# Patient Record
Sex: FEMALE | Race: WHITE | NOT HISPANIC OR LATINO | Employment: UNEMPLOYED | URBAN - METROPOLITAN AREA
[De-identification: names, ages, dates, MRNs, and addresses within clinical notes are randomized per-mention and may not be internally consistent; named-entity substitution may affect disease eponyms.]

---

## 2022-04-15 ENCOUNTER — PRE-ADMISSION TESTING (OUTPATIENT)
Dept: PREADMISSION TESTING | Facility: HOSPITAL | Age: 62
End: 2022-04-15

## 2022-04-15 VITALS — HEIGHT: 65 IN | BODY MASS INDEX: 37.17 KG/M2 | WEIGHT: 223.11 LBS

## 2022-04-15 LAB
DEPRECATED RDW RBC AUTO: 47.7 FL (ref 37–54)
ERYTHROCYTE [DISTWIDTH] IN BLOOD BY AUTOMATED COUNT: 14.1 % (ref 12.3–15.4)
HBA1C MFR BLD: 5.2 % (ref 4.8–5.6)
HCT VFR BLD AUTO: 43.8 % (ref 34–46.6)
HGB BLD-MCNC: 14.4 G/DL (ref 12–15.9)
MCH RBC QN AUTO: 30.2 PG (ref 26.6–33)
MCHC RBC AUTO-ENTMCNC: 32.9 G/DL (ref 31.5–35.7)
MCV RBC AUTO: 91.8 FL (ref 79–97)
PLATELET # BLD AUTO: 287 10*3/MM3 (ref 140–450)
PMV BLD AUTO: 10 FL (ref 6–12)
QT INTERVAL: 370 MS
QTC INTERVAL: 432 MS
RBC # BLD AUTO: 4.77 10*6/MM3 (ref 3.77–5.28)
SARS-COV-2 RNA PNL SPEC NAA+PROBE: NOT DETECTED
WBC NRBC COR # BLD: 8.28 10*3/MM3 (ref 3.4–10.8)

## 2022-04-15 PROCEDURE — 36415 COLL VENOUS BLD VENIPUNCTURE: CPT

## 2022-04-15 PROCEDURE — 83036 HEMOGLOBIN GLYCOSYLATED A1C: CPT

## 2022-04-15 PROCEDURE — 93010 ELECTROCARDIOGRAM REPORT: CPT | Performed by: INTERNAL MEDICINE

## 2022-04-15 PROCEDURE — U0004 COV-19 TEST NON-CDC HGH THRU: HCPCS

## 2022-04-15 PROCEDURE — 93005 ELECTROCARDIOGRAM TRACING: CPT

## 2022-04-15 PROCEDURE — C9803 HOPD COVID-19 SPEC COLLECT: HCPCS

## 2022-04-15 PROCEDURE — 85027 COMPLETE CBC AUTOMATED: CPT

## 2022-04-15 RX ORDER — TIZANIDINE 4 MG/1
4 TABLET ORAL AS NEEDED
Status: ON HOLD | COMMUNITY
Start: 2022-03-21 | End: 2022-10-20

## 2022-04-15 RX ORDER — GABAPENTIN 300 MG/1
300 CAPSULE ORAL 3 TIMES DAILY PRN
Status: ON HOLD | COMMUNITY
Start: 2022-03-21 | End: 2022-10-20

## 2022-04-15 RX ORDER — RIVAROXABAN 20 MG/1
20 TABLET, FILM COATED ORAL DAILY
Status: ON HOLD | COMMUNITY
Start: 2022-03-21 | End: 2022-10-17

## 2022-04-15 RX ORDER — AMLODIPINE BESYLATE 10 MG/1
10 TABLET ORAL DAILY
Status: ON HOLD | COMMUNITY
Start: 2022-03-21 | End: 2022-12-05

## 2022-04-15 RX ORDER — PRIMIDONE 50 MG/1
150 TABLET ORAL NIGHTLY
COMMUNITY
Start: 2022-03-21 | End: 2022-10-22 | Stop reason: HOSPADM

## 2022-04-15 RX ORDER — ATORVASTATIN CALCIUM 40 MG/1
40 TABLET, FILM COATED ORAL DAILY
COMMUNITY
Start: 2022-03-21

## 2022-04-15 RX ORDER — MELATONIN
3000 2 TIMES DAILY
COMMUNITY
Start: 2022-03-21

## 2022-04-15 RX ORDER — CYANOCOBALAMIN 1000 UG/ML
1000 INJECTION, SOLUTION INTRAMUSCULAR; SUBCUTANEOUS
Status: ON HOLD | COMMUNITY
Start: 2022-03-21 | End: 2022-10-20

## 2022-04-15 RX ORDER — LIOTHYRONINE SODIUM 5 UG/1
5 TABLET ORAL DAILY
COMMUNITY
Start: 2022-03-21

## 2022-04-15 RX ORDER — ESTRADIOL 1 MG/1
1 TABLET ORAL DAILY
Status: ON HOLD | COMMUNITY
End: 2022-10-17

## 2022-04-15 RX ORDER — LEVOTHYROXINE SODIUM 112 UG/1
224 CAPSULE ORAL DAILY
Status: ON HOLD | COMMUNITY
Start: 2022-03-31 | End: 2022-10-20

## 2022-04-15 NOTE — PAT
Patient viewed general [facility] education video as instructed in their preoperative information received from their surgeon.  Patient stated the general [facility] education video was viewed in its entirety and survey completed.  Copies of [facility] general education handouts (Incentive Spirometry, Meds to Beds Program, Patient Belongings, Pre-op skin preparation instructions, Blood Glucose testing, Visitor policy, Surgery FAQ, Code H) distributed to patient if not printed. Education related to the PAT pass and skin preparation for surgery (if applicable) completed in PAT as a reinforcement to PAT education video. Patient instructed to return PAT pass provided today as well as completed skin preparation sheet (if applicable) on the day of procedure.     Additionally if patient had not viewed video yet but intended to view it at home or in our waiting area, then referred them to the handout with QR code/link provided during PAT visit.  Instructed patient to complete survey after viewing the video in its entirety.  Encouraged patient/family to read [facility] general education handouts thoroughly and notify PAT staff with any questions or concerns. Patient verbalized understanding of all information and priority content.    Patient to apply Chlorhexadine wipes  to surgical area (as instructed) the night before procedure and the AM of procedure. Wipes provided.    Covid test done in PAT, pt reports she tested positive for Covid in February 2022 in [place] but could not find result.  Asymptomatic in PAT today.     Pt reports she is stopping Xarelto 48 hours prior to procedure.  Pt reports last dose will be on 4/15/22.

## 2022-04-17 ENCOUNTER — ANESTHESIA EVENT (OUTPATIENT)
Dept: PERIOP | Facility: HOSPITAL | Age: 62
End: 2022-04-17

## 2022-04-17 RX ORDER — SODIUM CHLORIDE 0.9 % (FLUSH) 0.9 %
10 SYRINGE (ML) INJECTION EVERY 12 HOURS SCHEDULED
Status: CANCELLED | OUTPATIENT
Start: 2022-04-17

## 2022-04-17 RX ORDER — FAMOTIDINE 10 MG/ML
20 INJECTION, SOLUTION INTRAVENOUS ONCE
Status: CANCELLED | OUTPATIENT
Start: 2022-04-17 | End: 2022-04-17

## 2022-04-18 ENCOUNTER — ANESTHESIA (OUTPATIENT)
Dept: PERIOP | Facility: HOSPITAL | Age: 62
End: 2022-04-18

## 2022-04-18 ENCOUNTER — APPOINTMENT (OUTPATIENT)
Dept: GENERAL RADIOLOGY | Facility: HOSPITAL | Age: 62
End: 2022-04-18

## 2022-04-18 ENCOUNTER — HOSPITAL ENCOUNTER (OUTPATIENT)
Facility: HOSPITAL | Age: 62
Discharge: HOME | End: 2022-04-18
Attending: SURGERY | Admitting: SURGERY

## 2022-04-18 VITALS
BODY MASS INDEX: 37.1 KG/M2 | OXYGEN SATURATION: 94 % | RESPIRATION RATE: 16 BRPM | SYSTOLIC BLOOD PRESSURE: 120 MMHG | DIASTOLIC BLOOD PRESSURE: 87 MMHG | TEMPERATURE: 97.8 F | WEIGHT: 222.66 LBS | HEIGHT: 65 IN | HEART RATE: 72 BPM

## 2022-04-18 PROBLEM — I72.4 POPLITEAL ARTERY ANEURYSM: Status: ACTIVE | Noted: 2022-04-18

## 2022-04-18 PROBLEM — I72.4 POPLITEAL ANEURYSM: Status: ACTIVE | Noted: 2022-04-18

## 2022-04-18 PROCEDURE — 25010000002 PHENYLEPHRINE 10 MG/ML SOLUTION: Performed by: NURSE ANESTHETIST, CERTIFIED REGISTERED

## 2022-04-18 PROCEDURE — 25010000002 HEPARIN (PORCINE) PER 1000 UNITS: Performed by: NURSE ANESTHETIST, CERTIFIED REGISTERED

## 2022-04-18 PROCEDURE — C1894 INTRO/SHEATH, NON-LASER: HCPCS | Performed by: SURGERY

## 2022-04-18 PROCEDURE — 25010000002 HYDROMORPHONE 1 MG/ML SOLUTION

## 2022-04-18 PROCEDURE — C1874 STENT, COATED/COV W/DEL SYS: HCPCS | Performed by: SURGERY

## 2022-04-18 PROCEDURE — 25010000002 PROPOFOL 10 MG/ML EMULSION: Performed by: NURSE ANESTHETIST, CERTIFIED REGISTERED

## 2022-04-18 PROCEDURE — C1760 CLOSURE DEV, VASC: HCPCS | Performed by: SURGERY

## 2022-04-18 PROCEDURE — 25010000002 CEFAZOLIN IN DEXTROSE 2-4 GM/100ML-% SOLUTION: Performed by: SURGERY

## 2022-04-18 PROCEDURE — C1725 CATH, TRANSLUMIN NON-LASER: HCPCS | Performed by: SURGERY

## 2022-04-18 PROCEDURE — 0 IODIXANOL PER 1 ML: Performed by: SURGERY

## 2022-04-18 PROCEDURE — 25010000002 HEPARIN (PORCINE) PER 1000 UNITS: Performed by: SURGERY

## 2022-04-18 PROCEDURE — 0 LIDOCAINE 1 % SOLUTION: Performed by: SURGERY

## 2022-04-18 PROCEDURE — C1753 CATH, INTRAVAS ULTRASOUND: HCPCS | Performed by: SURGERY

## 2022-04-18 PROCEDURE — 25010000002 MIDAZOLAM PER 1 MG: Performed by: ANESTHESIOLOGY

## 2022-04-18 PROCEDURE — C1769 GUIDE WIRE: HCPCS | Performed by: SURGERY

## 2022-04-18 PROCEDURE — 25010000002 PROTAMINE SULFATE PER 10 MG: Performed by: NURSE ANESTHETIST, CERTIFIED REGISTERED

## 2022-04-18 PROCEDURE — 25010000002 ONDANSETRON PER 1 MG: Performed by: NURSE ANESTHETIST, CERTIFIED REGISTERED

## 2022-04-18 PROCEDURE — 25010000002 DEXAMETHASONE PER 1 MG: Performed by: NURSE ANESTHETIST, CERTIFIED REGISTERED

## 2022-04-18 DEVICE — STENTGR ENDOPROSTH VIABAHN RO HEP 7F 8MM 10X120CM: Type: IMPLANTABLE DEVICE | Site: ARTERIAL | Status: FUNCTIONAL

## 2022-04-18 RX ORDER — HYDROMORPHONE HYDROCHLORIDE 1 MG/ML
0.5 INJECTION, SOLUTION INTRAMUSCULAR; INTRAVENOUS; SUBCUTANEOUS
Status: DISCONTINUED | OUTPATIENT
Start: 2022-04-18 | End: 2022-04-18 | Stop reason: HOSPADM

## 2022-04-18 RX ORDER — IODIXANOL 320 MG/ML
INJECTION, SOLUTION INTRAVASCULAR AS NEEDED
Status: DISCONTINUED | OUTPATIENT
Start: 2022-04-18 | End: 2022-04-18 | Stop reason: HOSPADM

## 2022-04-18 RX ORDER — CEFAZOLIN SODIUM 2 G/100ML
2 INJECTION, SOLUTION INTRAVENOUS ONCE
Status: COMPLETED | OUTPATIENT
Start: 2022-04-18 | End: 2022-04-18

## 2022-04-18 RX ORDER — PROTAMINE SULFATE 10 MG/ML
INJECTION, SOLUTION INTRAVENOUS AS NEEDED
Status: DISCONTINUED | OUTPATIENT
Start: 2022-04-18 | End: 2022-04-18 | Stop reason: SURG

## 2022-04-18 RX ORDER — LIDOCAINE HYDROCHLORIDE 10 MG/ML
INJECTION, SOLUTION INFILTRATION; PERINEURAL AS NEEDED
Status: DISCONTINUED | OUTPATIENT
Start: 2022-04-18 | End: 2022-04-18 | Stop reason: HOSPADM

## 2022-04-18 RX ORDER — LIOTHYRONINE SODIUM 5 UG/1
5 TABLET ORAL DAILY
Status: CANCELLED | OUTPATIENT
Start: 2022-04-18

## 2022-04-18 RX ORDER — MIDAZOLAM HYDROCHLORIDE 1 MG/ML
1 INJECTION INTRAMUSCULAR; INTRAVENOUS
Status: DISCONTINUED | OUTPATIENT
Start: 2022-04-18 | End: 2022-04-18 | Stop reason: HOSPADM

## 2022-04-18 RX ORDER — AMLODIPINE BESYLATE 10 MG/1
10 TABLET ORAL DAILY
Status: CANCELLED | OUTPATIENT
Start: 2022-04-18

## 2022-04-18 RX ORDER — HYDROCODONE BITARTRATE AND ACETAMINOPHEN 5; 325 MG/1; MG/1
1 TABLET ORAL EVERY 4 HOURS PRN
Status: DISCONTINUED | OUTPATIENT
Start: 2022-04-18 | End: 2022-04-18 | Stop reason: HOSPADM

## 2022-04-18 RX ORDER — PRIMIDONE 50 MG/1
50 TABLET ORAL DAILY
Status: CANCELLED | OUTPATIENT
Start: 2022-04-18

## 2022-04-18 RX ORDER — ATORVASTATIN CALCIUM 40 MG/1
40 TABLET, FILM COATED ORAL DAILY
Status: CANCELLED | OUTPATIENT
Start: 2022-04-18

## 2022-04-18 RX ORDER — SODIUM CHLORIDE, SODIUM LACTATE, POTASSIUM CHLORIDE, CALCIUM CHLORIDE 600; 310; 30; 20 MG/100ML; MG/100ML; MG/100ML; MG/100ML
9 INJECTION, SOLUTION INTRAVENOUS CONTINUOUS
Status: DISCONTINUED | OUTPATIENT
Start: 2022-04-18 | End: 2022-04-18 | Stop reason: HOSPADM

## 2022-04-18 RX ORDER — LIDOCAINE HYDROCHLORIDE 10 MG/ML
0.5 INJECTION, SOLUTION EPIDURAL; INFILTRATION; INTRACAUDAL; PERINEURAL ONCE AS NEEDED
Status: COMPLETED | OUTPATIENT
Start: 2022-04-18 | End: 2022-04-18

## 2022-04-18 RX ORDER — ONDANSETRON 2 MG/ML
4 INJECTION INTRAMUSCULAR; INTRAVENOUS ONCE AS NEEDED
Status: DISCONTINUED | OUTPATIENT
Start: 2022-04-18 | End: 2022-04-18 | Stop reason: HOSPADM

## 2022-04-18 RX ORDER — NALOXONE HCL 0.4 MG/ML
0.4 VIAL (ML) INJECTION
Status: DISCONTINUED | OUTPATIENT
Start: 2022-04-18 | End: 2022-04-18 | Stop reason: HOSPADM

## 2022-04-18 RX ORDER — PHENYLEPHRINE HYDROCHLORIDE 10 MG/ML
INJECTION INTRAVENOUS AS NEEDED
Status: DISCONTINUED | OUTPATIENT
Start: 2022-04-18 | End: 2022-04-18 | Stop reason: SURG

## 2022-04-18 RX ORDER — GABAPENTIN 300 MG/1
300 CAPSULE ORAL NIGHTLY
Status: CANCELLED | OUTPATIENT
Start: 2022-04-18

## 2022-04-18 RX ORDER — PROPOFOL 10 MG/ML
VIAL (ML) INTRAVENOUS AS NEEDED
Status: DISCONTINUED | OUTPATIENT
Start: 2022-04-18 | End: 2022-04-18 | Stop reason: SURG

## 2022-04-18 RX ORDER — DEXAMETHASONE SODIUM PHOSPHATE 4 MG/ML
INJECTION, SOLUTION INTRA-ARTICULAR; INTRALESIONAL; INTRAMUSCULAR; INTRAVENOUS; SOFT TISSUE AS NEEDED
Status: DISCONTINUED | OUTPATIENT
Start: 2022-04-18 | End: 2022-04-18 | Stop reason: SURG

## 2022-04-18 RX ORDER — ESTRADIOL 0.5 MG/1
1 TABLET ORAL DAILY
Status: CANCELLED | OUTPATIENT
Start: 2022-04-18

## 2022-04-18 RX ORDER — SODIUM CHLORIDE 0.9 % (FLUSH) 0.9 %
10 SYRINGE (ML) INJECTION AS NEEDED
Status: DISCONTINUED | OUTPATIENT
Start: 2022-04-18 | End: 2022-04-18 | Stop reason: HOSPADM

## 2022-04-18 RX ORDER — CYANOCOBALAMIN 1000 UG/ML
1000 INJECTION, SOLUTION INTRAMUSCULAR; SUBCUTANEOUS
Status: CANCELLED | OUTPATIENT
Start: 2022-04-18

## 2022-04-18 RX ORDER — FAMOTIDINE 20 MG/1
20 TABLET, FILM COATED ORAL ONCE
Status: COMPLETED | OUTPATIENT
Start: 2022-04-18 | End: 2022-04-18

## 2022-04-18 RX ORDER — HEPARIN SODIUM 1000 [USP'U]/ML
INJECTION, SOLUTION INTRAVENOUS; SUBCUTANEOUS AS NEEDED
Status: DISCONTINUED | OUTPATIENT
Start: 2022-04-18 | End: 2022-04-18 | Stop reason: SURG

## 2022-04-18 RX ORDER — ONDANSETRON 2 MG/ML
INJECTION INTRAMUSCULAR; INTRAVENOUS AS NEEDED
Status: DISCONTINUED | OUTPATIENT
Start: 2022-04-18 | End: 2022-04-18 | Stop reason: SURG

## 2022-04-18 RX ORDER — SODIUM CHLORIDE 9 MG/ML
100 INJECTION, SOLUTION INTRAVENOUS CONTINUOUS
Status: DISCONTINUED | OUTPATIENT
Start: 2022-04-18 | End: 2022-04-18 | Stop reason: HOSPADM

## 2022-04-18 RX ORDER — BUPIVACAINE HYDROCHLORIDE 5 MG/ML
INJECTION, SOLUTION EPIDURAL; INTRACAUDAL AS NEEDED
Status: DISCONTINUED | OUTPATIENT
Start: 2022-04-18 | End: 2022-04-18 | Stop reason: HOSPADM

## 2022-04-18 RX ORDER — MELATONIN
1000 DAILY
Status: CANCELLED | OUTPATIENT
Start: 2022-04-18

## 2022-04-18 RX ORDER — FENTANYL CITRATE 50 UG/ML
50 INJECTION, SOLUTION INTRAMUSCULAR; INTRAVENOUS
Status: DISCONTINUED | OUTPATIENT
Start: 2022-04-18 | End: 2022-04-18 | Stop reason: HOSPADM

## 2022-04-18 RX ADMIN — PHENYLEPHRINE HYDROCHLORIDE 200 MCG: 10 INJECTION INTRAVENOUS at 10:25

## 2022-04-18 RX ADMIN — PROPOFOL 50 MG: 10 INJECTION, EMULSION INTRAVENOUS at 09:59

## 2022-04-18 RX ADMIN — SODIUM CHLORIDE, POTASSIUM CHLORIDE, SODIUM LACTATE AND CALCIUM CHLORIDE 9 ML/HR: 600; 310; 30; 20 INJECTION, SOLUTION INTRAVENOUS at 08:45

## 2022-04-18 RX ADMIN — HYDROMORPHONE HYDROCHLORIDE 0.5 MG: 1 INJECTION, SOLUTION INTRAMUSCULAR; INTRAVENOUS; SUBCUTANEOUS at 11:00

## 2022-04-18 RX ADMIN — FAMOTIDINE 20 MG: 20 TABLET ORAL at 08:46

## 2022-04-18 RX ADMIN — ONDANSETRON 4 MG: 2 INJECTION INTRAMUSCULAR; INTRAVENOUS at 09:59

## 2022-04-18 RX ADMIN — PHENYLEPHRINE HYDROCHLORIDE 100 MCG: 10 INJECTION INTRAVENOUS at 10:10

## 2022-04-18 RX ADMIN — DEXAMETHASONE SODIUM PHOSPHATE 4 MG: 4 INJECTION, SOLUTION INTRA-ARTICULAR; INTRALESIONAL; INTRAMUSCULAR; INTRAVENOUS; SOFT TISSUE at 09:59

## 2022-04-18 RX ADMIN — LIDOCAINE HYDROCHLORIDE 0.5 ML: 10 INJECTION, SOLUTION EPIDURAL; INFILTRATION; INTRACAUDAL; PERINEURAL at 08:45

## 2022-04-18 RX ADMIN — MIDAZOLAM HYDROCHLORIDE 2 MG: 1 INJECTION, SOLUTION INTRAMUSCULAR; INTRAVENOUS at 09:05

## 2022-04-18 RX ADMIN — PHENYLEPHRINE HYDROCHLORIDE 100 MCG: 10 INJECTION INTRAVENOUS at 10:19

## 2022-04-18 RX ADMIN — PROPOFOL 100 MCG/KG/MIN: 10 INJECTION, EMULSION INTRAVENOUS at 09:59

## 2022-04-18 RX ADMIN — PROTAMINE SULFATE 20 MG: 10 INJECTION, SOLUTION INTRAVENOUS at 10:42

## 2022-04-18 RX ADMIN — HEPARIN SODIUM 8000 UNITS: 1000 INJECTION, SOLUTION INTRAVENOUS; SUBCUTANEOUS at 10:14

## 2022-04-18 RX ADMIN — CEFAZOLIN SODIUM 2 G: 2 INJECTION, SOLUTION INTRAVENOUS at 09:59

## 2022-04-18 NOTE — CASE MANAGEMENT/SOCIAL WORK
Discharge Planning Assessment  [facility]     Patient Name: Madelaine Chavez  MRN: [id number]  Today's Date: 4/18/2022    Admit Date: 4/18/2022     Discharge Needs Assessment    No documentation.                Discharge Plan     Row Name 04/18/22 1452       Plan    Plan home    Provided Post Acute Provider List? N/A    Provided Post Acute Provider Quality & Resource List? N/A    Patient/Family in Agreement with Plan yes    Plan Comments CM spoke with pt and granddaughter Maximino and bedside. Pt resides in [facility] and is independent of adls. Pt denies use of DME and is not current with home health or outpatient medical services. Pt denies having a living will or legal POA. Pt has received her covid vaccinations. PCP is DUANE Torres in [facility]. Pt has [employer] [place] medicaid and denies concerns or disruption in coverage. Pt has prescription drug coverage and denies issues obtaining or affording current medications. Pt plans to return home and will have assistance from his son Juan and daughter in law who reside with her. Pt will have private transportation home.    Final Discharge Disposition Code 01 - home or self-care              Continued Care and Services - Admitted Since 4/18/2022    Coordination has not been started for this encounter.       Expected Discharge Date and Time     Expected Discharge Date Expected Discharge Time    Apr 18, 2022          Demographic Summary    No documentation.                Functional Status    No documentation.                Psychosocial    No documentation.                Abuse/Neglect    No documentation.                Legal    No documentation.                Substance Abuse    No documentation.                Patient Forms    No documentation.                   Natasha Castro RN

## 2022-04-18 NOTE — H&P
Pre-Op H&P  Madelaine Chavez  [id number]  1960      Chief complaint: RLE pain      Subjective:  Patient is a 62 y.o.female presents for scheduled surgery by Dr. Linn. She anticipates a RIGHT LOWER EXTREMITY ANGIOGRAM IVUS, POSSIBLE POPLITEAL ARTERY STENT today. 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 She had the left popliteal artery repaired about 3 weeks ago; which she recovered well.      Review of Systems:  Constitutional-- No fever, chills or sweats. No fatigue.  CV-- No chest pain, palpitation or syncope. +HTN, HLD  Resp-- No SOB, cough, hemoptysis  Skin--No rashes or lesions      Allergies: No Known Allergies      Home Meds:  Medications Prior to Admission   Medication Sig Dispense Refill Last Dose   • amLODIPine (NORVASC) 10 MG tablet Take 10 mg by mouth Daily.   4/18/2022 at Unknown time   • atorvastatin (LIPITOR) 40 MG tablet Take 40 mg by mouth Daily.   4/17/2022 at Unknown time   • cholecalciferol (VITAMIN D3) 25 MCG (1000 UT) tablet Take 1,000 Units by mouth Daily.   4/17/2022 at Unknown time   • cyanocobalamin 1000 MCG/ML injection Inject 1,000 mcg under the skin into the appropriate area as directed Every 28 (Twenty-Eight) Days.   Past Month at Unknown time   • estradiol (ESTRACE) 1 MG tablet Take 1 mg by mouth Daily.   4/17/2022 at Unknown time   • gabapentin (NEURONTIN) 300 MG capsule Take 300 mg by mouth As Needed.   Past Month at Unknown time   • liothyronine (CYTOMEL) 5 MCG tablet Take 5 mcg by mouth Daily.   4/17/2022 at Unknown time   • primidone (MYSOLINE) 50 MG tablet Take 50 mg by mouth Daily.   4/18/2022 at Unknown time   • Tirosint 112 MCG capsule Take 112 mcg by mouth Daily.   4/17/2022 at Unknown time   • tiZANidine (ZANAFLEX) 4 MG tablet Take 4 mg by mouth As Needed.   Past Month at Unknown time   • Xarelto 20 MG tablet Take 20 mg by mouth Daily.   04/15/2021         PMH:   Past Medical History:   Diagnosis Date   • Cancer  "(HCC)     Vulvar dysplasia   • Elevated cholesterol    • Full dentures    • GERD (gastroesophageal reflux disease)    • History of transfusion     auto transfusion   • Wears glasses      PSH:    Past Surgical History:   Procedure Laterality Date   • ABDOMINAL SURGERY      Portion of bowel removed as a child and lap band surgery as an adult.   • BACK SURGERY     •  SECTION     • COLONOSCOPY     • HERNIA REPAIR      ventral hernia repair with mesh   • HYSTERECTOMY      total   • JOINT REPLACEMENT Bilateral    • LAPAROSCOPIC CHOLECYSTECTOMY     • THYROIDECTOMY, PARTIAL     • VULVA SURGERY       Simple Partial Vulvectomy       Immunization History:  Influenza:   Pneumococcal: UTD  Tetanus: UTD  Covid x2:     Social History:   Tobacco:   Social History     Tobacco Use   Smoking Status Former Smoker   • Quit date: 2021   • Years since quittin.3   Smokeless Tobacco Never Used      Alcohol:     Social History     Substance and Sexual Activity   Alcohol Use Not Currently         Physical Exam:/89 (BP Location: Right arm, Patient Position: Lying)   Pulse 84   Temp 98 °F (36.7 °C) (Temporal)   Resp 18   Ht 165.1 cm (65\")   Wt 101 kg (222 lb 10.6 oz)   SpO2 98%   BMI 37.05 kg/m²       General Appearance:    Alert, cooperative, no distress, appears stated age   Head:    Normocephalic, without obvious abnormality, atraumatic   Lungs:     Clear to auscultation bilaterally, respirations unlabored    Heart:   Regular rate and rhythm, S1 and S2 normal    Abdomen:    Soft without tenderness   Extremities:   Extremities normal, atraumatic, no cyanosis or edema   Skin:   Skin color, texture, turgor normal, no rashes or lesions   Neurologic:   Grossly intact     Results Review:     LABS:  Lab Results   Component Value Date    WBC 8.28 04/15/2022    HGB 14.4 04/15/2022    HCT 43.8 04/15/2022    MCV 91.8 04/15/2022     04/15/2022       RADIOLOGY:  Imaging Results (Last 72 Hours)     ** No " results found for the last 72 hours. **          I reviewed the patient's new clinical results.    Cancer Staging (if applicable)  Cancer Patient: __ yes __no __unknown; If yes, clinical stage T:__ N:__M:__, stage group or __N/A      Impression: Popliteal artery aneurysm       Plan: RIGHT LOWER EXTREMITY ANGIOGRAM IVUS, POSSIBLE POPLITEAL ARTERY STENT      Rachel Howard, APRN   4/18/2022   08:54 EDT

## 2022-04-18 NOTE — OP NOTE
AORTAGRAM WITH OR WITHOUT RUNOFFS POSSIBLE STENT  Procedure Report    Patient Name:  Madelaine Chavez  YOB: 1960    Date of Surgery:  4/18/2022     Indications: This is a 62-year-old female who presented with left sided blue toe syndrome and pain.  Patient underwent work-up and was found to have small bilateral popliteal artery aneurysms with mural thrombus.  She underwent successful endovascular treatment of her left-sided popliteal artery aneurysm and now presents for stenting of her right popliteal artery.    Pre-op Diagnosis:   Asymptomatic right popliteal artery aneurysm with mural thrombus       Post-Op Diagnosis Codes:  Asymptomatic right popliteal artery aneurysm with mural thrombus    Procedure/CPT® Codes:      Procedure(s):  1.  Left common femoral artery percutaneous ultrasound-guided access  2.  Right lower extremity angiogram  3.  Intravascular ultrasound of right superficial femoral and popliteal arteries  4.  Right popliteal artery stenting (8x10 cm viabahn)    Staff:  Surgeon(s):  Jeremy Linn MD    Circulator: Haase, Sherri L, RN; Mary Jo Mejia RN  Radiology Technologist: Harris Santos  Scrub Person: Yaritza Stevens  Nursing Assistant: Fela Christensen PCT           Anesthesia: Monitored Anesthesia Care    Estimated Blood Loss: none    Implants:    Implant Name Type Inv. Item Serial No.  Lot No. LRB No. Used Action   STENTGR ENDOPROSTH VIABAHN RO HEP 7F [id number] - [id number] Implant STENTGR ENDOPROSTH VIABAHN RO HEP 7F 8MM [id number]  WL [employer] RAFAT[employer]  Right 1 Implanted       Specimen:          none        Findings: successful coverage of mural thrombus of the popliteal artery    Complications: none    Description of Procedure: Patient was brought to the operating, and laid on the table in the supine position.  Patient's bilateral groins were prepped and draped in standard surgical fashion.  Timeout was performed.  Left common femoral artery was identified  with ultrasound guidance.  Skin and subcutaneous tissues were infiltrated with local anesthetic.  The vessel was accessed utilizing micropuncture technique.  7 Angolan sheath was placed.  [place] was administered.  Flush catheter was advanced into the infrarenal aorta.  Aortogram was not performed as it was recently done and showed no significant aortic pathology.  The flush catheter was advanced across the aortic bifurcation into the right external iliac artery.  Wire was advanced into the superficial femoral artery on the right.  Short 7 Angolan sheath was exchanged for 7 Angolan by 45 cm sheath.  A glide advantage wire was advanced into the distal popliteal artery and exchanged for a 0.018 system.  An angiogram was performed and this time.  This did not show significant aneurysmal degeneration.  There was proximal and mid popliteal artery anatomy was obscured due to presence of knee hardware.  At this time an intravascular ultrasound catheter was advanced through the superficial femoral and popliteal arteries.  This allowed us to identify and measure the sizes of the vessels and also noted significant amount of thrombus lining is slightly dilated popliteal artery.  The diameter of the vessel measured no greater than 9 mm.  However there was significant amount of mural thrombus lining the vessel.  The vessel was smaller in proximal and distal aspect and measured about 7 mm.  At this time given the presence of mural thrombus and slight dilation of the vessel we decided to proceed and covered this area with a Viabahn stent graft.  The 8 x 10 Viabahn stent graft was advanced and deployed.  It was then angioplastied with a 7 mm balloon.  Completion angiogram showed excellent flow through the treated segment without extravasation of contrast, dissection or stenosis.  At this time procedure was terminated.  Heparin effect was reversed with protamine.  The sheath was removed and access site was closed with a Perclose closure  device.  Sterile dressing was applied.  Patient tolerated the procedure well.  She was taken to recovery stable condition.      Jeremy Linn MD     Date: 4/18/2022  Time: 10:46 EDT

## 2022-04-18 NOTE — ANESTHESIA PREPROCEDURE EVALUATION
Anesthesia Evaluation     Patient summary reviewed and Nursing notes reviewed   NPO Solid Status: > 8 hours  NPO Liquid Status: > 2 hours           Airway   Mallampati: I  TM distance: >3 FB  Neck ROM: full  No difficulty expected  Dental    (+) upper dentures and lower dentures    Pulmonary     breath sounds clear to auscultation  Cardiovascular     ECG reviewed  Rhythm: regular  Rate: normal    (+) hyperlipidemia,       Neuro/Psych  GI/Hepatic/Renal/Endo    (+) obesity,  GERD,      Musculoskeletal     Abdominal    Substance History      OB/GYN          Other      history of cancer                  Anesthesia Plan    ASA 3     MAC     intravenous induction     Anesthetic plan, all risks, benefits, and alternatives have been provided, discussed and informed consent has been obtained with: patient.    Plan discussed with CRNA.        CODE STATUS:

## 2022-04-18 NOTE — ANESTHESIA POSTPROCEDURE EVALUATION
Patient: Madelaine Chavez    Procedure Summary     Date: 04/18/22 Room / Location: [facility] OR 02 / [facility]    Anesthesia Start: 0955 Anesthesia Stop: 1056    Procedure: RIGHT LOWER EXTREMITY ANGIOGRAM IVUS, right POPLITEAL ARTERY STENT (N/A Abdomen) Diagnosis:     Surgeons: Jeremy Linn MD Provider: Toan Kessler MD    Anesthesia Type: MAC ASA Status: 3          Anesthesia Type: MAC    Vitals  Vitals Value Taken Time   BP     Temp     Pulse     Resp     SpO2 95 % 04/18/22 1056           Post Anesthesia Care and Evaluation    Patient location during evaluation: PACU  Patient participation: complete - patient participated  Level of consciousness: awake and alert  Pain management: adequate  Airway patency: patent  Anesthetic complications: No anesthetic complications  PONV Status: none  Cardiovascular status: hemodynamically stable and acceptable  Respiratory status: nonlabored ventilation, acceptable and nasal cannula  Hydration status: acceptable

## 2022-10-12 ENCOUNTER — PRE-ADMISSION TESTING (OUTPATIENT)
Dept: PREADMISSION TESTING | Facility: HOSPITAL | Age: 62
End: 2022-10-12

## 2022-10-12 VITALS — WEIGHT: 217.37 LBS | BODY MASS INDEX: 36.22 KG/M2 | HEIGHT: 65 IN

## 2022-10-12 LAB
ANION GAP SERPL CALCULATED.3IONS-SCNC: 11 MMOL/L (ref 5–15)
APTT PPP: 34.8 SECONDS (ref 22–39)
BUN SERPL-MCNC: 10 MG/DL (ref 8–23)
BUN/CREAT SERPL: 14.3 (ref 7–25)
CALCIUM SPEC-SCNC: 9.3 MG/DL (ref 8.6–10.5)
CHLORIDE SERPL-SCNC: 105 MMOL/L (ref 98–107)
CO2 SERPL-SCNC: 24 MMOL/L (ref 22–29)
CREAT SERPL-MCNC: 0.7 MG/DL (ref 0.57–1)
DEPRECATED RDW RBC AUTO: 44.4 FL (ref 37–54)
EGFRCR SERPLBLD CKD-EPI 2021: 99.2 ML/MIN/1.73
ERYTHROCYTE [DISTWIDTH] IN BLOOD BY AUTOMATED COUNT: 13.9 % (ref 12.3–15.4)
GLUCOSE SERPL-MCNC: 102 MG/DL (ref 65–99)
HBA1C MFR BLD: 5.2 % (ref 4.8–5.6)
HCT VFR BLD AUTO: 39.5 % (ref 34–46.6)
HGB BLD-MCNC: 12.7 G/DL (ref 12–15.9)
INR PPP: 1.18 (ref 0.84–1.13)
MCH RBC QN AUTO: 28 PG (ref 26.6–33)
MCHC RBC AUTO-ENTMCNC: 32.2 G/DL (ref 31.5–35.7)
MCV RBC AUTO: 87 FL (ref 79–97)
PLATELET # BLD AUTO: 351 10*3/MM3 (ref 140–450)
PMV BLD AUTO: 9.6 FL (ref 6–12)
POTASSIUM SERPL-SCNC: 3.9 MMOL/L (ref 3.5–5.2)
PROTHROMBIN TIME: 15 SECONDS (ref 11.4–14.4)
QT INTERVAL: 328 MS
QTC INTERVAL: 420 MS
RBC # BLD AUTO: 4.54 10*6/MM3 (ref 3.77–5.28)
SODIUM SERPL-SCNC: 140 MMOL/L (ref 136–145)
WBC NRBC COR # BLD: 5.73 10*3/MM3 (ref 3.4–10.8)

## 2022-10-12 PROCEDURE — 85027 COMPLETE CBC AUTOMATED: CPT

## 2022-10-12 PROCEDURE — 93005 ELECTROCARDIOGRAM TRACING: CPT

## 2022-10-12 PROCEDURE — 80048 BASIC METABOLIC PNL TOTAL CA: CPT

## 2022-10-12 PROCEDURE — 85610 PROTHROMBIN TIME: CPT

## 2022-10-12 PROCEDURE — 36415 COLL VENOUS BLD VENIPUNCTURE: CPT

## 2022-10-12 PROCEDURE — 85730 THROMBOPLASTIN TIME PARTIAL: CPT

## 2022-10-12 PROCEDURE — 83036 HEMOGLOBIN GLYCOSYLATED A1C: CPT

## 2022-10-12 PROCEDURE — 93010 ELECTROCARDIOGRAM REPORT: CPT | Performed by: INTERNAL MEDICINE

## 2022-10-12 RX ORDER — ALLOPURINOL 100 MG/1
100 TABLET ORAL DAILY
COMMUNITY

## 2022-10-12 NOTE — PAT
Patient viewed general PAT education video as instructed in their preoperative information received from their surgeon.  Patient stated the general PAT education video was viewed in its entirety and survey completed.  Copies of [facility] general education handouts (Incentive Spirometry, Meds to Beds Program, Patient Belongings, Pre-op skin preparation instructions, Blood Glucose testing, Visitor policy, Surgery FAQ, Code H) distributed to patient if not printed. Education related to the PAT pass and skin preparation for surgery (if applicable) completed in PAT as a reinforcement to PAT education video. Patient instructed to return PAT pass provided today as well as completed skin preparation sheet (if applicable) on the day of procedure.     Additionally if patient had not viewed video yet but intended to view it at home or in our waiting area, then referred them to the handout with QR code/link provided during PAT visit.  Instructed patient to complete survey after viewing the video in its entirety.  Encouraged patient/family to read [facility] general education handouts thoroughly and notify PAT staff with any questions or concerns. Patient verbalized understanding of all information and priority content.    An arrival time for procedure was not provided during PAT visit. If patient had any questions or concerns about their arrival time, they were instructed to contact their surgeon/physician.  Additionally, if the patient referred to an arrival time that was acquired from their my chart account, patient was encouraged to verify that time with their surgeon/physician. Arrival times are NOT provided in Pre Admission Testing Department.    Patient to apply Chlorhexadine wipes  to surgical area (as instructed) the night before procedure and the AM of procedure. Wipes provided.    Patient denies any current skin issues.     PATIENT TO HOLD ELIQUIS 48 HOURS PRIOR TO SURGERY. WRITTEN INSTRUCTIONS IN CHART FROM DR HALL. PATIENT  VERBALIZES UNDERSTANDING

## 2022-10-16 ENCOUNTER — ANESTHESIA EVENT (OUTPATIENT)
Dept: PERIOP | Facility: HOSPITAL | Age: 62
End: 2022-10-16

## 2022-10-16 RX ORDER — FAMOTIDINE 10 MG/ML
20 INJECTION, SOLUTION INTRAVENOUS ONCE
Status: CANCELLED | OUTPATIENT
Start: 2022-10-16 | End: 2022-10-16

## 2022-10-17 ENCOUNTER — APPOINTMENT (OUTPATIENT)
Dept: GENERAL RADIOLOGY | Facility: HOSPITAL | Age: 62
End: 2022-10-17

## 2022-10-17 ENCOUNTER — ANESTHESIA (OUTPATIENT)
Dept: PERIOP | Facility: HOSPITAL | Age: 62
End: 2022-10-17

## 2022-10-17 ENCOUNTER — HOSPITAL ENCOUNTER (INPATIENT)
Facility: HOSPITAL | Age: 62
LOS: 5 days | Discharge: HOME | End: 2022-10-22
Attending: SURGERY | Admitting: SURGERY

## 2022-10-17 DIAGNOSIS — I70.229 CRITICAL LIMB ISCHEMIA WITH HISTORY OF REVASCULARIZATION OF SAME EXTREMITY: Primary | ICD-10-CM

## 2022-10-17 DIAGNOSIS — Z98.890 CRITICAL LIMB ISCHEMIA WITH HISTORY OF REVASCULARIZATION OF SAME EXTREMITY: Primary | ICD-10-CM

## 2022-10-17 PROBLEM — M10.9 GOUT: Chronic | Status: ACTIVE | Noted: 2022-10-17

## 2022-10-17 PROBLEM — I10 HTN (HYPERTENSION): Status: ACTIVE | Noted: 2022-10-17

## 2022-10-17 PROBLEM — E66.9 CLASS 2 OBESITY IN ADULT: Status: ACTIVE | Noted: 2022-10-17

## 2022-10-17 PROBLEM — I71.40 AAA (ABDOMINAL AORTIC ANEURYSM): Status: ACTIVE | Noted: 2022-10-17

## 2022-10-17 PROBLEM — Z79.01 CHRONIC ANTICOAGULATION: Status: ACTIVE | Noted: 2022-10-17

## 2022-10-17 PROBLEM — K21.9 GERD (GASTROESOPHAGEAL REFLUX DISEASE): Chronic | Status: ACTIVE | Noted: 2022-10-17

## 2022-10-17 PROBLEM — M10.9 GOUT: Status: ACTIVE | Noted: 2022-10-17

## 2022-10-17 PROBLEM — Z87.891 FORMER SMOKER: Status: ACTIVE | Noted: 2022-10-17

## 2022-10-17 PROBLEM — I72.4 POPLITEAL ARTERY ANEURYSM: Chronic | Status: ACTIVE | Noted: 2022-04-18

## 2022-10-17 PROBLEM — E66.9 CLASS 2 OBESITY IN ADULT: Chronic | Status: ACTIVE | Noted: 2022-10-17

## 2022-10-17 PROBLEM — E89.0 POST-OPERATIVE HYPOTHYROIDISM: Status: ACTIVE | Noted: 2022-10-17

## 2022-10-17 PROBLEM — Z79.01 CHRONIC ANTICOAGULATION: Chronic | Status: ACTIVE | Noted: 2022-10-17

## 2022-10-17 PROBLEM — E78.5 DYSLIPIDEMIA: Chronic | Status: ACTIVE | Noted: 2022-10-17

## 2022-10-17 PROBLEM — Z87.891 FORMER SMOKER: Chronic | Status: ACTIVE | Noted: 2022-10-17

## 2022-10-17 PROBLEM — K21.9 GERD (GASTROESOPHAGEAL REFLUX DISEASE): Status: ACTIVE | Noted: 2022-10-17

## 2022-10-17 PROBLEM — E78.5 DYSLIPIDEMIA: Status: ACTIVE | Noted: 2022-10-17

## 2022-10-17 PROBLEM — I10 HTN (HYPERTENSION): Chronic | Status: ACTIVE | Noted: 2022-10-17

## 2022-10-17 PROBLEM — E89.0 POST-OPERATIVE HYPOTHYROIDISM: Chronic | Status: ACTIVE | Noted: 2022-10-17

## 2022-10-17 PROBLEM — I71.40 AAA (ABDOMINAL AORTIC ANEURYSM): Chronic | Status: ACTIVE | Noted: 2022-10-17

## 2022-10-17 LAB
BASOPHILS # BLD AUTO: 0.02 10*3/MM3 (ref 0–0.2)
BASOPHILS NFR BLD AUTO: 0.3 % (ref 0–1.5)
DEPRECATED RDW RBC AUTO: 43.5 FL (ref 37–54)
EOSINOPHIL # BLD AUTO: 0.02 10*3/MM3 (ref 0–0.4)
EOSINOPHIL NFR BLD AUTO: 0.3 % (ref 0.3–6.2)
ERYTHROCYTE [DISTWIDTH] IN BLOOD BY AUTOMATED COUNT: 14.1 % (ref 12.3–15.4)
HCT VFR BLD AUTO: 34.6 % (ref 34–46.6)
HGB BLD-MCNC: 11.3 G/DL (ref 12–15.9)
IMM GRANULOCYTES # BLD AUTO: 0.05 10*3/MM3 (ref 0–0.05)
IMM GRANULOCYTES NFR BLD AUTO: 0.7 % (ref 0–0.5)
LYMPHOCYTES # BLD AUTO: 0.84 10*3/MM3 (ref 0.7–3.1)
LYMPHOCYTES NFR BLD AUTO: 11.4 % (ref 19.6–45.3)
MCH RBC QN AUTO: 27.7 PG (ref 26.6–33)
MCHC RBC AUTO-ENTMCNC: 32.7 G/DL (ref 31.5–35.7)
MCV RBC AUTO: 84.8 FL (ref 79–97)
MONOCYTES # BLD AUTO: 0.16 10*3/MM3 (ref 0.1–0.9)
MONOCYTES NFR BLD AUTO: 2.2 % (ref 5–12)
NEUTROPHILS NFR BLD AUTO: 6.31 10*3/MM3 (ref 1.7–7)
NEUTROPHILS NFR BLD AUTO: 85.1 % (ref 42.7–76)
NRBC BLD AUTO-RTO: 0 /100 WBC (ref 0–0.2)
PLATELET # BLD AUTO: 379 10*3/MM3 (ref 140–450)
PMV BLD AUTO: 9.4 FL (ref 6–12)
RBC # BLD AUTO: 4.08 10*6/MM3 (ref 3.77–5.28)
WBC NRBC COR # BLD: 7.4 10*3/MM3 (ref 3.4–10.8)

## 2022-10-17 PROCEDURE — C1768 GRAFT, VASCULAR: HCPCS | Performed by: SURGERY

## 2022-10-17 PROCEDURE — 25010000002 CEFAZOLIN IN DEXTROSE 2-4 GM/100ML-% SOLUTION: Performed by: SURGERY

## 2022-10-17 PROCEDURE — 25010000002 FENTANYL CITRATE (PF) 50 MCG/ML SOLUTION: Performed by: NURSE ANESTHETIST, CERTIFIED REGISTERED

## 2022-10-17 PROCEDURE — 99233 SBSQ HOSP IP/OBS HIGH 50: CPT | Performed by: INTERNAL MEDICINE

## 2022-10-17 PROCEDURE — 25010000002 PROTAMINE SULFATE PER 10 MG: Performed by: NURSE ANESTHETIST, CERTIFIED REGISTERED

## 2022-10-17 PROCEDURE — 25010000002 DEXAMETHASONE PER 1 MG: Performed by: NURSE ANESTHETIST, CERTIFIED REGISTERED

## 2022-10-17 PROCEDURE — 25010000002 PHENYLEPHRINE 10 MG/ML SOLUTION 1 ML VIAL: Performed by: NURSE ANESTHETIST, CERTIFIED REGISTERED

## 2022-10-17 PROCEDURE — 25010000002 HEPARIN (PORCINE) PER 1000 UNITS: Performed by: SURGERY

## 2022-10-17 PROCEDURE — 25010000002 PROPOFOL 10 MG/ML EMULSION: Performed by: NURSE ANESTHETIST, CERTIFIED REGISTERED

## 2022-10-17 PROCEDURE — 04CN0ZZ EXTIRPATION OF MATTER FROM LEFT POPLITEAL ARTERY, OPEN APPROACH: ICD-10-PCS | Performed by: SURGERY

## 2022-10-17 PROCEDURE — 25010000002 HYDROMORPHONE 1 MG/ML SOLUTION

## 2022-10-17 PROCEDURE — C1894 INTRO/SHEATH, NON-LASER: HCPCS | Performed by: SURGERY

## 2022-10-17 PROCEDURE — 85025 COMPLETE CBC W/AUTO DIFF WBC: CPT | Performed by: SURGERY

## 2022-10-17 PROCEDURE — B41G1ZZ FLUOROSCOPY OF LEFT LOWER EXTREMITY ARTERIES USING LOW OSMOLAR CONTRAST: ICD-10-PCS | Performed by: SURGERY

## 2022-10-17 PROCEDURE — 041N0JL BYPASS LEFT POPLITEAL ARTERY TO POPLITEAL ARTERY WITH SYNTHETIC SUBSTITUTE, OPEN APPROACH: ICD-10-PCS | Performed by: SURGERY

## 2022-10-17 PROCEDURE — 0 LIDOCAINE 1 % SOLUTION: Performed by: NURSE ANESTHETIST, CERTIFIED REGISTERED

## 2022-10-17 PROCEDURE — C1757 CATH, THROMBECTOMY/EMBOLECT: HCPCS | Performed by: SURGERY

## 2022-10-17 PROCEDURE — 25010000002 IOPAMIDOL 61 % SOLUTION: Performed by: SURGERY

## 2022-10-17 PROCEDURE — 25010000002 HEPARIN (PORCINE) PER 1000 UNITS: Performed by: NURSE ANESTHETIST, CERTIFIED REGISTERED

## 2022-10-17 PROCEDURE — 04CL3ZZ EXTIRPATION OF MATTER FROM LEFT FEMORAL ARTERY, PERCUTANEOUS APPROACH: ICD-10-PCS | Performed by: SURGERY

## 2022-10-17 PROCEDURE — 25010000002 FENTANYL CITRATE (PF) 50 MCG/ML SOLUTION

## 2022-10-17 PROCEDURE — 25010000002 HYDROMORPHONE PER 4 MG: Performed by: SURGERY

## 2022-10-17 PROCEDURE — 25010000002 ONDANSETRON PER 1 MG: Performed by: NURSE ANESTHETIST, CERTIFIED REGISTERED

## 2022-10-17 DEVICE — PROPATEN VASCULAR GRAFT TW RR 6MMX50CM 40CM RINGS HEPARIN
Type: IMPLANTABLE DEVICE | Site: ARTERY ILIAC | Status: FUNCTIONAL
Brand: GORE PROPATEN VASCULAR GRAFT

## 2022-10-17 RX ORDER — AMOXICILLIN 250 MG
2 CAPSULE ORAL 2 TIMES DAILY PRN
Status: DISCONTINUED | OUTPATIENT
Start: 2022-10-17 | End: 2022-10-20

## 2022-10-17 RX ORDER — ASPIRIN 81 MG/1
81 TABLET, CHEWABLE ORAL DAILY
Status: DISCONTINUED | OUTPATIENT
Start: 2022-10-17 | End: 2022-10-22 | Stop reason: HOSPADM

## 2022-10-17 RX ORDER — OXYCODONE AND ACETAMINOPHEN 10; 325 MG/1; MG/1
1 TABLET ORAL EVERY 4 HOURS PRN
Status: DISCONTINUED | OUTPATIENT
Start: 2022-10-17 | End: 2022-10-22 | Stop reason: HOSPADM

## 2022-10-17 RX ORDER — SODIUM CHLORIDE 0.9 % (FLUSH) 0.9 %
10 SYRINGE (ML) INJECTION AS NEEDED
Status: DISCONTINUED | OUTPATIENT
Start: 2022-10-17 | End: 2022-10-17 | Stop reason: HOSPADM

## 2022-10-17 RX ORDER — SODIUM CHLORIDE 0.9 % (FLUSH) 0.9 %
3 SYRINGE (ML) INJECTION EVERY 12 HOURS SCHEDULED
Status: DISCONTINUED | OUTPATIENT
Start: 2022-10-17 | End: 2022-10-17 | Stop reason: HOSPADM

## 2022-10-17 RX ORDER — HYDROMORPHONE HYDROCHLORIDE 1 MG/ML
0.5 INJECTION, SOLUTION INTRAMUSCULAR; INTRAVENOUS; SUBCUTANEOUS
Status: DISCONTINUED | OUTPATIENT
Start: 2022-10-17 | End: 2022-10-20

## 2022-10-17 RX ORDER — SODIUM CHLORIDE 0.9 % (FLUSH) 0.9 %
3-10 SYRINGE (ML) INJECTION AS NEEDED
Status: DISCONTINUED | OUTPATIENT
Start: 2022-10-17 | End: 2022-10-17 | Stop reason: HOSPADM

## 2022-10-17 RX ORDER — LIDOCAINE HYDROCHLORIDE 10 MG/ML
0.5 INJECTION, SOLUTION EPIDURAL; INFILTRATION; INTRACAUDAL; PERINEURAL ONCE AS NEEDED
Status: COMPLETED | OUTPATIENT
Start: 2022-10-17 | End: 2022-10-17

## 2022-10-17 RX ORDER — FENTANYL CITRATE 50 UG/ML
INJECTION, SOLUTION INTRAMUSCULAR; INTRAVENOUS AS NEEDED
Status: DISCONTINUED | OUTPATIENT
Start: 2022-10-17 | End: 2022-10-17 | Stop reason: SURG

## 2022-10-17 RX ORDER — FENTANYL CITRATE 50 UG/ML
50 INJECTION, SOLUTION INTRAMUSCULAR; INTRAVENOUS
Status: DISCONTINUED | OUTPATIENT
Start: 2022-10-17 | End: 2022-10-17 | Stop reason: HOSPADM

## 2022-10-17 RX ORDER — SODIUM CHLORIDE 0.9 % (FLUSH) 0.9 %
10 SYRINGE (ML) INJECTION EVERY 12 HOURS SCHEDULED
Status: DISCONTINUED | OUTPATIENT
Start: 2022-10-17 | End: 2022-10-17 | Stop reason: HOSPADM

## 2022-10-17 RX ORDER — DROPERIDOL 2.5 MG/ML
0.62 INJECTION, SOLUTION INTRAMUSCULAR; INTRAVENOUS
Status: DISCONTINUED | OUTPATIENT
Start: 2022-10-17 | End: 2022-10-17 | Stop reason: HOSPADM

## 2022-10-17 RX ORDER — NALOXONE HCL 0.4 MG/ML
0.4 VIAL (ML) INJECTION
Status: DISCONTINUED | OUTPATIENT
Start: 2022-10-17 | End: 2022-10-20

## 2022-10-17 RX ORDER — BUPIVACAINE HCL/0.9 % NACL/PF 0.125 %
PLASTIC BAG, INJECTION (ML) EPIDURAL AS NEEDED
Status: DISCONTINUED | OUTPATIENT
Start: 2022-10-17 | End: 2022-10-17 | Stop reason: SURG

## 2022-10-17 RX ORDER — PROMETHAZINE HYDROCHLORIDE 25 MG/1
25 TABLET ORAL ONCE AS NEEDED
Status: DISCONTINUED | OUTPATIENT
Start: 2022-10-17 | End: 2022-10-17 | Stop reason: HOSPADM

## 2022-10-17 RX ORDER — AMLODIPINE BESYLATE 10 MG/1
10 TABLET ORAL DAILY
Status: DISCONTINUED | OUTPATIENT
Start: 2022-10-18 | End: 2022-10-22 | Stop reason: HOSPADM

## 2022-10-17 RX ORDER — PROMETHAZINE HYDROCHLORIDE 25 MG/1
25 SUPPOSITORY RECTAL ONCE AS NEEDED
Status: DISCONTINUED | OUTPATIENT
Start: 2022-10-17 | End: 2022-10-17 | Stop reason: HOSPADM

## 2022-10-17 RX ORDER — FAMOTIDINE 20 MG/1
20 TABLET, FILM COATED ORAL ONCE
Status: COMPLETED | OUTPATIENT
Start: 2022-10-17 | End: 2022-10-17

## 2022-10-17 RX ORDER — DEXAMETHASONE SODIUM PHOSPHATE 10 MG/ML
INJECTION INTRAMUSCULAR; INTRAVENOUS AS NEEDED
Status: DISCONTINUED | OUTPATIENT
Start: 2022-10-17 | End: 2022-10-17 | Stop reason: SURG

## 2022-10-17 RX ORDER — MEPERIDINE HYDROCHLORIDE 25 MG/ML
12.5 INJECTION INTRAMUSCULAR; INTRAVENOUS; SUBCUTANEOUS
Status: DISCONTINUED | OUTPATIENT
Start: 2022-10-17 | End: 2022-10-17 | Stop reason: HOSPADM

## 2022-10-17 RX ORDER — MIDAZOLAM HYDROCHLORIDE 1 MG/ML
1 INJECTION INTRAMUSCULAR; INTRAVENOUS
Status: DISCONTINUED | OUTPATIENT
Start: 2022-10-17 | End: 2022-10-17 | Stop reason: HOSPADM

## 2022-10-17 RX ORDER — ENOXAPARIN SODIUM 100 MG/ML
40 INJECTION SUBCUTANEOUS DAILY
Status: DISCONTINUED | OUTPATIENT
Start: 2022-10-18 | End: 2022-10-18 | Stop reason: SDUPTHER

## 2022-10-17 RX ORDER — CEFAZOLIN SODIUM 2 G/100ML
2 INJECTION, SOLUTION INTRAVENOUS EVERY 8 HOURS
Status: COMPLETED | OUTPATIENT
Start: 2022-10-18 | End: 2022-10-18

## 2022-10-17 RX ORDER — BUPIVACAINE HYDROCHLORIDE AND EPINEPHRINE 5; 5 MG/ML; UG/ML
INJECTION, SOLUTION PERINEURAL AS NEEDED
Status: DISCONTINUED | OUTPATIENT
Start: 2022-10-17 | End: 2022-10-17 | Stop reason: HOSPADM

## 2022-10-17 RX ORDER — LIDOCAINE HYDROCHLORIDE 10 MG/ML
INJECTION, SOLUTION INFILTRATION; PERINEURAL AS NEEDED
Status: DISCONTINUED | OUTPATIENT
Start: 2022-10-17 | End: 2022-10-17 | Stop reason: SURG

## 2022-10-17 RX ORDER — ONDANSETRON 2 MG/ML
4 INJECTION INTRAMUSCULAR; INTRAVENOUS ONCE AS NEEDED
Status: DISCONTINUED | OUTPATIENT
Start: 2022-10-17 | End: 2022-10-17 | Stop reason: HOSPADM

## 2022-10-17 RX ORDER — HYDROMORPHONE HYDROCHLORIDE 1 MG/ML
0.5 INJECTION, SOLUTION INTRAMUSCULAR; INTRAVENOUS; SUBCUTANEOUS
Status: DISCONTINUED | OUTPATIENT
Start: 2022-10-17 | End: 2022-10-17 | Stop reason: HOSPADM

## 2022-10-17 RX ORDER — SODIUM CHLORIDE 9 MG/ML
50 INJECTION, SOLUTION INTRAVENOUS CONTINUOUS
Status: DISCONTINUED | OUTPATIENT
Start: 2022-10-17 | End: 2022-10-22 | Stop reason: HOSPADM

## 2022-10-17 RX ORDER — SODIUM CHLORIDE, SODIUM LACTATE, POTASSIUM CHLORIDE, CALCIUM CHLORIDE 600; 310; 30; 20 MG/100ML; MG/100ML; MG/100ML; MG/100ML
9 INJECTION, SOLUTION INTRAVENOUS CONTINUOUS
Status: DISCONTINUED | OUTPATIENT
Start: 2022-10-17 | End: 2022-10-17

## 2022-10-17 RX ORDER — PRIMIDONE 50 MG/1
50 TABLET ORAL DAILY
Status: DISCONTINUED | OUTPATIENT
Start: 2022-10-18 | End: 2022-10-18

## 2022-10-17 RX ORDER — ATORVASTATIN CALCIUM 40 MG/1
40 TABLET, FILM COATED ORAL NIGHTLY
Status: DISCONTINUED | OUTPATIENT
Start: 2022-10-17 | End: 2022-10-22 | Stop reason: HOSPADM

## 2022-10-17 RX ORDER — CEFAZOLIN SODIUM 2 G/100ML
2 INJECTION, SOLUTION INTRAVENOUS ONCE
Status: COMPLETED | OUTPATIENT
Start: 2022-10-17 | End: 2022-10-17

## 2022-10-17 RX ORDER — SODIUM CHLORIDE, SODIUM LACTATE, POTASSIUM CHLORIDE, CALCIUM CHLORIDE 600; 310; 30; 20 MG/100ML; MG/100ML; MG/100ML; MG/100ML
INJECTION, SOLUTION INTRAVENOUS CONTINUOUS PRN
Status: DISCONTINUED | OUTPATIENT
Start: 2022-10-17 | End: 2022-10-17 | Stop reason: SURG

## 2022-10-17 RX ORDER — HEPARIN SODIUM 1000 [USP'U]/ML
INJECTION, SOLUTION INTRAVENOUS; SUBCUTANEOUS AS NEEDED
Status: DISCONTINUED | OUTPATIENT
Start: 2022-10-17 | End: 2022-10-17 | Stop reason: SURG

## 2022-10-17 RX ORDER — FAMOTIDINE 20 MG/1
20 TABLET, FILM COATED ORAL
Status: DISCONTINUED | OUTPATIENT
Start: 2022-10-18 | End: 2022-10-22 | Stop reason: HOSPADM

## 2022-10-17 RX ORDER — ONDANSETRON 2 MG/ML
INJECTION INTRAMUSCULAR; INTRAVENOUS AS NEEDED
Status: DISCONTINUED | OUTPATIENT
Start: 2022-10-17 | End: 2022-10-17 | Stop reason: SURG

## 2022-10-17 RX ORDER — NALOXONE HCL 0.4 MG/ML
0.4 VIAL (ML) INJECTION AS NEEDED
Status: DISCONTINUED | OUTPATIENT
Start: 2022-10-17 | End: 2022-10-17 | Stop reason: HOSPADM

## 2022-10-17 RX ORDER — ONDANSETRON 2 MG/ML
4 INJECTION INTRAMUSCULAR; INTRAVENOUS EVERY 6 HOURS PRN
Status: DISCONTINUED | OUTPATIENT
Start: 2022-10-17 | End: 2022-10-22 | Stop reason: HOSPADM

## 2022-10-17 RX ORDER — EPHEDRINE SULFATE 50 MG/ML
INJECTION, SOLUTION INTRAVENOUS AS NEEDED
Status: DISCONTINUED | OUTPATIENT
Start: 2022-10-17 | End: 2022-10-17 | Stop reason: SURG

## 2022-10-17 RX ORDER — PROPOFOL 10 MG/ML
VIAL (ML) INTRAVENOUS AS NEEDED
Status: DISCONTINUED | OUTPATIENT
Start: 2022-10-17 | End: 2022-10-17 | Stop reason: SURG

## 2022-10-17 RX ORDER — ONDANSETRON 4 MG/1
4 TABLET, FILM COATED ORAL EVERY 6 HOURS PRN
Status: DISCONTINUED | OUTPATIENT
Start: 2022-10-17 | End: 2022-10-22 | Stop reason: HOSPADM

## 2022-10-17 RX ORDER — DROPERIDOL 2.5 MG/ML
0.62 INJECTION, SOLUTION INTRAMUSCULAR; INTRAVENOUS ONCE AS NEEDED
Status: DISCONTINUED | OUTPATIENT
Start: 2022-10-17 | End: 2022-10-17 | Stop reason: HOSPADM

## 2022-10-17 RX ORDER — IPRATROPIUM BROMIDE AND ALBUTEROL SULFATE 2.5; .5 MG/3ML; MG/3ML
3 SOLUTION RESPIRATORY (INHALATION) ONCE AS NEEDED
Status: DISCONTINUED | OUTPATIENT
Start: 2022-10-17 | End: 2022-10-17 | Stop reason: HOSPADM

## 2022-10-17 RX ORDER — PROTAMINE SULFATE 10 MG/ML
INJECTION, SOLUTION INTRAVENOUS AS NEEDED
Status: DISCONTINUED | OUTPATIENT
Start: 2022-10-17 | End: 2022-10-17 | Stop reason: SURG

## 2022-10-17 RX ORDER — GABAPENTIN 300 MG/1
300 CAPSULE ORAL 3 TIMES DAILY PRN
Status: DISCONTINUED | OUTPATIENT
Start: 2022-10-17 | End: 2022-10-22 | Stop reason: HOSPADM

## 2022-10-17 RX ORDER — HYDROCODONE BITARTRATE AND ACETAMINOPHEN 5; 325 MG/1; MG/1
1 TABLET ORAL ONCE AS NEEDED
Status: DISCONTINUED | OUTPATIENT
Start: 2022-10-17 | End: 2022-10-17 | Stop reason: HOSPADM

## 2022-10-17 RX ORDER — LABETALOL HYDROCHLORIDE 5 MG/ML
5 INJECTION, SOLUTION INTRAVENOUS
Status: DISCONTINUED | OUTPATIENT
Start: 2022-10-17 | End: 2022-10-17 | Stop reason: HOSPADM

## 2022-10-17 RX ORDER — ROCURONIUM BROMIDE 10 MG/ML
INJECTION, SOLUTION INTRAVENOUS AS NEEDED
Status: DISCONTINUED | OUTPATIENT
Start: 2022-10-17 | End: 2022-10-17 | Stop reason: SURG

## 2022-10-17 RX ORDER — HYDRALAZINE HYDROCHLORIDE 20 MG/ML
5 INJECTION INTRAMUSCULAR; INTRAVENOUS
Status: DISCONTINUED | OUTPATIENT
Start: 2022-10-17 | End: 2022-10-17 | Stop reason: HOSPADM

## 2022-10-17 RX ORDER — FENTANYL CITRATE 50 UG/ML
INJECTION, SOLUTION INTRAMUSCULAR; INTRAVENOUS
Status: COMPLETED
Start: 2022-10-17 | End: 2022-10-17

## 2022-10-17 RX ADMIN — Medication 100 MCG: at 12:25

## 2022-10-17 RX ADMIN — PROPOFOL 25 MCG/KG/MIN: 10 INJECTION, EMULSION INTRAVENOUS at 11:34

## 2022-10-17 RX ADMIN — EPHEDRINE SULFATE 5 MG: 50 INJECTION INTRAVENOUS at 13:06

## 2022-10-17 RX ADMIN — FAMOTIDINE 20 MG: 20 TABLET ORAL at 09:35

## 2022-10-17 RX ADMIN — SODIUM CHLORIDE, POTASSIUM CHLORIDE, SODIUM LACTATE AND CALCIUM CHLORIDE: 600; 310; 30; 20 INJECTION, SOLUTION INTRAVENOUS at 11:24

## 2022-10-17 RX ADMIN — PROPOFOL 180 MG: 10 INJECTION, EMULSION INTRAVENOUS at 11:27

## 2022-10-17 RX ADMIN — Medication 50 MCG: at 11:44

## 2022-10-17 RX ADMIN — Medication 100 MCG: at 12:52

## 2022-10-17 RX ADMIN — FENTANYL CITRATE 50 MCG: 50 INJECTION, SOLUTION INTRAMUSCULAR; INTRAVENOUS at 16:44

## 2022-10-17 RX ADMIN — EPHEDRINE SULFATE 5 MG: 50 INJECTION INTRAVENOUS at 10:37

## 2022-10-17 RX ADMIN — HYDROMORPHONE HYDROCHLORIDE 0.5 MG: 1 INJECTION, SOLUTION INTRAMUSCULAR; INTRAVENOUS; SUBCUTANEOUS at 19:10

## 2022-10-17 RX ADMIN — HYDROMORPHONE HYDROCHLORIDE 0.5 MG: 1 INJECTION, SOLUTION INTRAMUSCULAR; INTRAVENOUS; SUBCUTANEOUS at 16:05

## 2022-10-17 RX ADMIN — PHENYLEPHRINE HYDROCHLORIDE 0.1 MCG/MIN: 10 INJECTION INTRAVENOUS at 13:13

## 2022-10-17 RX ADMIN — Medication 100 MCG: at 11:48

## 2022-10-17 RX ADMIN — Medication 50 MCG: at 11:33

## 2022-10-17 RX ADMIN — EPHEDRINE SULFATE 5 MG: 50 INJECTION INTRAVENOUS at 11:58

## 2022-10-17 RX ADMIN — Medication 100 MCG: at 10:37

## 2022-10-17 RX ADMIN — FENTANYL CITRATE 50 MCG: 50 INJECTION, SOLUTION INTRAMUSCULAR; INTRAVENOUS at 13:13

## 2022-10-17 RX ADMIN — HEPARIN SODIUM 8000 UNITS: 1000 INJECTION, SOLUTION INTRAVENOUS; SUBCUTANEOUS at 13:13

## 2022-10-17 RX ADMIN — Medication 100 MCG: at 11:58

## 2022-10-17 RX ADMIN — CEFAZOLIN SODIUM 2 G: 2 INJECTION, SOLUTION INTRAVENOUS at 23:48

## 2022-10-17 RX ADMIN — FENTANYL CITRATE 100 MCG: 50 INJECTION, SOLUTION INTRAMUSCULAR; INTRAVENOUS at 11:27

## 2022-10-17 RX ADMIN — ROCURONIUM BROMIDE 10 MG: 10 INJECTION, SOLUTION INTRAVENOUS at 14:40

## 2022-10-17 RX ADMIN — ROCURONIUM BROMIDE 10 MG: 10 INJECTION, SOLUTION INTRAVENOUS at 13:56

## 2022-10-17 RX ADMIN — ROCURONIUM BROMIDE 20 MG: 10 INJECTION, SOLUTION INTRAVENOUS at 12:12

## 2022-10-17 RX ADMIN — ROCURONIUM BROMIDE 10 MG: 10 INJECTION, SOLUTION INTRAVENOUS at 13:06

## 2022-10-17 RX ADMIN — SODIUM CHLORIDE 100 ML/HR: 9 INJECTION, SOLUTION INTRAVENOUS at 18:27

## 2022-10-17 RX ADMIN — CEFAZOLIN SODIUM 3 G: 2 INJECTION, SOLUTION INTRAVENOUS at 15:29

## 2022-10-17 RX ADMIN — SODIUM CHLORIDE, POTASSIUM CHLORIDE, SODIUM LACTATE AND CALCIUM CHLORIDE 9 ML/HR: 600; 310; 30; 20 INJECTION, SOLUTION INTRAVENOUS at 10:00

## 2022-10-17 RX ADMIN — LIDOCAINE HYDROCHLORIDE 50 MG: 10 INJECTION, SOLUTION INFILTRATION; PERINEURAL at 11:27

## 2022-10-17 RX ADMIN — EPHEDRINE SULFATE 5 MG: 50 INJECTION INTRAVENOUS at 11:52

## 2022-10-17 RX ADMIN — Medication 100 MCG: at 13:06

## 2022-10-17 RX ADMIN — CEFAZOLIN SODIUM 2 G: 2 INJECTION, SOLUTION INTRAVENOUS at 11:34

## 2022-10-17 RX ADMIN — HEPARIN SODIUM 2000 UNITS: 1000 INJECTION, SOLUTION INTRAVENOUS; SUBCUTANEOUS at 13:56

## 2022-10-17 RX ADMIN — ROCURONIUM BROMIDE 10 MG: 10 INJECTION, SOLUTION INTRAVENOUS at 12:47

## 2022-10-17 RX ADMIN — ATORVASTATIN CALCIUM 40 MG: 40 TABLET, FILM COATED ORAL at 20:06

## 2022-10-17 RX ADMIN — Medication 100 MCG: at 12:40

## 2022-10-17 RX ADMIN — SUGAMMADEX 200 MG: 100 INJECTION, SOLUTION INTRAVENOUS at 15:36

## 2022-10-17 RX ADMIN — PROTAMINE SULFATE 20 MG: 10 INJECTION, SOLUTION INTRAVENOUS at 15:16

## 2022-10-17 RX ADMIN — Medication 100 MCG: at 11:53

## 2022-10-17 RX ADMIN — Medication 100 MCG: at 12:58

## 2022-10-17 RX ADMIN — FENTANYL CITRATE 50 MCG: 50 INJECTION, SOLUTION INTRAMUSCULAR; INTRAVENOUS at 14:24

## 2022-10-17 RX ADMIN — ROCURONIUM BROMIDE 10 MG: 10 INJECTION, SOLUTION INTRAVENOUS at 14:59

## 2022-10-17 RX ADMIN — HYDROMORPHONE HYDROCHLORIDE 0.5 MG: 1 INJECTION, SOLUTION INTRAMUSCULAR; INTRAVENOUS; SUBCUTANEOUS at 23:33

## 2022-10-17 RX ADMIN — ROCURONIUM BROMIDE 10 MG: 10 INJECTION, SOLUTION INTRAVENOUS at 13:31

## 2022-10-17 RX ADMIN — SODIUM CHLORIDE, POTASSIUM CHLORIDE, SODIUM LACTATE AND CALCIUM CHLORIDE: 600; 310; 30; 20 INJECTION, SOLUTION INTRAVENOUS at 13:54

## 2022-10-17 RX ADMIN — FENTANYL CITRATE 50 MCG: 50 INJECTION, SOLUTION INTRAMUSCULAR; INTRAVENOUS at 17:26

## 2022-10-17 RX ADMIN — Medication 100 MCG: at 12:47

## 2022-10-17 RX ADMIN — ONDANSETRON 4 MG: 2 INJECTION INTRAMUSCULAR; INTRAVENOUS at 15:18

## 2022-10-17 RX ADMIN — LIDOCAINE HYDROCHLORIDE 0.5 ML: 10 INJECTION, SOLUTION EPIDURAL; INFILTRATION; INTRACAUDAL; PERINEURAL at 09:35

## 2022-10-17 RX ADMIN — ASPIRIN 81 MG 81 MG: 81 TABLET ORAL at 18:30

## 2022-10-17 RX ADMIN — ROCURONIUM BROMIDE 50 MG: 10 INJECTION, SOLUTION INTRAVENOUS at 11:27

## 2022-10-17 RX ADMIN — DEXAMETHASONE SODIUM PHOSPHATE 8 MG: 10 INJECTION INTRAMUSCULAR; INTRAVENOUS at 11:33

## 2022-10-17 NOTE — ANESTHESIA POSTPROCEDURE EVALUATION
Patient: Madelaine Chavez    Procedure Summary     Date: 10/17/22 Room / Location: [facility] OR 02 / [facility]    Anesthesia Start: 1124 Anesthesia Stop: 1550    Procedure: LEFT ABOVE KNEE TO BELOW KNEE  POPLITEAL ARTERY BYPASS, COMPLETION OF ANGIOGRAM (Left: Thigh) Diagnosis:     Surgeons: Jeremy Linn MD Provider: Abdi Blanca MD    Anesthesia Type: general ASA Status: 3          Anesthesia Type: general    Vitals  , /70, Sat 93, RR 12, Temp 97          Post Anesthesia Care and Evaluation    Patient location during evaluation: PACU  Patient participation: complete - patient participated  Level of consciousness: awake and alert  Pain score: 0  Pain management: adequate    Airway patency: patent  Anesthetic complications: No anesthetic complications  PONV Status: none  Cardiovascular status: hemodynamically stable and acceptable  Respiratory status: nonlabored ventilation, acceptable and nasal cannula  Hydration status: acceptable

## 2022-10-17 NOTE — ANESTHESIA PREPROCEDURE EVALUATION
Anesthesia Evaluation                  Airway   Mallampati: I  TM distance: >3 FB  Neck ROM: full  No difficulty expected  Dental      Pulmonary    Cardiovascular     ECG reviewed    (+) hypertension, PVD,       Neuro/Psych  GI/Hepatic/Renal/Endo    (+)  GERD,  thyroid problem hypothyroidism    Musculoskeletal     Abdominal    Substance History      OB/GYN          Other   arthritis,    history of cancer                    Anesthesia Plan    ASA 3     general     intravenous induction     Anesthetic plan, risks, benefits, and alternatives have been provided, discussed and informed consent has been obtained with: patient.    Plan discussed with CRNA.        CODE STATUS:

## 2022-10-18 PROBLEM — Z72.0 CURRENT EVERY DAY NICOTINE VAPING: Status: ACTIVE | Noted: 2022-10-18

## 2022-10-18 LAB
ANION GAP SERPL CALCULATED.3IONS-SCNC: 8 MMOL/L (ref 5–15)
BUN SERPL-MCNC: 8 MG/DL (ref 8–23)
BUN/CREAT SERPL: 12.1 (ref 7–25)
CALCIUM SPEC-SCNC: 8.4 MG/DL (ref 8.6–10.5)
CHLORIDE SERPL-SCNC: 107 MMOL/L (ref 98–107)
CO2 SERPL-SCNC: 24 MMOL/L (ref 22–29)
CREAT SERPL-MCNC: 0.66 MG/DL (ref 0.57–1)
DEPRECATED RDW RBC AUTO: 45.4 FL (ref 37–54)
EGFRCR SERPLBLD CKD-EPI 2021: 100.6 ML/MIN/1.73
ERYTHROCYTE [DISTWIDTH] IN BLOOD BY AUTOMATED COUNT: 14.3 % (ref 12.3–15.4)
GLUCOSE SERPL-MCNC: 121 MG/DL (ref 65–99)
HCT VFR BLD AUTO: 32.3 % (ref 34–46.6)
HGB BLD-MCNC: 10.4 G/DL (ref 12–15.9)
MCH RBC QN AUTO: 27.9 PG (ref 26.6–33)
MCHC RBC AUTO-ENTMCNC: 32.2 G/DL (ref 31.5–35.7)
MCV RBC AUTO: 86.6 FL (ref 79–97)
PLATELET # BLD AUTO: 371 10*3/MM3 (ref 140–450)
PMV BLD AUTO: 9.5 FL (ref 6–12)
POTASSIUM SERPL-SCNC: 4.3 MMOL/L (ref 3.5–5.2)
RBC # BLD AUTO: 3.73 10*6/MM3 (ref 3.77–5.28)
SODIUM SERPL-SCNC: 139 MMOL/L (ref 136–145)
WBC NRBC COR # BLD: 8.82 10*3/MM3 (ref 3.4–10.8)

## 2022-10-18 PROCEDURE — 25010000002 ENOXAPARIN PER 10 MG: Performed by: SURGERY

## 2022-10-18 PROCEDURE — 80048 BASIC METABOLIC PNL TOTAL CA: CPT | Performed by: SURGERY

## 2022-10-18 PROCEDURE — 97162 PT EVAL MOD COMPLEX 30 MIN: CPT

## 2022-10-18 PROCEDURE — 25010000002 HYDROMORPHONE PER 4 MG: Performed by: SURGERY

## 2022-10-18 PROCEDURE — 25010000002 CEFAZOLIN IN DEXTROSE 2-4 GM/100ML-% SOLUTION: Performed by: SURGERY

## 2022-10-18 PROCEDURE — 85027 COMPLETE CBC AUTOMATED: CPT | Performed by: SURGERY

## 2022-10-18 PROCEDURE — 99232 SBSQ HOSP IP/OBS MODERATE 35: CPT | Performed by: NURSE PRACTITIONER

## 2022-10-18 RX ORDER — ACETAMINOPHEN 325 MG/1
650 TABLET ORAL EVERY 6 HOURS PRN
Status: DISCONTINUED | OUTPATIENT
Start: 2022-10-18 | End: 2022-10-22 | Stop reason: HOSPADM

## 2022-10-18 RX ORDER — PROPRANOLOL HYDROCHLORIDE 20 MG/1
20 TABLET ORAL EVERY 12 HOURS
Status: DISCONTINUED | OUTPATIENT
Start: 2022-10-18 | End: 2022-10-19

## 2022-10-18 RX ADMIN — AMLODIPINE BESYLATE 10 MG: 10 TABLET ORAL at 08:40

## 2022-10-18 RX ADMIN — APIXABAN 5 MG: 5 TABLET, FILM COATED ORAL at 14:38

## 2022-10-18 RX ADMIN — ATORVASTATIN CALCIUM 40 MG: 40 TABLET, FILM COATED ORAL at 21:27

## 2022-10-18 RX ADMIN — OXYCODONE HYDROCHLORIDE AND ACETAMINOPHEN 1 TABLET: 10; 325 TABLET ORAL at 08:40

## 2022-10-18 RX ADMIN — OXYCODONE HYDROCHLORIDE AND ACETAMINOPHEN 1 TABLET: 10; 325 TABLET ORAL at 12:46

## 2022-10-18 RX ADMIN — SODIUM CHLORIDE 100 ML/HR: 9 INJECTION, SOLUTION INTRAVENOUS at 03:26

## 2022-10-18 RX ADMIN — SODIUM CHLORIDE 100 ML/HR: 9 INJECTION, SOLUTION INTRAVENOUS at 23:26

## 2022-10-18 RX ADMIN — ENOXAPARIN SODIUM 40 MG: 40 INJECTION SUBCUTANEOUS at 08:40

## 2022-10-18 RX ADMIN — FAMOTIDINE 20 MG: 20 TABLET ORAL at 17:03

## 2022-10-18 RX ADMIN — CEFAZOLIN SODIUM 2 G: 2 INJECTION, SOLUTION INTRAVENOUS at 07:24

## 2022-10-18 RX ADMIN — HYDROMORPHONE HYDROCHLORIDE 0.5 MG: 1 INJECTION, SOLUTION INTRAMUSCULAR; INTRAVENOUS; SUBCUTANEOUS at 03:16

## 2022-10-18 RX ADMIN — APIXABAN 5 MG: 5 TABLET, FILM COATED ORAL at 22:49

## 2022-10-18 RX ADMIN — PROPRANOLOL HYDROCHLORIDE 20 MG: 20 TABLET ORAL at 17:56

## 2022-10-18 RX ADMIN — HYDROMORPHONE HYDROCHLORIDE 0.5 MG: 1 INJECTION, SOLUTION INTRAMUSCULAR; INTRAVENOUS; SUBCUTANEOUS at 07:24

## 2022-10-18 RX ADMIN — OXYCODONE HYDROCHLORIDE AND ACETAMINOPHEN 1 TABLET: 10; 325 TABLET ORAL at 00:36

## 2022-10-18 RX ADMIN — HYDROMORPHONE HYDROCHLORIDE 0.5 MG: 1 INJECTION, SOLUTION INTRAMUSCULAR; INTRAVENOUS; SUBCUTANEOUS at 21:28

## 2022-10-18 RX ADMIN — ASPIRIN 81 MG 81 MG: 81 TABLET ORAL at 08:40

## 2022-10-18 RX ADMIN — HYDROMORPHONE HYDROCHLORIDE 0.5 MG: 1 INJECTION, SOLUTION INTRAMUSCULAR; INTRAVENOUS; SUBCUTANEOUS at 11:57

## 2022-10-18 RX ADMIN — HYDROMORPHONE HYDROCHLORIDE 0.5 MG: 1 INJECTION, SOLUTION INTRAMUSCULAR; INTRAVENOUS; SUBCUTANEOUS at 16:15

## 2022-10-18 RX ADMIN — ACETAMINOPHEN 650 MG: 325 TABLET, FILM COATED ORAL at 22:49

## 2022-10-18 RX ADMIN — PRIMIDONE 50 MG: 50 TABLET ORAL at 08:45

## 2022-10-18 RX ADMIN — FAMOTIDINE 20 MG: 20 TABLET ORAL at 07:23

## 2022-10-18 RX ADMIN — OXYCODONE HYDROCHLORIDE AND ACETAMINOPHEN 1 TABLET: 10; 325 TABLET ORAL at 17:03

## 2022-10-19 PROBLEM — E03.9 HYPOTHYROIDISM: Status: ACTIVE | Noted: 2022-10-19

## 2022-10-19 LAB
ANION GAP SERPL CALCULATED.3IONS-SCNC: 9 MMOL/L (ref 5–15)
BUN SERPL-MCNC: 8 MG/DL (ref 8–23)
BUN/CREAT SERPL: 9.6 (ref 7–25)
CALCIUM SPEC-SCNC: 8.2 MG/DL (ref 8.6–10.5)
CHLORIDE SERPL-SCNC: 107 MMOL/L (ref 98–107)
CO2 SERPL-SCNC: 25 MMOL/L (ref 22–29)
CREAT SERPL-MCNC: 0.83 MG/DL (ref 0.57–1)
DEPRECATED RDW RBC AUTO: 46.9 FL (ref 37–54)
EGFRCR SERPLBLD CKD-EPI 2021: 80.8 ML/MIN/1.73
ERYTHROCYTE [DISTWIDTH] IN BLOOD BY AUTOMATED COUNT: 14.6 % (ref 12.3–15.4)
GLUCOSE SERPL-MCNC: 97 MG/DL (ref 65–99)
HCT VFR BLD AUTO: 31.5 % (ref 34–46.6)
HGB BLD-MCNC: 9.8 G/DL (ref 12–15.9)
MAGNESIUM SERPL-MCNC: 2 MG/DL (ref 1.6–2.4)
MCH RBC QN AUTO: 27.8 PG (ref 26.6–33)
MCHC RBC AUTO-ENTMCNC: 31.1 G/DL (ref 31.5–35.7)
MCV RBC AUTO: 89.2 FL (ref 79–97)
PHOSPHATE SERPL-MCNC: 3.6 MG/DL (ref 2.5–4.5)
PLATELET # BLD AUTO: 339 10*3/MM3 (ref 140–450)
PMV BLD AUTO: 9.4 FL (ref 6–12)
POTASSIUM SERPL-SCNC: 3.9 MMOL/L (ref 3.5–5.2)
RBC # BLD AUTO: 3.53 10*6/MM3 (ref 3.77–5.28)
SODIUM SERPL-SCNC: 141 MMOL/L (ref 136–145)
WBC NRBC COR # BLD: 7.66 10*3/MM3 (ref 3.4–10.8)

## 2022-10-19 PROCEDURE — 25010000002 HYDROMORPHONE PER 4 MG: Performed by: SURGERY

## 2022-10-19 PROCEDURE — 80048 BASIC METABOLIC PNL TOTAL CA: CPT | Performed by: NURSE PRACTITIONER

## 2022-10-19 PROCEDURE — 99232 SBSQ HOSP IP/OBS MODERATE 35: CPT | Performed by: NURSE PRACTITIONER

## 2022-10-19 PROCEDURE — 85027 COMPLETE CBC AUTOMATED: CPT | Performed by: NURSE PRACTITIONER

## 2022-10-19 PROCEDURE — 83735 ASSAY OF MAGNESIUM: CPT | Performed by: NURSE PRACTITIONER

## 2022-10-19 PROCEDURE — 84100 ASSAY OF PHOSPHORUS: CPT | Performed by: NURSE PRACTITIONER

## 2022-10-19 RX ORDER — ALLOPURINOL 100 MG/1
100 TABLET ORAL DAILY
Status: DISCONTINUED | OUTPATIENT
Start: 2022-10-19 | End: 2022-10-22 | Stop reason: HOSPADM

## 2022-10-19 RX ORDER — LEVOTHYROXINE SODIUM 112 UG/1
224 TABLET ORAL
Status: DISCONTINUED | OUTPATIENT
Start: 2022-10-19 | End: 2022-10-20

## 2022-10-19 RX ORDER — PROPRANOLOL HYDROCHLORIDE 10 MG/1
10 TABLET ORAL EVERY 12 HOURS
Status: DISCONTINUED | OUTPATIENT
Start: 2022-10-19 | End: 2022-10-22 | Stop reason: HOSPADM

## 2022-10-19 RX ORDER — LIOTHYRONINE SODIUM 5 UG/1
5 TABLET ORAL DAILY
Status: DISCONTINUED | OUTPATIENT
Start: 2022-10-19 | End: 2022-10-22 | Stop reason: HOSPADM

## 2022-10-19 RX ADMIN — SENNOSIDES AND DOCUSATE SODIUM 2 TABLET: 50; 8.6 TABLET ORAL at 06:08

## 2022-10-19 RX ADMIN — APIXABAN 5 MG: 5 TABLET, FILM COATED ORAL at 20:10

## 2022-10-19 RX ADMIN — ALLOPURINOL 100 MG: 100 TABLET ORAL at 14:06

## 2022-10-19 RX ADMIN — FAMOTIDINE 20 MG: 20 TABLET ORAL at 08:42

## 2022-10-19 RX ADMIN — ATORVASTATIN CALCIUM 40 MG: 40 TABLET, FILM COATED ORAL at 20:10

## 2022-10-19 RX ADMIN — GABAPENTIN 300 MG: 300 CAPSULE ORAL at 09:20

## 2022-10-19 RX ADMIN — SODIUM CHLORIDE 100 ML/HR: 9 INJECTION, SOLUTION INTRAVENOUS at 08:46

## 2022-10-19 RX ADMIN — LEVOTHYROXINE SODIUM 224 MCG: 0.11 TABLET ORAL at 14:07

## 2022-10-19 RX ADMIN — HYDROMORPHONE HYDROCHLORIDE 0.5 MG: 1 INJECTION, SOLUTION INTRAMUSCULAR; INTRAVENOUS; SUBCUTANEOUS at 18:14

## 2022-10-19 RX ADMIN — APIXABAN 5 MG: 5 TABLET, FILM COATED ORAL at 08:43

## 2022-10-19 RX ADMIN — GABAPENTIN 300 MG: 300 CAPSULE ORAL at 20:10

## 2022-10-19 RX ADMIN — FAMOTIDINE 20 MG: 20 TABLET ORAL at 18:14

## 2022-10-19 RX ADMIN — OXYCODONE HYDROCHLORIDE AND ACETAMINOPHEN 1 TABLET: 10; 325 TABLET ORAL at 08:42

## 2022-10-19 RX ADMIN — HYDROMORPHONE HYDROCHLORIDE 0.5 MG: 1 INJECTION, SOLUTION INTRAMUSCULAR; INTRAVENOUS; SUBCUTANEOUS at 10:09

## 2022-10-19 RX ADMIN — OXYCODONE HYDROCHLORIDE AND ACETAMINOPHEN 1 TABLET: 10; 325 TABLET ORAL at 14:06

## 2022-10-19 RX ADMIN — PROPRANOLOL HYDROCHLORIDE 10 MG: 10 TABLET ORAL at 18:14

## 2022-10-19 RX ADMIN — ASPIRIN 81 MG 81 MG: 81 TABLET ORAL at 08:42

## 2022-10-19 RX ADMIN — LIOTHYRONINE SODIUM 5 MCG: 5 TABLET ORAL at 14:07

## 2022-10-19 RX ADMIN — HYDROMORPHONE HYDROCHLORIDE 0.5 MG: 1 INJECTION, SOLUTION INTRAMUSCULAR; INTRAVENOUS; SUBCUTANEOUS at 20:10

## 2022-10-20 ENCOUNTER — APPOINTMENT (OUTPATIENT)
Dept: CARDIOLOGY | Facility: HOSPITAL | Age: 62
End: 2022-10-20

## 2022-10-20 ENCOUNTER — APPOINTMENT (OUTPATIENT)
Dept: CT IMAGING | Facility: HOSPITAL | Age: 62
End: 2022-10-20

## 2022-10-20 LAB
ANION GAP SERPL CALCULATED.3IONS-SCNC: 10 MMOL/L (ref 5–15)
BH CV LOWER VASCULAR LEFT COMMON FEMORAL AUGMENT: NORMAL
BH CV LOWER VASCULAR LEFT COMMON FEMORAL COMPRESS: NORMAL
BH CV LOWER VASCULAR LEFT COMMON FEMORAL PHASIC: NORMAL
BH CV LOWER VASCULAR LEFT COMMON FEMORAL SPONT: NORMAL
BH CV LOWER VASCULAR LEFT DISTAL FEMORAL AUGMENT: NORMAL
BH CV LOWER VASCULAR LEFT DISTAL FEMORAL COMPRESS: NORMAL
BH CV LOWER VASCULAR LEFT DISTAL FEMORAL PHASIC: NORMAL
BH CV LOWER VASCULAR LEFT DISTAL FEMORAL SPONT: NORMAL
BH CV LOWER VASCULAR LEFT GASTRONEMIUS COMPRESS: NORMAL
BH CV LOWER VASCULAR LEFT GREATER SAPH AK COMPRESS: NORMAL
BH CV LOWER VASCULAR LEFT GREATER SAPH BK COMPRESS: NORMAL
BH CV LOWER VASCULAR LEFT LESSER SAPH COMPRESS: NORMAL
BH CV LOWER VASCULAR LEFT MID FEMORAL AUGMENT: NORMAL
BH CV LOWER VASCULAR LEFT MID FEMORAL COMPRESS: NORMAL
BH CV LOWER VASCULAR LEFT MID FEMORAL PHASIC: NORMAL
BH CV LOWER VASCULAR LEFT MID FEMORAL SPONT: NORMAL
BH CV LOWER VASCULAR LEFT PERONEAL COMPRESS: NORMAL
BH CV LOWER VASCULAR LEFT POPLITEAL AUGMENT: NORMAL
BH CV LOWER VASCULAR LEFT POPLITEAL COMPRESS: NORMAL
BH CV LOWER VASCULAR LEFT POPLITEAL PHASIC: NORMAL
BH CV LOWER VASCULAR LEFT POPLITEAL SPONT: NORMAL
BH CV LOWER VASCULAR LEFT POSTERIOR TIBIAL COMPRESS: NORMAL
BH CV LOWER VASCULAR LEFT PROFUNDA FEMORAL AUGMENT: NORMAL
BH CV LOWER VASCULAR LEFT PROFUNDA FEMORAL COMPRESS: NORMAL
BH CV LOWER VASCULAR LEFT PROFUNDA FEMORAL PHASIC: NORMAL
BH CV LOWER VASCULAR LEFT PROFUNDA FEMORAL SPONT: NORMAL
BH CV LOWER VASCULAR LEFT PROXIMAL FEMORAL AUGMENT: NORMAL
BH CV LOWER VASCULAR LEFT PROXIMAL FEMORAL COMPRESS: NORMAL
BH CV LOWER VASCULAR LEFT PROXIMAL FEMORAL PHASIC: NORMAL
BH CV LOWER VASCULAR LEFT PROXIMAL FEMORAL SPONT: NORMAL
BH CV LOWER VASCULAR LEFT SAPHENOFEMORAL JUNCTION AUGMENT: NORMAL
BH CV LOWER VASCULAR LEFT SAPHENOFEMORAL JUNCTION COMPRESS: NORMAL
BH CV LOWER VASCULAR LEFT SAPHENOFEMORAL JUNCTION PHASIC: NORMAL
BH CV LOWER VASCULAR LEFT SAPHENOFEMORAL JUNCTION SPONT: NORMAL
BH CV LOWER VASCULAR LEFT SOLEAL COMPRESS: NORMAL
BH CV LOWER VASCULAR RIGHT COMMON FEMORAL AUGMENT: NORMAL
BH CV LOWER VASCULAR RIGHT COMMON FEMORAL COMPRESS: NORMAL
BH CV LOWER VASCULAR RIGHT COMMON FEMORAL PHASIC: NORMAL
BH CV LOWER VASCULAR RIGHT COMMON FEMORAL SPONT: NORMAL
BH CV LOWER VASCULAR RIGHT PROFUNDA FEMORAL AUGMENT: NORMAL
BH CV LOWER VASCULAR RIGHT PROFUNDA FEMORAL PHASIC: NORMAL
BH CV LOWER VASCULAR RIGHT PROFUNDA FEMORAL SPONT: NORMAL
BH CV LOWER VASCULAR RIGHT SAPHENOFEMORAL JUNCTION AUGMENT: NORMAL
BH CV LOWER VASCULAR RIGHT SAPHENOFEMORAL JUNCTION PHASIC: NORMAL
BH CV LOWER VASCULAR RIGHT SAPHENOFEMORAL JUNCTION SPONT: NORMAL
BUN SERPL-MCNC: 11 MG/DL (ref 8–23)
BUN/CREAT SERPL: 11 (ref 7–25)
CALCIUM SPEC-SCNC: 8 MG/DL (ref 8.6–10.5)
CHLORIDE SERPL-SCNC: 103 MMOL/L (ref 98–107)
CO2 SERPL-SCNC: 22 MMOL/L (ref 22–29)
CREAT SERPL-MCNC: 1 MG/DL (ref 0.57–1)
DEPRECATED RDW RBC AUTO: 46.5 FL (ref 37–54)
EGFRCR SERPLBLD CKD-EPI 2021: 64.6 ML/MIN/1.73
ERYTHROCYTE [DISTWIDTH] IN BLOOD BY AUTOMATED COUNT: 14.6 % (ref 12.3–15.4)
GLUCOSE BLDC GLUCOMTR-MCNC: 136 MG/DL (ref 70–130)
GLUCOSE SERPL-MCNC: 121 MG/DL (ref 65–99)
HCT VFR BLD AUTO: 31.5 % (ref 34–46.6)
HGB BLD-MCNC: 10 G/DL (ref 12–15.9)
MAGNESIUM SERPL-MCNC: 1.5 MG/DL (ref 1.6–2.4)
MAXIMAL PREDICTED HEART RATE: 160 BPM
MCH RBC QN AUTO: 27.8 PG (ref 26.6–33)
MCHC RBC AUTO-ENTMCNC: 31.7 G/DL (ref 31.5–35.7)
MCV RBC AUTO: 87.5 FL (ref 79–97)
PHOSPHATE SERPL-MCNC: 3.9 MG/DL (ref 2.5–4.5)
PLATELET # BLD AUTO: 349 10*3/MM3 (ref 140–450)
PMV BLD AUTO: 9.3 FL (ref 6–12)
POTASSIUM SERPL-SCNC: 4.1 MMOL/L (ref 3.5–5.2)
PROCALCITONIN SERPL-MCNC: 0.04 NG/ML (ref 0–0.25)
RBC # BLD AUTO: 3.6 10*6/MM3 (ref 3.77–5.28)
SODIUM SERPL-SCNC: 135 MMOL/L (ref 136–145)
SODIUM SERPL-SCNC: 135 MMOL/L (ref 136–145)
STRESS TARGET HR: 136 BPM
WBC NRBC COR # BLD: 8.45 10*3/MM3 (ref 3.4–10.8)

## 2022-10-20 PROCEDURE — 25010000002 HYDROMORPHONE PER 4 MG: Performed by: SURGERY

## 2022-10-20 PROCEDURE — 84145 PROCALCITONIN (PCT): CPT

## 2022-10-20 PROCEDURE — 84295 ASSAY OF SERUM SODIUM: CPT

## 2022-10-20 PROCEDURE — 99232 SBSQ HOSP IP/OBS MODERATE 35: CPT

## 2022-10-20 PROCEDURE — 97530 THERAPEUTIC ACTIVITIES: CPT

## 2022-10-20 PROCEDURE — 0 IOPAMIDOL PER 1 ML: Performed by: SURGERY

## 2022-10-20 PROCEDURE — 97165 OT EVAL LOW COMPLEX 30 MIN: CPT

## 2022-10-20 PROCEDURE — 73706 CT ANGIO LWR EXTR W/O&W/DYE: CPT

## 2022-10-20 PROCEDURE — 83735 ASSAY OF MAGNESIUM: CPT | Performed by: NURSE PRACTITIONER

## 2022-10-20 PROCEDURE — 84100 ASSAY OF PHOSPHORUS: CPT | Performed by: NURSE PRACTITIONER

## 2022-10-20 PROCEDURE — 80048 BASIC METABOLIC PNL TOTAL CA: CPT | Performed by: NURSE PRACTITIONER

## 2022-10-20 PROCEDURE — 85027 COMPLETE CBC AUTOMATED: CPT | Performed by: NURSE PRACTITIONER

## 2022-10-20 PROCEDURE — 93971 EXTREMITY STUDY: CPT | Performed by: INTERNAL MEDICINE

## 2022-10-20 PROCEDURE — 93971 EXTREMITY STUDY: CPT

## 2022-10-20 PROCEDURE — 97535 SELF CARE MNGMENT TRAINING: CPT

## 2022-10-20 PROCEDURE — 25010000002 MAGNESIUM SULFATE 2 GM/50ML SOLUTION

## 2022-10-20 PROCEDURE — 82962 GLUCOSE BLOOD TEST: CPT

## 2022-10-20 PROCEDURE — 63710000001 ONDANSETRON PER 8 MG: Performed by: SURGERY

## 2022-10-20 RX ORDER — PLECANATIDE 3 MG/1
3 TABLET ORAL DAILY
COMMUNITY

## 2022-10-20 RX ORDER — AMOXICILLIN 250 MG
2 CAPSULE ORAL 2 TIMES DAILY
Status: DISCONTINUED | OUTPATIENT
Start: 2022-10-20 | End: 2022-10-22 | Stop reason: HOSPADM

## 2022-10-20 RX ORDER — HYDROMORPHONE HYDROCHLORIDE 1 MG/ML
0.5 INJECTION, SOLUTION INTRAMUSCULAR; INTRAVENOUS; SUBCUTANEOUS
Status: DISCONTINUED | OUTPATIENT
Start: 2022-10-20 | End: 2022-10-22 | Stop reason: HOSPADM

## 2022-10-20 RX ORDER — LEVOTHYROXINE SODIUM 125 UG/1
250 CAPSULE ORAL DAILY
COMMUNITY

## 2022-10-20 RX ORDER — MAGNESIUM SULFATE HEPTAHYDRATE 40 MG/ML
4 INJECTION, SOLUTION INTRAVENOUS AS NEEDED
Status: DISCONTINUED | OUTPATIENT
Start: 2022-10-20 | End: 2022-10-22 | Stop reason: HOSPADM

## 2022-10-20 RX ORDER — OXYCODONE AND ACETAMINOPHEN 10; 325 MG/1; MG/1
1 TABLET ORAL EVERY 6 HOURS PRN
COMMUNITY

## 2022-10-20 RX ORDER — LEVOTHYROXINE SODIUM 125 UG/1
250 CAPSULE ORAL
Status: DISCONTINUED | OUTPATIENT
Start: 2022-10-21 | End: 2022-10-22 | Stop reason: HOSPADM

## 2022-10-20 RX ORDER — MAGNESIUM SULFATE HEPTAHYDRATE 40 MG/ML
2 INJECTION, SOLUTION INTRAVENOUS AS NEEDED
Status: DISCONTINUED | OUTPATIENT
Start: 2022-10-20 | End: 2022-10-22 | Stop reason: HOSPADM

## 2022-10-20 RX ORDER — ESTRADIOL 1 MG/1
1 TABLET ORAL DAILY
Status: ON HOLD | COMMUNITY
End: 2022-10-20

## 2022-10-20 RX ORDER — NALOXONE HCL 0.4 MG/ML
0.4 VIAL (ML) INJECTION
Status: DISCONTINUED | OUTPATIENT
Start: 2022-10-20 | End: 2022-10-22 | Stop reason: HOSPADM

## 2022-10-20 RX ORDER — BISACODYL 10 MG
10 SUPPOSITORY, RECTAL RECTAL DAILY PRN
Status: DISCONTINUED | OUTPATIENT
Start: 2022-10-20 | End: 2022-10-22 | Stop reason: HOSPADM

## 2022-10-20 RX ORDER — POLYETHYLENE GLYCOL 3350 17 G/17G
17 POWDER, FOR SOLUTION ORAL DAILY PRN
Status: DISCONTINUED | OUTPATIENT
Start: 2022-10-20 | End: 2022-10-22 | Stop reason: HOSPADM

## 2022-10-20 RX ORDER — BISACODYL 5 MG/1
5 TABLET, DELAYED RELEASE ORAL DAILY PRN
Status: DISCONTINUED | OUTPATIENT
Start: 2022-10-20 | End: 2022-10-22 | Stop reason: HOSPADM

## 2022-10-20 RX ADMIN — FAMOTIDINE 20 MG: 20 TABLET ORAL at 08:21

## 2022-10-20 RX ADMIN — APIXABAN 5 MG: 5 TABLET, FILM COATED ORAL at 20:00

## 2022-10-20 RX ADMIN — IOPAMIDOL 125 ML: 755 INJECTION, SOLUTION INTRAVENOUS at 02:52

## 2022-10-20 RX ADMIN — ONDANSETRON HYDROCHLORIDE 4 MG: 4 TABLET, FILM COATED ORAL at 17:31

## 2022-10-20 RX ADMIN — GABAPENTIN 300 MG: 300 CAPSULE ORAL at 23:23

## 2022-10-20 RX ADMIN — MAGNESIUM SULFATE HEPTAHYDRATE 2 G: 2 INJECTION, SOLUTION INTRAVENOUS at 12:47

## 2022-10-20 RX ADMIN — GABAPENTIN 300 MG: 300 CAPSULE ORAL at 08:28

## 2022-10-20 RX ADMIN — PROPRANOLOL HYDROCHLORIDE 10 MG: 10 TABLET ORAL at 17:31

## 2022-10-20 RX ADMIN — OXYCODONE HYDROCHLORIDE AND ACETAMINOPHEN 1 TABLET: 10; 325 TABLET ORAL at 17:31

## 2022-10-20 RX ADMIN — BISACODYL 5 MG: 5 TABLET, COATED ORAL at 16:02

## 2022-10-20 RX ADMIN — HYDROMORPHONE HYDROCHLORIDE 0.5 MG: 1 INJECTION, SOLUTION INTRAMUSCULAR; INTRAVENOUS; SUBCUTANEOUS at 19:52

## 2022-10-20 RX ADMIN — ATORVASTATIN CALCIUM 40 MG: 40 TABLET, FILM COATED ORAL at 20:00

## 2022-10-20 RX ADMIN — FAMOTIDINE 20 MG: 20 TABLET ORAL at 16:54

## 2022-10-20 RX ADMIN — APIXABAN 5 MG: 5 TABLET, FILM COATED ORAL at 08:21

## 2022-10-20 RX ADMIN — SENNOSIDES AND DOCUSATE SODIUM 2 TABLET: 50; 8.6 TABLET ORAL at 20:00

## 2022-10-20 RX ADMIN — HYDROMORPHONE HYDROCHLORIDE 0.5 MG: 1 INJECTION, SOLUTION INTRAMUSCULAR; INTRAVENOUS; SUBCUTANEOUS at 11:40

## 2022-10-20 RX ADMIN — PROPRANOLOL HYDROCHLORIDE 10 MG: 10 TABLET ORAL at 05:22

## 2022-10-20 RX ADMIN — HYDROMORPHONE HYDROCHLORIDE 0.5 MG: 1 INJECTION, SOLUTION INTRAMUSCULAR; INTRAVENOUS; SUBCUTANEOUS at 14:31

## 2022-10-20 RX ADMIN — HYDROMORPHONE HYDROCHLORIDE 0.5 MG: 1 INJECTION, SOLUTION INTRAMUSCULAR; INTRAVENOUS; SUBCUTANEOUS at 01:13

## 2022-10-20 RX ADMIN — OXYCODONE HYDROCHLORIDE AND ACETAMINOPHEN 1 TABLET: 10; 325 TABLET ORAL at 21:05

## 2022-10-20 RX ADMIN — HYDROMORPHONE HYDROCHLORIDE 0.5 MG: 1 INJECTION, SOLUTION INTRAMUSCULAR; INTRAVENOUS; SUBCUTANEOUS at 18:42

## 2022-10-20 RX ADMIN — ACETAMINOPHEN 650 MG: 325 TABLET, FILM COATED ORAL at 10:28

## 2022-10-20 RX ADMIN — HYDROMORPHONE HYDROCHLORIDE 0.5 MG: 1 INJECTION, SOLUTION INTRAMUSCULAR; INTRAVENOUS; SUBCUTANEOUS at 16:54

## 2022-10-20 RX ADMIN — ALLOPURINOL 100 MG: 100 TABLET ORAL at 08:21

## 2022-10-20 RX ADMIN — HYDROMORPHONE HYDROCHLORIDE 0.5 MG: 1 INJECTION, SOLUTION INTRAMUSCULAR; INTRAVENOUS; SUBCUTANEOUS at 21:05

## 2022-10-20 RX ADMIN — MAGNESIUM SULFATE HEPTAHYDRATE 2 G: 2 INJECTION, SOLUTION INTRAVENOUS at 04:03

## 2022-10-20 RX ADMIN — OXYCODONE HYDROCHLORIDE AND ACETAMINOPHEN 1 TABLET: 10; 325 TABLET ORAL at 09:32

## 2022-10-20 RX ADMIN — LIOTHYRONINE SODIUM 5 MCG: 5 TABLET ORAL at 08:28

## 2022-10-20 RX ADMIN — MAGNESIUM SULFATE HEPTAHYDRATE 2 G: 2 INJECTION, SOLUTION INTRAVENOUS at 06:47

## 2022-10-20 RX ADMIN — ASPIRIN 81 MG 81 MG: 81 TABLET ORAL at 08:21

## 2022-10-20 RX ADMIN — HYDROMORPHONE HYDROCHLORIDE 0.5 MG: 1 INJECTION, SOLUTION INTRAMUSCULAR; INTRAVENOUS; SUBCUTANEOUS at 23:23

## 2022-10-20 RX ADMIN — AMLODIPINE BESYLATE 10 MG: 10 TABLET ORAL at 08:21

## 2022-10-20 RX ADMIN — LEVOTHYROXINE SODIUM 224 MCG: 0.11 TABLET ORAL at 05:22

## 2022-10-21 LAB
ANION GAP SERPL CALCULATED.3IONS-SCNC: 9 MMOL/L (ref 5–15)
BUN SERPL-MCNC: 9 MG/DL (ref 8–23)
BUN/CREAT SERPL: 13.4 (ref 7–25)
CALCIUM SPEC-SCNC: 8.2 MG/DL (ref 8.6–10.5)
CHLORIDE SERPL-SCNC: 108 MMOL/L (ref 98–107)
CO2 SERPL-SCNC: 24 MMOL/L (ref 22–29)
CREAT SERPL-MCNC: 0.67 MG/DL (ref 0.57–1)
DEPRECATED RDW RBC AUTO: 46.9 FL (ref 37–54)
EGFRCR SERPLBLD CKD-EPI 2021: 100.2 ML/MIN/1.73
ERYTHROCYTE [DISTWIDTH] IN BLOOD BY AUTOMATED COUNT: 14.4 % (ref 12.3–15.4)
GLUCOSE SERPL-MCNC: 112 MG/DL (ref 65–99)
HCT VFR BLD AUTO: 31.7 % (ref 34–46.6)
HGB BLD-MCNC: 10 G/DL (ref 12–15.9)
MAGNESIUM SERPL-MCNC: 1.9 MG/DL (ref 1.6–2.4)
MCH RBC QN AUTO: 28.1 PG (ref 26.6–33)
MCHC RBC AUTO-ENTMCNC: 31.5 G/DL (ref 31.5–35.7)
MCV RBC AUTO: 89 FL (ref 79–97)
PHOSPHATE SERPL-MCNC: 4.6 MG/DL (ref 2.5–4.5)
PLATELET # BLD AUTO: 339 10*3/MM3 (ref 140–450)
PMV BLD AUTO: 9.4 FL (ref 6–12)
POTASSIUM SERPL-SCNC: 4.2 MMOL/L (ref 3.5–5.2)
RBC # BLD AUTO: 3.56 10*6/MM3 (ref 3.77–5.28)
SODIUM SERPL-SCNC: 141 MMOL/L (ref 136–145)
WBC NRBC COR # BLD: 6.09 10*3/MM3 (ref 3.4–10.8)

## 2022-10-21 PROCEDURE — 80048 BASIC METABOLIC PNL TOTAL CA: CPT

## 2022-10-21 PROCEDURE — 85027 COMPLETE CBC AUTOMATED: CPT

## 2022-10-21 PROCEDURE — 25010000002 ONDANSETRON PER 1 MG: Performed by: SURGERY

## 2022-10-21 PROCEDURE — 83735 ASSAY OF MAGNESIUM: CPT

## 2022-10-21 PROCEDURE — 25010000002 HYDROMORPHONE PER 4 MG: Performed by: SURGERY

## 2022-10-21 PROCEDURE — 84100 ASSAY OF PHOSPHORUS: CPT

## 2022-10-21 PROCEDURE — 99232 SBSQ HOSP IP/OBS MODERATE 35: CPT

## 2022-10-21 RX ADMIN — OXYCODONE HYDROCHLORIDE AND ACETAMINOPHEN 1 TABLET: 10; 325 TABLET ORAL at 18:29

## 2022-10-21 RX ADMIN — FAMOTIDINE 20 MG: 20 TABLET ORAL at 17:18

## 2022-10-21 RX ADMIN — FAMOTIDINE 20 MG: 20 TABLET ORAL at 06:01

## 2022-10-21 RX ADMIN — LIOTHYRONINE SODIUM 5 MCG: 5 TABLET ORAL at 11:09

## 2022-10-21 RX ADMIN — PROPRANOLOL HYDROCHLORIDE 10 MG: 10 TABLET ORAL at 17:18

## 2022-10-21 RX ADMIN — APIXABAN 5 MG: 5 TABLET, FILM COATED ORAL at 11:09

## 2022-10-21 RX ADMIN — APIXABAN 5 MG: 5 TABLET, FILM COATED ORAL at 20:02

## 2022-10-21 RX ADMIN — SENNOSIDES AND DOCUSATE SODIUM 2 TABLET: 50; 8.6 TABLET ORAL at 20:02

## 2022-10-21 RX ADMIN — SENNOSIDES AND DOCUSATE SODIUM 2 TABLET: 50; 8.6 TABLET ORAL at 11:08

## 2022-10-21 RX ADMIN — ALLOPURINOL 100 MG: 100 TABLET ORAL at 11:08

## 2022-10-21 RX ADMIN — OXYCODONE HYDROCHLORIDE AND ACETAMINOPHEN 1 TABLET: 10; 325 TABLET ORAL at 12:13

## 2022-10-21 RX ADMIN — ASPIRIN 81 MG 81 MG: 81 TABLET ORAL at 11:09

## 2022-10-21 RX ADMIN — LEVOTHYROXINE SODIUM 250 MCG: 125 CAPSULE ORAL at 05:21

## 2022-10-21 RX ADMIN — OXYCODONE HYDROCHLORIDE AND ACETAMINOPHEN 1 TABLET: 10; 325 TABLET ORAL at 05:18

## 2022-10-21 RX ADMIN — OXYCODONE HYDROCHLORIDE AND ACETAMINOPHEN 1 TABLET: 10; 325 TABLET ORAL at 01:44

## 2022-10-21 RX ADMIN — HYDROMORPHONE HYDROCHLORIDE 0.5 MG: 1 INJECTION, SOLUTION INTRAMUSCULAR; INTRAVENOUS; SUBCUTANEOUS at 20:02

## 2022-10-21 RX ADMIN — HYDROMORPHONE HYDROCHLORIDE 0.5 MG: 1 INJECTION, SOLUTION INTRAMUSCULAR; INTRAVENOUS; SUBCUTANEOUS at 17:15

## 2022-10-21 RX ADMIN — ONDANSETRON 4 MG: 2 INJECTION INTRAMUSCULAR; INTRAVENOUS at 09:05

## 2022-10-21 RX ADMIN — PROPRANOLOL HYDROCHLORIDE 10 MG: 10 TABLET ORAL at 05:18

## 2022-10-21 RX ADMIN — ATORVASTATIN CALCIUM 40 MG: 40 TABLET, FILM COATED ORAL at 20:02

## 2022-10-22 ENCOUNTER — READMISSION MANAGEMENT (OUTPATIENT)
Dept: CALL CENTER | Facility: HOSPITAL | Age: 62
End: 2022-10-22

## 2022-10-22 VITALS
BODY MASS INDEX: 34.06 KG/M2 | OXYGEN SATURATION: 93 % | SYSTOLIC BLOOD PRESSURE: 108 MMHG | DIASTOLIC BLOOD PRESSURE: 66 MMHG | HEART RATE: 100 BPM | RESPIRATION RATE: 20 BRPM | HEIGHT: 67 IN | TEMPERATURE: 98.9 F | WEIGHT: 217 LBS

## 2022-10-22 PROBLEM — Z98.890 CRITICAL LIMB ISCHEMIA WITH HISTORY OF REVASCULARIZATION OF SAME EXTREMITY: Status: RESOLVED | Noted: 2022-10-17 | Resolved: 2022-10-22

## 2022-10-22 PROBLEM — I72.4 POPLITEAL ARTERY ANEURYSM: Chronic | Status: RESOLVED | Noted: 2022-04-18 | Resolved: 2022-10-22

## 2022-10-22 PROBLEM — I70.229 CRITICAL LIMB ISCHEMIA WITH HISTORY OF REVASCULARIZATION OF SAME EXTREMITY: Status: RESOLVED | Noted: 2022-10-17 | Resolved: 2022-10-22

## 2022-10-22 LAB
ANION GAP SERPL CALCULATED.3IONS-SCNC: 8 MMOL/L (ref 5–15)
BUN SERPL-MCNC: 8 MG/DL (ref 8–23)
BUN/CREAT SERPL: 11.4 (ref 7–25)
CALCIUM SPEC-SCNC: 8.1 MG/DL (ref 8.6–10.5)
CHLORIDE SERPL-SCNC: 104 MMOL/L (ref 98–107)
CO2 SERPL-SCNC: 24 MMOL/L (ref 22–29)
CREAT SERPL-MCNC: 0.7 MG/DL (ref 0.57–1)
DEPRECATED RDW RBC AUTO: 45.8 FL (ref 37–54)
EGFRCR SERPLBLD CKD-EPI 2021: 99.2 ML/MIN/1.73
ERYTHROCYTE [DISTWIDTH] IN BLOOD BY AUTOMATED COUNT: 14.6 % (ref 12.3–15.4)
GLUCOSE SERPL-MCNC: 115 MG/DL (ref 65–99)
HCT VFR BLD AUTO: 30.3 % (ref 34–46.6)
HGB BLD-MCNC: 9.9 G/DL (ref 12–15.9)
MAGNESIUM SERPL-MCNC: 1.6 MG/DL (ref 1.6–2.4)
MCH RBC QN AUTO: 28 PG (ref 26.6–33)
MCHC RBC AUTO-ENTMCNC: 32.7 G/DL (ref 31.5–35.7)
MCV RBC AUTO: 85.8 FL (ref 79–97)
PHOSPHATE SERPL-MCNC: 4.1 MG/DL (ref 2.5–4.5)
PLATELET # BLD AUTO: 361 10*3/MM3 (ref 140–450)
PMV BLD AUTO: 9.6 FL (ref 6–12)
POTASSIUM SERPL-SCNC: 4.1 MMOL/L (ref 3.5–5.2)
RBC # BLD AUTO: 3.53 10*6/MM3 (ref 3.77–5.28)
SODIUM SERPL-SCNC: 136 MMOL/L (ref 136–145)
WBC NRBC COR # BLD: 6.75 10*3/MM3 (ref 3.4–10.8)

## 2022-10-22 PROCEDURE — 99232 SBSQ HOSP IP/OBS MODERATE 35: CPT

## 2022-10-22 PROCEDURE — 97530 THERAPEUTIC ACTIVITIES: CPT

## 2022-10-22 PROCEDURE — 83735 ASSAY OF MAGNESIUM: CPT

## 2022-10-22 PROCEDURE — 80048 BASIC METABOLIC PNL TOTAL CA: CPT

## 2022-10-22 PROCEDURE — 97116 GAIT TRAINING THERAPY: CPT

## 2022-10-22 PROCEDURE — 84100 ASSAY OF PHOSPHORUS: CPT

## 2022-10-22 PROCEDURE — 0 MAGNESIUM SULFATE 4 GM/100ML SOLUTION

## 2022-10-22 PROCEDURE — 85027 COMPLETE CBC AUTOMATED: CPT

## 2022-10-22 RX ORDER — PROPRANOLOL HYDROCHLORIDE 10 MG/1
10 TABLET ORAL EVERY 12 HOURS
Qty: 180 TABLET | Refills: 3 | Status: SHIPPED | OUTPATIENT
Start: 2022-10-22 | End: 2023-01-20

## 2022-10-22 RX ORDER — ASPIRIN 81 MG/1
81 TABLET, CHEWABLE ORAL DAILY
Qty: 90 TABLET | Refills: 3 | Status: SHIPPED | OUTPATIENT
Start: 2022-10-23 | End: 2023-01-21

## 2022-10-22 RX ORDER — OXYCODONE AND ACETAMINOPHEN 10; 325 MG/1; MG/1
1 TABLET ORAL EVERY 4 HOURS PRN
Qty: 18 TABLET | Refills: 0 | Status: SHIPPED | OUTPATIENT
Start: 2022-10-22 | End: 2022-10-25

## 2022-10-22 RX ADMIN — LIOTHYRONINE SODIUM 5 MCG: 5 TABLET ORAL at 09:11

## 2022-10-22 RX ADMIN — MAGNESIUM SULFATE HEPTAHYDRATE 4 G: 40 INJECTION, SOLUTION INTRAVENOUS at 03:15

## 2022-10-22 RX ADMIN — OXYCODONE HYDROCHLORIDE AND ACETAMINOPHEN 1 TABLET: 10; 325 TABLET ORAL at 00:29

## 2022-10-22 RX ADMIN — FAMOTIDINE 20 MG: 20 TABLET ORAL at 16:50

## 2022-10-22 RX ADMIN — APIXABAN 5 MG: 5 TABLET, FILM COATED ORAL at 09:12

## 2022-10-22 RX ADMIN — SENNOSIDES AND DOCUSATE SODIUM 2 TABLET: 50; 8.6 TABLET ORAL at 09:11

## 2022-10-22 RX ADMIN — ASPIRIN 81 MG 81 MG: 81 TABLET ORAL at 09:12

## 2022-10-22 RX ADMIN — AMLODIPINE BESYLATE 10 MG: 10 TABLET ORAL at 09:12

## 2022-10-22 RX ADMIN — ACETAMINOPHEN 650 MG: 325 TABLET, FILM COATED ORAL at 01:06

## 2022-10-22 RX ADMIN — LEVOTHYROXINE SODIUM 250 MCG: 125 CAPSULE ORAL at 06:31

## 2022-10-22 RX ADMIN — OXYCODONE HYDROCHLORIDE AND ACETAMINOPHEN 1 TABLET: 10; 325 TABLET ORAL at 09:11

## 2022-10-22 RX ADMIN — FAMOTIDINE 20 MG: 20 TABLET ORAL at 06:31

## 2022-10-22 RX ADMIN — ALLOPURINOL 100 MG: 100 TABLET ORAL at 09:11

## 2022-10-22 RX ADMIN — PROPRANOLOL HYDROCHLORIDE 10 MG: 10 TABLET ORAL at 06:31

## 2022-10-22 RX ADMIN — OXYCODONE HYDROCHLORIDE AND ACETAMINOPHEN 1 TABLET: 10; 325 TABLET ORAL at 16:50

## 2022-10-23 NOTE — OUTREACH NOTE
Prep Survey    Flowsheet Row Responses   [facility] facility patient discharged from? [place]   Is LACE score < 7 ? No   Emergency Room discharge w/ pulse ox? No   Eligibility Readm Mgmt   Discharge diagnosis Bilateral popliteal artery aneurysm s/p repair and stent placement    Does the patient have one of the following disease processes/diagnoses(primary or secondary)? General Surgery   Does the patient have Home health ordered? No   Is there a DME ordered? No   Prep survey completed? Yes          QUINN MICHAUD - Registered Nurse

## 2022-10-25 ENCOUNTER — READMISSION MANAGEMENT (OUTPATIENT)
Dept: CALL CENTER | Facility: HOSPITAL | Age: 62
End: 2022-10-25

## 2022-10-25 NOTE — OUTREACH NOTE
"General Surgery Week 1 Survey    Flowsheet Row Responses   [facility] patient discharged from? [place]   Does the patient have one of the following disease processes/diagnoses(primary or secondary)? General Surgery   Week 1 attempt successful? Yes   Call start time 1200   Call end time 1202   Discharge diagnosis Bilateral popliteal artery aneurysm s/p repair and stent placement    Meds reviewed with patient/caregiver? Yes   Is the patient having any side effects they believe may be caused by any medication additions or changes? No   Does the patient have all medications related to this admission filled (includes all antibiotics, pain medications, etc.) Yes   Is the patient taking all medications as directed (includes completed medication regime)? Yes   Does the patient have a follow up appointment scheduled with their surgeon? Yes   Has the patient kept scheduled appointments due by today? N/A   Comments 11/3/22 with surgeon   Has home health visited the patient within 72 hours of discharge? N/A   Psychosocial issues? No   Did the patient receive a copy of their discharge instructions? Yes   Nursing interventions Reviewed instructions with patient   What is the patient's perception of their health status since discharge? Improving  [\"Some swelling and pain\" ]   Nursing interventions Nurse provided patient education   Is the patient /caregiver able to teach back basic post-op care? Drive as instructed by MD in discharge instructions, Take showers only when approved by MD-sponge bathe until then, Lifting as instructed by MD in discharge instructions, Keep incision areas clean,dry and protected, Continue use of incentive spirometry at least 1 week post discharge   Is the patient/caregiver able to teach back signs and symptoms of incisional infection? Increased redness, swelling or pain at the incisonal site, Increased drainage or bleeding, Incisional warmth, Pus or odor from incision, Fever   Is the " patient/caregiver able to teach back steps to recovery at home? Set small, achievable goals for return to baseline health, Rest and rebuild strength, gradually increase activity, Eat a well-balance diet, Make a list of questions for surgeon's appointment   If the patient is a current smoker, are they able to teach back resources for cessation? Not a smoker   Is the patient/caregiver able to teach back the hierarchy of who to call/visit for symptoms/problems? PCP, Specialist, Home health nurse, Urgent Care, ED, 911 Yes   Week 1 call completed? Yes          AMARA BELLA - Registered Nurse

## 2022-12-02 ENCOUNTER — ANESTHESIA EVENT (OUTPATIENT)
Dept: PERIOP | Facility: HOSPITAL | Age: 62
End: 2022-12-02

## 2022-12-02 RX ORDER — SODIUM CHLORIDE 0.9 % (FLUSH) 0.9 %
10 SYRINGE (ML) INJECTION AS NEEDED
Status: CANCELLED | OUTPATIENT
Start: 2022-12-02

## 2022-12-02 RX ORDER — FAMOTIDINE 10 MG/ML
20 INJECTION, SOLUTION INTRAVENOUS ONCE
Status: CANCELLED | OUTPATIENT
Start: 2022-12-02 | End: 2022-12-02

## 2022-12-02 RX ORDER — SODIUM CHLORIDE 0.9 % (FLUSH) 0.9 %
10 SYRINGE (ML) INJECTION EVERY 12 HOURS SCHEDULED
Status: CANCELLED | OUTPATIENT
Start: 2022-12-02

## 2022-12-05 ENCOUNTER — APPOINTMENT (OUTPATIENT)
Dept: GENERAL RADIOLOGY | Facility: HOSPITAL | Age: 62
End: 2022-12-05

## 2022-12-05 ENCOUNTER — HOSPITAL ENCOUNTER (OUTPATIENT)
Facility: HOSPITAL | Age: 62
Discharge: HOME | End: 2022-12-05
Attending: SURGERY | Admitting: SURGERY

## 2022-12-05 ENCOUNTER — ANESTHESIA (OUTPATIENT)
Dept: PERIOP | Facility: HOSPITAL | Age: 62
End: 2022-12-05

## 2022-12-05 VITALS
OXYGEN SATURATION: 98 % | DIASTOLIC BLOOD PRESSURE: 69 MMHG | HEART RATE: 68 BPM | BODY MASS INDEX: 36.15 KG/M2 | RESPIRATION RATE: 18 BRPM | TEMPERATURE: 97.7 F | HEIGHT: 65 IN | WEIGHT: 217 LBS | SYSTOLIC BLOOD PRESSURE: 97 MMHG

## 2022-12-05 PROBLEM — I70.229 CRITICAL LIMB ISCHEMIA WITH HISTORY OF REVASCULARIZATION OF SAME EXTREMITY: Status: ACTIVE | Noted: 2022-12-05

## 2022-12-05 PROBLEM — Z98.890 CRITICAL LIMB ISCHEMIA WITH HISTORY OF REVASCULARIZATION OF SAME EXTREMITY: Status: ACTIVE | Noted: 2022-12-05

## 2022-12-05 PROCEDURE — C1894 INTRO/SHEATH, NON-LASER: HCPCS | Performed by: SURGERY

## 2022-12-05 PROCEDURE — 25010000002 ONDANSETRON PER 1 MG: Performed by: NURSE ANESTHETIST, CERTIFIED REGISTERED

## 2022-12-05 PROCEDURE — C1769 GUIDE WIRE: HCPCS | Performed by: SURGERY

## 2022-12-05 PROCEDURE — C1887 CATHETER, GUIDING: HCPCS | Performed by: SURGERY

## 2022-12-05 PROCEDURE — 0 LIDOCAINE 1 % SOLUTION: Performed by: SURGERY

## 2022-12-05 PROCEDURE — 25010000002 FENTANYL CITRATE (PF) 50 MCG/ML SOLUTION

## 2022-12-05 PROCEDURE — C1760 CLOSURE DEV, VASC: HCPCS | Performed by: SURGERY

## 2022-12-05 PROCEDURE — 25010000002 IOPAMIDOL 61 % SOLUTION: Performed by: SURGERY

## 2022-12-05 PROCEDURE — 25010000002 CEFAZOLIN IN DEXTROSE 2-4 GM/100ML-% SOLUTION: Performed by: SURGERY

## 2022-12-05 PROCEDURE — 25010000002 HEPARIN (PORCINE) PER 1000 UNITS: Performed by: SURGERY

## 2022-12-05 PROCEDURE — 25010000002 DEXAMETHASONE PER 1 MG: Performed by: NURSE ANESTHETIST, CERTIFIED REGISTERED

## 2022-12-05 PROCEDURE — 25010000002 HEPARIN (PORCINE) PER 1000 UNITS: Performed by: NURSE ANESTHETIST, CERTIFIED REGISTERED

## 2022-12-05 PROCEDURE — C1725 CATH, TRANSLUMIN NON-LASER: HCPCS | Performed by: SURGERY

## 2022-12-05 PROCEDURE — C1757 CATH, THROMBECTOMY/EMBOLECT: HCPCS | Performed by: SURGERY

## 2022-12-05 PROCEDURE — 75710 ARTERY X-RAYS ARM/LEG: CPT

## 2022-12-05 PROCEDURE — 25010000002 PROPOFOL 10 MG/ML EMULSION: Performed by: NURSE ANESTHETIST, CERTIFIED REGISTERED

## 2022-12-05 PROCEDURE — 25010000002 ALTEPLASE 2 MG RECONSTITUTED SOLUTION: Performed by: SURGERY

## 2022-12-05 RX ORDER — DROPERIDOL 2.5 MG/ML
0.62 INJECTION, SOLUTION INTRAMUSCULAR; INTRAVENOUS
Status: DISCONTINUED | OUTPATIENT
Start: 2022-12-05 | End: 2022-12-05 | Stop reason: HOSPADM

## 2022-12-05 RX ORDER — SODIUM CHLORIDE 0.9 % (FLUSH) 0.9 %
3-10 SYRINGE (ML) INJECTION AS NEEDED
Status: DISCONTINUED | OUTPATIENT
Start: 2022-12-05 | End: 2022-12-05 | Stop reason: HOSPADM

## 2022-12-05 RX ORDER — SODIUM CHLORIDE 9 MG/ML
40 INJECTION, SOLUTION INTRAVENOUS AS NEEDED
Status: DISCONTINUED | OUTPATIENT
Start: 2022-12-05 | End: 2022-12-05 | Stop reason: HOSPADM

## 2022-12-05 RX ORDER — PROMETHAZINE HYDROCHLORIDE 25 MG/1
25 SUPPOSITORY RECTAL ONCE AS NEEDED
Status: DISCONTINUED | OUTPATIENT
Start: 2022-12-05 | End: 2022-12-05 | Stop reason: HOSPADM

## 2022-12-05 RX ORDER — FAMOTIDINE 20 MG/1
20 TABLET, FILM COATED ORAL ONCE
Status: COMPLETED | OUTPATIENT
Start: 2022-12-05 | End: 2022-12-05

## 2022-12-05 RX ORDER — DEXAMETHASONE SODIUM PHOSPHATE 4 MG/ML
INJECTION, SOLUTION INTRA-ARTICULAR; INTRALESIONAL; INTRAMUSCULAR; INTRAVENOUS; SOFT TISSUE AS NEEDED
Status: DISCONTINUED | OUTPATIENT
Start: 2022-12-05 | End: 2022-12-05 | Stop reason: SURG

## 2022-12-05 RX ORDER — HYDROMORPHONE HYDROCHLORIDE 1 MG/ML
0.5 INJECTION, SOLUTION INTRAMUSCULAR; INTRAVENOUS; SUBCUTANEOUS
Status: DISCONTINUED | OUTPATIENT
Start: 2022-12-05 | End: 2022-12-05 | Stop reason: HOSPADM

## 2022-12-05 RX ORDER — IODIXANOL 320 MG/ML
INJECTION, SOLUTION INTRAVASCULAR AS NEEDED
Status: DISCONTINUED | OUTPATIENT
Start: 2022-12-05 | End: 2022-12-05 | Stop reason: HOSPADM

## 2022-12-05 RX ORDER — LIDOCAINE HYDROCHLORIDE 10 MG/ML
INJECTION, SOLUTION INFILTRATION; PERINEURAL AS NEEDED
Status: DISCONTINUED | OUTPATIENT
Start: 2022-12-05 | End: 2022-12-05 | Stop reason: HOSPADM

## 2022-12-05 RX ORDER — MIDAZOLAM HYDROCHLORIDE 1 MG/ML
1 INJECTION INTRAMUSCULAR; INTRAVENOUS
Status: DISCONTINUED | OUTPATIENT
Start: 2022-12-05 | End: 2022-12-05 | Stop reason: HOSPADM

## 2022-12-05 RX ORDER — HYDROCODONE BITARTRATE AND ACETAMINOPHEN 5; 325 MG/1; MG/1
TABLET ORAL
Status: COMPLETED
Start: 2022-12-05 | End: 2022-12-05

## 2022-12-05 RX ORDER — NALOXONE HCL 0.4 MG/ML
0.4 VIAL (ML) INJECTION AS NEEDED
Status: DISCONTINUED | OUTPATIENT
Start: 2022-12-05 | End: 2022-12-05 | Stop reason: HOSPADM

## 2022-12-05 RX ORDER — PROMETHAZINE HYDROCHLORIDE 25 MG/1
25 TABLET ORAL ONCE AS NEEDED
Status: DISCONTINUED | OUTPATIENT
Start: 2022-12-05 | End: 2022-12-05 | Stop reason: HOSPADM

## 2022-12-05 RX ORDER — DROPERIDOL 2.5 MG/ML
0.62 INJECTION, SOLUTION INTRAMUSCULAR; INTRAVENOUS ONCE AS NEEDED
Status: DISCONTINUED | OUTPATIENT
Start: 2022-12-05 | End: 2022-12-05 | Stop reason: HOSPADM

## 2022-12-05 RX ORDER — SODIUM CHLORIDE, SODIUM LACTATE, POTASSIUM CHLORIDE, CALCIUM CHLORIDE 600; 310; 30; 20 MG/100ML; MG/100ML; MG/100ML; MG/100ML
9 INJECTION, SOLUTION INTRAVENOUS CONTINUOUS
Status: DISCONTINUED | OUTPATIENT
Start: 2022-12-05 | End: 2022-12-05 | Stop reason: HOSPADM

## 2022-12-05 RX ORDER — ONDANSETRON 2 MG/ML
INJECTION INTRAMUSCULAR; INTRAVENOUS AS NEEDED
Status: DISCONTINUED | OUTPATIENT
Start: 2022-12-05 | End: 2022-12-05 | Stop reason: SURG

## 2022-12-05 RX ORDER — FENTANYL CITRATE 50 UG/ML
50 INJECTION, SOLUTION INTRAMUSCULAR; INTRAVENOUS
Status: DISCONTINUED | OUTPATIENT
Start: 2022-12-05 | End: 2022-12-05 | Stop reason: HOSPADM

## 2022-12-05 RX ORDER — NALOXONE HCL 0.4 MG/ML
0.4 VIAL (ML) INJECTION
Status: DISCONTINUED | OUTPATIENT
Start: 2022-12-05 | End: 2022-12-05 | Stop reason: HOSPADM

## 2022-12-05 RX ORDER — LIDOCAINE HYDROCHLORIDE 10 MG/ML
INJECTION, SOLUTION EPIDURAL; INFILTRATION; INTRACAUDAL; PERINEURAL AS NEEDED
Status: DISCONTINUED | OUTPATIENT
Start: 2022-12-05 | End: 2022-12-05 | Stop reason: SURG

## 2022-12-05 RX ORDER — FENTANYL CITRATE 50 UG/ML
INJECTION, SOLUTION INTRAMUSCULAR; INTRAVENOUS
Status: COMPLETED
Start: 2022-12-05 | End: 2022-12-05

## 2022-12-05 RX ORDER — HYDROCODONE BITARTRATE AND ACETAMINOPHEN 5; 325 MG/1; MG/1
1 TABLET ORAL ONCE AS NEEDED
Status: DISCONTINUED | OUTPATIENT
Start: 2022-12-05 | End: 2022-12-05 | Stop reason: HOSPADM

## 2022-12-05 RX ORDER — IPRATROPIUM BROMIDE AND ALBUTEROL SULFATE 2.5; .5 MG/3ML; MG/3ML
3 SOLUTION RESPIRATORY (INHALATION) ONCE AS NEEDED
Status: DISCONTINUED | OUTPATIENT
Start: 2022-12-05 | End: 2022-12-05 | Stop reason: HOSPADM

## 2022-12-05 RX ORDER — LIDOCAINE HYDROCHLORIDE 10 MG/ML
0.5 INJECTION, SOLUTION EPIDURAL; INFILTRATION; INTRACAUDAL; PERINEURAL ONCE AS NEEDED
Status: COMPLETED | OUTPATIENT
Start: 2022-12-05 | End: 2022-12-05

## 2022-12-05 RX ORDER — LABETALOL HYDROCHLORIDE 5 MG/ML
5 INJECTION, SOLUTION INTRAVENOUS
Status: DISCONTINUED | OUTPATIENT
Start: 2022-12-05 | End: 2022-12-05 | Stop reason: HOSPADM

## 2022-12-05 RX ORDER — CEFAZOLIN SODIUM 2 G/100ML
2 INJECTION, SOLUTION INTRAVENOUS ONCE
Status: COMPLETED | OUTPATIENT
Start: 2022-12-05 | End: 2022-12-05

## 2022-12-05 RX ORDER — PROPOFOL 10 MG/ML
VIAL (ML) INTRAVENOUS AS NEEDED
Status: DISCONTINUED | OUTPATIENT
Start: 2022-12-05 | End: 2022-12-05 | Stop reason: SURG

## 2022-12-05 RX ORDER — SODIUM CHLORIDE 9 MG/ML
100 INJECTION, SOLUTION INTRAVENOUS CONTINUOUS
Status: DISCONTINUED | OUTPATIENT
Start: 2022-12-05 | End: 2022-12-05 | Stop reason: HOSPADM

## 2022-12-05 RX ORDER — NITROGLYCERIN 20 MG/100ML
INJECTION INTRAVENOUS CONTINUOUS PRN
Status: COMPLETED | OUTPATIENT
Start: 2022-12-05 | End: 2022-12-05

## 2022-12-05 RX ORDER — HYDROCODONE BITARTRATE AND ACETAMINOPHEN 5; 325 MG/1; MG/1
1 TABLET ORAL EVERY 4 HOURS PRN
Status: DISCONTINUED | OUTPATIENT
Start: 2022-12-05 | End: 2022-12-05 | Stop reason: HOSPADM

## 2022-12-05 RX ORDER — SODIUM CHLORIDE 0.9 % (FLUSH) 0.9 %
3 SYRINGE (ML) INJECTION EVERY 12 HOURS SCHEDULED
Status: DISCONTINUED | OUTPATIENT
Start: 2022-12-05 | End: 2022-12-05 | Stop reason: HOSPADM

## 2022-12-05 RX ORDER — ONDANSETRON 2 MG/ML
4 INJECTION INTRAMUSCULAR; INTRAVENOUS ONCE AS NEEDED
Status: DISCONTINUED | OUTPATIENT
Start: 2022-12-05 | End: 2022-12-05 | Stop reason: HOSPADM

## 2022-12-05 RX ORDER — PHENYLEPHRINE HCL IN 0.9% NACL 1 MG/10 ML
SYRINGE (ML) INTRAVENOUS AS NEEDED
Status: DISCONTINUED | OUTPATIENT
Start: 2022-12-05 | End: 2022-12-05 | Stop reason: SURG

## 2022-12-05 RX ORDER — HEPARIN SODIUM 1000 [USP'U]/ML
INJECTION, SOLUTION INTRAVENOUS; SUBCUTANEOUS AS NEEDED
Status: DISCONTINUED | OUTPATIENT
Start: 2022-12-05 | End: 2022-12-05 | Stop reason: SURG

## 2022-12-05 RX ADMIN — LIDOCAINE HYDROCHLORIDE 0.2 ML: 10 INJECTION, SOLUTION EPIDURAL; INFILTRATION; INTRACAUDAL; PERINEURAL at 06:35

## 2022-12-05 RX ADMIN — HEPARIN SODIUM 1000 UNITS: 1000 INJECTION, SOLUTION INTRAVENOUS; SUBCUTANEOUS at 08:35

## 2022-12-05 RX ADMIN — DEXAMETHASONE SODIUM PHOSPHATE 4 MG: 4 INJECTION, SOLUTION INTRAMUSCULAR; INTRAVENOUS at 08:00

## 2022-12-05 RX ADMIN — FAMOTIDINE 20 MG: 20 TABLET ORAL at 07:12

## 2022-12-05 RX ADMIN — ONDANSETRON 4 MG: 2 INJECTION INTRAMUSCULAR; INTRAVENOUS at 08:00

## 2022-12-05 RX ADMIN — HYDROCODONE BITARTRATE AND ACETAMINOPHEN 1 TABLET: 5; 325 TABLET ORAL at 11:49

## 2022-12-05 RX ADMIN — LIDOCAINE HYDROCHLORIDE 50 MG: 10 INJECTION, SOLUTION EPIDURAL; INFILTRATION; INTRACAUDAL; PERINEURAL at 07:57

## 2022-12-05 RX ADMIN — SODIUM CHLORIDE, POTASSIUM CHLORIDE, SODIUM LACTATE AND CALCIUM CHLORIDE 9 ML/HR: 600; 310; 30; 20 INJECTION, SOLUTION INTRAVENOUS at 06:35

## 2022-12-05 RX ADMIN — CEFAZOLIN SODIUM 2 G: 2 INJECTION, SOLUTION INTRAVENOUS at 07:58

## 2022-12-05 RX ADMIN — Medication 100 MCG: at 08:05

## 2022-12-05 RX ADMIN — FENTANYL CITRATE 50 MCG: 50 INJECTION, SOLUTION INTRAMUSCULAR; INTRAVENOUS at 09:45

## 2022-12-05 RX ADMIN — APIXABAN 5 MG: 5 TABLET, FILM COATED ORAL at 11:48

## 2022-12-05 RX ADMIN — PROPOFOL 100 MCG/KG/MIN: 10 INJECTION, EMULSION INTRAVENOUS at 07:57

## 2022-12-05 RX ADMIN — PROPOFOL 50 MG: 10 INJECTION, EMULSION INTRAVENOUS at 08:45

## 2022-12-05 RX ADMIN — Medication 100 MCG: at 08:37

## 2022-12-05 RX ADMIN — HEPARIN SODIUM 3000 UNITS: 1000 INJECTION, SOLUTION INTRAVENOUS; SUBCUTANEOUS at 09:00

## 2022-12-05 RX ADMIN — PROPOFOL 50 MG: 10 INJECTION, EMULSION INTRAVENOUS at 07:57

## 2022-12-05 RX ADMIN — Medication 100 MCG: at 09:00

## 2022-12-05 RX ADMIN — HEPARIN SODIUM 8000 UNITS: 1000 INJECTION, SOLUTION INTRAVENOUS; SUBCUTANEOUS at 08:19

## 2022-12-05 NOTE — OP NOTE
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  Patient still has poor distal perfusion with concerns for tibial vessel disease and she is brought to the operating room for an angiogram with possible intervention.    Pre-op Diagnosis:   Left lower extremity critical limb ischemia, tissue loss       Post-Op Diagnosis Codes:  Left lower extremity critical limb ischemia, tissue loss    Procedure/CPT® Codes:    Procedure(s):  1.  Right common femoral artery percutaneous ultrasound-guided access  2.  Aortogram, left lower extremity angiogram  3.  Left anterior tibial percutaneous mechanical thrombectomy utilizing export catheter  4.  Direct injection of thrombolytic into the anterior tibial artery  5.  Angioplasty of left anterior tibial and dorsalis pedis arteries    Staff:  Surgeon(s):  Jeremy Linn MD    Circulator: Lisa Yu RN  Radiology Technologist: Harris Santos; Eriberto Back, RT  Scrub Person: Dilcia Dowling Megan     Anesthesia: Monitored Anesthesia Care    Estimated Blood Loss: minimal    Implants:    Nothing was implanted during the procedure    Specimen: None      Findings: Occlusion of the tibioperoneal trunk, distal anterior tibial artery, distal posterior tibial artery    Complications: None    Description of Procedure: Patient was brought to the operating room, and laid on the table in a supine position.  Patient's bilateral groins were prepped and draped in standard surgical fashion.  Timeout was performed.  Right common femoral artery was identified with ultrasound guidance.  Skin and subcutaneous tissues were infiltrated with local anesthetic.  The vessel was accessed utilizing  micropuncture technique and the 6 Nigerian sheath was placed.  Heparin was administered.  Flush catheter was advanced into the distal aorta.  Aortogram showed patent distal aorta with patent bilateral common, internal and external iliac arteries.  Flush catheter was advanced into the left external iliac artery and left lower extremity angiogram was performed.  This showed patent left common femoral and profunda femoris arteries.  The potential femoral artery was patent.  The popliteal artery and stent were occluded.  The bypass graft appeared to be patent with no proximal or distal stenoses.  The anterior tibial artery was widely patent proximally and had an occlusion in the distal segment around the level of the ankle.  There was pedal flow through this vessel.  The tibioperoneal trunk was now occluded.  This was patent on the postop angiography after the creation of the bypass graft.  There was reconstitution of the posterior tibial and peroneal arteries.  There was a segment of occlusion of the posterior tibial artery around the ankle.  At this stage the short 6 Nigerian sheath was exchanged for a 6 Nigerian by 65 cm sheath.  A glide advantage wire and a 0.014 trailblazer catheter were advanced all the way across the occluded anterior tibial artery with successful reentry.  An export catheter was then used to perform a percutaneous mechanical thrombectomy removing several pieces of dark red thrombus.  2 mg of tissue plasminogen activator and 400 mcg of nitroglycerin were directly administered into the anterior tibial artery.  The vessel was then angioplastied with 2.5 mm balloon with good angiographic result with pedal flow.  There was an area of vasospasm noted which we left alone.  I then attempted to cross the occluded mid peroneal trunk without success.  At this stage we decided to terminate the procedure.  Heparin effect was not reversed.  The 6 Nigerian sheath was removed and the access site was closed with  Perclose closure device with successful hemostasis sterile dressing was applied.  [facility] and [facility] to recovery in stable condition.  All counts were correct.      Jeremy Linn MD     Date: 12/5/2022  Time: 09:33 EST

## 2022-12-05 NOTE — ANESTHESIA PREPROCEDURE EVALUATION
Anesthesia Evaluation     Patient summary reviewed and Nursing notes reviewed   no history of anesthetic complications:  NPO Solid Status: > 8 hours  NPO Liquid Status: > 2 hours           Airway   Mallampati: II  TM distance: >3 FB  Neck ROM: full  No difficulty expected  Dental    (+) edentulous    Pulmonary - normal exam    breath sounds clear to auscultation  (+) a smoker (quit x 1 yr) Former,   Cardiovascular - normal exam    ECG reviewed  PT is on anticoagulation therapy  Rhythm: regular  Rate: normal    (+) hypertension, PVD (s/p left fem-pop; 3cm AAA),       Neuro/Psych- negative ROS  GI/Hepatic/Renal/Endo    (+) obesity,  GERD well controlled,  thyroid problem hypothyroidism    Musculoskeletal     Abdominal    Substance History      OB/GYN          Other   arthritis,                      Anesthesia Plan    ASA 3     general     intravenous induction     Anesthetic plan, risks, benefits, and alternatives have been provided, discussed and informed consent has been obtained with: patient.    Plan discussed with CRNA.        CODE STATUS:

## 2022-12-05 NOTE — ANESTHESIA POSTPROCEDURE EVALUATION
Patient: Madelaine Chavez    Procedure Summary     Date: 12/05/22 Room / Location:  [facility] OR 02 /  [facility]    Anesthesia Start: 0753 Anesthesia Stop: 0936    Procedure: LOWER EXTREMITY ANGIOGRAM, PERCUTANEOUS THROMBECTOMY AND ANGIOPLASTY OF THE LEFT ANTERIOR TIBIAL ARTERY (Left: Abdomen) Diagnosis:     Surgeons: Jeremy Linn MD Provider: Tong Colmenares MD    Anesthesia Type: general ASA Status: 3          Anesthesia Type: general    Vitals  Vitals Value Taken Time   /65 12/05/22 0932   Temp     Pulse 85 12/05/22 0934   Resp     SpO2 95 % 12/05/22 0935   Vitals shown include unvalidated device data.    t 97.6F  rr 16    Post Anesthesia Care and Evaluation    Patient location during evaluation: PACU  Patient participation: complete - patient participated  Level of consciousness: awake and alert  Pain management: adequate    Airway patency: patent  Anesthetic complications: No anesthetic complications  PONV Status: none  Cardiovascular status: hemodynamically stable and acceptable  Respiratory status: nonlabored ventilation, acceptable and nasal cannula  Hydration status: acceptable

## 2022-12-05 NOTE — INTERVAL H&P NOTE
[facility] Pre-op    Full history and physical note from office is attached.    VS: /84  HR 71  RR 16  T 97.2  sat 96%RA    Immunizations:  Influenza:  2022  Pneumococcal:  No  Tetanus:  Unknown  Covid x2: 2021      LAB Results:  Lab Results   Component Value Date    WBC 6.75 10/22/2022    HGB 9.9 (L) 10/22/2022    HCT 30.3 (L) 10/22/2022    MCV 85.8 10/22/2022     10/22/2022    NEUTROABS 6.31 10/17/2022    GLUCOSE 115 (H) 10/22/2022    BUN 8 10/22/2022    CREATININE 0.70 10/22/2022     10/22/2022    K 4.1 10/22/2022     10/22/2022    CO2 24.0 10/22/2022    MG 1.6 10/22/2022    PHOS 4.1 10/22/2022    CALCIUM 8.1 (L) 10/22/2022    PTT 34.8 10/12/2022    INR 1.18 (H) 10/12/2022       Cancer Staging (if applicable)  Cancer Patient: __ yes __no __unknown__N/A; If yes, clinical stage T:__ N:__M:__, stage group or __N/A      Impression: Critical limb ischemia LLE       Plan: LOWER EXTREMITY ANGIOGRAM WITH POSSIBLE INTERVENTION LEFT      DUANE Dupree   12/5/2022   06:53 EST

## 2023-02-09 ENCOUNTER — HOSPITAL ENCOUNTER (INPATIENT)
Age: 63
LOS: 22 days | Discharge: HOME | DRG: 329 | End: 2023-03-03
Attending: INTERNAL MEDICINE
Payer: MEDICAID

## 2023-02-09 ENCOUNTER — APPOINTMENT (OUTPATIENT)
Dept: CT IMAGING | Age: 63
DRG: 329 | End: 2023-02-09
Attending: INTERNAL MEDICINE
Payer: MEDICAID

## 2023-02-09 DIAGNOSIS — T14.8XXA HEMATOMA: Primary | ICD-10-CM

## 2023-02-09 DIAGNOSIS — K56.699 SIGMOID STRICTURE (HCC): ICD-10-CM

## 2023-02-09 DIAGNOSIS — N17.9 ACUTE RENAL FAILURE, UNSPECIFIED ACUTE RENAL FAILURE TYPE (HCC): ICD-10-CM

## 2023-02-09 PROBLEM — K56.609 COLONIC OBSTRUCTION (HCC): Status: ACTIVE | Noted: 2023-02-09

## 2023-02-09 PROBLEM — K56.609 BOWEL OBSTRUCTION (HCC): Status: ACTIVE | Noted: 2023-02-09

## 2023-02-09 LAB
A/G RATIO: 1.5 (ref 1.1–2.2)
ALBUMIN SERPL-MCNC: 3.3 G/DL (ref 3.4–5)
ALP BLD-CCNC: 240 U/L (ref 40–129)
ALT SERPL-CCNC: 77 U/L (ref 10–40)
ANION GAP SERPL CALCULATED.3IONS-SCNC: 10 MMOL/L (ref 3–16)
APTT: 33.1 SEC (ref 23–34.3)
AST SERPL-CCNC: 120 U/L (ref 15–37)
BASOPHILS ABSOLUTE: 0 K/UL (ref 0–0.2)
BASOPHILS RELATIVE PERCENT: 0.6 %
BILIRUB SERPL-MCNC: 0.4 MG/DL (ref 0–1)
BUN BLDV-MCNC: 17 MG/DL (ref 7–20)
CALCIUM SERPL-MCNC: 8.7 MG/DL (ref 8.3–10.6)
CHLORIDE BLD-SCNC: 104 MMOL/L (ref 99–110)
CO2: 25 MMOL/L (ref 21–32)
CREAT SERPL-MCNC: 0.8 MG/DL (ref 0.6–1.2)
EOSINOPHILS ABSOLUTE: 0.1 K/UL (ref 0–0.6)
EOSINOPHILS RELATIVE PERCENT: 2.7 %
GFR SERPL CREATININE-BSD FRML MDRD: >60 ML/MIN/{1.73_M2}
GLUCOSE BLD-MCNC: 134 MG/DL (ref 70–99)
HCT VFR BLD CALC: 35.8 % (ref 36–48)
HEMOGLOBIN: 11.2 G/DL (ref 12–16)
INR BLD: 1.27 (ref 0.87–1.14)
LACTIC ACID: 0.8 MMOL/L (ref 0.4–2)
LYMPHOCYTES ABSOLUTE: 1.3 K/UL (ref 1–5.1)
LYMPHOCYTES RELATIVE PERCENT: 24.4 %
MAGNESIUM: 2 MG/DL (ref 1.8–2.4)
MCH RBC QN AUTO: 24.9 PG (ref 26–34)
MCHC RBC AUTO-ENTMCNC: 31.1 G/DL (ref 31–36)
MCV RBC AUTO: 79.9 FL (ref 80–100)
MONOCYTES ABSOLUTE: 0.7 K/UL (ref 0–1.3)
MONOCYTES RELATIVE PERCENT: 13.6 %
NEUTROPHILS ABSOLUTE: 3.2 K/UL (ref 1.7–7.7)
NEUTROPHILS RELATIVE PERCENT: 58.7 %
PDW BLD-RTO: 17.6 % (ref 12.4–15.4)
PHOSPHORUS: 4.6 MG/DL (ref 2.5–4.9)
PLATELET # BLD: 339 K/UL (ref 135–450)
PMV BLD AUTO: 7.8 FL (ref 5–10.5)
POTASSIUM REFLEX MAGNESIUM: 4.3 MMOL/L (ref 3.5–5.1)
PROTHROMBIN TIME: 15.9 SEC (ref 11.7–14.5)
RBC # BLD: 4.48 M/UL (ref 4–5.2)
SODIUM BLD-SCNC: 139 MMOL/L (ref 136–145)
TOTAL PROTEIN: 5.5 G/DL (ref 6.4–8.2)
WBC # BLD: 5.4 K/UL (ref 4–11)

## 2023-02-09 PROCEDURE — APPNB60 APP NON BILLABLE TIME 46-60 MINS: Performed by: CLINICAL NURSE SPECIALIST

## 2023-02-09 PROCEDURE — 85730 THROMBOPLASTIN TIME PARTIAL: CPT

## 2023-02-09 PROCEDURE — 85025 COMPLETE CBC W/AUTO DIFF WBC: CPT

## 2023-02-09 PROCEDURE — 84100 ASSAY OF PHOSPHORUS: CPT

## 2023-02-09 PROCEDURE — 2580000003 HC RX 258: Performed by: INTERNAL MEDICINE

## 2023-02-09 PROCEDURE — 1200000000 HC SEMI PRIVATE

## 2023-02-09 PROCEDURE — 6370000000 HC RX 637 (ALT 250 FOR IP): Performed by: INTERNAL MEDICINE

## 2023-02-09 PROCEDURE — 6360000002 HC RX W HCPCS: Performed by: INTERNAL MEDICINE

## 2023-02-09 PROCEDURE — 99254 IP/OBS CNSLTJ NEW/EST MOD 60: CPT | Performed by: SURGERY

## 2023-02-09 PROCEDURE — 6360000004 HC RX CONTRAST MEDICATION: Performed by: CLINICAL NURSE SPECIALIST

## 2023-02-09 PROCEDURE — 83735 ASSAY OF MAGNESIUM: CPT

## 2023-02-09 PROCEDURE — 36415 COLL VENOUS BLD VENIPUNCTURE: CPT

## 2023-02-09 PROCEDURE — 74177 CT ABD & PELVIS W/CONTRAST: CPT

## 2023-02-09 PROCEDURE — 83605 ASSAY OF LACTIC ACID: CPT

## 2023-02-09 PROCEDURE — 85610 PROTHROMBIN TIME: CPT

## 2023-02-09 PROCEDURE — APPSS45 APP SPLIT SHARED TIME 31-45 MINUTES: Performed by: CLINICAL NURSE SPECIALIST

## 2023-02-09 PROCEDURE — 80053 COMPREHEN METABOLIC PANEL: CPT

## 2023-02-09 RX ORDER — CYCLOBENZAPRINE HCL 10 MG
10 TABLET ORAL 3 TIMES DAILY
Status: ON HOLD | COMMUNITY
End: 2023-03-03 | Stop reason: HOSPADM

## 2023-02-09 RX ORDER — PROPRANOLOL HYDROCHLORIDE 40 MG/1
40 TABLET ORAL 2 TIMES DAILY
Status: ON HOLD | COMMUNITY
End: 2023-03-03 | Stop reason: HOSPADM

## 2023-02-09 RX ORDER — ONDANSETRON 2 MG/ML
4 INJECTION INTRAMUSCULAR; INTRAVENOUS EVERY 6 HOURS PRN
Status: DISCONTINUED | OUTPATIENT
Start: 2023-02-09 | End: 2023-03-03

## 2023-02-09 RX ORDER — OXYCODONE AND ACETAMINOPHEN 10; 325 MG/1; MG/1
1 TABLET ORAL EVERY 6 HOURS PRN
COMMUNITY

## 2023-02-09 RX ORDER — KETOROLAC TROMETHAMINE 30 MG/ML
30 INJECTION, SOLUTION INTRAMUSCULAR; INTRAVENOUS ONCE
Status: COMPLETED | OUTPATIENT
Start: 2023-02-09 | End: 2023-02-09

## 2023-02-09 RX ORDER — ALLOPURINOL 100 MG/1
100 TABLET ORAL DAILY
Status: DISCONTINUED | OUTPATIENT
Start: 2023-02-09 | End: 2023-03-03 | Stop reason: HOSPADM

## 2023-02-09 RX ORDER — LIOTHYRONINE SODIUM 5 UG/1
5 TABLET ORAL DAILY
COMMUNITY

## 2023-02-09 RX ORDER — SODIUM CHLORIDE 0.9 % (FLUSH) 0.9 %
5-40 SYRINGE (ML) INJECTION PRN
Status: DISCONTINUED | OUTPATIENT
Start: 2023-02-09 | End: 2023-03-03 | Stop reason: HOSPADM

## 2023-02-09 RX ORDER — POTASSIUM CHLORIDE 20 MEQ/1
40 TABLET, EXTENDED RELEASE ORAL PRN
Status: DISCONTINUED | OUTPATIENT
Start: 2023-02-09 | End: 2023-03-02

## 2023-02-09 RX ORDER — ASPIRIN 81 MG/1
81 TABLET, CHEWABLE ORAL DAILY
Status: DISCONTINUED | OUTPATIENT
Start: 2023-02-09 | End: 2023-03-03 | Stop reason: HOSPADM

## 2023-02-09 RX ORDER — POLYETHYLENE GLYCOL 3350 17 G/17G
17 POWDER, FOR SOLUTION ORAL DAILY PRN
Status: DISCONTINUED | OUTPATIENT
Start: 2023-02-09 | End: 2023-02-13

## 2023-02-09 RX ORDER — ASPIRIN 81 MG/1
81 TABLET, CHEWABLE ORAL DAILY
COMMUNITY

## 2023-02-09 RX ORDER — ACETAMINOPHEN 650 MG/1
650 SUPPOSITORY RECTAL EVERY 6 HOURS PRN
Status: DISCONTINUED | OUTPATIENT
Start: 2023-02-09 | End: 2023-03-03 | Stop reason: HOSPADM

## 2023-02-09 RX ORDER — SODIUM CHLORIDE 9 MG/ML
INJECTION, SOLUTION INTRAVENOUS PRN
Status: DISCONTINUED | OUTPATIENT
Start: 2023-02-09 | End: 2023-03-03 | Stop reason: HOSPADM

## 2023-02-09 RX ORDER — MAGNESIUM SULFATE IN WATER 40 MG/ML
2000 INJECTION, SOLUTION INTRAVENOUS PRN
Status: DISCONTINUED | OUTPATIENT
Start: 2023-02-09 | End: 2023-03-03 | Stop reason: HOSPADM

## 2023-02-09 RX ORDER — ATORVASTATIN CALCIUM 40 MG/1
40 TABLET, FILM COATED ORAL DAILY
Status: DISCONTINUED | OUTPATIENT
Start: 2023-02-09 | End: 2023-03-03 | Stop reason: HOSPADM

## 2023-02-09 RX ORDER — SODIUM CHLORIDE 0.9 % (FLUSH) 0.9 %
5-40 SYRINGE (ML) INJECTION EVERY 12 HOURS SCHEDULED
Status: DISCONTINUED | OUTPATIENT
Start: 2023-02-09 | End: 2023-03-03 | Stop reason: HOSPADM

## 2023-02-09 RX ORDER — OXYCODONE HYDROCHLORIDE AND ACETAMINOPHEN 5; 325 MG/1; MG/1
1 TABLET ORAL EVERY 4 HOURS PRN
Status: DISCONTINUED | OUTPATIENT
Start: 2023-02-09 | End: 2023-02-10

## 2023-02-09 RX ORDER — LEVOTHYROXINE SODIUM 0.1 MG/1
200 TABLET ORAL DAILY
Status: DISCONTINUED | OUTPATIENT
Start: 2023-02-10 | End: 2023-03-03 | Stop reason: HOSPADM

## 2023-02-09 RX ORDER — ACETAMINOPHEN 325 MG/1
650 TABLET ORAL EVERY 6 HOURS PRN
Status: DISCONTINUED | OUTPATIENT
Start: 2023-02-09 | End: 2023-03-03 | Stop reason: HOSPADM

## 2023-02-09 RX ORDER — ALLOPURINOL 100 MG/1
100 TABLET ORAL DAILY
Status: ON HOLD | COMMUNITY
End: 2023-03-03 | Stop reason: HOSPADM

## 2023-02-09 RX ORDER — ATORVASTATIN CALCIUM 40 MG/1
40 TABLET, FILM COATED ORAL DAILY
Status: ON HOLD | COMMUNITY
End: 2023-03-03 | Stop reason: SDUPTHER

## 2023-02-09 RX ORDER — SODIUM CHLORIDE, SODIUM LACTATE, POTASSIUM CHLORIDE, CALCIUM CHLORIDE 600; 310; 30; 20 MG/100ML; MG/100ML; MG/100ML; MG/100ML
INJECTION, SOLUTION INTRAVENOUS CONTINUOUS
Status: DISCONTINUED | OUTPATIENT
Start: 2023-02-09 | End: 2023-02-11

## 2023-02-09 RX ORDER — GABAPENTIN 300 MG/1
300 CAPSULE ORAL 3 TIMES DAILY
Status: ON HOLD | COMMUNITY
End: 2023-03-03 | Stop reason: HOSPADM

## 2023-02-09 RX ORDER — ENOXAPARIN SODIUM 100 MG/ML
1 INJECTION SUBCUTANEOUS 2 TIMES DAILY
Status: DISCONTINUED | OUTPATIENT
Start: 2023-02-09 | End: 2023-02-26

## 2023-02-09 RX ORDER — PLECANATIDE 3 MG/1
3 TABLET ORAL DAILY
Status: ON HOLD | COMMUNITY
End: 2023-03-03 | Stop reason: HOSPADM

## 2023-02-09 RX ORDER — PROPRANOLOL HYDROCHLORIDE 40 MG/1
40 TABLET ORAL 2 TIMES DAILY
Status: DISCONTINUED | OUTPATIENT
Start: 2023-02-09 | End: 2023-03-03 | Stop reason: HOSPADM

## 2023-02-09 RX ORDER — MORPHINE SULFATE 2 MG/ML
2 INJECTION, SOLUTION INTRAMUSCULAR; INTRAVENOUS EVERY 4 HOURS PRN
Status: DISCONTINUED | OUTPATIENT
Start: 2023-02-09 | End: 2023-02-14

## 2023-02-09 RX ORDER — LEVOTHYROXINE SODIUM 200 UG/1
CAPSULE ORAL DAILY
COMMUNITY

## 2023-02-09 RX ORDER — KETOROLAC TROMETHAMINE 30 MG/ML
15 INJECTION, SOLUTION INTRAMUSCULAR; INTRAVENOUS EVERY 6 HOURS PRN
Status: DISCONTINUED | OUTPATIENT
Start: 2023-02-09 | End: 2023-02-10

## 2023-02-09 RX ORDER — POTASSIUM CHLORIDE 7.45 MG/ML
10 INJECTION INTRAVENOUS PRN
Status: DISCONTINUED | OUTPATIENT
Start: 2023-02-09 | End: 2023-02-18

## 2023-02-09 RX ORDER — GABAPENTIN 300 MG/1
300 CAPSULE ORAL 3 TIMES DAILY
Status: DISCONTINUED | OUTPATIENT
Start: 2023-02-09 | End: 2023-03-03 | Stop reason: HOSPADM

## 2023-02-09 RX ORDER — ONDANSETRON 4 MG/1
4 TABLET, ORALLY DISINTEGRATING ORAL EVERY 8 HOURS PRN
Status: DISCONTINUED | OUTPATIENT
Start: 2023-02-09 | End: 2023-03-03

## 2023-02-09 RX ADMIN — GABAPENTIN 300 MG: 300 CAPSULE ORAL at 20:00

## 2023-02-09 RX ADMIN — SODIUM CHLORIDE, PRESERVATIVE FREE 10 ML: 5 INJECTION INTRAVENOUS at 20:00

## 2023-02-09 RX ADMIN — KETOROLAC TROMETHAMINE 30 MG: 30 INJECTION, SOLUTION INTRAMUSCULAR; INTRAVENOUS at 16:11

## 2023-02-09 RX ADMIN — MORPHINE SULFATE 2 MG: 2 INJECTION, SOLUTION INTRAMUSCULAR; INTRAVENOUS at 16:32

## 2023-02-09 RX ADMIN — ENOXAPARIN SODIUM 100 MG: 100 INJECTION SUBCUTANEOUS at 20:00

## 2023-02-09 RX ADMIN — MORPHINE SULFATE 2 MG: 2 INJECTION, SOLUTION INTRAMUSCULAR; INTRAVENOUS at 19:59

## 2023-02-09 RX ADMIN — SODIUM CHLORIDE, POTASSIUM CHLORIDE, SODIUM LACTATE AND CALCIUM CHLORIDE: 600; 310; 30; 20 INJECTION, SOLUTION INTRAVENOUS at 16:15

## 2023-02-09 RX ADMIN — PROPRANOLOL HYDROCHLORIDE 40 MG: 40 TABLET ORAL at 20:00

## 2023-02-09 RX ADMIN — IOPAMIDOL 75 ML: 755 INJECTION, SOLUTION INTRAVENOUS at 15:33

## 2023-02-09 ASSESSMENT — PAIN DESCRIPTION - LOCATION
LOCATION: ABDOMEN

## 2023-02-09 ASSESSMENT — PAIN DESCRIPTION - DESCRIPTORS
DESCRIPTORS: ACHING
DESCRIPTORS: SHARP;SHOOTING

## 2023-02-09 ASSESSMENT — PAIN DESCRIPTION - ORIENTATION
ORIENTATION: LEFT;LOWER

## 2023-02-09 ASSESSMENT — PAIN SCALES - GENERAL
PAINLEVEL_OUTOF10: 6
PAINLEVEL_OUTOF10: 3
PAINLEVEL_OUTOF10: 5
PAINLEVEL_OUTOF10: 6
PAINLEVEL_OUTOF10: 7
PAINLEVEL_OUTOF10: 6

## 2023-02-09 ASSESSMENT — PAIN - FUNCTIONAL ASSESSMENT: PAIN_FUNCTIONAL_ASSESSMENT: PREVENTS OR INTERFERES SOME ACTIVE ACTIVITIES AND ADLS

## 2023-02-09 ASSESSMENT — PAIN DESCRIPTION - FREQUENCY: FREQUENCY: INTERMITTENT

## 2023-02-09 ASSESSMENT — PAIN DESCRIPTION - PAIN TYPE: TYPE: ACUTE PAIN

## 2023-02-09 NOTE — CONSULTS
Consult placed    Who:powell  Date:2/9/2023,  Time:1:44 PM        Electronically signed by Matthew Martins on 2/9/2023 at 1:44 PM

## 2023-02-09 NOTE — CONSULTS
Department of General Surgery Consult    PATIENT NAME: Darby Luna OF BIRTH: 1960    ADMISSION DATE: 2/9/2023  9:19 AM      TODAY'S DATE: 2/9/2023    Reason for Consult:  large bowel obs    Chief Complaint: abd pain, distention    Requesting Physician:  Eneida Thornton    HISTORY OF PRESENT ILLNESS:              The patient is a 63 y.o. female who presented to outside hospital yesterday with complaints of abdominal pain, bloating and constipation. She currently denies nausea and states she is hungry. The pt has a somewhat complicated history over the past few weeks beginning with lower extremity vascular bypass surgery on the right leg. She reports that she developed these same symptoms following her surgery, and had ngt placed as well as a rectal tube and eventually had colonoscopy that reportedly revealed sigmoid colon edema with diverticula and an \"ulcer\"; she reports there was some discussion of her having surgery and a colostomy, but she apparently improved enough that she went home. She was home for just a few days and on this past Sunday began developing abd pain and some distention again. She states she has not had a BM since Sunday and hasn't taken anything like miralax etc. The pt reports an extensive abdominal surgical history, the first surgery occurring when she was a child for an obstructing mass. She has had many surgeries for adhesions and has had a midline hernia repair with mesh. Past Medical History:    No past medical history on file. Past Surgical History:    No past surgical history on file.     Current Medications:   Current Facility-Administered Medications: sodium chloride flush 0.9 % injection 5-40 mL, 5-40 mL, IntraVENous, 2 times per day  sodium chloride flush 0.9 % injection 5-40 mL, 5-40 mL, IntraVENous, PRN  0.9 % sodium chloride infusion, , IntraVENous, PRN  ondansetron (ZOFRAN-ODT) disintegrating tablet 4 mg, 4 mg, Oral, Q8H PRN **OR** ondansetron (ZOFRAN) injection 4 mg, 4 mg, IntraVENous, Q6H PRN  polyethylene glycol (GLYCOLAX) packet 17 g, 17 g, Oral, Daily PRN  acetaminophen (TYLENOL) tablet 650 mg, 650 mg, Oral, Q6H PRN **OR** acetaminophen (TYLENOL) suppository 650 mg, 650 mg, Rectal, Q6H PRN  lactated ringers IV soln infusion, , IntraVENous, Continuous  potassium chloride (KLOR-CON M) extended release tablet 40 mEq, 40 mEq, Oral, PRN **OR** potassium bicarb-citric acid (EFFER-K) effervescent tablet 40 mEq, 40 mEq, Oral, PRN **OR** potassium chloride 10 mEq/100 mL IVPB (Peripheral Line), 10 mEq, IntraVENous, PRN  magnesium sulfate 2000 mg in 50 mL IVPB premix, 2,000 mg, IntraVENous, PRN  sodium phosphate 10 mmol in sodium chloride 0.9 % 250 mL IVPB, 10 mmol, IntraVENous, PRN **OR** sodium phosphate 15 mmol in sodium chloride 0.9 % 250 mL IVPB, 15 mmol, IntraVENous, PRN **OR** sodium phosphate 20 mmol in sodium chloride 0.9 % 500 mL IVPB, 20 mmol, IntraVENous, PRN  oxyCODONE-acetaminophen (PERCOCET) 5-325 MG per tablet 1 tablet, 1 tablet, Oral, Q4H PRN  ketorolac (TORADOL) injection 15 mg, 15 mg, IntraVENous, Q6H PRN  ketorolac (TORADOL) injection 30 mg, 30 mg, IntraVENous, Once  allopurinol (ZYLOPRIM) tablet 100 mg, 100 mg, Oral, Daily  atorvastatin (LIPITOR) tablet 40 mg, 40 mg, Oral, Daily  aspirin chewable tablet 81 mg, 81 mg, Oral, Daily  gabapentin (NEURONTIN) capsule 300 mg, 300 mg, Oral, TID  [START ON 2/10/2023] levothyroxine (SYNTHROID) tablet 200 mcg, 200 mcg, Oral, Daily  propranolol (INDERAL) tablet 40 mg, 40 mg, Oral, BID  enoxaparin (LOVENOX) injection 100 mg, 1 mg/kg, SubCUTAneous, BID  Prior to Admission medications    Medication Sig Start Date End Date Taking? Authorizing Provider   apixaban (ELIQUIS) 5 MG TABS tablet Take 5 mg by mouth 2 times daily Patient unsure of dose.  \"Thinks\" it is 5mg   Yes Historical Provider, MD   Levothyroxine Sodium 200 MCG CAPS Take by mouth daily Unsure of dose   Yes Historical Provider, MD   propranolol (INDERAL) 40 MG tablet Take 40 mg by mouth 2 times daily   Yes Historical Provider, MD   gabapentin (NEURONTIN) 300 MG capsule Take 300 mg by mouth 3 times daily. Yes Historical Provider, MD   oxyCODONE-acetaminophen (PERCOCET)  MG per tablet Take 1 tablet by mouth every 6 hours as needed for Pain. Yes Historical Provider, MD   atorvastatin (LIPITOR) 40 MG tablet Take 40 mg by mouth daily   Yes Historical Provider, MD   cyclobenzaprine (FLEXERIL) 10 MG tablet Take 10 mg by mouth 3 times daily   Yes Historical Provider, MD   Plecanatide (TRULANCE) 3 MG TABS Take 3 mg by mouth daily   Yes Historical Provider, MD   allopurinol (ZYLOPRIM) 100 MG tablet Take 100 mg by mouth daily   Yes Historical Provider, MD   liothyronine (CYTOMEL) 5 MCG tablet Take 5 mcg by mouth daily   Yes Historical Provider, MD   aspirin 81 MG chewable tablet Take 81 mg by mouth daily   Yes Historical Provider, MD        Allergies:  Patient has no known allergies. Social History:   TOBACCO:   reports that she quit smoking about 14 months ago. Her smoking use included cigarettes. She started smoking about 46 years ago. She has a 34.00 pack-year smoking history. She uses smokeless tobacco.  ETOH:   reports no history of alcohol use. DRUGS:   reports no history of drug use. Family History:    No family history on file. REVIEW OF SYSTEMS:  CONSTITUTIONAL:  negative  HEENT:  negative  RESPIRATORY:  negative  CARDIOVASCULAR:  negative  GASTROINTESTINAL:  negative except for constipation, abdominal pain, and abdominal distention  GENITOURINARY:  negative  HEMATOLOGIC/LYMPHATIC:  negative  NEUROLOGICAL:  Negative  * All other ROS reviewed and negative. PHYSICAL EXAM:  VITALS:  /73   Pulse (!) 109   Temp 98.3 °F (36.8 °C) (Oral)   Resp 20   Ht 5' 5\" (1.651 m)   Wt 220 lb 12.8 oz (100.2 kg)   SpO2 94%   BMI 36.74 kg/m²   24HR INTAKE/OUTPUT:    No intake/output data recorded.   No intake/output data recorded. CONSTITUTIONAL:  alert, no apparent distress and moderately obese  EYES:  PERRL, sclera clear  ENT:  Normocephalic,atraumatic, without obvious abnormality  NECK:  supple, symmetrical, trachea midline  LUNGS: Resp effort easy and unlabored, no crackles or wheezing  CARDIOVASCULAR:  NO JVD, tachycardic with reg rhythm   ABDOMEN:  scars noted consistent with multiple prior surgeries, hypoactive bowel sounds, soft, distended, tenderness left lower abd without guarding or rebound,   MUSCULOSKELETAL: No clubbing or cyanosis, 0+ pitting edema lower extremities  NEUROLOGIC:  Mental Status Exam:  Level of Alertness:   awake  PSYCHIATRIC:   person, place, time  SKIN:  normal skin color, texture, turgor    DATA:    CBC:   Recent Labs     02/09/23  1353   WBC 5.4   HGB 11.2*   HCT 35.8*        BMP:    Recent Labs     02/09/23  1353      K 4.3      CO2 25   BUN 17   CREATININE 0.8   GLUCOSE 134*     Hepatic:   Recent Labs     02/09/23  1353   *   ALT 77*   BILITOT 0.4   ALKPHOS 240*     Mag:      Recent Labs     02/09/23  1353   MG 2.00      Phos:     Recent Labs     02/09/23  1353   PHOS 4.6      INR:   Recent Labs     02/09/23  1353   INR 1.27*       Radiology Review: Images personally reviewed by me. Ct scan ordered and pending      IMPRESSION/RECOMMENDATIONS:    Recurrent abdominal distention, pain and constipation of uncertain etiology - I.e.functional vs mechanical issue. Unfortunately, we are unable to access her records from [facility], [facility], and unable to review the CT scan. The records we were given did not have CT reading either. Similiarly, we are unable to access records from her stay at [facility] as they do not appear to be on care everywhere. A repeat CT scan has been ordered to further evaluate - recommendations to follow based on these results. May need GI involvement depending on results. Continue with supportive care.      Have discussed at length with pt and her family at bedside.     Electronically signed by Pilar Nelson, APRN - [address] [facility]  [id number]

## 2023-02-09 NOTE — PROGRESS NOTES
Admitted to [address] [place] [address]. Brought to unit via ambulance transport. VSS. A/O x4. Unable to complete home med list d/t she doesn't know the doses of her meds. Hospital pharmacist going to call her pharmacy for med list.     Incision with staples R inner thigh and R inner lower leg. Approximated. No drainage. No redness. Some scabbing. Abdomen soft, tender, pain 8/10 LLQ. No guarding. Positive bowel sounds x4. Last BM Sunday. Hasn't eaten solids for past few days.    Last time she had anything to drink was yesterday around 8pm.

## 2023-02-09 NOTE — PROGRESS NOTES
4 Eyes Skin Assessment     The patient is being assess for  Admission    I agree that 2 RN's have performed a thorough Head to Toe Skin Assessment on the patient. ALL assessment sites listed below have been assessed. Areas assessed by both nurses: Penelope Oakes RN and Andrae Mays RN  [x]   Head, Face, and Ears   [x]   Shoulders, Back, and Chest  [x]   Arms, Elbows, and Hands   [x]   Coccyx, Sacrum, and Ischum  [x]   Legs, Feet, and Heels    2 healing incisions L inner leg. Staples in place. Does the Patient have Skin Breakdown?   Yes a wound was noted on the Admission Assessment and an WOUND LDA was Initiated documentation include the Juana-wound, Wound Assessment, Measurements, Dressing Treatment, Drainage, and Color\",         Brendan Prevention initiated:  NA   Wound Care Orders initiated:  CHRIS      [facility] nurse consulted for Pressure Injury (Stage 3,4, Unstageable, DTI, NWPT, and Complex wounds):  NA      Nurse 1 eSignature: Electronically signed by David Covarrubias RN on 2/9/23 at 12:59 PM EST    **SHARE this note so that the co-signing nurse is able to place an eSignature**    Nurse 2 eSignature: {Esignature:[id number]}

## 2023-02-09 NOTE — H&P
Hospital Medicine History & Physical      PCP: Viri Carr    Date of Admission: 2/9/2023    Chief Complaint: Abdominal pain      History Of Present Illness:      63 y.o. female who presented to [facility] for management of high-grade sigmoid large bowel obstruction. Patient presented to emergency room with complaint of abdominal pain. Patient has been experiencing abdominal pain since Sunday. Patient's abdominal pain gradually worsened. Patient also started experiencing abdominal distention. Patient has not had a bowel movement in last 4 to 5 days. Patient denies passing gas. Denies nausea vomiting. Patient has history of bowel resection as a child due to obstructive mass in the intestine. Patient also had cholecystectomy. Patient had total abdominal hysterectomy and multiple interventions for adhesions. Patient had a laparoscopic surgeries. Patient was hospitalized in January at [facility] 2 weeks ago for management of bowel obstruction. Patient did not require any intervention at that time. Patient preferred to avoid colostomy. Pt had colonoscopy on 1/24/23 when she was noted to have ulcer in the intestine. Also had diverticulosis. Past Medical History:      No past medical history on file. Past Surgical History:      No past surgical history on file. Medications Prior to Admission:      Prior to Admission medications    Medication Sig Start Date End Date Taking? Authorizing Provider   apixaban (ELIQUIS) 5 MG TABS tablet Take 5 mg by mouth 2 times daily Patient unsure of dose. \"Thinks\" it is 5mg   Yes Historical Provider, MD   Levothyroxine Sodium 200 MCG CAPS Take by mouth daily Unsure of dose   Yes Historical Provider, MD   propranolol (INDERAL) 40 MG tablet Take 40 mg by mouth 2 times daily   Yes Historical Provider, MD   gabapentin (NEURONTIN) 300 MG capsule Take 300 mg by mouth 3 times daily.    Yes Historical Provider, MD oxyCODONE-acetaminophen (PERCOCET)  MG per tablet Take 1 tablet by mouth every 6 hours as needed for Pain. Yes Historical Provider, MD   atorvastatin (LIPITOR) 40 MG tablet Take 40 mg by mouth daily   Yes Historical Provider, MD   cyclobenzaprine (FLEXERIL) 10 MG tablet Take 10 mg by mouth 3 times daily   Yes Historical Provider, MD   Plecanatide (TRULANCE) 3 MG TABS Take 3 mg by mouth daily   Yes Historical Provider, MD   allopurinol (ZYLOPRIM) 100 MG tablet Take 100 mg by mouth daily   Yes Historical Provider, MD   liothyronine (CYTOMEL) 5 MCG tablet Take 5 mcg by mouth daily   Yes Historical Provider, MD   aspirin 81 MG chewable tablet Take 81 mg by mouth daily   Yes Historical Provider, MD       Allergies:  Patient has no known allergies. Social History:      TOBACCO:   reports that she quit smoking about 14 months ago. Her smoking use included cigarettes. She started smoking about 46 years ago. She has a 34.00 pack-year smoking history. She uses smokeless tobacco.  ETOH:   reports no history of alcohol use. E-cigarette/Vaping       Questions Responses    E-cigarette/Vaping Use     Start Date     Passive Exposure     Quit Date     Counseling Given     Comments               Family History:     Reviewed and negative in regards to presenting illness/complaint. No family history on file. REVIEW OF SYSTEMS COMPLETED:   Pertinent positives as noted in the HPI. All other systems reviewed and negative. PHYSICAL EXAM PERFORMED:    /73   Pulse (!) 109   Temp 98.3 °F (36.8 °C) (Oral)   Resp 20   Ht 5' 5\" (1.651 m)   Wt 220 lb 12.8 oz (100.2 kg)   SpO2 94%   BMI 36.74 kg/m²     General appearance:  No apparent distress, appears stated age and cooperative. HEENT:  Normal cephalic, atraumatic without obvious deformity. Pupils equal, round, and reactive to light. Extra ocular muscles intact. Conjunctivae/corneas clear. Neck: Supple, with full range of motion.  No jugular venous distention. Trachea midline. Respiratory:  Normal respiratory effort. Clear to auscultation, bilaterally without Rales/Wheezes/Rhonchi. Cardiovascular:  Regular rate and rhythm with normal S1/S2 without murmurs, rubs or gallops. Abdomen: Soft, distended, nontender   Musculoskeletal:  No clubbing, cyanosis or edema bilaterally. Full range of motion without deformity. Skin: Skin color, texture, turgor normal.  No rashes or lesions. Neurologic:  Neurovascularly intact without any focal sensory/motor deficits. Cranial nerves: II-XII intact, grossly non-focal.  Psychiatric:  Alert and oriented, thought content appropriate, normal insight  Capillary Refill: Brisk,3 seconds, normal  Peripheral Pulses: +2 palpable, equal bilaterally       Labs:     No results for input(s): WBC, HGB, HCT, PLT in the last 72 hours. No results for input(s): NA, K, CL, CO2, BUN, CREATININE, CALCIUM, PHOS in the last 72 hours. Invalid input(s): MAGNES  No results for input(s): AST, ALT, BILIDIR, BILITOT, ALKPHOS in the last 72 hours. No results for input(s): INR in the last 72 hours. No results for input(s): Emmett Baseman in the last 72 hours. Urinalysis:    No results found for: Araceli Cardona, BACTERIA, [address] [place] [address]    Radiology:     CXR: I have reviewed the CXR   EKG:  I have reviewed the EKG     No orders to display       Consults:    None    ASSESSMENT:    Active Hospital Problems    Diagnosis Date Noted    Colonic obstruction ([facility] [address] [K56.609] 02/09/2023     Priority: Medium     -High-grade sigmoid large bowel obstruction: pt with recurrent obstruction and multiple abdominal surgeries. - PAD / recent lower extremity vascular bypass  - Hypothyroidism  - Gout  -Essential HTN    PLAN:    Admit patient to MedSurg unit  Keep patient n.p.o.  NG tube placement  Start IV lactated Ringer's at 75 cc/h  Monitor serum potassium, magnesium, phosphorus  Obtain surgical consultation  Hold Eliquis.  Lovenox therapeutic dose. DVT Prophylaxis: SCD  Diet: No diet orders on file  Code Status: No Order    PT/OT Eval Status: Yes    Dispo - Pending clinical improvement        Anna Balbuena MD    Thank you Fahad Ayers for the opportunity to be involved in this patient's care. If you have any questions or concerns please feel free to contact me at [phone].

## 2023-02-10 PROBLEM — K57.92 DIVERTICULITIS: Status: ACTIVE | Noted: 2023-02-10

## 2023-02-10 LAB
A/G RATIO: 2.1 (ref 1.1–2.2)
ALBUMIN SERPL-MCNC: 3 G/DL (ref 3.4–5)
ALP BLD-CCNC: 193 U/L (ref 40–129)
ALT SERPL-CCNC: 49 U/L (ref 10–40)
ANION GAP SERPL CALCULATED.3IONS-SCNC: 7 MMOL/L (ref 3–16)
AST SERPL-CCNC: 52 U/L (ref 15–37)
BASOPHILS ABSOLUTE: 0.1 K/UL (ref 0–0.2)
BASOPHILS RELATIVE PERCENT: 0.9 %
BILIRUB SERPL-MCNC: 0.3 MG/DL (ref 0–1)
BUN BLDV-MCNC: 19 MG/DL (ref 7–20)
CALCIUM SERPL-MCNC: 8.5 MG/DL (ref 8.3–10.6)
CHLORIDE BLD-SCNC: 105 MMOL/L (ref 99–110)
CO2: 26 MMOL/L (ref 21–32)
CREAT SERPL-MCNC: 0.8 MG/DL (ref 0.6–1.2)
EOSINOPHILS ABSOLUTE: 0.3 K/UL (ref 0–0.6)
EOSINOPHILS RELATIVE PERCENT: 4.4 %
GFR SERPL CREATININE-BSD FRML MDRD: >60 ML/MIN/{1.73_M2}
GLUCOSE BLD-MCNC: 103 MG/DL (ref 70–99)
HCT VFR BLD CALC: 32.1 % (ref 36–48)
HEMOGLOBIN: 10.6 G/DL (ref 12–16)
LYMPHOCYTES ABSOLUTE: 1.8 K/UL (ref 1–5.1)
LYMPHOCYTES RELATIVE PERCENT: 29.2 %
MCH RBC QN AUTO: 26.5 PG (ref 26–34)
MCHC RBC AUTO-ENTMCNC: 33.1 G/DL (ref 31–36)
MCV RBC AUTO: 80 FL (ref 80–100)
MONOCYTES ABSOLUTE: 0.7 K/UL (ref 0–1.3)
MONOCYTES RELATIVE PERCENT: 11.1 %
NEUTROPHILS ABSOLUTE: 3.4 K/UL (ref 1.7–7.7)
NEUTROPHILS RELATIVE PERCENT: 54.4 %
PDW BLD-RTO: 17.4 % (ref 12.4–15.4)
PLATELET # BLD: 311 K/UL (ref 135–450)
PMV BLD AUTO: 7.8 FL (ref 5–10.5)
POTASSIUM REFLEX MAGNESIUM: 4.4 MMOL/L (ref 3.5–5.1)
RBC # BLD: 4.01 M/UL (ref 4–5.2)
SODIUM BLD-SCNC: 138 MMOL/L (ref 136–145)
TOTAL PROTEIN: 4.4 G/DL (ref 6.4–8.2)
WBC # BLD: 6.2 K/UL (ref 4–11)

## 2023-02-10 PROCEDURE — 6360000002 HC RX W HCPCS: Performed by: CLINICAL NURSE SPECIALIST

## 2023-02-10 PROCEDURE — 80053 COMPREHEN METABOLIC PANEL: CPT

## 2023-02-10 PROCEDURE — 36415 COLL VENOUS BLD VENIPUNCTURE: CPT

## 2023-02-10 PROCEDURE — 99232 SBSQ HOSP IP/OBS MODERATE 35: CPT | Performed by: SURGERY

## 2023-02-10 PROCEDURE — 6360000002 HC RX W HCPCS: Performed by: INTERNAL MEDICINE

## 2023-02-10 PROCEDURE — 97161 PT EVAL LOW COMPLEX 20 MIN: CPT

## 2023-02-10 PROCEDURE — 85025 COMPLETE CBC W/AUTO DIFF WBC: CPT

## 2023-02-10 PROCEDURE — 6370000000 HC RX 637 (ALT 250 FOR IP): Performed by: INTERNAL MEDICINE

## 2023-02-10 PROCEDURE — 2580000003 HC RX 258: Performed by: INTERNAL MEDICINE

## 2023-02-10 PROCEDURE — 6370000000 HC RX 637 (ALT 250 FOR IP)

## 2023-02-10 PROCEDURE — 97530 THERAPEUTIC ACTIVITIES: CPT

## 2023-02-10 PROCEDURE — 1200000000 HC SEMI PRIVATE

## 2023-02-10 PROCEDURE — 6360000002 HC RX W HCPCS

## 2023-02-10 PROCEDURE — APPSS30 APP SPLIT SHARED TIME 16-30 MINUTES: Performed by: CLINICAL NURSE SPECIALIST

## 2023-02-10 PROCEDURE — 2580000003 HC RX 258: Performed by: CLINICAL NURSE SPECIALIST

## 2023-02-10 RX ORDER — KETOROLAC TROMETHAMINE 30 MG/ML
15 INJECTION, SOLUTION INTRAMUSCULAR; INTRAVENOUS EVERY 6 HOURS PRN
Status: DISPENSED | OUTPATIENT
Start: 2023-02-10 | End: 2023-02-14

## 2023-02-10 RX ORDER — OXYCODONE HYDROCHLORIDE AND ACETAMINOPHEN 5; 325 MG/1; MG/1
1 TABLET ORAL EVERY 4 HOURS PRN
Status: DISCONTINUED | OUTPATIENT
Start: 2023-02-10 | End: 2023-02-14

## 2023-02-10 RX ADMIN — PIPERACILLIN AND TAZOBACTAM 3375 MG: 3; .375 INJECTION, POWDER, FOR SOLUTION INTRAVENOUS at 09:33

## 2023-02-10 RX ADMIN — ENOXAPARIN SODIUM 100 MG: 100 INJECTION SUBCUTANEOUS at 19:44

## 2023-02-10 RX ADMIN — OXYCODONE AND ACETAMINOPHEN 1 TABLET: 5; 325 TABLET ORAL at 08:47

## 2023-02-10 RX ADMIN — SODIUM CHLORIDE, POTASSIUM CHLORIDE, SODIUM LACTATE AND CALCIUM CHLORIDE: 600; 310; 30; 20 INJECTION, SOLUTION INTRAVENOUS at 06:53

## 2023-02-10 RX ADMIN — OXYCODONE AND ACETAMINOPHEN 1 TABLET: 5; 325 TABLET ORAL at 13:06

## 2023-02-10 RX ADMIN — KETOROLAC TROMETHAMINE 15 MG: 30 INJECTION, SOLUTION INTRAMUSCULAR; INTRAVENOUS at 02:22

## 2023-02-10 RX ADMIN — ALLOPURINOL 100 MG: 100 TABLET ORAL at 08:47

## 2023-02-10 RX ADMIN — ASPIRIN 81 MG 81 MG: 81 TABLET ORAL at 08:48

## 2023-02-10 RX ADMIN — PIPERACILLIN AND TAZOBACTAM 3375 MG: 3; .375 INJECTION, POWDER, FOR SOLUTION INTRAVENOUS at 16:51

## 2023-02-10 RX ADMIN — GABAPENTIN 300 MG: 300 CAPSULE ORAL at 13:37

## 2023-02-10 RX ADMIN — MORPHINE SULFATE 2 MG: 2 INJECTION, SOLUTION INTRAMUSCULAR; INTRAVENOUS at 19:44

## 2023-02-10 RX ADMIN — SODIUM CHLORIDE, PRESERVATIVE FREE 10 ML: 5 INJECTION INTRAVENOUS at 08:50

## 2023-02-10 RX ADMIN — PROPRANOLOL HYDROCHLORIDE 40 MG: 40 TABLET ORAL at 08:48

## 2023-02-10 RX ADMIN — MORPHINE SULFATE 2 MG: 2 INJECTION, SOLUTION INTRAMUSCULAR; INTRAVENOUS at 00:23

## 2023-02-10 RX ADMIN — OXYCODONE AND ACETAMINOPHEN 1 TABLET: 5; 325 TABLET ORAL at 18:35

## 2023-02-10 RX ADMIN — GABAPENTIN 300 MG: 300 CAPSULE ORAL at 08:47

## 2023-02-10 RX ADMIN — KETOROLAC TROMETHAMINE 15 MG: 30 INJECTION, SOLUTION INTRAMUSCULAR; INTRAVENOUS at 22:55

## 2023-02-10 RX ADMIN — ATORVASTATIN CALCIUM 40 MG: 40 TABLET, FILM COATED ORAL at 08:48

## 2023-02-10 RX ADMIN — LEVOTHYROXINE SODIUM 200 MCG: 0.1 TABLET ORAL at 06:51

## 2023-02-10 ASSESSMENT — PAIN DESCRIPTION - LOCATION
LOCATION: ABDOMEN

## 2023-02-10 ASSESSMENT — PAIN SCALES - GENERAL
PAINLEVEL_OUTOF10: 9
PAINLEVEL_OUTOF10: 9
PAINLEVEL_OUTOF10: 8
PAINLEVEL_OUTOF10: 0
PAINLEVEL_OUTOF10: 9
PAINLEVEL_OUTOF10: 6
PAINLEVEL_OUTOF10: 10
PAINLEVEL_OUTOF10: 8
PAINLEVEL_OUTOF10: 8
PAINLEVEL_OUTOF10: 0

## 2023-02-10 ASSESSMENT — PAIN DESCRIPTION - DESCRIPTORS
DESCRIPTORS: PRESSURE
DESCRIPTORS: ACHING
DESCRIPTORS: CRAMPING
DESCRIPTORS: ACHING

## 2023-02-10 ASSESSMENT — PAIN DESCRIPTION - ORIENTATION: ORIENTATION: LEFT;LOWER

## 2023-02-10 NOTE — PROGRESS NOTES
Shift assessment completed and charted. VSS. A/O x4. Pt c/o abdominal pain, pt aware of history of diverticulosis. All meds given per [facility]. No bowel movements since 2/5. No further needs at this time.

## 2023-02-10 NOTE — PROGRESS NOTES
Physical Therapy  Facility/Department: [facility] C3 TELE/MED SURG/ONC  Physical Therapy Initial Assessment/Discharge summary. Name: Brisa Dias  : 1960  MRN: [id number]  Date of Service: 2/10/2023    Discharge Recommendations:  Home with assist PRN   PT Equipment Recommendations  Equipment Needed: No      Patient Diagnosis(es): There were no encounter diagnoses. Past Medical History:  has no past medical history on file. Past Surgical History:  has no past surgical history on file. Assessment   Assessment: pt presents to [facility] with primary diagnosis of colonic obstruction. PTA pt lived at home with son, daughter in law, and three grandchildren with one small threshold step to enter, reports IND with all mobility and activity. currently pt demonstrates IND with bed mobility, IND with transfers with no AD, and IND with ambulation up to 55 feet. pt demonsytrates no significant deficits or need for PT in the acute setting. per nursing pt is IND in room and per LUCIO Trevino) pt to be left in chair without alarm at this time. pt has no goals for acute setting, being discharged from PT at this time. recommend DC home with assist PRN. Treatment Diagnosis: none. Therapy Prognosis: Excellent  Decision Making: Low Complexity  No Skilled PT:  At baseline function  Requires PT Follow-Up: No  Activity Tolerance  Activity Tolerance: Patient tolerated evaluation without incident     Plan   Physcial Therapy Plan  General Plan: Discharge with evaluation only  Safety Devices  Type of Devices: Call light within reach, Gait belt, Left in chair, Nurse notified (per LUCIO Trevino) pt can be left in chair without alarm, states pt is now IND in room.)  Restraints  Restraints Initially in Place: No     Restrictions  Restrictions/Precautions  Restrictions/Precautions: General Precautions, NPO  Position Activity Restriction  Other position/activity restrictions: IV, tele,     Subjective   Pain: 7/10 in abdomen,  General  Chart Reviewed: Yes  Patient assessed for rehabilitation services?: Yes  Family / Caregiver Present: No  Referring Practitioner: Sindy Gilbert DO  Referral Date : 02/10/23  Diagnosis: colonic obstruction  Follows Commands: Within Functional Limits  Subjective  Subjective: pt agreeable to PT treatment. Social/Functional History  Social/Functional History  Lives With: Son (and DIL and grandchildren.)  Type of Home: House  Home Layout: One level (has a basement, does not need to go down there.)  Home Access: Stairs to enter without rails  Entrance Stairs - Number of Steps: 1 threshold MARYJ O, no HR. Bathroom Shower/Tub: Tub/Shower unit  Bathroom Toilet: Standard  Home Equipment: Rollator (was sent home with a rollator but does not use it.)  Has the patient had two or more falls in the past year or any fall with injury in the past year?: No  ADL Assistance: Independent  Homemaking Assistance: Independent  Homemaking Responsibilities: Yes  Meal Prep Responsibility: Primary  Laundry Responsibility: Primary  Cleaning Responsibility: Primary  Bill Paying/Finance Responsibility: Primary  Shopping Responsibility: Primary  Ambulation Assistance: Independent  Transfer Assistance: Independent  Active : Yes  Occupation: On disability  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 Leisure & Hobbies: spend time with grandchildren, play games on phone.   Vision/Hearing  Vision  Vision: Impaired  Vision Exceptions: Wears glasses for distance;Wears glasses for reading;Wears glasses at all times  Hearing  Hearing: Within functional limits    Cognition   Orientation  Overall Orientation Status: Within Functional Limits  Orientation Level: Oriented to person;Oriented to situation;Oriented to place;Oriented to time  Cognition  Overall Cognitive Status: WFL     Objective   Heart Rate: 67  Heart Rate Source: Monitor  BP: 109/74  BP Location: Left upper arm  BP Method: Automatic  Patient Position: Semi fowlers  MAP (Calculated): 86  Resp: 16  SpO2: 95 %  O2 Device: None (Room air)  Comment: HR 67 BPM, SPO2 95% on room air, /74     Observation/Palpation  Posture: Good (with no AD.)  Observation: healing wound on RLE from recent surgical procedure, appears to be healing well, staples removed. Gross Assessment  AROM: Within functional limits  PROM: Within functional limits  Strength: Within functional limits  Sensation: Intact (to light touch.)         Bed Mobility Training  Bed Mobility Training: Yes  Overall Level of Assistance: Independent  Rolling: Independent  Supine to Sit: Independent  Sit to Supine: Other (comment) (ANGE, pt up in chair at end of session.)  Balance  Sitting: Intact  Standing: Intact  Transfer Training  Transfer Training: Yes  Overall Level of Assistance: Independent  Sit to Stand: Independent  Stand to Sit: Independent  Bed to Chair: Independent  Toilet Transfer: Independent  Gait Training  Gait Training: Yes  Gait  Overall Level of Assistance: Independent  Gait Abnormalities:  (appropriate gate speed with no LOB, unsteadiness, or gait abnormalities.)  Distance (ft): 55 Feet (within room.)  Assistive Device: Other (comment);Gait belt (pt ambulates without AD.)        AM-PAC Score  AM-PAC Inpatient Mobility Raw Score : 24 (02/10/23 1747)  AM-PAC Inpatient T-Scale Score : 61.14 (02/10/23 1747)  Mobility Inpatient CMS 0-100% Score: 0 (02/10/23 1747)  Mobility Inpatient CMS G-Code Modifier : [facility] (02/10/23 1747)        Goals  Short Term Goals  Time Frame for Short Term Goals: 2/10  Short Term Goal 1: pt will demonstrate IND with bed mobility, transfers, and ambulation. - 2/10, GOAL MET. Patient Goals   Patient Goals : \"I'd like to get my bowels moving and go back home. \"       Education  Patient Education  Education Given To: Patient  Education Provided: Role of Therapy;Plan of Care;Home Exercise Program  Education Provided Comments: educated pt on impact of hospital stay on endurance, return to normal activity, importance of routine mild exercise, called nurse for pt regarding her NPO status, per nursing pt still NPO,  Education Method: Demonstration;Verbal  Barriers to Learning: None  Education Outcome: Demonstrated understanding;Verbalized understanding      Therapy Time   Individual Concurrent Group Co-treatment   Time In 1523         Time Out 1602         Minutes 39         Timed Code Treatment Minutes: 24 Minutes, 15 minutes for initial evaluation.        Lois Phi, PT, DPT

## 2023-02-10 NOTE — PROGRESS NOTES
Progress Note      PCP: Hermelindo Canales    Date of Admission: 2/9/2023    Chief Complaint: abdominal pain and constipation    Hospital Course: Ms. Kami Dsouza is a 63year old female who presented to [facility] from [facility] with abdominal pain and constipation on 2/9. She had a lower extremity vascular bypass surgery on her right leg a few weeks ago. She reports constipation and abdominal distension following her surgery with ng tube placement as well as a rectal tube. Patient reports her follow-up colonoscopy revealed sigmoid edema with diverticula and an ulcer, she then improved and was discharged. She again developed abdominal pain and distension and has not had a BM since 2/5. She denies trying any stool softeners or motility agents at home. 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 She also has had surgeries for adhesions and ventral hernia repair. Subjective: Patient seen today in acute discomfort and abdominal pain. Reporting her abdominal pain has not improved and she has not passed a BM, also has not passed gas since 2/5. Denies chest pain, SOA, nausea, vomiting.  Patient has staples RLE from bypass graft procedure, reporting that she needs them removed, she had procedure done in [facility]    Medications:  Reviewed    Infusion Medications    sodium chloride      lactated ringers IV soln 75 mL/hr at 02/10/23 1075     Scheduled Medications    piperacillin-tazobactam  3,375 mg IntraVENous Q8H    sodium chloride flush  5-40 mL IntraVENous 2 times per day    allopurinol  100 mg Oral Daily    atorvastatin  40 mg Oral Daily    aspirin  81 mg Oral Daily    gabapentin  300 mg Oral TID    levothyroxine  200 mcg Oral Daily    propranolol  40 mg Oral BID    enoxaparin  1 mg/kg SubCUTAneous BID     PRN Meds: sodium chloride flush, sodium chloride, ondansetron **OR** ondansetron, polyethylene glycol, acetaminophen **OR** acetaminophen, potassium chloride **OR** potassium alternative oral replacement **OR** potassium chloride, magnesium sulfate, sodium phosphate IVPB **OR** sodium phosphate IVPB **OR** sodium phosphate IVPB, oxyCODONE-acetaminophen, ketorolac, morphine      Intake/Output Summary (Last 24 hours) at 2/10/2023 1307  Last data filed at 2/9/2023 2046  Gross per 24 hour   Intake 314.14 ml   Output --   Net 314.14 ml       Physical Exam Performed:    /70   Pulse 80   Temp 97.8 °F (36.6 °C) (Oral)   Resp 16   Ht 5' 5\" (1.651 m)   Wt 220 lb 12.8 oz (100.2 kg)   SpO2 93%   BMI 36.74 kg/m²     General appearance:  female, acutely in pain, appears stated age and cooperative. HEENT: Conjunctivae/corneas clear. Neck: No jugular venous distention. Respiratory:  Normal respiratory effort. Clear to auscultation, bilaterally without Rales/Wheezes/Rhonchi. Cardiovascular: Regular rate and rhythm without murmurs, rubs or gallops. Abdomen: Soft, tenderness diffusely and LLQ, distended  Musculoskeletal: No clubbing, cyanosis or edema bilaterally. Full range of motion without deformity. Patient has RLE staples from bypass graft clean and dry, no drainage or erythema   Skin: Skin color, texture, turgor normal.  No rashes or lesions. Neurologic:  Neurovascularly intact without any focal sensory/motor deficits. Psychiatric: Alert and oriented, thought content appropriate, normal insight    Labs:   Recent Labs     02/09/23  1353 02/10/23  0520   WBC 5.4 6.2   HGB 11.2* 10.6*   HCT 35.8* 32.1*    311     Recent Labs     02/09/23  1353 02/10/23  0520    138   K 4.3 4.4    105   CO2 25 26   BUN 17 19   CREATININE 0.8 0.8   CALCIUM 8.7 8.5   PHOS 4.6  --      Recent Labs     02/09/23  1353 02/10/23  0520   * 52*   ALT 77* 49*   BILITOT 0.4 0.3   ALKPHOS 240* 193*     Recent Labs     02/09/23  1353   INR 1.27*     No results for input(s): Bhavesh Net in the last 72 hours.     Urinalysis:    No results found for: Muriel Butter, BACTERIA, RBCUA, BLOODU, [address], [address] [place]    Radiology:  CT ABDOMEN PELVIS W IV CONTRAST Additional Contrast? None   Final Result   Diverticulitis of the descending sigmoid junction. Mild wall thickening but   negative for obstruction. Assessment/Plan:    Active Hospital Problems    Diagnosis     Diverticulitis [K57.92]      Priority: Medium    Colonic obstruction ([address] [K56.609]      Priority: Medium    Bowel obstruction ([address] [K56.609]      Priority: Medium     Diverticulitis  History of recurrent bowel obstruction  - CT abdomen revealing diverticulitis of descending sigmoid junction. Negative for obstruction  - Surgery following, started IV zosyn  - IV fluids LR 75 cc/h  - prn pain meds: morphine, percocet, toradol.  Morphine and percocet for severe pain, toradol preferable to limit decrease in GI motility  - clear liquids diet  - continue supportive care, will monitor    Peripheral artery disease  s/p vascular bypass RLE  - wounds clean and dry with staples in place  - needs follow-up with surgery for removal and management  - holding home eliquis  - continue lovenox 100 mg BID  - continue aspirin 81 mg daily  - continue atorvastatin 40 mg daily    Gout  - continue allopurinol 100 mg daily    Hypothyroidism  - continue levothyroxine 200 mcg daily    DVT Prophylaxis: lovenox  Diet: Diet NPO  Code Status: Full Code    PT/OT Eval Status: ordered    Dispo - pending clinical improvement    Jurgen Kumar DO, PGY-1   [facility] Medicine Residency

## 2023-02-10 NOTE — CARE COORDINATION
Case Management Assessment  Initial Evaluation    Date/Time of Evaluation: 2/10/2023 9:39 AM  Assessment Completed by: Pradip Jean Baptiste RN    If patient is discharged prior to next notation, then this note serves as note for discharge by case management. Patient Name: Melvi Guaman                   YOB: 1960  Diagnosis: Colonic obstruction ([address] [M94.749]  Bowel obstruction ([address] [K26.035]                   Date / Time: 2/9/2023  9:19 AM    Patient Admission Status: Inpatient   Readmission Risk (Low < 19, Mod (19-27), High > 27): Readmission Risk Score: 13    Current PCP: Mery Brady  PCP verified by CM? Yes    Chart Reviewed: Yes      History Provided by: Patient  Patient Orientation: Alert and Oriented, Person, Place, Situation, Self    Patient Cognition: Alert    Hospitalization in the last 30 days (Readmission):  No    If yes, Readmission Assessment in CM Navigator will be completed. Advance Directives:      Code Status: Full Code   Patient's Primary Decision Maker is: Legal Next of Kin    Primary Decision Maker: Megan Warren  Child - [phone]    Secondary Decision Maker: Luigi Sprague  Child - [phone]    Discharge Planning:    Patient lives with: Children (son, DIL and their kids live with her.) Type of Home: House  Primary Care Giver: Self  Patient Support Systems include: Children   Current Financial resources: Medicaid  Current community resources: None  Current services prior to admission: None            Current DME:              Type of Home Care services:  None    ADLS  Prior functional level: Independent in ADLs/IADLs  Current functional level: Independent in ADLs/IADLs    PT AM-PAC:   /24  OT AM-PAC:   /24    Family can provide assistance at DC: Yes  Would you like Case Management to discuss the discharge plan with any other family members/significant others, and if so, who?  No  Plans to Return to Present Housing: Yes  Other Identified Issues/Barriers to RETURNING to current housing: none  Potential Assistance needed at discharge: N/A            Potential DME:    Patient expects to discharge to: [facility] for transportation at discharge: Self    Financial    Payor: Марина Vale / Plan: Lorenzo Singh / Product Type: *No Product type* /     Does insurance require precert for SNF: Yes    Potential assistance Purchasing Medications: No  Meds-to-Beds request: Yes      [facility] - [place] - [address]  [place] [employer] [address]  Phone: [phone] Fax: [phone]      Notes:    Factors facilitating achievement of predicted outcomes: Family support, Motivated, Cooperative, and Pleasant    Barriers to discharge: none    Additional Case Management Notes: spoke with patient. Reported IPTA and drives. Will be able to get to any follow up appts. Denied any DCP needs. Following for clinical improvement. The Plan for Transition of Care is related to the following treatment goals of Colonic obstruction ([address] [K56.609]  Bowel obstruction ([address] [O92.382]    IF APPLICABLE: The Patient and/or patient representative Lloyd Crews and her family were provided with a choice of provider and agrees with the discharge plan. Freedom of choice list with basic dialogue that supports the patient's individualized plan of care/goals and shares the quality data associated with the providers was provided to:     Patient Representative Name:       The Patient and/or Patient Representative Agree with the Discharge Plan?       Alec Sy RN  Case Management Department

## 2023-02-10 NOTE — PROGRESS NOTES
[facility]    PATIENT NAME: Jerry Coates     TODAY'S DATE: 2/10/2023    CHIEF COMPLAINT: abd pain    INTERVAL HISTORY/HPI:    Pt reports ongoing abd pain, no nausea, no BMs. REVIEW OF SYSTEMS:  Pertinent positives and negatives as per interval history section    OBJECTIVE:  VITALS:  /70   Pulse 80   Temp 97.8 °F (36.6 °C) (Oral)   Resp 16   Ht 5' 5\" (1.651 m)   Wt 220 lb 12.8 oz (100.2 kg)   SpO2 93%   BMI 36.74 kg/m²     INTAKE/OUTPUT:    I/O last 3 completed shifts: In: 314.1 [I.V.:314.1]  Out: -   No intake/output data recorded. CONSTITUTIONAL:  awake and alert  LUNGS:  Respirations easy and unlabored, no crackles or wheezing  CARD:  regular rate and rhythm  ABDOMEN:  normal bowel sounds, soft, non-distended, tenderness noted left lower abd     Data:  CBC:   Recent Labs     02/09/23  1353 02/10/23  0520   WBC 5.4 6.2   HGB 11.2* 10.6*   HCT 35.8* 32.1*    311     BMP:    Recent Labs     02/09/23  1353 02/10/23  0520    138   K 4.3 4.4    105   CO2 25 26   BUN 17 19   CREATININE 0.8 0.8   GLUCOSE 134* 103*     Hepatic:   Recent Labs     02/09/23  1353 02/10/23  0520   * 52*   ALT 77* 49*   BILITOT 0.4 0.3   ALKPHOS 240* 193*     Mag:      Recent Labs     02/09/23  1353   MG 2.00      Phos:     Recent Labs     02/09/23  1353   PHOS 4.6      INR:   Recent Labs     02/09/23  1353   INR 1.27*       Radiology Review:  *Imaging personally reviewed by me. EXAMINATION:   CT OF THE ABDOMEN AND PELVIS WITH CONTRAST 2/9/2023 3:22 pm     TECHNIQUE:   CT of the abdomen and pelvis was performed with the administration of   intravenous contrast. Multiplanar reformatted images are provided for review. Automated exposure control, iterative reconstruction, and/or weight based   adjustment of the mA/kV was utilized to reduce the radiation dose to as low   as reasonably achievable. COMPARISON:   None.      HISTORY:   ORDERING SYSTEM PROVIDED HISTORY: abd pain, possible large bowel obstruction   TECHNOLOGIST PROVIDED HISTORY:   Additional Contrast?->None   Reason for exam:->abd pain, possible large bowel obstruction   Reason for Exam: Bloating and cramping,   Relevant Medical/Surgical History: Choly     FINDINGS:   Heart: Heart size is normal. No effusions. Liver: Liver is normal density. No enhancing masses. Normal enhancement of   the intrahepatic vasculature. Spleen:  Craniocaudal dimension of the spleen is 13 cm. Normal enhancement. No visualized mass. Pancreas: No enhancing masses. No ductal dilation. No adjacent fatty   stranding. Gallbladder resected. Clips in the gallbladder fossa     Bile ducts: Intrahepatic bile ducts are visualized but not grossly dilated. Extrahepatic bile duct is within normal limits. Adrenals: The adrenal glands are unremarkable     Kidneys: Kidneys are normal in appearance. No hydronephrosis. No enhancing   masses. Norenal stones. Very low-density nodule on the right kidney. It   measures 7.5 mm by 7.5 mm. GI: No small bowel dilation. No colonic wall thickening. No large mass. The stomach is unremarkable in appearance but it is underdistended. Gastric   band is in place. Contrast is seen throughout the colon. Mesentery: No enlarged lymphadenopathy. No free fluid. No free gas. Aorta: Aorta is of normal size. Negative for dissection. IVC is   unremarkable. Celiac axis and SMA are patent. Portal vein is patent. PELVIS     GI: Multiple colonic diverticula. Mild thickening and adjacent fatty   stranding at the descending sigmoid junction. Small amount of free fluid but   no free gas. No abscess. Tiny amount of free fluid in the pelvis. : The bladder is unremarkable in appearance. No large mass. Patient is   post hysterectomy. Neither ovary seen. Phleboliths in the pelvis. Lungs: The lung bases were reviewed. Lung bases are clear. Osseous: Previous laminectomy.   Lateral osseous fusion in the lower lumbar   spine extending to the SI joints. No widening of the SI joints. Deformity   of the left ilium related to previous donor site. Osteoarthritic change of   the SI joints and the hips. Impression:     Diverticulitis of the descending sigmoid junction. Mild wall thickening but   negative for obstruction. ASSESSMENT AND PLAN:  Diverticulitis with mild thickening sigmoid colon - no evidence of obstruction. Antibiotics started. Clear liquids okay. Continue with supportive care. Electronically signed by JARETT Zafar - CNP     [id number]    Patient seen and agree with above and more than half of the total time was spent by me on the encounter. Conitnued pain and mild bloating. No fevers or chills. Clear liquids and continue iv abx. If worsens or fails to improve through the weekend then repeat imaging.     Jesus Ibrahim MD

## 2023-02-10 NOTE — PLAN OF CARE
A&Ox4, no signs of any distress, family at bedside, no need to put in NG tube per previous nurse since CT scan showed only diverticulitis. Pt medicated for pain, IV infusing CD&I, staples on right leg were supposed to be removed today at Dr. Sakina Esparza in [place] family is concerned about this. Pt is tolerating clear liquids and ice with no nausea or vomiting.

## 2023-02-11 ENCOUNTER — APPOINTMENT (OUTPATIENT)
Dept: GENERAL RADIOLOGY | Age: 63
DRG: 329 | End: 2023-02-11
Attending: INTERNAL MEDICINE
Payer: MEDICAID

## 2023-02-11 LAB
A/G RATIO: 1.6 (ref 1.1–2.2)
ALBUMIN SERPL-MCNC: 2.7 G/DL (ref 3.4–5)
ALP BLD-CCNC: 193 U/L (ref 40–129)
ALT SERPL-CCNC: 35 U/L (ref 10–40)
ANION GAP SERPL CALCULATED.3IONS-SCNC: 9 MMOL/L (ref 3–16)
AST SERPL-CCNC: 25 U/L (ref 15–37)
BASOPHILS ABSOLUTE: 0 K/UL (ref 0–0.2)
BASOPHILS RELATIVE PERCENT: 0.7 %
BILIRUB SERPL-MCNC: 0.3 MG/DL (ref 0–1)
BUN BLDV-MCNC: 19 MG/DL (ref 7–20)
CALCIUM SERPL-MCNC: 8.2 MG/DL (ref 8.3–10.6)
CHLORIDE BLD-SCNC: 105 MMOL/L (ref 99–110)
CO2: 26 MMOL/L (ref 21–32)
CREAT SERPL-MCNC: 0.9 MG/DL (ref 0.6–1.2)
EOSINOPHILS ABSOLUTE: 0.3 K/UL (ref 0–0.6)
EOSINOPHILS RELATIVE PERCENT: 5.6 %
GFR SERPL CREATININE-BSD FRML MDRD: >60 ML/MIN/{1.73_M2}
GLUCOSE BLD-MCNC: 104 MG/DL (ref 70–99)
HCT VFR BLD CALC: 33.9 % (ref 36–48)
HEMOGLOBIN: 11.1 G/DL (ref 12–16)
LYMPHOCYTES ABSOLUTE: 1.7 K/UL (ref 1–5.1)
LYMPHOCYTES RELATIVE PERCENT: 35.2 %
MCH RBC QN AUTO: 26.2 PG (ref 26–34)
MCHC RBC AUTO-ENTMCNC: 32.7 G/DL (ref 31–36)
MCV RBC AUTO: 80.3 FL (ref 80–100)
MONOCYTES ABSOLUTE: 0.6 K/UL (ref 0–1.3)
MONOCYTES RELATIVE PERCENT: 12.7 %
NEUTROPHILS ABSOLUTE: 2.3 K/UL (ref 1.7–7.7)
NEUTROPHILS RELATIVE PERCENT: 45.8 %
PDW BLD-RTO: 17.5 % (ref 12.4–15.4)
PLATELET # BLD: 290 K/UL (ref 135–450)
PMV BLD AUTO: 7.7 FL (ref 5–10.5)
POTASSIUM REFLEX MAGNESIUM: 4.5 MMOL/L (ref 3.5–5.1)
RBC # BLD: 4.22 M/UL (ref 4–5.2)
SODIUM BLD-SCNC: 140 MMOL/L (ref 136–145)
TOTAL PROTEIN: 4.4 G/DL (ref 6.4–8.2)
WBC # BLD: 4.9 K/UL (ref 4–11)

## 2023-02-11 PROCEDURE — 6370000000 HC RX 637 (ALT 250 FOR IP)

## 2023-02-11 PROCEDURE — 6360000002 HC RX W HCPCS

## 2023-02-11 PROCEDURE — 74250 X-RAY XM SM INT 1CNTRST STD: CPT

## 2023-02-11 PROCEDURE — 80053 COMPREHEN METABOLIC PANEL: CPT

## 2023-02-11 PROCEDURE — 6360000002 HC RX W HCPCS: Performed by: CLINICAL NURSE SPECIALIST

## 2023-02-11 PROCEDURE — 1200000000 HC SEMI PRIVATE

## 2023-02-11 PROCEDURE — APPSS30 APP SPLIT SHARED TIME 16-30 MINUTES: Performed by: CLINICAL NURSE SPECIALIST

## 2023-02-11 PROCEDURE — 85025 COMPLETE CBC W/AUTO DIFF WBC: CPT

## 2023-02-11 PROCEDURE — 2580000003 HC RX 258: Performed by: INTERNAL MEDICINE

## 2023-02-11 PROCEDURE — 36415 COLL VENOUS BLD VENIPUNCTURE: CPT

## 2023-02-11 PROCEDURE — 6370000000 HC RX 637 (ALT 250 FOR IP): Performed by: INTERNAL MEDICINE

## 2023-02-11 PROCEDURE — 6360000002 HC RX W HCPCS: Performed by: INTERNAL MEDICINE

## 2023-02-11 PROCEDURE — 2580000003 HC RX 258

## 2023-02-11 PROCEDURE — 2580000003 HC RX 258: Performed by: CLINICAL NURSE SPECIALIST

## 2023-02-11 RX ORDER — SODIUM CHLORIDE 9 MG/ML
INJECTION, SOLUTION INTRAVENOUS CONTINUOUS
Status: DISCONTINUED | OUTPATIENT
Start: 2023-02-11 | End: 2023-02-13

## 2023-02-11 RX ADMIN — PIPERACILLIN AND TAZOBACTAM 3375 MG: 3; .375 INJECTION, POWDER, FOR SOLUTION INTRAVENOUS at 02:36

## 2023-02-11 RX ADMIN — SODIUM CHLORIDE: 9 INJECTION, SOLUTION INTRAVENOUS at 09:58

## 2023-02-11 RX ADMIN — GABAPENTIN 300 MG: 300 CAPSULE ORAL at 15:19

## 2023-02-11 RX ADMIN — ATORVASTATIN CALCIUM 40 MG: 40 TABLET, FILM COATED ORAL at 09:59

## 2023-02-11 RX ADMIN — LEVOTHYROXINE SODIUM 200 MCG: 0.1 TABLET ORAL at 09:13

## 2023-02-11 RX ADMIN — OXYCODONE AND ACETAMINOPHEN 1 TABLET: 5; 325 TABLET ORAL at 16:20

## 2023-02-11 RX ADMIN — PROPRANOLOL HYDROCHLORIDE 40 MG: 40 TABLET ORAL at 21:05

## 2023-02-11 RX ADMIN — ENOXAPARIN SODIUM 100 MG: 100 INJECTION SUBCUTANEOUS at 21:05

## 2023-02-11 RX ADMIN — ASPIRIN 81 MG 81 MG: 81 TABLET ORAL at 11:30

## 2023-02-11 RX ADMIN — ENOXAPARIN SODIUM 100 MG: 100 INJECTION SUBCUTANEOUS at 09:54

## 2023-02-11 RX ADMIN — ONDANSETRON 4 MG: 2 INJECTION INTRAMUSCULAR; INTRAVENOUS at 16:20

## 2023-02-11 RX ADMIN — OXYCODONE AND ACETAMINOPHEN 1 TABLET: 5; 325 TABLET ORAL at 21:04

## 2023-02-11 RX ADMIN — PIPERACILLIN AND TAZOBACTAM 3375 MG: 3; .375 INJECTION, POWDER, FOR SOLUTION INTRAVENOUS at 18:22

## 2023-02-11 RX ADMIN — MORPHINE SULFATE 2 MG: 2 INJECTION, SOLUTION INTRAMUSCULAR; INTRAVENOUS at 18:18

## 2023-02-11 RX ADMIN — POLYETHYLENE GLYCOL 3350 17 G: 17 POWDER, FOR SOLUTION ORAL at 10:04

## 2023-02-11 RX ADMIN — GABAPENTIN 300 MG: 300 CAPSULE ORAL at 21:05

## 2023-02-11 RX ADMIN — PIPERACILLIN AND TAZOBACTAM 3375 MG: 3; .375 INJECTION, POWDER, FOR SOLUTION INTRAVENOUS at 09:54

## 2023-02-11 RX ADMIN — GABAPENTIN 300 MG: 300 CAPSULE ORAL at 09:55

## 2023-02-11 RX ADMIN — KETOROLAC TROMETHAMINE 15 MG: 30 INJECTION, SOLUTION INTRAMUSCULAR; INTRAVENOUS at 19:34

## 2023-02-11 RX ADMIN — KETOROLAC TROMETHAMINE 15 MG: 30 INJECTION, SOLUTION INTRAMUSCULAR; INTRAVENOUS at 09:54

## 2023-02-11 RX ADMIN — PROPRANOLOL HYDROCHLORIDE 40 MG: 40 TABLET ORAL at 09:55

## 2023-02-11 RX ADMIN — SODIUM CHLORIDE, PRESERVATIVE FREE 10 ML: 5 INJECTION INTRAVENOUS at 01:26

## 2023-02-11 RX ADMIN — ALLOPURINOL 100 MG: 100 TABLET ORAL at 11:30

## 2023-02-11 ASSESSMENT — PAIN SCALES - GENERAL
PAINLEVEL_OUTOF10: 7
PAINLEVEL_OUTOF10: 8
PAINLEVEL_OUTOF10: 8
PAINLEVEL_OUTOF10: 10
PAINLEVEL_OUTOF10: 7

## 2023-02-11 ASSESSMENT — PAIN - FUNCTIONAL ASSESSMENT
PAIN_FUNCTIONAL_ASSESSMENT: PREVENTS OR INTERFERES SOME ACTIVE ACTIVITIES AND ADLS
PAIN_FUNCTIONAL_ASSESSMENT: PREVENTS OR INTERFERES SOME ACTIVE ACTIVITIES AND ADLS

## 2023-02-11 ASSESSMENT — PAIN DESCRIPTION - DESCRIPTORS
DESCRIPTORS: STABBING;THROBBING
DESCRIPTORS: ACHING
DESCRIPTORS: SHARP
DESCRIPTORS: ACHING

## 2023-02-11 ASSESSMENT — PAIN DESCRIPTION - PAIN TYPE
TYPE: ACUTE PAIN

## 2023-02-11 ASSESSMENT — PAIN DESCRIPTION - ORIENTATION
ORIENTATION: LEFT
ORIENTATION: LOWER;LEFT
ORIENTATION: MID
ORIENTATION: LOWER;LEFT

## 2023-02-11 ASSESSMENT — PAIN DESCRIPTION - LOCATION
LOCATION: ABDOMEN

## 2023-02-11 NOTE — PROGRESS NOTES
[facility] SURGERY    PATIENT NAME: Levy Orona     TODAY'S DATE: 2/11/2023    CHIEF COMPLAINT: abd pain    INTERVAL HISTORY/HPI:    Pt reports that she feels her abd pain is somewhat better this AM - denies nausea and is asking for something to eat/drink. She states she has not had BM. REVIEW OF SYSTEMS:  Pertinent positives and negatives as per interval history section    OBJECTIVE:  VITALS:  /83   Pulse 72   Temp 98.7 °F (37.1 °C) (Oral)   Resp 20   Ht 5' 5\" (1.651 m)   Wt 220 lb 12.8 oz (100.2 kg)   SpO2 93%   BMI 36.74 kg/m²     INTAKE/OUTPUT:    I/O last 3 completed shifts: In: 1630.1 [I.V.:1630.1]  Out: -   No intake/output data recorded. CONSTITUTIONAL:  awake and alert  LUNGS:  Respirations easy and unlabored, no crackles or wheezing  CARD:  regular rate and rhythm  ABDOMEN:  normal bowel sounds, soft, non-distended, tenderness noted left lower abd     Data:  CBC:   Recent Labs     02/09/23  1353 02/10/23  0520 02/11/23  0534   WBC 5.4 6.2 4.9   HGB 11.2* 10.6* 11.1*   HCT 35.8* 32.1* 33.9*    311 290       BMP:    Recent Labs     02/09/23  1353 02/10/23  0520 02/11/23  0534    138 140   K 4.3 4.4 4.5    105 105   CO2 25 26 26   BUN 17 19 19   CREATININE 0.8 0.8 0.9   GLUCOSE 134* 103* 104*       Hepatic:   Recent Labs     02/09/23  1353 02/10/23  0520 02/11/23  0534   * 52* 25   ALT 77* 49* 35   BILITOT 0.4 0.3 0.3   ALKPHOS 240* 193* 193*       Mag:      Recent Labs     02/09/23  1353   MG 2.00        Phos:     Recent Labs     02/09/23  1353   PHOS 4.6        INR:   Recent Labs     02/09/23  1353   INR 1.27*         ASSESSMENT AND PLAN:  Diverticulitis with mild thickening sigmoid colon - no evidence of obstruction. Continue with IV antibiotics, liquids. IF pain worsens that would need repeat CT. Electronically signed by JARETT Sanderson CNP       Patient seen and examined.   I agree with the assessment and plan from Vikki Hernández SKIP Son.       Lydia Pulido MD

## 2023-02-11 NOTE — PROGRESS NOTES
Occupational Therapy  Spoke with PT/pt at bedside. Per PT, patient at baseline for functional mobility. RN reporting pt IND within room. Pt declining OT services at this time. Will sign off. Please re-order if there is a change in medical status.    Thank you,    Lin Alcantara, OT

## 2023-02-11 NOTE — PROGRESS NOTES
Progress Note      PCP: Bassam Moore    Date of Admission: 2/9/2023    Chief Complaint: abdominal pain and constipation    Subjective: Patient seen today still in acute discomfort and abdominal pain. Reports somewhat improved today, has not passed gas or had a BM. Denies chest pain, SOA, N/V. She received glycolax this morning. Hospital Course: Ms. Noemy Ramires is a 63year old female who presented to [facility] from [facility] with abdominal pain and constipation on 2/9. She had a lower extremity vascular bypass surgery on her right leg a few weeks ago. She reports constipation and abdominal distension following her surgery with ng tube placement as well as a rectal tube. Patient reports her follow-up colonoscopy revealed sigmoid edema with diverticula and an ulcer, she then improved and was discharged. She again developed abdominal pain and distension and has not had a BM since 2/5. She denies trying any stool softeners or motility agents at home. 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 She also has had surgeries for adhesions and ventral hernia repair.     Medications:  Reviewed    Infusion Medications    sodium chloride 75 mL/hr at 02/11/23 6455    sodium chloride       Scheduled Medications    piperacillin-tazobactam  3,375 mg IntraVENous Q8H    sodium chloride flush  5-40 mL IntraVENous 2 times per day    allopurinol  100 mg Oral Daily    atorvastatin  40 mg Oral Daily    aspirin  81 mg Oral Daily    gabapentin  300 mg Oral TID    levothyroxine  200 mcg Oral Daily    propranolol  40 mg Oral BID    enoxaparin  1 mg/kg SubCUTAneous BID     PRN Meds: ketorolac, oxyCODONE-acetaminophen, sodium chloride flush, sodium chloride, ondansetron **OR** ondansetron, polyethylene glycol, acetaminophen **OR** acetaminophen, potassium chloride **OR** potassium alternative oral replacement **OR** potassium chloride, magnesium sulfate, sodium phosphate IVPB **OR** sodium phosphate IVPB **OR** sodium phosphate IVPB, morphine      Intake/Output Summary (Last 24 hours) at 2/11/2023 1234  Last data filed at 2/10/2023 1523  Gross per 24 hour   Intake 1316 ml   Output --   Net 1316 ml       Physical Exam Performed:    /88   Pulse 69   Temp 98.5 °F (36.9 °C) (Oral)   Resp 17   Ht 5' 5\" (1.651 m)   Wt 220 lb 12.8 oz (100.2 kg)   SpO2 94%   BMI 36.74 kg/m²     General appearance:  female, acutely in pain, appears stated age and cooperative. HEENT: Conjunctivae/corneas clear. Neck: No jugular venous distention. Respiratory:  Normal respiratory effort. Clear to auscultation, bilaterally without Rales/Wheezes/Rhonchi. Cardiovascular: Regular rate and rhythm without murmurs, rubs or gallops. Abdomen: Soft, tenderness diffusely and LLQ, distended  Musculoskeletal: No clubbing, cyanosis or edema bilaterally. Full range of motion without deformity. Patient has RLE staples from bypass graft clean and dry, no drainage or erythema   Skin: Skin color, texture, turgor normal.  No rashes or lesions. Neurologic:  Neurovascularly intact without any focal sensory/motor deficits. Psychiatric: Alert and oriented, thought content appropriate, normal insight      Labs:   Recent Labs     02/09/23  1353 02/10/23  0520 02/11/23  0534   WBC 5.4 6.2 4.9   HGB 11.2* 10.6* 11.1*   HCT 35.8* 32.1* 33.9*    311 290     Recent Labs     02/09/23  1353 02/10/23  0520 02/11/23  0534    138 140   K 4.3 4.4 4.5    105 105   CO2 25 26 26   BUN 17 19 19   CREATININE 0.8 0.8 0.9   CALCIUM 8.7 8.5 8.2*   PHOS 4.6  --   --      Recent Labs     02/09/23  1353 02/10/23  0520 02/11/23  0534   * 52* 25   ALT 77* 49* 35   BILITOT 0.4 0.3 0.3   ALKPHOS 240* 193* 193*     Recent Labs     02/09/23  1353   INR 1.27*     No results for input(s): CKTOTAL, TROPONINI in the last 72 hours.     Urinalysis:    No results found for: NITRU, [address], BACTERIA, RBCUA, Sudheer Johnston, [place]    Radiology:  CT ABDOMEN PELVIS W IV CONTRAST Additional Contrast? None   Final Result   Diverticulitis of the descending sigmoid junction. Mild wall thickening but   negative for obstruction. FL SMALL BOWEL FOLLOW THROUGH ONLY    (Results Pending)           Assessment/Plan:    Active Hospital Problems    Diagnosis     Diverticulitis [K57.92]      Priority: Medium    Colonic obstruction ([address] [K56.609]      Priority: Medium    Bowel obstruction ([address] [K56.609]      Priority: Medium     Diverticulitis  History of recurrent bowel obstruction  - CT abdomen revealing diverticulitis of descending sigmoid junction. Negative for obstruction  - Surgery following, continue IV zosyn  - FL small bowel follow through ordered  - discontinued IV fluids LR 75 cc/h, incompatible with zosyn per nursing  - start IV hydration NS 75 cc/h  - prn pain meds: morphine, percocet, toradol. Morphine and percocet for severe pain, toradol preferable to limit decrease in GI motility  - clear liquids diet  - continue supportive care, will monitor     Peripheral artery disease  s/p vascular bypass RLE  - wounds clean and dry with staples in place  - needs follow-up with surgery for removal and management  - holding home eliquis  - continue lovenox 100 mg BID  - continue aspirin 81 mg daily  - continue atorvastatin 40 mg daily     Gout  - continue allopurinol 100 mg daily     Hypothyroidism  - continue levothyroxine 200 mcg daily    DVT Prophylaxis: lovenox  Diet: ADULT DIET;  Clear Liquid  Code Status: Full Code    PT/OT Eval Status: patient independent, discharged from PT/OT    Dispo - 1-2 days pending clinical improvement    [address] PATHAK, PGY-1   [facility] Medicine Residency

## 2023-02-11 NOTE — PROGRESS NOTES
Pt alert and orientated. Call light within reach. Pt resting in bed. Pain in abd is 6/10. Alek Queen RN

## 2023-02-12 LAB
A/G RATIO: 1.4 (ref 1.1–2.2)
ALBUMIN SERPL-MCNC: 2.8 G/DL (ref 3.4–5)
ALP BLD-CCNC: 291 U/L (ref 40–129)
ALT SERPL-CCNC: 48 U/L (ref 10–40)
ANION GAP SERPL CALCULATED.3IONS-SCNC: 8 MMOL/L (ref 3–16)
AST SERPL-CCNC: 65 U/L (ref 15–37)
BASOPHILS ABSOLUTE: 0 K/UL (ref 0–0.2)
BASOPHILS RELATIVE PERCENT: 0.9 %
BILIRUB SERPL-MCNC: 0.4 MG/DL (ref 0–1)
BUN BLDV-MCNC: 19 MG/DL (ref 7–20)
CALCIUM SERPL-MCNC: 7.8 MG/DL (ref 8.3–10.6)
CHLORIDE BLD-SCNC: 106 MMOL/L (ref 99–110)
CO2: 25 MMOL/L (ref 21–32)
CREAT SERPL-MCNC: 0.9 MG/DL (ref 0.6–1.2)
EOSINOPHILS ABSOLUTE: 0.2 K/UL (ref 0–0.6)
EOSINOPHILS RELATIVE PERCENT: 4.4 %
GFR SERPL CREATININE-BSD FRML MDRD: >60 ML/MIN/{1.73_M2}
GLUCOSE BLD-MCNC: 97 MG/DL (ref 70–99)
HCT VFR BLD CALC: 33.1 % (ref 36–48)
HEMOGLOBIN: 10.4 G/DL (ref 12–16)
LYMPHOCYTES ABSOLUTE: 1.6 K/UL (ref 1–5.1)
LYMPHOCYTES RELATIVE PERCENT: 30.5 %
MCH RBC QN AUTO: 25.3 PG (ref 26–34)
MCHC RBC AUTO-ENTMCNC: 31.4 G/DL (ref 31–36)
MCV RBC AUTO: 80.5 FL (ref 80–100)
MONOCYTES ABSOLUTE: 0.7 K/UL (ref 0–1.3)
MONOCYTES RELATIVE PERCENT: 13.3 %
NEUTROPHILS ABSOLUTE: 2.6 K/UL (ref 1.7–7.7)
NEUTROPHILS RELATIVE PERCENT: 50.9 %
PDW BLD-RTO: 18 % (ref 12.4–15.4)
PLATELET # BLD: 302 K/UL (ref 135–450)
PMV BLD AUTO: 8 FL (ref 5–10.5)
POTASSIUM REFLEX MAGNESIUM: 4.1 MMOL/L (ref 3.5–5.1)
RBC # BLD: 4.12 M/UL (ref 4–5.2)
SODIUM BLD-SCNC: 139 MMOL/L (ref 136–145)
TOTAL PROTEIN: 4.8 G/DL (ref 6.4–8.2)
WBC # BLD: 5.2 K/UL (ref 4–11)

## 2023-02-12 PROCEDURE — 80053 COMPREHEN METABOLIC PANEL: CPT

## 2023-02-12 PROCEDURE — 36415 COLL VENOUS BLD VENIPUNCTURE: CPT

## 2023-02-12 PROCEDURE — 6360000002 HC RX W HCPCS: Performed by: INTERNAL MEDICINE

## 2023-02-12 PROCEDURE — 6370000000 HC RX 637 (ALT 250 FOR IP): Performed by: INTERNAL MEDICINE

## 2023-02-12 PROCEDURE — 6370000000 HC RX 637 (ALT 250 FOR IP): Performed by: SURGERY

## 2023-02-12 PROCEDURE — 99232 SBSQ HOSP IP/OBS MODERATE 35: CPT | Performed by: SURGERY

## 2023-02-12 PROCEDURE — 2580000003 HC RX 258: Performed by: INTERNAL MEDICINE

## 2023-02-12 PROCEDURE — 6360000002 HC RX W HCPCS: Performed by: CLINICAL NURSE SPECIALIST

## 2023-02-12 PROCEDURE — 1200000000 HC SEMI PRIVATE

## 2023-02-12 PROCEDURE — 85025 COMPLETE CBC W/AUTO DIFF WBC: CPT

## 2023-02-12 PROCEDURE — 2580000003 HC RX 258

## 2023-02-12 PROCEDURE — 2580000003 HC RX 258: Performed by: CLINICAL NURSE SPECIALIST

## 2023-02-12 PROCEDURE — 6370000000 HC RX 637 (ALT 250 FOR IP)

## 2023-02-12 RX ORDER — BISACODYL 10 MG
20 SUPPOSITORY, RECTAL RECTAL ONCE
Status: COMPLETED | OUTPATIENT
Start: 2023-02-12 | End: 2023-02-12

## 2023-02-12 RX ADMIN — PIPERACILLIN AND TAZOBACTAM 3375 MG: 3; .375 INJECTION, POWDER, FOR SOLUTION INTRAVENOUS at 18:57

## 2023-02-12 RX ADMIN — ATORVASTATIN CALCIUM 40 MG: 40 TABLET, FILM COATED ORAL at 10:26

## 2023-02-12 RX ADMIN — ENOXAPARIN SODIUM 100 MG: 100 INJECTION SUBCUTANEOUS at 20:07

## 2023-02-12 RX ADMIN — ENOXAPARIN SODIUM 100 MG: 100 INJECTION SUBCUTANEOUS at 10:25

## 2023-02-12 RX ADMIN — SODIUM CHLORIDE: 9 INJECTION, SOLUTION INTRAVENOUS at 06:14

## 2023-02-12 RX ADMIN — GABAPENTIN 300 MG: 300 CAPSULE ORAL at 10:26

## 2023-02-12 RX ADMIN — PROPRANOLOL HYDROCHLORIDE 40 MG: 40 TABLET ORAL at 10:26

## 2023-02-12 RX ADMIN — ONDANSETRON 4 MG: 4 TABLET, ORALLY DISINTEGRATING ORAL at 23:43

## 2023-02-12 RX ADMIN — SODIUM CHLORIDE, PRESERVATIVE FREE 10 ML: 5 INJECTION INTRAVENOUS at 10:21

## 2023-02-12 RX ADMIN — GABAPENTIN 300 MG: 300 CAPSULE ORAL at 20:07

## 2023-02-12 RX ADMIN — PROPRANOLOL HYDROCHLORIDE 40 MG: 40 TABLET ORAL at 20:07

## 2023-02-12 RX ADMIN — PIPERACILLIN AND TAZOBACTAM 3375 MG: 3; .375 INJECTION, POWDER, FOR SOLUTION INTRAVENOUS at 10:12

## 2023-02-12 RX ADMIN — POLYETHYLENE GLYCOL 3350 17 G: 17 POWDER, FOR SOLUTION ORAL at 14:48

## 2023-02-12 RX ADMIN — SODIUM CHLORIDE: 9 INJECTION, SOLUTION INTRAVENOUS at 02:00

## 2023-02-12 RX ADMIN — OXYCODONE AND ACETAMINOPHEN 1 TABLET: 5; 325 TABLET ORAL at 14:32

## 2023-02-12 RX ADMIN — LEVOTHYROXINE SODIUM 200 MCG: 0.1 TABLET ORAL at 06:10

## 2023-02-12 RX ADMIN — ALLOPURINOL 100 MG: 100 TABLET ORAL at 10:30

## 2023-02-12 RX ADMIN — GABAPENTIN 300 MG: 300 CAPSULE ORAL at 14:32

## 2023-02-12 RX ADMIN — ASPIRIN 81 MG 81 MG: 81 TABLET ORAL at 10:26

## 2023-02-12 RX ADMIN — OXYCODONE AND ACETAMINOPHEN 1 TABLET: 5; 325 TABLET ORAL at 23:43

## 2023-02-12 RX ADMIN — SODIUM CHLORIDE: 9 INJECTION, SOLUTION INTRAVENOUS at 20:03

## 2023-02-12 RX ADMIN — Medication 20 MG: at 10:18

## 2023-02-12 RX ADMIN — OXYCODONE AND ACETAMINOPHEN 1 TABLET: 5; 325 TABLET ORAL at 18:54

## 2023-02-12 RX ADMIN — PIPERACILLIN AND TAZOBACTAM 3375 MG: 3; .375 INJECTION, POWDER, FOR SOLUTION INTRAVENOUS at 02:03

## 2023-02-12 ASSESSMENT — PAIN - FUNCTIONAL ASSESSMENT
PAIN_FUNCTIONAL_ASSESSMENT: PREVENTS OR INTERFERES SOME ACTIVE ACTIVITIES AND ADLS

## 2023-02-12 ASSESSMENT — PAIN DESCRIPTION - ORIENTATION
ORIENTATION: LEFT
ORIENTATION: MID
ORIENTATION: LEFT;LOWER
ORIENTATION: MID

## 2023-02-12 ASSESSMENT — PAIN DESCRIPTION - LOCATION
LOCATION: ABDOMEN

## 2023-02-12 ASSESSMENT — PAIN SCALES - GENERAL
PAINLEVEL_OUTOF10: 5
PAINLEVEL_OUTOF10: 8
PAINLEVEL_OUTOF10: 9

## 2023-02-12 ASSESSMENT — PAIN DESCRIPTION - DESCRIPTORS
DESCRIPTORS: CRAMPING
DESCRIPTORS: ACHING
DESCRIPTORS: SHARP;STABBING
DESCRIPTORS: ACHING

## 2023-02-12 ASSESSMENT — PAIN DESCRIPTION - PAIN TYPE
TYPE: ACUTE PAIN
TYPE: ACUTE PAIN

## 2023-02-12 NOTE — PROGRESS NOTES
[facility] GENERAL SURGERY DAILY PROGRESS NOTE    SUBJECTIVE: Awake, alert. Complains of intermittent pain. Denies N/V.     OBJECTIVE: CURRENT VITALS:  /82   Pulse 85   Temp 99.1 °F (37.3 °C) (Oral)   Resp 16   Ht 5' 5\" (1.651 m)   Wt 220 lb 12.8 oz (100.2 kg)   SpO2 94%   BMI 36.74 kg/m²          ABD: Soft. Some distention. Tender LLQ. No peritoneal signs.      LABS:    CBC:   Recent Labs     02/10/23  0520 02/11/23  0534 02/12/23  0549   WBC 6.2 4.9 5.2   RBC 4.01 4.22 4.12   HGB 10.6* 11.1* 10.4*   HCT 32.1* 33.9* 33.1*   MCV 80.0 80.3 80.5   RDW 17.4* 17.5* 18.0*    290 302     BMP:   Recent Labs     02/09/23  1353 02/10/23  0520 02/11/23  0534 02/12/23  0549    138 140 139   K 4.3 4.4 4.5 4.1    105 105 106   CO2 25 26 26 25   PHOS 4.6  --   --   --    BUN 17 19 19 19   CREATININE 0.8 0.8 0.9 0.9     Recent Labs     02/09/23  1353   MG 2.00             ASSESSMENT:   Sigmoid diverticulitis      PLAN:   Continue antibiotics  Full liquids  Ambulate  Dulcolax suppositories to try and stimulate BM         Barrett Pepe MD

## 2023-02-12 NOTE — PROGRESS NOTES
Hospitalist Progress Note      PCP: Chi Lynch    Date of Admission: 2/9/2023    Chief Complaint: Abdominal pain and constipation    Hospital Course:     Ms. Paco Parks is a 63year old female who presented to [facility] from [facility] with abdominal pain and constipation on 2/9. She had a lower extremity vascular bypass surgery on her right leg a few weeks ago. She reports constipation and abdominal distension following her surgery with ng tube placement as well as a rectal tube. Patient reports her follow-up colonoscopy revealed sigmoid edema with diverticula and an ulcer, she then improved and was discharged. She again developed abdominal pain and distension and has not had a BM since 2/5. She denies trying any stool softeners or motility agents at home. 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 She also has had surgeries for adhesions and ventral hernia repair. Subjective:     Patient seen and examined. No acute events overnight. Patient states that her abdominal pain is still there but it comes and goes. She denies any bowel movement or passing flatus. She denies any nausea and vomiting. She states she is tolerating her diet well.       Medications:  Reviewed    Infusion Medications    sodium chloride 75 mL/hr at 02/12/23 0615    sodium chloride Stopped (02/12/23 0203)     Scheduled Medications    piperacillin-tazobactam  3,375 mg IntraVENous Q8H    sodium chloride flush  5-40 mL IntraVENous 2 times per day    allopurinol  100 mg Oral Daily    atorvastatin  40 mg Oral Daily    aspirin  81 mg Oral Daily    gabapentin  300 mg Oral TID    levothyroxine  200 mcg Oral Daily    propranolol  40 mg Oral BID    enoxaparin  1 mg/kg SubCUTAneous BID     PRN Meds: ketorolac, oxyCODONE-acetaminophen, sodium chloride flush, sodium chloride, ondansetron **OR** ondansetron, polyethylene glycol, acetaminophen **OR** acetaminophen, potassium chloride **OR** potassium alternative oral replacement **OR** potassium chloride, magnesium sulfate, sodium phosphate IVPB **OR** sodium phosphate IVPB **OR** sodium phosphate IVPB, morphine      Intake/Output Summary (Last 24 hours) at 2/12/2023 1305  Last data filed at 2/12/2023 1027  Gross per 24 hour   Intake 1902.13 ml   Output 600 ml   Net 1302.13 ml       Physical Exam Performed:    /82   Pulse 85   Temp 99.1 °F (37.3 °C) (Oral)   Resp 16   Ht 5' 5\" (1.651 m)   Wt 220 lb 12.8 oz (100.2 kg)   SpO2 94%   BMI 36.74 kg/m²     General appearance: Mild amount of distress, appears stated age and cooperative. HEENT: Conjunctivae/corneas clear. Neck: No jugular venous distention. Respiratory:  Normal respiratory effort. Clear to auscultation, bilaterally without Rales/Wheezes/Rhonchi. Cardiovascular: Regular rate and rhythm without murmurs, rubs or gallops. Abdomen: Soft, diffuse abdominal tenderness, extreme distention noted. Bowel sounds slowed  Musculoskeletal: No clubbing, cyanosis or edema bilaterally. Skin: Skin color, texture, turgor normal.  No rashes or lesions. Neurologic:  Neurovascularly intact without any focal sensory/motor deficits.   Psychiatric: Alert and oriented, thought content appropriate, normal insight      Labs:   Recent Labs     02/10/23  0520 02/11/23  0534 02/12/23  0549   WBC 6.2 4.9 5.2   HGB 10.6* 11.1* 10.4*   HCT 32.1* 33.9* 33.1*    290 302     Recent Labs     02/09/23  1353 02/10/23  0520 02/11/23  0534 02/12/23  0549    138 140 139   K 4.3 4.4 4.5 4.1    105 105 106   CO2 25 26 26 25   BUN 17 19 19 19   CREATININE 0.8 0.8 0.9 0.9   CALCIUM 8.7 8.5 8.2* 7.8*   PHOS 4.6  --   --   --      Recent Labs     02/10/23  0520 02/11/23  0534 02/12/23  0549   AST 52* 25 65*   ALT 49* 35 48*   BILITOT 0.3 0.3 0.4   ALKPHOS 193* 193* 291*     Recent Labs     02/09/23  1353   INR 1.27*     No results for input(s): Norma Brown in the last 72 hours.    Urinalysis:    No results found for: Mekhi Cassette, BACTERIA, RBCUA, BLOODU, Orysia Lemuel, [address] [place] [address]    Radiology:  FL SMALL BOWEL FOLLOW THROUGH ONLY   Final Result   Mildly dilated small bowel, though small-bowel transit time is upper limits   of normal.         CT ABDOMEN PELVIS W IV CONTRAST Additional Contrast? None   Final Result   Diverticulitis of the descending sigmoid junction. Mild wall thickening but   negative for obstruction. IP CONSULT TO GENERAL SURGERY    Assessment/Plan:    Active Hospital Problems    Diagnosis     Diverticulitis [K57.92]      Priority: Medium    Colonic obstruction ([address] [K56.609]      Priority: Medium    Bowel obstruction ([address] [Z56.346]      Priority: Medium     1. Diverticulitis  2. History of recurrent bowel obstruction  - CT of the abdomen and pelvis showed diverticulitis of the descending and sigmoid colon. Was negative for obstruction  - Small bowel follow-through was completed and showed mildly dilated small bowel with transit time the upper limit of normal.  - Will continue IV hydration and pain management as tolerated  - General surgery consulted and appreciate their recommendations   - Continue IV Zosyn   - Continue to ambulate the patient    - Dulcolax suppository to help stimulate a bowel movement   - Full liquid diet    3. Peripheral artery disease  - Status post vascular bypass of the right lower extremity  - Wounds clean and dry with staples in place  - Needs follow-up with vascular surgery for removal of the staples  - Currently holding home Eliquis in case patient needs the OR  - Continue Lovenox 100 mg twice daily  - Continue 81 mg of aspirin daily and atorvastatin 40 mg daily    4. Gout  - Continue allopurinol 100 mg daily    5. Hypothyroidism  - Continue levothyroxine 200 mcg daily    DVT Prophylaxis: Lovenox  Diet: ADULT DIET;  Full Liquid  Code Status: Full Code  PT/OT Eval Status: Patient is independent    Dispo -1 to 2 days pending bowel movement    Appropriate for A1 Discharge Unit: Wendy Molinas, DO   PGY-2  [facility] and [facility] Medicine Residency

## 2023-02-12 NOTE — PROGRESS NOTES
Pt up ambulating in hallway. Pt states pain is getting worse. Toradol given and Dr. Karyn Baptiste informed. No new orders at this time. Aida Gleason RN

## 2023-02-12 NOTE — PROGRESS NOTES
Pt alert and orientated. Call light within reach. Rates pain 5/10 in abd stabbing, throbbing pain. Pt denies the need for pain medication at this time. Alicia Valdez RN

## 2023-02-12 NOTE — PLAN OF CARE
Problem: ABCDS Injury Assessment  Goal: Absence of physical injury  2/11/2023 2239 by Tim Hou  Outcome: Progressing  Flowsheets (Taken 2/11/2023 2239)  Absence of Physical Injury: Implement safety measures based on patient assessment  2/11/2023 1825 by Ronna Roberto RN  Outcome: Progressing     Problem: Safety - Adult  Goal: Free from fall injury  2/11/2023 2239 by Tim Hou  Outcome: Progressing  Flowsheets (Taken 2/11/2023 2239)  Free From Fall Injury:   Based on caregiver fall risk screen, instruct family/caregiver to ask for assistance with transferring infant if caregiver noted to have fall risk factors   Instruct family/caregiver on patient safety  2/11/2023 1825 by Ronna Roberto RN  Outcome: Progressing     Problem: Pain  Goal: Verbalizes/displays adequate comfort level or baseline comfort level  2/11/2023 2239 by Tim Hou  Outcome: Not Progressing  Flowsheets (Taken 2/11/2023 1945)  Verbalizes/displays adequate comfort level or baseline comfort level:   Encourage patient to monitor pain and request assistance   Assess pain using appropriate pain scale   Administer analgesics based on type and severity of pain and evaluate response   Implement non-pharmacological measures as appropriate and evaluate response   Consider cultural and social influences on pain and pain management   Notify Licensed Independent Practitioner if interventions unsuccessful or patient reports new pain  Note: Pain fluctuates in abd, pain medication mildly effective.    2/11/2023 1825 by Ronna Roberto RN  Outcome: Progressing

## 2023-02-12 NOTE — PROGRESS NOTES
Head to toe completed and documented. Abd is distended and rounded. Tenderness reported in the lower abdomen with increased pain of the LLQ. Pt given PRN pain medication at shift change by off going nurse, pain improved but not by much. Pt states that the pain fluctuates and goes away after some time. Cold pack given for further pain intervention. Bowel sounds are hyperactive. Pt denies needs at this time. Call bell in reach.

## 2023-02-13 ENCOUNTER — APPOINTMENT (OUTPATIENT)
Dept: GENERAL RADIOLOGY | Age: 63
DRG: 329 | End: 2023-02-13
Attending: INTERNAL MEDICINE
Payer: MEDICAID

## 2023-02-13 LAB
A/G RATIO: 1.4 (ref 1.1–2.2)
ALBUMIN SERPL-MCNC: 2.7 G/DL (ref 3.4–5)
ALP BLD-CCNC: 240 U/L (ref 40–129)
ALT SERPL-CCNC: 34 U/L (ref 10–40)
ANION GAP SERPL CALCULATED.3IONS-SCNC: 9 MMOL/L (ref 3–16)
AST SERPL-CCNC: 27 U/L (ref 15–37)
BASOPHILS ABSOLUTE: 0 K/UL (ref 0–0.2)
BASOPHILS RELATIVE PERCENT: 1 %
BILIRUB SERPL-MCNC: 0.3 MG/DL (ref 0–1)
BUN BLDV-MCNC: 17 MG/DL (ref 7–20)
CALCIUM SERPL-MCNC: 8 MG/DL (ref 8.3–10.6)
CHLORIDE BLD-SCNC: 108 MMOL/L (ref 99–110)
CO2: 23 MMOL/L (ref 21–32)
CREAT SERPL-MCNC: 0.8 MG/DL (ref 0.6–1.2)
EOSINOPHILS ABSOLUTE: 0.2 K/UL (ref 0–0.6)
EOSINOPHILS RELATIVE PERCENT: 3.2 %
GFR SERPL CREATININE-BSD FRML MDRD: >60 ML/MIN/{1.73_M2}
GLUCOSE BLD-MCNC: 99 MG/DL (ref 70–99)
HCT VFR BLD CALC: 32.9 % (ref 36–48)
HEMOGLOBIN: 10.6 G/DL (ref 12–16)
LYMPHOCYTES ABSOLUTE: 1.6 K/UL (ref 1–5.1)
LYMPHOCYTES RELATIVE PERCENT: 33.1 %
MCH RBC QN AUTO: 25.9 PG (ref 26–34)
MCHC RBC AUTO-ENTMCNC: 32.2 G/DL (ref 31–36)
MCV RBC AUTO: 80.6 FL (ref 80–100)
MONOCYTES ABSOLUTE: 0.7 K/UL (ref 0–1.3)
MONOCYTES RELATIVE PERCENT: 13.7 %
NEUTROPHILS ABSOLUTE: 2.4 K/UL (ref 1.7–7.7)
NEUTROPHILS RELATIVE PERCENT: 49 %
PDW BLD-RTO: 18.1 % (ref 12.4–15.4)
PLATELET # BLD: 298 K/UL (ref 135–450)
PMV BLD AUTO: 8 FL (ref 5–10.5)
POTASSIUM REFLEX MAGNESIUM: 4.2 MMOL/L (ref 3.5–5.1)
RBC # BLD: 4.08 M/UL (ref 4–5.2)
SODIUM BLD-SCNC: 140 MMOL/L (ref 136–145)
TOTAL PROTEIN: 4.7 G/DL (ref 6.4–8.2)
WBC # BLD: 5 K/UL (ref 4–11)

## 2023-02-13 PROCEDURE — 6360000002 HC RX W HCPCS

## 2023-02-13 PROCEDURE — APPSS30 APP SPLIT SHARED TIME 16-30 MINUTES: Performed by: CLINICAL NURSE SPECIALIST

## 2023-02-13 PROCEDURE — 6370000000 HC RX 637 (ALT 250 FOR IP): Performed by: INTERNAL MEDICINE

## 2023-02-13 PROCEDURE — 1200000000 HC SEMI PRIVATE

## 2023-02-13 PROCEDURE — APPNB45 APP NON BILLABLE 31-45 MINUTES: Performed by: CLINICAL NURSE SPECIALIST

## 2023-02-13 PROCEDURE — 6360000002 HC RX W HCPCS: Performed by: CLINICAL NURSE SPECIALIST

## 2023-02-13 PROCEDURE — 85025 COMPLETE CBC W/AUTO DIFF WBC: CPT

## 2023-02-13 PROCEDURE — 6360000002 HC RX W HCPCS: Performed by: INTERNAL MEDICINE

## 2023-02-13 PROCEDURE — 6370000000 HC RX 637 (ALT 250 FOR IP): Performed by: STUDENT IN AN ORGANIZED HEALTH CARE EDUCATION/TRAINING PROGRAM

## 2023-02-13 PROCEDURE — 80053 COMPREHEN METABOLIC PANEL: CPT

## 2023-02-13 PROCEDURE — 2580000003 HC RX 258

## 2023-02-13 PROCEDURE — 36415 COLL VENOUS BLD VENIPUNCTURE: CPT

## 2023-02-13 PROCEDURE — 2580000003 HC RX 258: Performed by: CLINICAL NURSE SPECIALIST

## 2023-02-13 PROCEDURE — 99232 SBSQ HOSP IP/OBS MODERATE 35: CPT | Performed by: SURGERY

## 2023-02-13 PROCEDURE — 6370000000 HC RX 637 (ALT 250 FOR IP)

## 2023-02-13 PROCEDURE — 74018 RADEX ABDOMEN 1 VIEW: CPT

## 2023-02-13 PROCEDURE — 2580000003 HC RX 258: Performed by: INTERNAL MEDICINE

## 2023-02-13 RX ORDER — POLYETHYLENE GLYCOL 3350 17 G/17G
17 POWDER, FOR SOLUTION ORAL DAILY
Status: DISCONTINUED | OUTPATIENT
Start: 2023-02-13 | End: 2023-02-14

## 2023-02-13 RX ORDER — DOCUSATE SODIUM 100 MG/1
100 CAPSULE, LIQUID FILLED ORAL DAILY
Status: DISCONTINUED | OUTPATIENT
Start: 2023-02-13 | End: 2023-03-03 | Stop reason: HOSPADM

## 2023-02-13 RX ADMIN — SODIUM CHLORIDE: 9 INJECTION, SOLUTION INTRAVENOUS at 10:08

## 2023-02-13 RX ADMIN — KETOROLAC TROMETHAMINE 15 MG: 30 INJECTION, SOLUTION INTRAMUSCULAR; INTRAVENOUS at 20:05

## 2023-02-13 RX ADMIN — ASPIRIN 81 MG 81 MG: 81 TABLET ORAL at 08:08

## 2023-02-13 RX ADMIN — GABAPENTIN 300 MG: 300 CAPSULE ORAL at 20:05

## 2023-02-13 RX ADMIN — POLYETHYLENE GLYCOL 3350 17 G: 17 POWDER, FOR SOLUTION ORAL at 10:05

## 2023-02-13 RX ADMIN — LEVOTHYROXINE SODIUM 200 MCG: 0.1 TABLET ORAL at 06:35

## 2023-02-13 RX ADMIN — ATORVASTATIN CALCIUM 40 MG: 40 TABLET, FILM COATED ORAL at 08:08

## 2023-02-13 RX ADMIN — PIPERACILLIN AND TAZOBACTAM 3375 MG: 3; .375 INJECTION, POWDER, FOR SOLUTION INTRAVENOUS at 10:11

## 2023-02-13 RX ADMIN — GABAPENTIN 300 MG: 300 CAPSULE ORAL at 08:08

## 2023-02-13 RX ADMIN — DOCUSATE SODIUM 100 MG: 100 CAPSULE, LIQUID FILLED ORAL at 10:43

## 2023-02-13 RX ADMIN — ONDANSETRON 4 MG: 2 INJECTION INTRAMUSCULAR; INTRAVENOUS at 13:00

## 2023-02-13 RX ADMIN — ALLOPURINOL 100 MG: 100 TABLET ORAL at 08:08

## 2023-02-13 RX ADMIN — OXYCODONE AND ACETAMINOPHEN 1 TABLET: 5; 325 TABLET ORAL at 13:32

## 2023-02-13 RX ADMIN — ENOXAPARIN SODIUM 100 MG: 100 INJECTION SUBCUTANEOUS at 20:04

## 2023-02-13 RX ADMIN — GABAPENTIN 300 MG: 300 CAPSULE ORAL at 13:32

## 2023-02-13 RX ADMIN — KETOROLAC TROMETHAMINE 15 MG: 30 INJECTION, SOLUTION INTRAMUSCULAR; INTRAVENOUS at 00:17

## 2023-02-13 RX ADMIN — ENOXAPARIN SODIUM 100 MG: 100 INJECTION SUBCUTANEOUS at 08:07

## 2023-02-13 RX ADMIN — SODIUM CHLORIDE, PRESERVATIVE FREE 10 ML: 5 INJECTION INTRAVENOUS at 20:05

## 2023-02-13 RX ADMIN — PIPERACILLIN AND TAZOBACTAM 3375 MG: 3; .375 INJECTION, POWDER, FOR SOLUTION INTRAVENOUS at 02:54

## 2023-02-13 RX ADMIN — PROPRANOLOL HYDROCHLORIDE 40 MG: 40 TABLET ORAL at 20:05

## 2023-02-13 RX ADMIN — PROPRANOLOL HYDROCHLORIDE 40 MG: 40 TABLET ORAL at 08:08

## 2023-02-13 RX ADMIN — MORPHINE SULFATE 2 MG: 2 INJECTION, SOLUTION INTRAMUSCULAR; INTRAVENOUS at 12:26

## 2023-02-13 RX ADMIN — MORPHINE SULFATE 2 MG: 2 INJECTION, SOLUTION INTRAMUSCULAR; INTRAVENOUS at 17:41

## 2023-02-13 RX ADMIN — PIPERACILLIN AND TAZOBACTAM 3375 MG: 3; .375 INJECTION, POWDER, FOR SOLUTION INTRAVENOUS at 17:44

## 2023-02-13 RX ADMIN — KETOROLAC TROMETHAMINE 15 MG: 30 INJECTION, SOLUTION INTRAMUSCULAR; INTRAVENOUS at 08:11

## 2023-02-13 ASSESSMENT — PAIN SCALES - GENERAL
PAINLEVEL_OUTOF10: 7
PAINLEVEL_OUTOF10: 7
PAINLEVEL_OUTOF10: 6
PAINLEVEL_OUTOF10: 9
PAINLEVEL_OUTOF10: 10
PAINLEVEL_OUTOF10: 6
PAINLEVEL_OUTOF10: 10

## 2023-02-13 ASSESSMENT — PAIN DESCRIPTION - LOCATION
LOCATION: ABDOMEN

## 2023-02-13 ASSESSMENT — PAIN DESCRIPTION - ORIENTATION
ORIENTATION: LEFT

## 2023-02-13 ASSESSMENT — PAIN DESCRIPTION - FREQUENCY
FREQUENCY: INTERMITTENT
FREQUENCY: CONTINUOUS
FREQUENCY: INTERMITTENT

## 2023-02-13 ASSESSMENT — PAIN DESCRIPTION - DESCRIPTORS
DESCRIPTORS: CRAMPING
DESCRIPTORS: CRAMPING;THROBBING;SHARP
DESCRIPTORS: CRAMPING
DESCRIPTORS: CRAMPING
DESCRIPTORS: CRAMPING;ACHING
DESCRIPTORS: CRAMPING

## 2023-02-13 ASSESSMENT — PAIN - FUNCTIONAL ASSESSMENT
PAIN_FUNCTIONAL_ASSESSMENT: PREVENTS OR INTERFERES SOME ACTIVE ACTIVITIES AND ADLS

## 2023-02-13 ASSESSMENT — PAIN DESCRIPTION - PAIN TYPE
TYPE: ACUTE PAIN

## 2023-02-13 NOTE — PROGRESS NOTES
Pt a/o. VSS. Shift assessment updated and documented. Pt with complaints of abd pain of 7/10. PRN Toradol given, see mar.

## 2023-02-13 NOTE — PROGRESS NOTES
Spoke with pt's daughter in room, she is very concerned about her mother and would like to speak with Dr. Kenard Krabbe. Will page Dr. Kenard Krabbe at this time.

## 2023-02-13 NOTE — PROGRESS NOTES
Pt arrived tearful and very nauseated. Order for zofran in [facility], consulted primary RN Charlene and Fariba Caballero that pt have this medication at this time. Zofran 4mg IV administered to pt.

## 2023-02-13 NOTE — CONSULTS
Consult placed    Kasandra:jacoby  Date:2/13/2023,  Time:6:04 PM        Electronically signed by Lindsey Garcia on 2/13/2023 at 6:04 PM

## 2023-02-13 NOTE — PLAN OF CARE
Problem: Pain  Goal: Verbalizes/displays adequate comfort level or baseline comfort level  2/13/2023 0816 by Maeve Mariscal RN  Outcome: Progressing  Flowsheets (Taken 2/13/2023 1890)  Verbalizes/displays adequate comfort level or baseline comfort level:   Encourage patient to monitor pain and request assistance   Assess pain using appropriate pain scale   Administer analgesics based on type and severity of pain and evaluate response   Implement non-pharmacological measures as appropriate and evaluate response

## 2023-02-13 NOTE — PROGRESS NOTES
Hospitalist Progress Note      PCP: Orquidea Kiran    Date of Admission: 2/9/2023    Chief Complaint: Abdominal pain and constipation    Hospital Course:     Ms. Graham Miranda is a 63year old female who presented to [facility] with abdominal pain and constipation on 2/9. She had a lower extremity vascular bypass surgery on her right leg a few weeks ago. She reports constipation and abdominal distension following her surgery with ng tube placement as well as a rectal tube. Patient reports her follow-up colonoscopy revealed sigmoid edema with diverticula and an ulcer, she then improved and was discharged. She again developed abdominal pain and distension and has not had a BM since 2/5. She denies trying any stool softeners or motility agents at home. 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 She also has had surgeries for adhesions and ventral hernia repair. Subjective:     Pt states that she continues to have diffuse abdominal pain that is intermittent and positional. She describes it as sharp, throbbing, and stabbing. She states that she has not had a BM in 8 days. She noted mild nausea overnight, but no vomiting. She has been tolerating diet well, but feels that this is causing her to be bloated/distended thus increasing her pain. Pt does endorse hx of diverticulitis within one recent episode several weeks ago after being discharged after surgical intervention for R LE PAD. She states that her last prior episode of diverticulitis before that was about 20 years ago. Spoke to daughter at pt's bedside in PM. Daughter is concerned and feels that pt is declining. Daughter is wanting to know alternative treatment options since pt is in a lot of pain. Pt previously on Trulance at home with moderate relief.          Medications:  Reviewed    Infusion Medications    sodium chloride 75 mL/hr at 02/12/23 2014    sodium chloride Stopped (02/12/23 6627)     Scheduled Medications    polyethylene glycol  17 g Oral Daily    piperacillin-tazobactam  3,375 mg IntraVENous Q8H    sodium chloride flush  5-40 mL IntraVENous 2 times per day    allopurinol  100 mg Oral Daily    atorvastatin  40 mg Oral Daily    aspirin  81 mg Oral Daily    gabapentin  300 mg Oral TID    levothyroxine  200 mcg Oral Daily    propranolol  40 mg Oral BID    enoxaparin  1 mg/kg SubCUTAneous BID     PRN Meds: ketorolac, oxyCODONE-acetaminophen, sodium chloride flush, sodium chloride, ondansetron **OR** ondansetron, acetaminophen **OR** acetaminophen, potassium chloride **OR** potassium alternative oral replacement **OR** potassium chloride, magnesium sulfate, sodium phosphate IVPB **OR** sodium phosphate IVPB **OR** sodium phosphate IVPB, morphine      Intake/Output Summary (Last 24 hours) at 2/13/2023 0919  Last data filed at 2/13/2023 0636  Gross per 24 hour   Intake 2403.86 ml   Output 2100 ml   Net 303.86 ml       Physical Exam Performed:    BP (!) 148/82   Pulse 92   Temp 98.8 °F (37.1 °C) (Oral)   Resp 16   Ht 5' 5\" (1.651 m)   Wt 220 lb 12.8 oz (100.2 kg)   SpO2 92%   BMI 36.74 kg/m²     General appearance: Mild amount of distress, appears stated age and cooperative. HEENT: Conjunctivae/corneas clear. Neck: No jugular venous distention. Respiratory:  Normal respiratory effort. Clear to auscultation, bilaterally without Rales/Wheezes/Rhonchi. Cardiovascular: Regular rate and rhythm without murmurs, rubs or gallops. Abdomen: Soft, diffuse abdominal tenderness, severe distention noted. Bowel sounds slowed  Musculoskeletal: No clubbing, cyanosis or edema bilaterally. Skin: Skin color, texture, turgor normal.  No rashes or lesions. Neurologic:  Neurovascularly intact without any focal sensory/motor deficits.   Psychiatric: Alert and oriented, thought content appropriate, normal insight      Labs:   Recent Labs     02/11/23  0534 02/12/23  0549 02/13/23  0533   WBC 4.9 5.2 5.0   HGB 11.1* 10.4* 10.6*   HCT 33.9* 33.1* 32.9*    302 298     Recent Labs     02/11/23  0534 02/12/23  0549 02/13/23  0533    139 140   K 4.5 4.1 4.2    106 108   CO2 26 25 23   BUN 19 19 17   CREATININE 0.9 0.9 0.8   CALCIUM 8.2* 7.8* 8.0*     Recent Labs     02/11/23  0534 02/12/23  0549 02/13/23  0533   AST 25 65* 27   ALT 35 48* 34   BILITOT 0.3 0.4 0.3   ALKPHOS 193* 291* 240*     No results for input(s): INR in the last 72 hours. No results for input(s): Bernis Alek in the last 72 hours. Urinalysis:    No results found for: Georgeanna Bonds, BACTERIA, RBCUA, BLOODU, [address], [address]    Radiology:  FL SMALL BOWEL FOLLOW THROUGH ONLY   Final Result   Mildly dilated small bowel, though small-bowel transit time is upper limits   of normal.         CT ABDOMEN PELVIS W IV CONTRAST Additional Contrast? None   Final Result   Diverticulitis of the descending sigmoid junction. Mild wall thickening but   negative for obstruction. IP CONSULT TO GENERAL SURGERY    Assessment/Plan:    Active Hospital Problems    Diagnosis     Diverticulitis [K57.92]      Priority: Medium    Colonic obstruction ([address] [K56.609]      Priority: Medium    Bowel obstruction ([address] [K72.962]      Priority: Medium     1. Diverticulitis  2. History of recurrent bowel obstruction  - CT of the abdomen and pelvis on 2/9/23 showed diverticulitis of the descending and sigmoid colon. Was negative for obstruction  - Small bowel follow-through was completed on 2/11/23 and showed mildly dilated small bowel with transit time the upper limit of normal.  - Will continue IV hydration with NS and pain management with Ketorolac, Morphine, and Percocet PRN as tolerated. Already on Gabapentin 300 mg TID. - Switched Glycolax from PRN to scheduled daily given pt has not had BM in 1 week. - Started Colace 100 mg daily today.    - KUB showed presence of barium, so pt was unable to obtain barium enema today per GS.   - Will order repeat Non-contrast CT to r/o abscess today. - GI consulted by GS today. Their advice appreciated. - General surgery consulted and appreciate their recommendations   - Continue IV Zosyn   - Continue to ambulate the patient    - Dulcolax suppository given yesterday without relief   - Full liquid diet    3. Peripheral artery disease  - Status post vascular bypass of the right lower extremity  - Needs follow-up with vascular surgery for removal of the staples  - Currently holding home Eliquis in case patient needs the OR  - Continue Lovenox 100 mg SC twice daily  - Continue 81 mg of aspirin daily, Propanolol 40 mg BID, and atorvastatin 40 mg daily    4. Gout  - Continue allopurinol 100 mg daily    5. Hypothyroidism  - Continue levothyroxine 200 mcg daily    6. Hypocalcemia   - 8.0 today, improved from 7.8 yesterday. - Will continue to monitor, replenish if indicated     7. Hypoalbuminemia   - Relatively unchanged in the last several days  - Will continue to monitor. Renal function and LFTs normal.     8. Normocytic Anemia   - Stable, relatively unchanged since yesterday. No signs of active bleeding.   - Consider transfusion if hemoglobin <7.   - Normal MCV, ddx includes anemia of chronic diease. 9. Elevated Alkaline Phosphatase   - Relatively unchanged since inpatient stay. Improved since yesterday. - Consider ordering GGT and RUQ US if indicated. DVT Prophylaxis: Lovenox  Diet: ADULT DIET;  Full Liquid  Code Status: Full Code  PT/OT Eval Status: Patient is independent    Dispo -1 to 2 days pending bowel movement    Appropriate for A1 Discharge Unit: No      Eri Zendejas DO   PGY-1  [facility]

## 2023-02-13 NOTE — PROGRESS NOTES
[facility]    PATIENT NAME: Myra Hearn     TODAY'S DATE: 2/13/2023    CHIEF COMPLAINT: abd pain    INTERVAL HISTORY/HPI:    Pt reports that she feels about the same today with lower abd pain and still has not had BM     REVIEW OF SYSTEMS:  Pertinent positives and negatives as per interval history section    OBJECTIVE:  VITALS:  BP (!) 148/82   Pulse 92   Temp 98.8 °F (37.1 °C) (Oral)   Resp 16   Ht 5' 5\" (1.651 m)   Wt 220 lb 12.8 oz (100.2 kg)   SpO2 92%   BMI 36.74 kg/m²     INTAKE/OUTPUT:    I/O last 3 completed shifts: In: 2809 [P.O.:1320; I.V.:2209.2; IV Piggyback:163.8]  Out: 2100 [Urine:2100]  No intake/output data recorded. CONSTITUTIONAL:  awake and alert  LUNGS:  Respirations easy and unlabored, no crackles or wheezing  CARD:  regular rate and rhythm  ABDOMEN:  normal bowel sounds, soft, non-distended, tenderness noted left lower abd     Data:  CBC:   Recent Labs     02/11/23  0534 02/12/23  0549 02/13/23  0533   WBC 4.9 5.2 5.0   HGB 11.1* 10.4* 10.6*   HCT 33.9* 33.1* 32.9*    302 298       BMP:    Recent Labs     02/11/23  0534 02/12/23  0549 02/13/23  0533    139 140   K 4.5 4.1 4.2    106 108   CO2 26 25 23   BUN 19 19 17   CREATININE 0.9 0.9 0.8   GLUCOSE 104* 97 99       Hepatic:   Recent Labs     02/11/23  0534 02/12/23  0549 02/13/23  0533   AST 25 65* 27   ALT 35 48* 34   BILITOT 0.3 0.4 0.3   ALKPHOS 193* 291* 240*     RAD:   Hypaque enema today    ASSESSMENT AND PLAN:  Diverticulitis with mild thickening sigmoid colon - no evidence of obstruction. Continue with IV antibiotics, liquids. Will plan on hypaque enema today to further evaluate for possible stricture. Further recommendations to follow based on results. Electronically signed by JARETT Meek CNP     Patient seen and agree with above and more than half of the total time was spent by me on the encounter. Continued lower abd pain and bloating. No fevers or chills. No bms. Given lack of improvement will get contrast enema to see if stricture is present. If so and not improving with present management then would need operation.     Jesus Ibrahim MD

## 2023-02-13 NOTE — PROGRESS NOTES
Received call from radiology. Stated they were unable to do barium enema due to barium still being in large bowel. Also stated that they will be notifying Dr. Freedom Bradley.

## 2023-02-13 NOTE — PLAN OF CARE
Problem: Pain  Goal: Verbalizes/displays adequate comfort level or baseline comfort level  2/12/2023 2359 by Scott Elder  Outcome: Progressing  Flowsheets (Taken 2/11/2023 1945)  Verbalizes/displays adequate comfort level or baseline comfort level:   Encourage patient to monitor pain and request assistance   Assess pain using appropriate pain scale   Administer analgesics based on type and severity of pain and evaluate response   Implement non-pharmacological measures as appropriate and evaluate response   Consider cultural and social influences on pain and pain management   Notify Licensed Independent Practitioner if interventions unsuccessful or patient reports new pain  2/12/2023 2012 by Edmund Roy, LUCIO  Outcome: Progressing     Problem: ABCDS Injury Assessment  Goal: Absence of physical injury  2/12/2023 2359 by Scott Elder  Outcome: Progressing  Flowsheets (Taken 2/11/2023 2239)  Absence of Physical Injury: Implement safety measures based on patient assessment  2/12/2023 2012 by Edmund Roy RN  Outcome: Progressing     Problem: Safety - Adult  Goal: Free from fall injury  2/12/2023 2359 by Scott Elder  Outcome: Progressing  Flowsheets (Taken 2/11/2023 2239)  Free From Fall Injury:   Based on caregiver fall risk screen, instruct family/caregiver to ask for assistance with transferring infant if caregiver noted to have fall risk factors   Instruct family/caregiver on patient safety  2/12/2023 2012 by Edmund Roy RN  Outcome: Progressing

## 2023-02-13 NOTE — CARE COORDINATION
Chart reviewed day 4. Care managed per surgery and IM. Hypaque enema pending, r/o stricture. Following for clinical improvement. If no better may need surgical intervention.  Holli Hernández RN

## 2023-02-13 NOTE — PROGRESS NOTES
Pt's daughter spoke with Dr. Abdoulaye Delarosa via telephone earlier. GI has since been consulted. Daughter now wants to know when GI will be coming. Page sent to GI. Awaiting response.

## 2023-02-13 NOTE — PROGRESS NOTES
Head to toe completed and documented. Bowel sounds hyperactive. Ice given for discomfort, reported to be effective. Pt denies needs at this time. Call bell in reach.

## 2023-02-14 ENCOUNTER — APPOINTMENT (OUTPATIENT)
Dept: CT IMAGING | Age: 63
DRG: 329 | End: 2023-02-14
Attending: INTERNAL MEDICINE
Payer: MEDICAID

## 2023-02-14 ENCOUNTER — ANESTHESIA (OUTPATIENT)
Dept: OPERATING ROOM | Age: 63
End: 2023-02-14
Payer: MEDICAID

## 2023-02-14 ENCOUNTER — ANESTHESIA EVENT (OUTPATIENT)
Dept: OPERATING ROOM | Age: 63
End: 2023-02-14
Payer: MEDICAID

## 2023-02-14 LAB
A/G RATIO: 2 (ref 1.1–2.2)
ALBUMIN SERPL-MCNC: 2.8 G/DL (ref 3.4–5)
ALP BLD-CCNC: 235 U/L (ref 40–129)
ALT SERPL-CCNC: 40 U/L (ref 10–40)
ANION GAP SERPL CALCULATED.3IONS-SCNC: 8 MMOL/L (ref 3–16)
AST SERPL-CCNC: 48 U/L (ref 15–37)
BASOPHILS ABSOLUTE: 0 K/UL (ref 0–0.2)
BASOPHILS RELATIVE PERCENT: 0.5 %
BILIRUB SERPL-MCNC: 0.3 MG/DL (ref 0–1)
BUN BLDV-MCNC: 15 MG/DL (ref 7–20)
CALCIUM SERPL-MCNC: 8.2 MG/DL (ref 8.3–10.6)
CHLORIDE BLD-SCNC: 106 MMOL/L (ref 99–110)
CO2: 23 MMOL/L (ref 21–32)
CREAT SERPL-MCNC: 0.9 MG/DL (ref 0.6–1.2)
EOSINOPHILS ABSOLUTE: 0.2 K/UL (ref 0–0.6)
EOSINOPHILS RELATIVE PERCENT: 3.4 %
GFR SERPL CREATININE-BSD FRML MDRD: >60 ML/MIN/{1.73_M2}
GLUCOSE BLD-MCNC: 103 MG/DL (ref 70–99)
GLUCOSE BLD-MCNC: 109 MG/DL (ref 70–99)
GLUCOSE BLD-MCNC: 111 MG/DL (ref 70–99)
GLUCOSE BLD-MCNC: 94 MG/DL (ref 70–99)
HCT VFR BLD CALC: 31.1 % (ref 36–48)
HEMOGLOBIN: 9.8 G/DL (ref 12–16)
LYMPHOCYTES ABSOLUTE: 1.7 K/UL (ref 1–5.1)
LYMPHOCYTES RELATIVE PERCENT: 35 %
MCH RBC QN AUTO: 25.3 PG (ref 26–34)
MCHC RBC AUTO-ENTMCNC: 31.5 G/DL (ref 31–36)
MCV RBC AUTO: 80.3 FL (ref 80–100)
MONOCYTES ABSOLUTE: 0.7 K/UL (ref 0–1.3)
MONOCYTES RELATIVE PERCENT: 14 %
NEUTROPHILS ABSOLUTE: 2.3 K/UL (ref 1.7–7.7)
NEUTROPHILS RELATIVE PERCENT: 47.1 %
PDW BLD-RTO: 18.1 % (ref 12.4–15.4)
PERFORMED ON: ABNORMAL
PLATELET # BLD: 299 K/UL (ref 135–450)
PMV BLD AUTO: 8.1 FL (ref 5–10.5)
POTASSIUM REFLEX MAGNESIUM: 4.6 MMOL/L (ref 3.5–5.1)
RBC # BLD: 3.87 M/UL (ref 4–5.2)
SODIUM BLD-SCNC: 137 MMOL/L (ref 136–145)
TOTAL PROTEIN: 4.2 G/DL (ref 6.4–8.2)
WBC # BLD: 5 K/UL (ref 4–11)

## 2023-02-14 PROCEDURE — 6360000002 HC RX W HCPCS: Performed by: SURGERY

## 2023-02-14 PROCEDURE — 7100000000 HC PACU RECOVERY - FIRST 15 MIN: Performed by: SURGERY

## 2023-02-14 PROCEDURE — 6360000002 HC RX W HCPCS: Performed by: INTERNAL MEDICINE

## 2023-02-14 PROCEDURE — 0DTG0ZZ RESECTION OF LEFT LARGE INTESTINE, OPEN APPROACH: ICD-10-PCS | Performed by: SURGERY

## 2023-02-14 PROCEDURE — 2580000003 HC RX 258: Performed by: SURGERY

## 2023-02-14 PROCEDURE — 3700000000 HC ANESTHESIA ATTENDED CARE: Performed by: SURGERY

## 2023-02-14 PROCEDURE — 99232 SBSQ HOSP IP/OBS MODERATE 35: CPT | Performed by: SURGERY

## 2023-02-14 PROCEDURE — 3600000014 HC SURGERY LEVEL 4 ADDTL 15MIN: Performed by: SURGERY

## 2023-02-14 PROCEDURE — 3700000001 HC ADD 15 MINUTES (ANESTHESIA): Performed by: SURGERY

## 2023-02-14 PROCEDURE — APPSS45 APP SPLIT SHARED TIME 31-45 MINUTES: Performed by: CLINICAL NURSE SPECIALIST

## 2023-02-14 PROCEDURE — 6370000000 HC RX 637 (ALT 250 FOR IP)

## 2023-02-14 PROCEDURE — 74176 CT ABD & PELVIS W/O CONTRAST: CPT

## 2023-02-14 PROCEDURE — 2580000003 HC RX 258: Performed by: INTERNAL MEDICINE

## 2023-02-14 PROCEDURE — 6360000002 HC RX W HCPCS: Performed by: ANESTHESIOLOGY

## 2023-02-14 PROCEDURE — 2720000010 HC SURG SUPPLY STERILE: Performed by: SURGERY

## 2023-02-14 PROCEDURE — 6370000000 HC RX 637 (ALT 250 FOR IP): Performed by: SURGERY

## 2023-02-14 PROCEDURE — A4217 STERILE WATER/SALINE, 500 ML: HCPCS | Performed by: SURGERY

## 2023-02-14 PROCEDURE — 2500000003 HC RX 250 WO HCPCS: Performed by: NURSE ANESTHETIST, CERTIFIED REGISTERED

## 2023-02-14 PROCEDURE — 6370000000 HC RX 637 (ALT 250 FOR IP): Performed by: INTERNAL MEDICINE

## 2023-02-14 PROCEDURE — 88307 TISSUE EXAM BY PATHOLOGIST: CPT

## 2023-02-14 PROCEDURE — 6360000002 HC RX W HCPCS: Performed by: NURSE ANESTHETIST, CERTIFIED REGISTERED

## 2023-02-14 PROCEDURE — 80053 COMPREHEN METABOLIC PANEL: CPT

## 2023-02-14 PROCEDURE — 2580000003 HC RX 258: Performed by: NURSE ANESTHETIST, CERTIFIED REGISTERED

## 2023-02-14 PROCEDURE — 44143 PARTIAL REMOVAL OF COLON: CPT | Performed by: SURGERY

## 2023-02-14 PROCEDURE — 36415 COLL VENOUS BLD VENIPUNCTURE: CPT

## 2023-02-14 PROCEDURE — 1200000000 HC SEMI PRIVATE

## 2023-02-14 PROCEDURE — 3600000004 HC SURGERY LEVEL 4 BASE: Performed by: SURGERY

## 2023-02-14 PROCEDURE — 6360000002 HC RX W HCPCS: Performed by: CLINICAL NURSE SPECIALIST

## 2023-02-14 PROCEDURE — 7100000001 HC PACU RECOVERY - ADDTL 15 MIN: Performed by: SURGERY

## 2023-02-14 PROCEDURE — 2709999900 HC NON-CHARGEABLE SUPPLY: Performed by: SURGERY

## 2023-02-14 PROCEDURE — 6370000000 HC RX 637 (ALT 250 FOR IP): Performed by: STUDENT IN AN ORGANIZED HEALTH CARE EDUCATION/TRAINING PROGRAM

## 2023-02-14 PROCEDURE — 85025 COMPLETE CBC W/AUTO DIFF WBC: CPT

## 2023-02-14 PROCEDURE — 6360000002 HC RX W HCPCS

## 2023-02-14 PROCEDURE — 2580000003 HC RX 258: Performed by: CLINICAL NURSE SPECIALIST

## 2023-02-14 RX ORDER — INSULIN LISPRO 100 [IU]/ML
0-8 INJECTION, SOLUTION INTRAVENOUS; SUBCUTANEOUS EVERY 4 HOURS
Status: DISCONTINUED | OUTPATIENT
Start: 2023-02-14 | End: 2023-02-22

## 2023-02-14 RX ORDER — SODIUM CHLORIDE 0.9 % (FLUSH) 0.9 %
5-40 SYRINGE (ML) INJECTION EVERY 12 HOURS SCHEDULED
Status: DISCONTINUED | OUTPATIENT
Start: 2023-02-14 | End: 2023-02-14 | Stop reason: HOSPADM

## 2023-02-14 RX ORDER — MEPERIDINE HYDROCHLORIDE 50 MG/ML
12.5 INJECTION INTRAMUSCULAR; INTRAVENOUS; SUBCUTANEOUS AS NEEDED
Status: DISCONTINUED | OUTPATIENT
Start: 2023-02-14 | End: 2023-02-14 | Stop reason: HOSPADM

## 2023-02-14 RX ORDER — NALOXONE HYDROCHLORIDE 0.4 MG/ML
INJECTION, SOLUTION INTRAMUSCULAR; INTRAVENOUS; SUBCUTANEOUS PRN
Status: DISCONTINUED | OUTPATIENT
Start: 2023-02-14 | End: 2023-02-20

## 2023-02-14 RX ORDER — SODIUM CHLORIDE 9 MG/ML
INJECTION, SOLUTION INTRAVENOUS PRN
Status: DISCONTINUED | OUTPATIENT
Start: 2023-02-14 | End: 2023-02-14 | Stop reason: HOSPADM

## 2023-02-14 RX ORDER — SODIUM CHLORIDE 9 MG/ML
INJECTION, SOLUTION INTRAVENOUS CONTINUOUS
Status: DISCONTINUED | OUTPATIENT
Start: 2023-02-14 | End: 2023-02-16

## 2023-02-14 RX ORDER — ONDANSETRON 2 MG/ML
INJECTION INTRAMUSCULAR; INTRAVENOUS PRN
Status: DISCONTINUED | OUTPATIENT
Start: 2023-02-14 | End: 2023-02-14 | Stop reason: SDUPTHER

## 2023-02-14 RX ORDER — ONDANSETRON 2 MG/ML
4 INJECTION INTRAMUSCULAR; INTRAVENOUS
Status: COMPLETED | OUTPATIENT
Start: 2023-02-14 | End: 2023-02-14

## 2023-02-14 RX ORDER — PHENYLEPHRINE HCL IN 0.9% NACL 1 MG/10 ML
SYRINGE (ML) INTRAVENOUS PRN
Status: DISCONTINUED | OUTPATIENT
Start: 2023-02-14 | End: 2023-02-14 | Stop reason: SDUPTHER

## 2023-02-14 RX ORDER — SODIUM CHLORIDE, SODIUM LACTATE, POTASSIUM CHLORIDE, CALCIUM CHLORIDE 600; 310; 30; 20 MG/100ML; MG/100ML; MG/100ML; MG/100ML
INJECTION, SOLUTION INTRAVENOUS CONTINUOUS PRN
Status: DISCONTINUED | OUTPATIENT
Start: 2023-02-14 | End: 2023-02-14 | Stop reason: SDUPTHER

## 2023-02-14 RX ORDER — HYDRALAZINE HYDROCHLORIDE 20 MG/ML
10 INJECTION INTRAMUSCULAR; INTRAVENOUS
Status: DISCONTINUED | OUTPATIENT
Start: 2023-02-14 | End: 2023-02-14 | Stop reason: HOSPADM

## 2023-02-14 RX ORDER — CALCIUM GLUCONATE 94 MG/ML
INJECTION, SOLUTION INTRAVENOUS PRN
Status: DISCONTINUED | OUTPATIENT
Start: 2023-02-14 | End: 2023-02-14 | Stop reason: SDUPTHER

## 2023-02-14 RX ORDER — DIPHENHYDRAMINE HYDROCHLORIDE 50 MG/ML
12.5 INJECTION INTRAMUSCULAR; INTRAVENOUS
Status: DISCONTINUED | OUTPATIENT
Start: 2023-02-14 | End: 2023-02-14 | Stop reason: HOSPADM

## 2023-02-14 RX ORDER — DEXTROSE MONOHYDRATE 100 MG/ML
INJECTION, SOLUTION INTRAVENOUS CONTINUOUS PRN
Status: DISCONTINUED | OUTPATIENT
Start: 2023-02-14 | End: 2023-03-03 | Stop reason: HOSPADM

## 2023-02-14 RX ORDER — MAGNESIUM HYDROXIDE 1200 MG/15ML
LIQUID ORAL CONTINUOUS PRN
Status: COMPLETED | OUTPATIENT
Start: 2023-02-14 | End: 2023-02-14

## 2023-02-14 RX ORDER — DEXAMETHASONE SODIUM PHOSPHATE 4 MG/ML
INJECTION, SOLUTION INTRA-ARTICULAR; INTRALESIONAL; INTRAMUSCULAR; INTRAVENOUS; SOFT TISSUE PRN
Status: DISCONTINUED | OUTPATIENT
Start: 2023-02-14 | End: 2023-02-14 | Stop reason: SDUPTHER

## 2023-02-14 RX ORDER — LIDOCAINE HYDROCHLORIDE 20 MG/ML
INJECTION, SOLUTION INFILTRATION; PERINEURAL PRN
Status: DISCONTINUED | OUTPATIENT
Start: 2023-02-14 | End: 2023-02-14 | Stop reason: SDUPTHER

## 2023-02-14 RX ORDER — ROCURONIUM BROMIDE 10 MG/ML
INJECTION, SOLUTION INTRAVENOUS PRN
Status: DISCONTINUED | OUTPATIENT
Start: 2023-02-14 | End: 2023-02-14 | Stop reason: SDUPTHER

## 2023-02-14 RX ORDER — SUCCINYLCHOLINE CHLORIDE 20 MG/ML
INJECTION INTRAMUSCULAR; INTRAVENOUS PRN
Status: DISCONTINUED | OUTPATIENT
Start: 2023-02-14 | End: 2023-02-14 | Stop reason: SDUPTHER

## 2023-02-14 RX ORDER — MIDAZOLAM HYDROCHLORIDE 1 MG/ML
INJECTION INTRAMUSCULAR; INTRAVENOUS PRN
Status: DISCONTINUED | OUTPATIENT
Start: 2023-02-14 | End: 2023-02-14 | Stop reason: SDUPTHER

## 2023-02-14 RX ORDER — FENTANYL CITRATE 50 UG/ML
INJECTION, SOLUTION INTRAMUSCULAR; INTRAVENOUS PRN
Status: DISCONTINUED | OUTPATIENT
Start: 2023-02-14 | End: 2023-02-14 | Stop reason: SDUPTHER

## 2023-02-14 RX ORDER — PROPOFOL 10 MG/ML
INJECTION, EMULSION INTRAVENOUS PRN
Status: DISCONTINUED | OUTPATIENT
Start: 2023-02-14 | End: 2023-02-14 | Stop reason: SDUPTHER

## 2023-02-14 RX ORDER — SODIUM CHLORIDE 0.9 % (FLUSH) 0.9 %
5-40 SYRINGE (ML) INJECTION PRN
Status: DISCONTINUED | OUTPATIENT
Start: 2023-02-14 | End: 2023-02-14 | Stop reason: HOSPADM

## 2023-02-14 RX ORDER — KETAMINE HCL IN NACL, ISO-OSM 100MG/10ML
SYRINGE (ML) INJECTION PRN
Status: DISCONTINUED | OUTPATIENT
Start: 2023-02-14 | End: 2023-02-14 | Stop reason: SDUPTHER

## 2023-02-14 RX ORDER — PROCHLORPERAZINE EDISYLATE 5 MG/ML
5 INJECTION INTRAMUSCULAR; INTRAVENOUS
Status: DISCONTINUED | OUTPATIENT
Start: 2023-02-14 | End: 2023-02-14 | Stop reason: HOSPADM

## 2023-02-14 RX ADMIN — Medication 200 MCG: at 15:50

## 2023-02-14 RX ADMIN — Medication 200 MCG: at 16:00

## 2023-02-14 RX ADMIN — PROPOFOL 150 MG: 10 INJECTION, EMULSION INTRAVENOUS at 15:42

## 2023-02-14 RX ADMIN — PROPRANOLOL HYDROCHLORIDE 40 MG: 40 TABLET ORAL at 09:12

## 2023-02-14 RX ADMIN — SUCCINYLCHOLINE CHLORIDE 160 MG: 20 INJECTION, SOLUTION INTRAMUSCULAR; INTRAVENOUS at 15:42

## 2023-02-14 RX ADMIN — PIPERACILLIN AND TAZOBACTAM 3375 MG: 3; .375 INJECTION, POWDER, FOR SOLUTION INTRAVENOUS at 01:03

## 2023-02-14 RX ADMIN — GABAPENTIN 300 MG: 300 CAPSULE ORAL at 09:11

## 2023-02-14 RX ADMIN — ALLOPURINOL 100 MG: 100 TABLET ORAL at 09:10

## 2023-02-14 RX ADMIN — SODIUM CHLORIDE, PRESERVATIVE FREE 10 ML: 5 INJECTION INTRAVENOUS at 09:12

## 2023-02-14 RX ADMIN — CALCIUM GLUCONATE 1 G: 98 INJECTION, SOLUTION INTRAVENOUS at 15:42

## 2023-02-14 RX ADMIN — ATORVASTATIN CALCIUM 40 MG: 40 TABLET, FILM COATED ORAL at 09:11

## 2023-02-14 RX ADMIN — HYDROMORPHONE HYDROCHLORIDE 0.5 MG: 1 INJECTION, SOLUTION INTRAMUSCULAR; INTRAVENOUS; SUBCUTANEOUS at 18:25

## 2023-02-14 RX ADMIN — DOCUSATE SODIUM 100 MG: 100 CAPSULE, LIQUID FILLED ORAL at 09:11

## 2023-02-14 RX ADMIN — SODIUM CHLORIDE, SODIUM LACTATE, POTASSIUM CHLORIDE, AND CALCIUM CHLORIDE: .6; .31; .03; .02 INJECTION, SOLUTION INTRAVENOUS at 15:34

## 2023-02-14 RX ADMIN — SODIUM CHLORIDE: 9 INJECTION, SOLUTION INTRAVENOUS at 18:48

## 2023-02-14 RX ADMIN — Medication 100 MCG: at 16:36

## 2023-02-14 RX ADMIN — HYDROMORPHONE HYDROCHLORIDE 0.5 MG: 1 INJECTION, SOLUTION INTRAMUSCULAR; INTRAVENOUS; SUBCUTANEOUS at 22:31

## 2023-02-14 RX ADMIN — PIPERACILLIN AND TAZOBACTAM 3375 MG: 3; .375 INJECTION, POWDER, FOR SOLUTION INTRAVENOUS at 09:10

## 2023-02-14 RX ADMIN — ENOXAPARIN SODIUM 100 MG: 100 INJECTION SUBCUTANEOUS at 09:10

## 2023-02-14 RX ADMIN — SUGAMMADEX 200 MG: 100 INJECTION, SOLUTION INTRAVENOUS at 17:21

## 2023-02-14 RX ADMIN — ASPIRIN 81 MG 81 MG: 81 TABLET ORAL at 09:11

## 2023-02-14 RX ADMIN — ROCURONIUM BROMIDE 50 MG: 10 SOLUTION INTRAVENOUS at 15:54

## 2023-02-14 RX ADMIN — ONDANSETRON 4 MG: 2 INJECTION INTRAMUSCULAR; INTRAVENOUS at 18:26

## 2023-02-14 RX ADMIN — FENTANYL CITRATE 50 MCG: 50 INJECTION INTRAMUSCULAR; INTRAVENOUS at 15:34

## 2023-02-14 RX ADMIN — MORPHINE SULFATE 2 MG: 2 INJECTION, SOLUTION INTRAMUSCULAR; INTRAVENOUS at 11:51

## 2023-02-14 RX ADMIN — HYDROMORPHONE HYDROCHLORIDE 0.5 MG: 1 INJECTION, SOLUTION INTRAMUSCULAR; INTRAVENOUS; SUBCUTANEOUS at 18:15

## 2023-02-14 RX ADMIN — LEVOTHYROXINE SODIUM 200 MCG: 0.1 TABLET ORAL at 07:07

## 2023-02-14 RX ADMIN — ONDANSETRON 4 MG: 2 INJECTION INTRAMUSCULAR; INTRAVENOUS at 00:34

## 2023-02-14 RX ADMIN — POLYETHYLENE GLYCOL 3350 17 G: 17 POWDER, FOR SOLUTION ORAL at 09:11

## 2023-02-14 RX ADMIN — ONDANSETRON 4 MG: 2 INJECTION INTRAMUSCULAR; INTRAVENOUS at 20:34

## 2023-02-14 RX ADMIN — MIDAZOLAM HYDROCHLORIDE 2 MG: 2 INJECTION, SOLUTION INTRAMUSCULAR; INTRAVENOUS at 15:34

## 2023-02-14 RX ADMIN — LIDOCAINE HYDROCHLORIDE 100 MG: 20 INJECTION, SOLUTION INFILTRATION; PERINEURAL at 15:42

## 2023-02-14 RX ADMIN — ENOXAPARIN SODIUM 100 MG: 100 INJECTION SUBCUTANEOUS at 20:52

## 2023-02-14 RX ADMIN — DEXAMETHASONE SODIUM PHOSPHATE 8 MG: 4 INJECTION, SOLUTION INTRAMUSCULAR; INTRAVENOUS at 15:42

## 2023-02-14 RX ADMIN — Medication: at 18:36

## 2023-02-14 RX ADMIN — SODIUM CHLORIDE, PRESERVATIVE FREE 10 ML: 5 INJECTION INTRAVENOUS at 20:52

## 2023-02-14 RX ADMIN — FENTANYL CITRATE 50 MCG: 50 INJECTION INTRAMUSCULAR; INTRAVENOUS at 17:24

## 2023-02-14 RX ADMIN — ONDANSETRON 4 MG: 2 INJECTION INTRAMUSCULAR; INTRAVENOUS at 15:42

## 2023-02-14 RX ADMIN — GABAPENTIN 300 MG: 300 CAPSULE ORAL at 20:52

## 2023-02-14 RX ADMIN — OXYCODONE AND ACETAMINOPHEN 1 TABLET: 5; 325 TABLET ORAL at 00:34

## 2023-02-14 RX ADMIN — PROPRANOLOL HYDROCHLORIDE 40 MG: 40 TABLET ORAL at 20:52

## 2023-02-14 RX ADMIN — ONDANSETRON 4 MG: 2 INJECTION INTRAMUSCULAR; INTRAVENOUS at 11:17

## 2023-02-14 RX ADMIN — Medication 200 MCG: at 16:03

## 2023-02-14 RX ADMIN — Medication 30 MG: at 15:59

## 2023-02-14 ASSESSMENT — PAIN DESCRIPTION - LOCATION
LOCATION: ABDOMEN

## 2023-02-14 ASSESSMENT — PAIN SCALES - GENERAL
PAINLEVEL_OUTOF10: 5
PAINLEVEL_OUTOF10: 8
PAINLEVEL_OUTOF10: 10
PAINLEVEL_OUTOF10: 7
PAINLEVEL_OUTOF10: 6
PAINLEVEL_OUTOF10: 6
PAINLEVEL_OUTOF10: 10
PAINLEVEL_OUTOF10: 6

## 2023-02-14 ASSESSMENT — PAIN DESCRIPTION - FREQUENCY
FREQUENCY: CONTINUOUS

## 2023-02-14 ASSESSMENT — PAIN DESCRIPTION - ORIENTATION
ORIENTATION: LEFT;LOWER;UPPER
ORIENTATION: LEFT;LOWER
ORIENTATION: LEFT;LOWER
ORIENTATION: LOWER;LEFT

## 2023-02-14 ASSESSMENT — PAIN DESCRIPTION - PAIN TYPE
TYPE: ACUTE PAIN

## 2023-02-14 ASSESSMENT — LIFESTYLE VARIABLES: SMOKING_STATUS: 0

## 2023-02-14 ASSESSMENT — PAIN DESCRIPTION - DESCRIPTORS
DESCRIPTORS: ACHING

## 2023-02-14 NOTE — CARE COORDINATION
Chart reviewed day 5. Care managed per surgery and IM. Consult pending for GI, re: sigmoid stricture; input on possible decompressive sigmoidoscopy. Currently on full liquid diet and IVATBX. From home with sons family living with her. IPTA. Following for needs.

## 2023-02-14 NOTE — PLAN OF CARE
Problem: Pain  Goal: Verbalizes/displays adequate comfort level or baseline comfort level  2/14/2023 1057 by Katherine Nava RN  Outcome: Progressing  Flowsheets (Taken 2/14/2023 1057)  Verbalizes/displays adequate comfort level or baseline comfort level:   Encourage patient to monitor pain and request assistance   Administer analgesics based on type and severity of pain and evaluate response   Assess pain using appropriate pain scale  2/14/2023 0048 by Kevin Dominguez RN  Outcome: Progressing  Flowsheets (Taken 2/14/2023 0048)  Verbalizes/displays adequate comfort level or baseline comfort level:   Encourage patient to monitor pain and request assistance   Administer analgesics based on type and severity of pain and evaluate response   Assess pain using appropriate pain scale   Implement non-pharmacological measures as appropriate and evaluate response     Problem: ABCDS Injury Assessment  Goal: Absence of physical injury  Outcome: Progressing

## 2023-02-14 NOTE — OP NOTE
Date of Surgery: 2/14/23    Preop Dx:  Sigmoid Stricture    Postop Dx:  Same    Procedure:  Haylie's Procedure    Surgeon:  Abdoulaye Delarosa    Assistant:      Anesthesia:  GETA    EBL:   50ml    Specimen:  Sigmoid colon    Complications:  none    Drains/Lines:  none    Indications:  62 yo with stricture of sigmoid colon with no improvement with conservative measures    Description:       Patient was given adequate description of the risks and rewards of the procedure, including bleeding, infection, injury to surrounding structures, need for further operations and freely consented. She was given appropriate antibiotics and brought to the OR where GETA anesthesia was induced. She was placed in supine position. Prepped and draped in usual sterile fashion. Incision was made from symphysis pubis to just above the umbilicus in the midline. The incision was carried down through the dermis, subcutaneous fat, and linea alba using electrocautery. Preperitoneal fat was incised using electrocautery. Peritoneum was grasped with pickups. Small incision was made in the peritoneal cavity. The preperitoneal fat and peritoneum were then incised along the length of the incision. The right colon, transverse colon, and descending colon were dilated and inflamed to the level of the sigmoid stricture that was adherent to the left pelvis. A Bookwalter retractor was then placed on the operating room table and used for retraction throughout the procedure. Attention was then turned to the sigmoid colon. The lateral attachments of the colon were freed up using electrocautery along the white line of Toldt. Left ureter was identified in the left retroperitoneal space and not injured. The adhesions to the pelvis sidewall were taken down with cautery. Given the proximal inflammation and distention the decision was made to do a diverting colostomy.  A purse string of 2-0 silk suture was placed at distal descending colon and used to decompress the proximal colon somewhat. This was tied and later used to paige the proximal edge of the specimen. At the distal descending colon just proximal to this purse string the mesentery was scored with electrocautery and using a blue load on the TAYLOR stapler, the bowel was transected. The mesentery and sigmoid vessels were controlled with enseal device to a point distal to the stricture. At the level of the pelvic inlet this distal resection margin was chosen. At this level the contour stapling device was placed and fired. The sigmoid colon was then sent to pathology. The abdomen was then lavaged with normal saline. The rectal stump was marked with prolene sutures at each end. An appropriate location above the level of the umbilicus in the left abdomen was chosen for creation of the colostomy. It was situated over the center of the left rectus muscle. The descending colon was freed from its lateral attachments and reached without tension, thus not requiring mobilization of the splenic flexure. The skin at the colostomy site was grasped with a kocher and a quarter sized incision made. This was carried down to rectus fascia using electrocautery and a cruciate incision made in the fascia with electrocautery. The rectus was split using retractors and peritoneum incised with electrocautery. This allowed for passage of three fingers. The descending colon was then grasped with a ines clamp and brought through the abdominal wall in a tension free, nontwisting manner. The midline incision was then closed at the fascial level with looped 0-0 PDS suture x 2. Staples were used to close the epidermis. This wound was covered with a sterile towel. The descending colon then had its staple line removed with electrocautery. The colostomy was then created in a brooked fashion using interrupted 3-0 vicryl suture. An ostomy device was then placed. Sterile dressing placed.   All suture, sponge and instrument count correct times two at end of case. Transferred to PACU in stable condition.     Randall Piña MD

## 2023-02-14 NOTE — PROGRESS NOTES
PCA initiated in PACU with Sherry Whittington RN and writer. Writer reviewed how to use with patient and patient demonstrated understanding. Denies additional questions or needs at this time.

## 2023-02-14 NOTE — ANESTHESIA PRE PROCEDURE
Department of Anesthesiology  Preprocedure Note       Name:  Davon Dunbar   Age:  63 y.o.  :  1960                                          MRN:  [id number]         Date:  2023      Surgeon: Grayson Murcia):  Jack Shah MD    Procedure: Procedure(s):  BOWEL RESECTION SIGMOID COLECTOMY POSSIBLE COLOSTOMY    Medications prior to admission:   Prior to Admission medications    Medication Sig Start Date End Date Taking? Authorizing Provider   apixaban (ELIQUIS) 5 MG TABS tablet Take 5 mg by mouth 2 times daily Patient unsure of dose. \"Thinks\" it is 5mg   Yes Historical Provider, MD   Levothyroxine Sodium 200 MCG CAPS Take by mouth daily Unsure of dose   Yes Historical Provider, MD   propranolol (INDERAL) 40 MG tablet Take 40 mg by mouth 2 times daily   Yes Historical Provider, MD   gabapentin (NEURONTIN) 300 MG capsule Take 300 mg by mouth 3 times daily. Yes Historical Provider, MD   oxyCODONE-acetaminophen (PERCOCET)  MG per tablet Take 1 tablet by mouth every 6 hours as needed for Pain.    Yes Historical Provider, MD   atorvastatin (LIPITOR) 40 MG tablet Take 40 mg by mouth daily   Yes Historical Provider, MD   cyclobenzaprine (FLEXERIL) 10 MG tablet Take 10 mg by mouth 3 times daily   Yes Historical Provider, MD   Plecanatide (TRULANCE) 3 MG TABS Take 3 mg by mouth daily   Yes Historical Provider, MD   allopurinol (ZYLOPRIM) 100 MG tablet Take 100 mg by mouth daily   Yes Historical Provider, MD   liothyronine (CYTOMEL) 5 MCG tablet Take 5 mcg by mouth daily   Yes Historical Provider, MD   aspirin 81 MG chewable tablet Take 81 mg by mouth daily   Yes Historical Provider, MD       Current medications:    Current Facility-Administered Medications   Medication Dose Route Frequency Provider Last Rate Last Admin    polyethylene glycol (GLYCOLAX) packet 17 g  17 g Oral Daily [place],    17 g at 23 0911    docusate sodium (COLACE) capsule 100 mg  100 mg Oral Daily Bindu Dobson DO   100 mg at 02/14/23 0911    piperacillin-tazobactam (ZOSYN) 3,375 mg in sodium chloride 0.9 % 50 mL IVPB (mini-bag)  3,375 mg IntraVENous Q8H JARETT Mendoza - CNP   Stopped at 02/14/23 1313    oxyCODONE-acetaminophen (PERCOCET) 5-325 MG per tablet 1 tablet  1 tablet Oral Q4H PRN Jim Menendez DO   1 tablet at 02/14/23 0034    sodium chloride flush 0.9 % injection 5-40 mL  5-40 mL IntraVENous 2 times per day Chelsy Jackson MD   10 mL at 02/14/23 0912    sodium chloride flush 0.9 % injection 5-40 mL  5-40 mL IntraVENous PRN Jn Mcmahon MD        0.9 % sodium chloride infusion   IntraVENous PRN Chelsy Jackson MD   Stopped at 02/12/23 1857    ondansetron (ZOFRAN-ODT) disintegrating tablet 4 mg  4 mg Oral Q8H PRN Jn Mcmahon MD   4 mg at 02/12/23 2343    Or    ondansetron (ZOFRAN) injection 4 mg  4 mg IntraVENous Q6H PRN Jn Mcmahon MD   4 mg at 02/14/23 1117    acetaminophen (TYLENOL) tablet 650 mg  650 mg Oral Q6H PRN Jn Mcmahon MD        Or    acetaminophen (TYLENOL) suppository 650 mg  650 mg Rectal Q6H PRN Chelsy Jackson MD        potassium chloride (KLOR-CON M) extended release tablet 40 mEq  40 mEq Oral PRN Chelsy Jackson MD        Or    potassium bicarb-citric acid (EFFER-K) effervescent tablet 40 mEq  40 mEq Oral PRN Chelsy Jackson MD        Or    potassium chloride 10 mEq/100 mL IVPB (Peripheral Line)  10 mEq IntraVENous PRN Chelsy Jackson MD        magnesium sulfate 2000 mg in 50 mL IVPB premix  2,000 mg IntraVENous PRN Chelsy Jackson MD        sodium phosphate 10 mmol in sodium chloride 0.9 % 250 mL IVPB  10 mmol IntraVENous PRN Jn Mcmahon MD        Or    sodium phosphate 15 mmol in sodium chloride 0.9 % 250 mL IVPB  15 mmol IntraVENous PRN Jn Mcmahon MD        Or    sodium phosphate 20 mmol in sodium chloride 0.9 % 500 mL IVPB  20 mmol IntraVENous PRN Jn Mcmahon MD        allopurinol (ZYLOPRIM) tablet 100 mg  100 mg Oral Daily Jn Mcmahon MD   100 mg at 02/14/23 0906    atorvastatin (LIPITOR) tablet 40 mg  40 mg Oral Daily Jn Mcmahon MD   40 mg at 23 0911    aspirin chewable tablet 81 mg  81 mg Oral Daily Jn Mcmahon MD   81 mg at 23 0911    gabapentin (NEURONTIN) capsule 300 mg  300 mg Oral TID Jn Mcmahon MD   300 mg at 23 0911    levothyroxine (SYNTHROID) tablet 200 mcg  200 mcg Oral Daily Jn Mcmahon MD   200 mcg at 23 0707    propranolol (INDERAL) tablet 40 mg  40 mg Oral BID Jn Mcmahon MD   40 mg at 23 0912    enoxaparin (LOVENOX) injection 100 mg  1 mg/kg SubCUTAneous BID Jn Mcmahon MD   100 mg at 23 0910    morphine (PF) injection 2 mg  2 mg IntraVENous Q4H PRN Jn Mcmahon MD   2 mg at 23 1151       Allergies:  No Known Allergies    Problem List:    Patient Active Problem List   Diagnosis Code    Colonic obstruction ([facility] [address] K56.609    Bowel obstruction ([facility] [address] K56.609    Diverticulitis K57.92       Past Medical History:  History reviewed. No pertinent past medical history. Past Surgical History:  History reviewed. No pertinent surgical history.     Social History:    Social History     Tobacco Use    Smoking status: Former     Packs/day: 1.00     Years: 34.00     Pack years: 34.00     Types: Cigarettes     Start date: 1977     Quit date: 2021     Years since quittin.1    Smokeless tobacco: Current   Substance Use Topics    Alcohol use: Never                                Ready to quit: Not Answered  Counseling given: Not Answered      Vital Signs (Current):   Vitals:    23 0034 23 0815 23 1130 23 1413   BP: 129/86 133/81 134/79 127/82   Pulse: 77 86 84 83   Resp: 17 16 16 18   Temp: 98.1 °F (36.7 °C) 99 °F (37.2 °C) 98.5 °F (36.9 °C) 98.9 °F (37.2 °C)   TempSrc: Oral Oral Oral Temporal   SpO2: 95% 98% 96% 95%   Weight:       Height:                                                  BP Readings from Last 3 Encounters:   23 127/82       NPO Status: Time of last liquid consumption: 0900                                                 Date of last liquid consumption: 02/14/23                        Date of last solid food consumption: 02/12/23    BMI:   Wt Readings from Last 3 Encounters:   02/09/23 220 lb 12.8 oz (100.2 kg)     Body mass index is 36.74 kg/m². CBC:   Lab Results   Component Value Date/Time    WBC 5.0 02/14/2023 05:42 AM    RBC 3.87 02/14/2023 05:42 AM    HGB 9.8 02/14/2023 05:42 AM    HCT 31.1 02/14/2023 05:42 AM    MCV 80.3 02/14/2023 05:42 AM    RDW 18.1 02/14/2023 05:42 AM     02/14/2023 05:42 AM       CMP:   Lab Results   Component Value Date/Time     02/14/2023 05:42 AM    K 4.6 02/14/2023 05:42 AM     02/14/2023 05:42 AM    CO2 23 02/14/2023 05:42 AM    BUN 15 02/14/2023 05:42 AM    CREATININE 0.9 02/14/2023 05:42 AM    AGRATIO 2.0 02/14/2023 05:42 AM    LABGLOM >60 02/14/2023 05:42 AM    GLUCOSE 94 02/14/2023 05:42 AM    PROT 4.2 02/14/2023 05:42 AM    CALCIUM 8.2 02/14/2023 05:42 AM    BILITOT 0.3 02/14/2023 05:42 AM    ALKPHOS 235 02/14/2023 05:42 AM    AST 48 02/14/2023 05:42 AM    ALT 40 02/14/2023 05:42 AM       POC Tests: No results for input(s): POCGLU, POCNA, POCK, POCCL, POCBUN, POCHEMO, POCHCT in the last 72 hours.     Coags:   Lab Results   Component Value Date/Time    PROTIME 15.9 02/09/2023 01:53 PM    INR 1.27 02/09/2023 01:53 PM    APTT 33.1 02/09/2023 01:53 PM       HCG (If Applicable): No results found for: PREGTESTUR, PREGSERUM, HCG, HCGQUANT     ABGs: No results found for: PHART, PO2ART, WQU0LJY, GMF9NDT, BEART, Q4YBMPOM     Type & Screen (If Applicable):  No results found for: LABABO, LABRH    Drug/Infectious Status (If Applicable):  No results found for: HIV, HEPCAB    COVID-19 Screening (If Applicable): No results found for: COVID19        Anesthesia Evaluation  Patient summary reviewed and Nursing notes reviewed no history of anesthetic complications:   Airway: Mallampati: II  TM distance: >3 FB   Neck ROM: full  Mouth opening: > = 3 FB   Dental:    (+) upper dentures and lower dentures      Pulmonary: breath sounds clear to auscultation      (-) not a current smoker (QUIT 12/2021    34 PK YRS )                           Cardiovascular:  Exercise tolerance: good (>4 METS),       (-) past MI    NYHA Classification: II    Rhythm: regular  Rate: normal           Beta Blocker:  Dose within 24 Hrs      ROS comment:        Neuro/Psych:               GI/Hepatic/Renal:        (-) GERD      ROS comment: DIVERTICULITIS . Endo/Other: Negative Endo/Other ROS                    Abdominal:   (+) obese,           Vascular:   + DVT (ON ELIQUIS    ), .       Other Findings:           Anesthesia Plan      general     ASA 3 - emergent       Induction: intravenous. MIPS: Prophylactic antiemetics administered. Anesthetic plan and risks discussed with patient. Use of blood products discussed with patient whom. Plan discussed with CRNA.     Attending anesthesiologist reviewed and agrees with Preprocedure content                Nani Wiseman DO   2/14/2023

## 2023-02-14 NOTE — PROGRESS NOTES
Hospitalist Progress Note      PCP: Carmel Corbett    Date of Admission: 2/9/2023    Chief Complaint: Abdominal pain and constipation    Hospital Course:     Ms. Avni Guidry is a 63year old female who presented to [facility] from [facility] with abdominal pain and constipation on 2/9. She had a lower extremity vascular bypass surgery on her right leg a few weeks ago. She reports constipation and abdominal distension following her surgery with ng tube placement as well as a rectal tube. Patient reports her follow-up colonoscopy revealed sigmoid edema with diverticula and an ulcer, she then improved and was discharged. She again developed abdominal pain and distension and has not had a BM since 2/5. She denies trying any stool softeners or motility agents at home. 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 She also has had surgeries for adhesions and ventral hernia repair. Subjective:     Patient seen at bedside this AM. Continues to endorse positional pain however is comfortable at bedside. She states that since yesterday, she has continued to not have a BM yet. Per her recent surgical intervention to the R LE, she states that staples were removed several days ago. She states that she has been up and ambulating throughout the day but is limited by abdominal pain. She continues to endorse distention and diffuse abdominal pain that is worse in the RUQ and LUQ.        Medications:  Reviewed    Infusion Medications    sodium chloride Stopped (02/12/23 1857)     Scheduled Medications    polyethylene glycol  17 g Oral Daily    docusate sodium  100 mg Oral Daily    piperacillin-tazobactam  3,375 mg IntraVENous Q8H    sodium chloride flush  5-40 mL IntraVENous 2 times per day    allopurinol  100 mg Oral Daily    atorvastatin  40 mg Oral Daily    aspirin  81 mg Oral Daily    gabapentin  300 mg Oral TID    levothyroxine  200 mcg Oral Daily propranolol  40 mg Oral BID    enoxaparin  1 mg/kg SubCUTAneous BID     PRN Meds: ketorolac, oxyCODONE-acetaminophen, sodium chloride flush, sodium chloride, ondansetron **OR** ondansetron, acetaminophen **OR** acetaminophen, potassium chloride **OR** potassium alternative oral replacement **OR** potassium chloride, magnesium sulfate, sodium phosphate IVPB **OR** sodium phosphate IVPB **OR** sodium phosphate IVPB, morphine      Intake/Output Summary (Last 24 hours) at 2/14/2023 0738  Last data filed at 2/13/2023 1448  Gross per 24 hour   Intake 80 ml   Output --   Net 80 ml       Physical Exam Performed:    /86   Pulse 77   Temp 98.1 °F (36.7 °C) (Oral)   Resp 17   Ht 5' 5\" (1.651 m)   Wt 220 lb 12.8 oz (100.2 kg)   SpO2 95%   BMI 36.74 kg/m²     General appearance: Mild amount of distress, appears stated age and cooperative. HEENT: Conjunctivae/corneas clear. Neck: No jugular venous distention. Respiratory:  Normal respiratory effort. Clear to auscultation, bilaterally without Rales/Wheezes/Rhonchi. Cardiovascular: Regular rate and rhythm without murmurs, rubs or gallops. Abdomen: Soft, diffuse abdominal tenderness, severe distention noted. Bowel sounds slowed  Musculoskeletal: No clubbing, cyanosis or edema bilaterally. Skin: Skin color, texture, turgor normal.  No rashes or lesions. Neurologic:  Neurovascularly intact without any focal sensory/motor deficits.   Psychiatric: Alert and oriented, thought content appropriate, normal insight      Labs:   Recent Labs     02/12/23  0549 02/13/23  0533 02/14/23  0542   WBC 5.2 5.0 5.0   HGB 10.4* 10.6* 9.8*   HCT 33.1* 32.9* 31.1*    298 299     Recent Labs     02/12/23  0549 02/13/23  0533 02/14/23  0542    140 137   K 4.1 4.2 4.6    108 106   CO2 25 23 23   BUN 19 17 15   CREATININE 0.9 0.8 0.9   CALCIUM 7.8* 8.0* 8.2*     Recent Labs     02/12/23  0549 02/13/23  0533 02/14/23  0542   AST 65* 27 48*   ALT 48* 34 40   BILITOT 0.4 0.3 0.3   ALKPHOS 291* 240* 235*     No results for input(s): INR in the last 72 hours. No results for input(s): Bouchra Ludwig in the last 72 hours. Urinalysis:    No results found for: Dock Battiest, BACTERIA, RBCUA, BLOODU, SPECGRAV, [address]    Radiology:  CT ABDOMEN PELVIS WO CONTRAST Additional Contrast? None   Final Result   1. Barium contrast from prior small-bowel follow-through is seen throughout   the colon and limits evaluation due to streak artifact. No definite abscess   is identified. 2.  Infrarenal abdominal aortic aneurysm measures up to 3.0 cm. See   recommendations for follow-up below. 3.  There is a 1.1 cm right lower lobe pulmonary nodule and a 0.3 cm left   lower lobe pulmonary nodule. Recommend dedicated CT chest for further   evaluation. RECOMMENDATIONS:      Multiple. Worst: 11 mm solid      Pathology: Multiple pulmonary nodules. Most severe: 11 mm solid pulmonary   nodule detected on incomplete chest CT. Recommend prompt non-contrast Chest CT for further evaluation. These guidelines do not apply to immunocompromised patients and patients with   cancer. Follow up in patients with significant comorbidities as clinically   warranted. For lung cancer screening, adhere to Lung-RADS guidelines. Reference: Radiology. 2017; 284(1):228-43         3 cm AAA      Pathology: 3 cm infrarenal abdominal aortic aneurysm. Recommend follow-up every 3 years. Reference: J [facility] [place] Radiol 7465;79:815-385. XR ABDOMEN (KUB) (SINGLE AP VIEW)   Preliminary Result   Cancellation of scheduled hypaque enema due to presence of barium throughout   the colon including the rectosigmoid region as described above.          FL SMALL BOWEL FOLLOW THROUGH ONLY   Final Result   Mildly dilated small bowel, though small-bowel transit time is upper limits   of normal.         CT ABDOMEN PELVIS W IV CONTRAST Additional Contrast? None   Final Result   Diverticulitis of the descending sigmoid junction. Mild wall thickening but   negative for obstruction. IP CONSULT TO GENERAL SURGERY  IP CONSULT TO GI    Assessment/Plan:    Active Hospital Problems    Diagnosis     Diverticulitis [K57.92]      Priority: Medium    Colonic obstruction ([address] [K56.609]      Priority: Medium    Bowel obstruction ([address] [K56.609]      Priority: Medium     1. Diverticulitis  2. History of recurrent bowel obstruction  - CT of the abdomen and pelvis on 2/9/23 showed diverticulitis of the descending and sigmoid colon. Was negative for obstruction  - Small bowel follow-through was completed on 2/11/23 and showed mildly dilated small bowel with transit time the upper limit of normal.  -- Repeat of the CT Abdomen/Pelvis on 2/13/23 showed \"No evidence of bowel obstruction. No definite abscess in the region of the previously described proximal sigmoid diverticulitis, although extensive streak artifact limits evaluation. \" \"Infrarenal AAA measures up to 3.0 cm. \" \"There is a 1.1 cm right lower lobe pulmonary nodule and a 0.3 cm left lower lobe pulmonary nodule. \"  -- Consider ordering Chest CT after any surgical intervention. Pt already aware of lung nodule and AAA for the last several years. - Will continue IV hydration with NS and pain management with Ketorolac, Morphine, and Percocet PRN as tolerated. Already on Gabapentin 300 mg TID.   - Continue Glycolax and Colace 100 mg daily.   - Recent KUB showed presence of barium, so pt was unable to obtain barium enema per GS.   - GI consulted by GS yesterday for possible decompressive sigmoidoscopy vs surgical intervention today. Their advice appreciated. - General surgery consulted and appreciate their recommendations   - Continue IV Zosyn and IVF   - Continue to ambulate the patient    - Full liquid diet    3. Peripheral artery disease  - Status post vascular bypass of the right lower extremity  - Staples removed.  Wound site dry and clear without signs of infection. - Currently holding home Eliquis in case patient needs the OR  - Continue Lovenox 100 mg SC twice daily  - Continue 81 mg of aspirin daily, Propanolol 40 mg BID, and atorvastatin 40 mg daily    4. Gout  - Continue allopurinol 100 mg daily    5. Hypothyroidism  - Continue levothyroxine 200 mcg daily    6. Hypocalcemia   - 8.2 today, improved from 8.0 yesterday. - Will continue to monitor, replenish if indicated     7. Hypoalbuminemia   - Relatively unchanged in the last several days  - Will continue to monitor. Renal function and LFTs normal.     8. Normocytic Anemia   - Worsened since yesterday, likely secondary to decreased PO intake. No signs of active bleeding.   - Consider transfusion if hemoglobin <7.   - Normal MCV, ddx includes anemia of chronic diease. 9. Elevated Alkaline Phosphatase   - Slightly improved, but relatively unchanged since yesterday. - Consider ordering GGT and RUQ US if indicated. DVT Prophylaxis: Lovenox  Diet: ADULT DIET;  Full Liquid  Code Status: Full Code  PT/OT Eval Status: Patient is independent    Dispo -1 to 2 days pending bowel movement    Appropriate for A1 Discharge Unit: Wendy Guerin DO   PGY-1  [facility] Medicine Residency

## 2023-02-14 NOTE — ADDENDUM NOTE
Addendum  created 02/14/23 1805 by Jasmin Doyle,     Order list changed, Order sets accessed, Pharmacy for encounter modified

## 2023-02-14 NOTE — PROGRESS NOTES
[facility] SURGERY    PATIENT NAME: Hakan Riddle     TODAY'S DATE: 2/14/2023    CHIEF COMPLAINT: abd pain    INTERVAL HISTORY/HPI:    Pt reports that she has increased abdominal discomfort today - still without BMs. REVIEW OF SYSTEMS:  Pertinent positives and negatives as per interval history section    OBJECTIVE:  VITALS:  /79   Pulse 84   Temp 98.5 °F (36.9 °C) (Oral)   Resp 16   Ht 5' 5\" (1.651 m)   Wt 220 lb 12.8 oz (100.2 kg)   SpO2 96%   BMI 36.74 kg/m²     INTAKE/OUTPUT:    I/O last 3 completed shifts: In: 1176.6 [P.O.:380; I.V.:781.4; IV Piggyback:15.2]  Out: -   No intake/output data recorded. CONSTITUTIONAL:  awake and alert  LUNGS:  Respirations easy and unlabored, no crackles or wheezing  CARD:  regular rate and rhythm  ABDOMEN:  normal bowel sounds, soft, non-distended, tenderness noted     Data:  CBC:   Recent Labs     02/12/23  0549 02/13/23  0533 02/14/23  0542   WBC 5.2 5.0 5.0   HGB 10.4* 10.6* 9.8*   HCT 33.1* 32.9* 31.1*    298 299       BMP:    Recent Labs     02/12/23  0549 02/13/23  0533 02/14/23  0542    140 137   K 4.1 4.2 4.6    108 106   CO2 25 23 23   BUN 19 17 15   CREATININE 0.9 0.8 0.9   GLUCOSE 97 99 94       Hepatic:   Recent Labs     02/12/23  0549 02/13/23  0533 02/14/23  0542   AST 65* 27 48*   ALT 48* 34 40   BILITOT 0.4 0.3 0.3   ALKPHOS 291* 240* 235*     RAD:   EXAMINATION:   ONE SUPINE XRAY VIEW(S) OF THE ABDOMEN     2/13/2023 12:36 pm     COMPARISON:   CT abdomen/pelvis 02/09/2023. HISTORY:   ORDERING SYSTEM PROVIDED HISTORY: sigmoid stricture, incomplete barium enema,   per Dr. Simran Duran   TECHNOLOGIST PROVIDED HISTORY:   Please use water soluble contrast   Reason for exam:->sigmoid stricture, incomplete barium enema, per Dr. Simran Duran   Reason for Exam: sigmoid stricture, incomplete attempt at barium enema, per   Dr. Alvarado Section:   Patient was brought to the department for hypaque enema.    film of the   abdomen demonstrates moderate distention of the colon by air and barium   associated with recent small bowel follow-through performed on 02/11/2023. Contrast material is identified in the rectosigmoid region. Presence of the   barium precludes performing the hypaque enema as the area of concern in the   sigmoid region may be very difficult to visualize. Surgical clips are   identified in the right upper quadrant related to prior cholecystectomy. Lap   band hardware is visualized. Impression:     Cancellation of scheduled hypaque enema due to presence of barium throughout   the colon including the rectosigmoid region as described above. ASSESSMENT AND PLAN:  Diverticulitis with mild thickening sigmoid colon - worsening symptoms. Unable to complete hypaque    Continue with IV antibiotics, liquids. GI consulted yesterday for their opinion regarding scope/possible decompression. She will likely require surgical intervention - likely sooner than later given ongoing symptoms    Did review records yesterday from [facility] in [place] - have been scanned in media tab. Electronically signed by JARETT Chacon - CNP     Surgery Staff    I have examined this patient, and read and agree with the note by Xiomy Soler CNP from today; more than half of the total time was spent by me on the encounter. Patient remains very uncomfortable, distended, obstipated. During prior admission last month w/ similar symptoms she had a decompressive sigmoidoscopy which was successful in resolving her acute distention, discomfort. Awaiting input from GI here as to whether another decompression would be helpful. Regardless, she will need surgical intervention to definitively resolve her obstruction in her sigmoid, but if she is decompressed successfully then we shouldn't have to rush to OR (as in, today). Will discuss w/ GI. Discussed these above issues with patient and family.   They appear to understand, ask appropriate questions, and agree with the plan.     Zahra Quezada MD

## 2023-02-14 NOTE — PROGRESS NOTES
Patient arrived in PACU at this time and placed on monitor. Report received from [address]. Will continue to monitor.  2 L/min O2 via nasal cannula.

## 2023-02-14 NOTE — CONSULTS
Consultation Note    Patient Name: Tesfaye Franco  : 1960  Age: 63 y.o. Admitting Physician: Jorja Duverney, MD   Date of Admission: 2023  9:19 AM   Primary Care Physician: Faizan Franco is being seen at the request of Jorja Duverney, MD for bowel obstruction. History of Present Illness:  63year old F with PMH significant for PUD, cancer of labia majora, hypertension, peripheral artery disease, and hypothyroidism. Abdominal surgeries include  section, cholecystectomy, gastrectomy, gastric banding, diagnostic lap with lysis of adhesions, and colon resection as a child. Patient presented to outside hospital with lower abdominal pain and obstipation. She was admitted with similar complaints a few weeks prior (-). Prior to this she was admitted on  for vascular surgery due to extensive DVT s/p above the knee popliteal to below the knee popliteal bypass. At that time she had a decompressive colonoscopy and was found to have an ulceration and stricture in the sigmoid colon. Denies personal or family history of IBD or GI malignancies. GI History:  2023 Colonoscopy  Findings:  Diverticula  Sigmoid obstruction of unknown etiology  Colonic ulceration (pathology: reactive colonic mucosa with ulceration, negative for CMV, dysplasia, or malignancy)    Past Medical History:  No past medical history on file. Past Surgical History:  No past surgical history on file. Historical Medications:  Prior to Visit Medications    Medication Sig Taking? Authorizing Provider   apixaban (ELIQUIS) 5 MG TABS tablet Take 5 mg by mouth 2 times daily Patient unsure of dose.  \"Thinks\" it is 5mg Yes Historical Provider, MD   Levothyroxine Sodium 200 MCG CAPS Take by mouth daily Unsure of dose Yes Historical Provider, MD   propranolol (INDERAL) 40 MG tablet Take 40 mg by mouth 2 times daily Yes Historical Provider, MD   gabapentin (NEURONTIN) 300 MG capsule Take 300 mg by mouth 3 times daily. Yes Historical Provider, MD   oxyCODONE-acetaminophen (PERCOCET)  MG per tablet Take 1 tablet by mouth every 6 hours as needed for Pain.  Yes Historical Provider, MD   atorvastatin (LIPITOR) 40 MG tablet Take 40 mg by mouth daily Yes Historical Provider, MD   cyclobenzaprine (FLEXERIL) 10 MG tablet Take 10 mg by mouth 3 times daily Yes Historical Provider, MD   Plecanatide (TRULANCE) 3 MG TABS Take 3 mg by mouth daily Yes Historical Provider, MD   allopurinol (ZYLOPRIM) 100 MG tablet Take 100 mg by mouth daily Yes Historical Provider, MD   liothyronine (CYTOMEL) 5 MCG tablet Take 5 mcg by mouth daily Yes Historical Provider, MD   aspirin 81 MG chewable tablet Take 81 mg by mouth daily Yes Historical Provider, MD        Hospital Medications:  Current Facility-Administered Medications: polyethylene glycol (GLYCOLAX) packet 17 g, 17 g, Oral, Daily  docusate sodium (COLACE) capsule 100 mg, 100 mg, Oral, Daily  piperacillin-tazobactam (ZOSYN) 3,375 mg in sodium chloride 0.9 % 50 mL IVPB (mini-bag), 3,375 mg, IntraVENous, Q8H  ketorolac (TORADOL) injection 15 mg, 15 mg, IntraVENous, Q6H PRN  oxyCODONE-acetaminophen (PERCOCET) 5-325 MG per tablet 1 tablet, 1 tablet, Oral, Q4H PRN  sodium chloride flush 0.9 % injection 5-40 mL, 5-40 mL, IntraVENous, 2 times per day  sodium chloride flush 0.9 % injection 5-40 mL, 5-40 mL, IntraVENous, PRN  0.9 % sodium chloride infusion, , IntraVENous, PRN  ondansetron (ZOFRAN-ODT) disintegrating tablet 4 mg, 4 mg, Oral, Q8H PRN **OR** ondansetron (ZOFRAN) injection 4 mg, 4 mg, IntraVENous, Q6H PRN  acetaminophen (TYLENOL) tablet 650 mg, 650 mg, Oral, Q6H PRN **OR** acetaminophen (TYLENOL) suppository 650 mg, 650 mg, Rectal, Q6H PRN  potassium chloride (KLOR-CON M) extended release tablet 40 mEq, 40 mEq, Oral, PRN **OR** potassium bicarb-citric acid (EFFER-K) effervescent tablet 40 mEq, 40 mEq, Oral, PRN **OR** potassium chloride 10 mEq/100 mL IVPB (Peripheral Line), 10 mEq, IntraVENous, PRN  magnesium sulfate 2000 mg in 50 mL IVPB premix, 2,000 mg, IntraVENous, PRN  sodium phosphate 10 mmol in sodium chloride 0.9 % 250 mL IVPB, 10 mmol, IntraVENous, PRN **OR** sodium phosphate 15 mmol in sodium chloride 0.9 % 250 mL IVPB, 15 mmol, IntraVENous, PRN **OR** sodium phosphate 20 mmol in sodium chloride 0.9 % 500 mL IVPB, 20 mmol, IntraVENous, PRN  allopurinol (ZYLOPRIM) tablet 100 mg, 100 mg, Oral, Daily  atorvastatin (LIPITOR) tablet 40 mg, 40 mg, Oral, Daily  aspirin chewable tablet 81 mg, 81 mg, Oral, Daily  gabapentin (NEURONTIN) capsule 300 mg, 300 mg, Oral, TID  levothyroxine (SYNTHROID) tablet 200 mcg, 200 mcg, Oral, Daily  propranolol (INDERAL) tablet 40 mg, 40 mg, Oral, BID  enoxaparin (LOVENOX) injection 100 mg, 1 mg/kg, SubCUTAneous, BID  morphine (PF) injection 2 mg, 2 mg, IntraVENous, Q4H PRN     Social History:   Social History       Tobacco History       Smoking Status  Former Smoking Start Date  2/1/1977 Quit Date  12/9/2021 Smoking Frequency  1 pack/day for 34.00 years (34.00 pk-yrs)    Smoking Tobacco Type  Cigarettes from 2/1/1977 to 12/9/2021      Smokeless Tobacco Use  Current              Alcohol History       Alcohol Use Status  Never              Drug Use       Drug Use Status  Never              Sexual Activity       Sexually Active  Not Asked                     Family History:  No family history on file.      Allergies:  No Known Allergies     ROS:   General: No fever or weight change  Hematologic: No unexpected submucosal bleeding or bruising  HEENT: No sore throat or facial pain  Respiratory: No cough or dyspnea  Cardiovascular: No angina or dependent edema  Gastrointestinal: See HPI  Musculoskeletal: No usual joint pain or stiffness  Skin: No skin eruptions or changing lesions  Neurologic: No focal weakness or numbness  Psychiatric: No anxiety or sleep disturbance    Physical Exam:  Vital Signs:   Vitals:    02/14/23 0815   BP: 133/81   Pulse: 86   Resp: 16   Temp: 99 °F (37.2 °C)   SpO2: 98%       General: Well-nourished, well-developed  HEENT: Sclera anicteric, mucosal membranes moist  Cardiovascular: Regular rate and rhythm. No murmurs. Respiratory: Respirations nonlabored, no crepitus  GI: Abdomen distended and diffusely tender. Rectal: Deferred  Musculoskeletal: No pitting edema of the lower legs. Neurological: Gross memory appears intact. Patient is alert and oriented      Recent Imaging:   XR ABDOMEN (KUB) (SINGLE AP VIEW)  Narrative: EXAMINATION:  ONE SUPINE XRAY VIEW(S) OF THE ABDOMEN    2/13/2023 12:36 pm    COMPARISON:  CT abdomen/pelvis 02/09/2023. HISTORY:  ORDERING SYSTEM PROVIDED HISTORY: sigmoid stricture, incomplete barium enema,  per Dr. Emmanuel Mcmanus  TECHNOLOGIST PROVIDED HISTORY:  Please use water soluble contrast  Reason for exam:->sigmoid stricture, incomplete barium enema, per Dr. Emmanuel Mcmanus  Reason for Exam: sigmoid stricture, incomplete attempt at barium enema, per  Dr. Gwendolyn Delgado:  Patient was brought to the department for hypaque enema.  film of the  abdomen demonstrates moderate distention of the colon by air and barium  associated with recent small bowel follow-through performed on 02/11/2023. Contrast material is identified in the rectosigmoid region. Presence of the  barium precludes performing the hypaque enema as the area of concern in the  sigmoid region may be very difficult to visualize. Surgical clips are  identified in the right upper quadrant related to prior cholecystectomy. Lap  band hardware is visualized. Impression: Cancellation of scheduled hypaque enema due to presence of barium throughout  the colon including the rectosigmoid region as described above.   CT ABDOMEN PELVIS WO CONTRAST Additional Contrast? None  Narrative: EXAMINATION:  CT OF THE ABDOMEN AND PELVIS WITHOUT CONTRAST 2/14/2023 2:19 am    TECHNIQUE:  CT of the abdomen and pelvis was performed without the administration of  intravenous contrast. Multiplanar reformatted images are provided for review. Automated exposure control, iterative reconstruction, and/or weight based  adjustment of the mA/kV was utilized to reduce the radiation dose to as low  as reasonably achievable. COMPARISON:  02/09/2023 CT abdomen/pelvis    HISTORY:  ORDERING SYSTEM PROVIDED HISTORY: Constipation, abdominal distention. R/o  abscess. TECHNOLOGIST PROVIDED HISTORY:  Reason for exam:->Constipation, abdominal distention. R/o abscess. Additional Contrast?->None  Reason for Exam: abdominal distention, constipation  Relevant Medical/Surgical History: barium swallow this past Saturday    FINDINGS:  Lower Chest: There is a 1.1 cm right lower lobe pulmonary nodule and a 0.3 cm  left lower lobe pulmonary nodule (series 2, image 12). The heart is normal  in size. No pericardial effusion. Organs: The liver is unremarkable. Prominent intrahepatic and extrahepatic  bile ducts, likely representing reservoir effect in the setting of prior  cholecystectomy. The spleen, pancreas, and adrenal glands are unremarkable. 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 No hydronephrosis  or renal calculi. GI/Bowel: Gastric band in place. No evidence of bowel obstruction. Previously administered barium contrast is seen throughout the colon. No  definite abscess in the region of the previously described proximal sigmoid  diverticulitis, although extensive streak artifact limits evaluation. Pelvis: The bladder is unremarkable. Prior hysterectomy. Peritoneum/Retroperitoneum: No retroperitoneal, mesenteric,, or pelvic  lymphadenopathy. No free fluid or free intraperitoneal gas. Infrarenal  abdominal aortic aneurysm measuring up to 3.0 cm. Bones/Soft Tissues: No acute osseous or soft tissue abnormality. Redemonstrated deformity of the left iliac bone. Mild degenerative changes  of the spine.   Prior L5 laminectomy. Impression: 1. Barium contrast from prior small-bowel follow-through is seen throughout  the colon and limits evaluation due to streak artifact. No definite abscess  is identified. 2.  Infrarenal abdominal aortic aneurysm measures up to 3.0 cm. See  recommendations for follow-up below. 3.  There is a 1.1 cm right lower lobe pulmonary nodule and a 0.3 cm left  lower lobe pulmonary nodule. Recommend dedicated CT chest for further  evaluation. RECOMMENDATIONS:    Multiple. Worst: 11 mm solid    Pathology: Multiple pulmonary nodules. Most severe: 11 mm solid pulmonary  nodule detected on incomplete chest CT. Recommend prompt non-contrast Chest CT for further evaluation. These guidelines do not apply to immunocompromised patients and patients with  cancer. Follow up in patients with significant comorbidities as clinically  warranted. For lung cancer screening, adhere to Lung-RADS guidelines. Reference: Radiology. 2017; 284(1):228-43    3 cm AAA    Pathology: 3 cm infrarenal abdominal aortic aneurysm. Recommend follow-up every 3 years. Reference: J Am [place] Radiol 2328;41:294-621. Labs:   Recent Labs     02/12/23  0549 02/13/23  0533 02/14/23  0542   HGB 10.4* 10.6* 9.8*   WBC 5.2 5.0 5.0   PROT 4.8* 4.7* 4.2*   LABALBU 2.8* 2.7* 2.8*   ALKPHOS 291* 240* 235*   ALT 48* 34 40   AST 65* 27 48*   BILITOT 0.4 0.3 0.3        Assessment:    63year old F with PMH significant for PUD, cancer of labia majora, hypertension, peripheral artery disease, and hypothyroidism. Presents with lower abdominal pain, distention, and obstipation. Admitted at OSH with similar complaints 1/24. Decompressive colonoscopy at that time showed sigmoid obstruction and ulceration. She initially improved after decompression, but symptoms have now recurred. CT A/P barium contrast from prior SBFT is seen throughout the colon and limits evaluation.    SBFT with mildly dilated small bowel, though small bowel transit times is upper limits of normal  CT A/P on admission shows diverticulitis of the descending sigmoid junction. Mild wall thickening but negative for obstruction. Given recurrent symptoms after her last decompressive colonoscopy and ?diverticulitis on CT would not think repeat colonoscopy would be beneficial at this time. This was discussed with patient and family at the bedside, verbalized understanding. Also discussed with Dr. Che Wilburn this afternoon. Plan:  Continue to keep NPO  General surgery planning for OR today  Supportive care       JARETT Hills - SKIP    [facility]    [phone].  Also available via [employer]

## 2023-02-14 NOTE — PROGRESS NOTES
Pt A&Ox4. VSS. Shift assessment completed. Some gas reported, but no BM yet. Does not want pain medication as of now. Call light within reach, bed in lowest position, wheels locked.

## 2023-02-14 NOTE — PLAN OF CARE
Problem: Pain  Goal: Verbalizes/displays adequate comfort level or baseline comfort level  Outcome: Progressing  Flowsheets (Taken 2/14/2023 0048)  Verbalizes/displays adequate comfort level or baseline comfort level:   Encourage patient to monitor pain and request assistance   Administer analgesics based on type and severity of pain and evaluate response   Assess pain using appropriate pain scale   Implement non-pharmacological measures as appropriate and evaluate response

## 2023-02-15 LAB
A/G RATIO: 1.3 (ref 1.1–2.2)
ABO/RH: NORMAL
ALBUMIN SERPL-MCNC: 2.6 G/DL (ref 3.4–5)
ALP BLD-CCNC: 194 U/L (ref 40–129)
ALT SERPL-CCNC: 43 U/L (ref 10–40)
ANION GAP SERPL CALCULATED.3IONS-SCNC: 13 MMOL/L (ref 3–16)
ANION GAP SERPL CALCULATED.3IONS-SCNC: 9 MMOL/L (ref 3–16)
ANTIBODY SCREEN: NORMAL
AST SERPL-CCNC: 50 U/L (ref 15–37)
BASOPHILS ABSOLUTE: 0 K/UL (ref 0–0.2)
BASOPHILS RELATIVE PERCENT: 0.2 %
BILIRUB SERPL-MCNC: 0.3 MG/DL (ref 0–1)
BUN BLDV-MCNC: 12 MG/DL (ref 7–20)
BUN BLDV-MCNC: 21 MG/DL (ref 7–20)
CALCIUM SERPL-MCNC: 7.7 MG/DL (ref 8.3–10.6)
CALCIUM SERPL-MCNC: 7.9 MG/DL (ref 8.3–10.6)
CHLORIDE BLD-SCNC: 104 MMOL/L (ref 99–110)
CHLORIDE BLD-SCNC: 98 MMOL/L (ref 99–110)
CO2: 23 MMOL/L (ref 21–32)
CO2: 27 MMOL/L (ref 21–32)
CREAT SERPL-MCNC: 0.9 MG/DL (ref 0.6–1.2)
CREAT SERPL-MCNC: 2.2 MG/DL (ref 0.6–1.2)
EOSINOPHILS ABSOLUTE: 0 K/UL (ref 0–0.6)
EOSINOPHILS RELATIVE PERCENT: 0 %
GFR SERPL CREATININE-BSD FRML MDRD: 25 ML/MIN/{1.73_M2}
GFR SERPL CREATININE-BSD FRML MDRD: >60 ML/MIN/{1.73_M2}
GLUCOSE BLD-MCNC: 126 MG/DL (ref 70–99)
GLUCOSE BLD-MCNC: 129 MG/DL (ref 70–99)
GLUCOSE BLD-MCNC: 132 MG/DL (ref 70–99)
GLUCOSE BLD-MCNC: 132 MG/DL (ref 70–99)
GLUCOSE BLD-MCNC: 133 MG/DL (ref 70–99)
GLUCOSE BLD-MCNC: 145 MG/DL (ref 70–99)
GLUCOSE BLD-MCNC: 158 MG/DL (ref 70–99)
GLUCOSE BLD-MCNC: 166 MG/DL (ref 70–99)
HCT VFR BLD CALC: 24.2 % (ref 36–48)
HCT VFR BLD CALC: 29.8 % (ref 36–48)
HEMOGLOBIN: 7.4 G/DL (ref 12–16)
HEMOGLOBIN: 9.7 G/DL (ref 12–16)
LACTIC ACID: 1.6 MMOL/L (ref 0.4–2)
LYMPHOCYTES ABSOLUTE: 1.5 K/UL (ref 1–5.1)
LYMPHOCYTES RELATIVE PERCENT: 16.2 %
MCH RBC QN AUTO: 26.2 PG (ref 26–34)
MCHC RBC AUTO-ENTMCNC: 32.6 G/DL (ref 31–36)
MCV RBC AUTO: 80.3 FL (ref 80–100)
MONOCYTES ABSOLUTE: 0.8 K/UL (ref 0–1.3)
MONOCYTES RELATIVE PERCENT: 9.1 %
NEUTROPHILS ABSOLUTE: 6.7 K/UL (ref 1.7–7.7)
NEUTROPHILS RELATIVE PERCENT: 74.5 %
PDW BLD-RTO: 18.3 % (ref 12.4–15.4)
PERFORMED ON: ABNORMAL
PLATELET # BLD: 325 K/UL (ref 135–450)
PMV BLD AUTO: 7.6 FL (ref 5–10.5)
POTASSIUM REFLEX MAGNESIUM: 4.2 MMOL/L (ref 3.5–5.1)
POTASSIUM REFLEX MAGNESIUM: 4.6 MMOL/L (ref 3.5–5.1)
PROCALCITONIN: 0.44 NG/ML (ref 0–0.15)
RBC # BLD: 3.71 M/UL (ref 4–5.2)
SODIUM BLD-SCNC: 134 MMOL/L (ref 136–145)
SODIUM BLD-SCNC: 140 MMOL/L (ref 136–145)
TOTAL PROTEIN: 4.6 G/DL (ref 6.4–8.2)
WBC # BLD: 9.1 K/UL (ref 4–11)

## 2023-02-15 PROCEDURE — 2700000000 HC OXYGEN THERAPY PER DAY

## 2023-02-15 PROCEDURE — 86901 BLOOD TYPING SEROLOGIC RH(D): CPT

## 2023-02-15 PROCEDURE — 87040 BLOOD CULTURE FOR BACTERIA: CPT

## 2023-02-15 PROCEDURE — 36415 COLL VENOUS BLD VENIPUNCTURE: CPT

## 2023-02-15 PROCEDURE — 6360000002 HC RX W HCPCS: Performed by: SURGERY

## 2023-02-15 PROCEDURE — 86850 RBC ANTIBODY SCREEN: CPT

## 2023-02-15 PROCEDURE — 6370000000 HC RX 637 (ALT 250 FOR IP): Performed by: SURGERY

## 2023-02-15 PROCEDURE — P9045 ALBUMIN (HUMAN), 5%, 250 ML: HCPCS | Performed by: NURSE PRACTITIONER

## 2023-02-15 PROCEDURE — 85014 HEMATOCRIT: CPT

## 2023-02-15 PROCEDURE — 99024 POSTOP FOLLOW-UP VISIT: CPT | Performed by: SURGERY

## 2023-02-15 PROCEDURE — APPSS30 APP SPLIT SHARED TIME 16-30 MINUTES: Performed by: CLINICAL NURSE SPECIALIST

## 2023-02-15 PROCEDURE — 94761 N-INVAS EAR/PLS OXIMETRY MLT: CPT

## 2023-02-15 PROCEDURE — 2580000003 HC RX 258: Performed by: CLINICAL NURSE SPECIALIST

## 2023-02-15 PROCEDURE — 1200000000 HC SEMI PRIVATE

## 2023-02-15 PROCEDURE — 84145 PROCALCITONIN (PCT): CPT

## 2023-02-15 PROCEDURE — 83605 ASSAY OF LACTIC ACID: CPT

## 2023-02-15 PROCEDURE — 94660 CPAP INITIATION&MGMT: CPT

## 2023-02-15 PROCEDURE — 86900 BLOOD TYPING SEROLOGIC ABO: CPT

## 2023-02-15 PROCEDURE — 86923 COMPATIBILITY TEST ELECTRIC: CPT

## 2023-02-15 PROCEDURE — 85025 COMPLETE CBC W/AUTO DIFF WBC: CPT

## 2023-02-15 PROCEDURE — 2580000003 HC RX 258: Performed by: NURSE PRACTITIONER

## 2023-02-15 PROCEDURE — 6360000002 HC RX W HCPCS: Performed by: CLINICAL NURSE SPECIALIST

## 2023-02-15 PROCEDURE — 80053 COMPREHEN METABOLIC PANEL: CPT

## 2023-02-15 PROCEDURE — P9016 RBC LEUKOCYTES REDUCED: HCPCS

## 2023-02-15 PROCEDURE — 6360000002 HC RX W HCPCS: Performed by: NURSE PRACTITIONER

## 2023-02-15 PROCEDURE — 2580000003 HC RX 258: Performed by: SURGERY

## 2023-02-15 PROCEDURE — 85018 HEMOGLOBIN: CPT

## 2023-02-15 RX ORDER — SODIUM CHLORIDE 9 MG/ML
INJECTION, SOLUTION INTRAVENOUS PRN
Status: DISCONTINUED | OUTPATIENT
Start: 2023-02-15 | End: 2023-02-17

## 2023-02-15 RX ORDER — SODIUM CHLORIDE 0.9 % (FLUSH) 0.9 %
5-40 SYRINGE (ML) INJECTION EVERY 12 HOURS SCHEDULED
Status: DISCONTINUED | OUTPATIENT
Start: 2023-02-15 | End: 2023-02-17

## 2023-02-15 RX ORDER — SODIUM CHLORIDE, SODIUM LACTATE, POTASSIUM CHLORIDE, AND CALCIUM CHLORIDE .6; .31; .03; .02 G/100ML; G/100ML; G/100ML; G/100ML
500 INJECTION, SOLUTION INTRAVENOUS ONCE
Status: COMPLETED | OUTPATIENT
Start: 2023-02-15 | End: 2023-02-16

## 2023-02-15 RX ORDER — SODIUM CHLORIDE 9 MG/ML
25 INJECTION, SOLUTION INTRAVENOUS PRN
Status: DISCONTINUED | OUTPATIENT
Start: 2023-02-15 | End: 2023-03-01

## 2023-02-15 RX ORDER — ALBUMIN, HUMAN INJ 5% 5 %
25 SOLUTION INTRAVENOUS ONCE
Status: COMPLETED | OUTPATIENT
Start: 2023-02-15 | End: 2023-02-15

## 2023-02-15 RX ORDER — SODIUM CHLORIDE 0.9 % (FLUSH) 0.9 %
5-40 SYRINGE (ML) INJECTION PRN
Status: DISCONTINUED | OUTPATIENT
Start: 2023-02-15 | End: 2023-02-17

## 2023-02-15 RX ORDER — KETOROLAC TROMETHAMINE 30 MG/ML
30 INJECTION, SOLUTION INTRAMUSCULAR; INTRAVENOUS ONCE
Status: COMPLETED | OUTPATIENT
Start: 2023-02-15 | End: 2023-02-15

## 2023-02-15 RX ORDER — LIDOCAINE HYDROCHLORIDE 10 MG/ML
5 INJECTION, SOLUTION INFILTRATION; PERINEURAL ONCE
Status: COMPLETED | OUTPATIENT
Start: 2023-02-15 | End: 2023-02-16

## 2023-02-15 RX ORDER — KETOROLAC TROMETHAMINE 30 MG/ML
15 INJECTION, SOLUTION INTRAMUSCULAR; INTRAVENOUS EVERY 6 HOURS
Status: DISCONTINUED | OUTPATIENT
Start: 2023-02-15 | End: 2023-02-16

## 2023-02-15 RX ADMIN — ENOXAPARIN SODIUM 100 MG: 100 INJECTION SUBCUTANEOUS at 08:39

## 2023-02-15 RX ADMIN — PIPERACILLIN AND TAZOBACTAM 3375 MG: 3; .375 INJECTION, POWDER, FOR SOLUTION INTRAVENOUS at 17:02

## 2023-02-15 RX ADMIN — GABAPENTIN 300 MG: 300 CAPSULE ORAL at 08:40

## 2023-02-15 RX ADMIN — LEVOTHYROXINE SODIUM 200 MCG: 0.1 TABLET ORAL at 06:16

## 2023-02-15 RX ADMIN — ALLOPURINOL 100 MG: 100 TABLET ORAL at 08:38

## 2023-02-15 RX ADMIN — ONDANSETRON 4 MG: 2 INJECTION INTRAMUSCULAR; INTRAVENOUS at 08:33

## 2023-02-15 RX ADMIN — METHOCARBAMOL 500 MG: 100 INJECTION INTRAMUSCULAR; INTRAVENOUS at 14:51

## 2023-02-15 RX ADMIN — KETOROLAC TROMETHAMINE 30 MG: 30 INJECTION, SOLUTION INTRAMUSCULAR at 21:58

## 2023-02-15 RX ADMIN — KETOROLAC TROMETHAMINE 15 MG: 30 INJECTION, SOLUTION INTRAMUSCULAR; INTRAVENOUS at 18:39

## 2023-02-15 RX ADMIN — ENOXAPARIN SODIUM 100 MG: 100 INJECTION SUBCUTANEOUS at 22:46

## 2023-02-15 RX ADMIN — GABAPENTIN 300 MG: 300 CAPSULE ORAL at 14:49

## 2023-02-15 RX ADMIN — ATORVASTATIN CALCIUM 40 MG: 40 TABLET, FILM COATED ORAL at 08:39

## 2023-02-15 RX ADMIN — SODIUM CHLORIDE, PRESERVATIVE FREE 10 ML: 5 INJECTION INTRAVENOUS at 08:40

## 2023-02-15 RX ADMIN — ALBUMIN (HUMAN) 25 G: 12.5 INJECTION, SOLUTION INTRAVENOUS at 21:45

## 2023-02-15 RX ADMIN — SODIUM CHLORIDE, POTASSIUM CHLORIDE, SODIUM LACTATE AND CALCIUM CHLORIDE 500 ML: 600; 310; 30; 20 INJECTION, SOLUTION INTRAVENOUS at 23:23

## 2023-02-15 RX ADMIN — KETOROLAC TROMETHAMINE 15 MG: 30 INJECTION, SOLUTION INTRAMUSCULAR; INTRAVENOUS at 12:55

## 2023-02-15 RX ADMIN — DOCUSATE SODIUM 100 MG: 100 CAPSULE, LIQUID FILLED ORAL at 08:39

## 2023-02-15 RX ADMIN — CALCIUM GLUCONATE 1000 MG: 98 INJECTION, SOLUTION INTRAVENOUS at 22:14

## 2023-02-15 RX ADMIN — PROPRANOLOL HYDROCHLORIDE 40 MG: 40 TABLET ORAL at 08:40

## 2023-02-15 RX ADMIN — PIPERACILLIN AND TAZOBACTAM 3375 MG: 3; .375 INJECTION, POWDER, FOR SOLUTION INTRAVENOUS at 08:42

## 2023-02-15 RX ADMIN — PIPERACILLIN AND TAZOBACTAM 3375 MG: 3; .375 INJECTION, POWDER, FOR SOLUTION INTRAVENOUS at 02:09

## 2023-02-15 RX ADMIN — SODIUM CHLORIDE, PRESERVATIVE FREE 10 ML: 5 INJECTION INTRAVENOUS at 22:09

## 2023-02-15 RX ADMIN — ASPIRIN 81 MG 81 MG: 81 TABLET ORAL at 08:39

## 2023-02-15 ASSESSMENT — PAIN SCALES - GENERAL
PAINLEVEL_OUTOF10: 7
PAINLEVEL_OUTOF10: 0
PAINLEVEL_OUTOF10: 5
PAINLEVEL_OUTOF10: 7
PAINLEVEL_OUTOF10: 3

## 2023-02-15 ASSESSMENT — PAIN DESCRIPTION - DESCRIPTORS: DESCRIPTORS: DISCOMFORT

## 2023-02-15 ASSESSMENT — PAIN DESCRIPTION - LOCATION: LOCATION: ABDOMEN

## 2023-02-15 ASSESSMENT — PAIN - FUNCTIONAL ASSESSMENT: PAIN_FUNCTIONAL_ASSESSMENT: PREVENTS OR INTERFERES SOME ACTIVE ACTIVITIES AND ADLS

## 2023-02-15 ASSESSMENT — PAIN DESCRIPTION - FREQUENCY: FREQUENCY: INTERMITTENT

## 2023-02-15 ASSESSMENT — PAIN DESCRIPTION - PAIN TYPE: TYPE: SURGICAL PAIN

## 2023-02-15 NOTE — PROGRESS NOTES
Hospitalist Progress Note      PCP: Kendy Urban    Date of Admission: 2/9/2023    Chief Complaint: Abdominal pain and constipation    Hospital Course:     Ms. Perez Vieyra is a 63year old female who presented to [facility] from [facility] with abdominal pain and constipation on 2/9. She had a lower extremity vascular bypass surgery on her right leg a few weeks ago. She reports constipation and abdominal distension following her surgery with ng tube placement as well as a rectal tube. Patient reports her follow-up colonoscopy revealed sigmoid edema with diverticula and an ulcer, she then improved and was discharged. She again developed abdominal pain and distension and has not had a BM since 2/5. She denies trying any stool softeners or motility agents at home. 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 She also has had surgeries for adhesions and ventral hernia repair. Subjective:     Patient seen at bedside in the AM. She recently completed Haylie procedure yesterday with GS. Today, pt continues to endorse abdominal pain (7/10), but feels improved since procedure. She states that she feels less distended. She denies fever, chills, CP, SOB, or vomiting. Endorses nausea overnight secondary to pain but resolves with Zofran. Colostomy site looks clean and dry after being changed by Wound Care this morning. Pt currently tolerating ice chips well.          Medications:  Reviewed    Infusion Medications    sodium chloride 125 mL/hr at 02/14/23 1848    HYDROmorphone      dextrose      sodium chloride Stopped (02/12/23 1857)     Scheduled Medications    insulin lispro  0-8 Units SubCUTAneous Q4H    docusate sodium  100 mg Oral Daily    piperacillin-tazobactam  3,375 mg IntraVENous Q8H    sodium chloride flush  5-40 mL IntraVENous 2 times per day    allopurinol  100 mg Oral Daily    atorvastatin  40 mg Oral Daily    aspirin  81 mg Oral Daily    gabapentin  300 mg Oral TID    levothyroxine  200 mcg Oral Daily    propranolol  40 mg Oral BID    enoxaparin  1 mg/kg SubCUTAneous BID     PRN Meds: naloxone, glucose, dextrose bolus **OR** dextrose bolus, glucagon (rDNA), dextrose, benzocaine, sodium chloride flush, sodium chloride, ondansetron **OR** ondansetron, acetaminophen **OR** acetaminophen, potassium chloride **OR** potassium alternative oral replacement **OR** potassium chloride, magnesium sulfate, sodium phosphate IVPB **OR** sodium phosphate IVPB **OR** sodium phosphate IVPB      Intake/Output Summary (Last 24 hours) at 2/15/2023 0932  Last data filed at 2/15/2023 0902  Gross per 24 hour   Intake 1503.4 ml   Output 5375 ml   Net -3871.6 ml       Physical Exam Performed:    BP 98/70   Pulse 100   Temp 100 °F (37.8 °C) (Oral)   Resp 16   Ht 5' 5\" (1.651 m)   Wt 220 lb 12.8 oz (100.2 kg)   LMP  (LMP Unknown) Comment: HYSTERECTOMY  SpO2 96%   BMI 36.74 kg/m²     General appearance: Mild amount of distress, appears stated age and cooperative. HEENT: Conjunctivae/corneas clear. Neck: No jugular venous distention. Respiratory:  Normal respiratory effort. Clear to auscultation, bilaterally without Rales/Wheezes/Rhonchi. Cardiovascular: Regular rate and rhythm without murmurs, rubs or gallops. Abdomen: Soft, diffuse abdominal tenderness, distension improved. Bowel sounds slowed  Musculoskeletal: No clubbing, cyanosis or edema bilaterally. Skin: Skin color, texture, turgor normal.  No rashes or lesions. Neurologic:  Neurovascularly intact without any focal sensory/motor deficits.   Psychiatric: Alert and oriented, thought content appropriate, normal insight      Labs:   Recent Labs     02/13/23  0533 02/14/23  0542 02/15/23  0515   WBC 5.0 5.0 9.1   HGB 10.6* 9.8* 9.7*   HCT 32.9* 31.1* 29.8*    299 325     Recent Labs     02/13/23  0533 02/14/23  0542 02/15/23  0514    137 140   K 4.2 4.6 4.6    106 104   CO2 23 23 27 BUN 17 15 12   CREATININE 0.8 0.9 0.9   CALCIUM 8.0* 8.2* 7.9*     Recent Labs     02/13/23  0533 02/14/23  0542 02/15/23  0514   AST 27 48* 50*   ALT 34 40 43*   BILITOT 0.3 0.3 0.3   ALKPHOS 240* 235* 194*     No results for input(s): INR in the last 72 hours. No results for input(s): Pasty Dragon in the last 72 hours. Urinalysis:    No results found for: Ananya Munch, BACTERIA, RBCUA, BLOODU, SPECGRAV, [address] [place] [address]    Radiology:  CT ABDOMEN PELVIS WO CONTRAST Additional Contrast? None   Final Result   1. Barium contrast from prior small-bowel follow-through is seen throughout   the colon and limits evaluation due to streak artifact. No definite abscess   is identified. 2.  Infrarenal abdominal aortic aneurysm measures up to 3.0 cm. See   recommendations for follow-up below. 3.  There is a 1.1 cm right lower lobe pulmonary nodule and a 0.3 cm left   lower lobe pulmonary nodule. Recommend dedicated CT chest for further   evaluation. RECOMMENDATIONS:      Multiple. Worst: 11 mm solid      Pathology: Multiple pulmonary nodules. Most severe: 11 mm solid pulmonary   nodule detected on incomplete chest CT. Recommend prompt non-contrast Chest CT for further evaluation. These guidelines do not apply to immunocompromised patients and patients with   cancer. Follow up in patients with significant comorbidities as clinically   warranted. For lung cancer screening, adhere to Lung-RADS guidelines. Reference: Radiology. 2017; 284(1):228-43         3 cm AAA      Pathology: 3 cm infrarenal abdominal aortic aneurysm. Recommend follow-up every 3 years. Reference: J Am [facility] Radiol 5600;84:243-753. XR ABDOMEN (KUB) (SINGLE AP VIEW)   Final Result   Cancellation of scheduled hypaque enema due to presence of barium throughout   the colon including the rectosigmoid region as described above.          FL SMALL BOWEL FOLLOW THROUGH ONLY   Final Result   Mildly dilated small bowel, though small-bowel transit time is upper limits   of normal.         CT ABDOMEN PELVIS W IV CONTRAST Additional Contrast? None   Final Result   Diverticulitis of the descending sigmoid junction. Mild wall thickening but   negative for obstruction. IP CONSULT TO GENERAL SURGERY  IP CONSULT TO GI    Assessment/Plan:    Active Hospital Problems    Diagnosis     Diverticulitis [K57.92]      Priority: Medium    Colonic obstruction ([address] [K56.609]      Priority: Medium    Bowel obstruction ([address] [K56.609]      Priority: Medium     1. Diverticulitis  2. History of recurrent bowel obstruction  - CT of the abdomen and pelvis on 2/9/23 showed diverticulitis of the descending and sigmoid colon. Was negative for obstruction  - Small bowel follow-through was completed on 2/11/23 and showed mildly dilated small bowel with transit time the upper limit of normal.  -- Repeat of the CT Abdomen/Pelvis on 2/13/23 showed \"No evidence of bowel obstruction. No definite abscess in the region of the previously described proximal sigmoid diverticulitis, although extensive streak artifact limits evaluation. \" \"Infrarenal AAA measures up to 3.0 cm. \" \"There is a 1.1 cm right lower lobe pulmonary nodule and a 0.3 cm left lower lobe pulmonary nodule. \"  -- Consider ordering Chest CT after any surgical intervention. Pt already aware of lung nodule and AAA for the last several years. - Hold IV hydration with NS today to assess volume status and renal function tomorrow. GFR and Cr normal today. -- Pt switched to Dilaudid 1 mg/mL PCA q8 minutes per GS. Already on Gabapentin 300 mg TID.   - Continue Colace 100 mg daily. - GS completed successful Haylie procedure yesterday. Colostomy placed. 2L of stool output yesterday. Wound site clear/dry. No signs/symptoms of infection today. Wound Care following.   - Continue IV Zosyn   - Continue to ambulate the patient   -- PT and OT Consulted. - Can continue Ice Chips per GS    3. Peripheral artery disease  - Status post vascular bypass of the right lower extremity  - Staples removed. Wound site dry and clear without signs of infection.   - Holding home Eliquis for now. - Continue Lovenox 100 mg SC twice daily  - Continue 81 mg of aspirin daily, Propanolol 40 mg BID, and atorvastatin 40 mg daily    4. Gout  - Continue allopurinol 100 mg daily    5. Hypothyroidism  - Continue levothyroxine 200 mcg daily    6. Hypocalcemia   - 7.9 today, worsened from 8.2 yesterday. - Will continue to monitor, replenish if indicated     7. Hypoalbuminemia   - Relatively unchanged in the last several days  - Will continue to monitor. Renal function and LFTs normal.     8. Normocytic Anemia   - Relatively unchanged since yesterday despite OR, likely secondary to decreased PO intake. No signs of active bleeding.   - Consider transfusion if hemoglobin <7.   - Normal MCV, ddx includes anemia of chronic diease. 9. Elevated Alkaline Phosphatase   - Improved since yesterday. - Consider ordering GGT and RUQ US if indicated. DVT Prophylaxis: Lovenox  Diet: Diet NPO Exceptions are: Sips of Water with Meds, Ice Chips  Code Status: Full Code  PT/OT Eval Status: Patient is independent    Dispo - 2-3 days pending clinical improvement.      Appropriate for A1 Discharge Unit: No      Marley Preciado DO   PGY-1  [facility] Medicine Residency

## 2023-02-15 NOTE — PROGRESS NOTES
Wound Care nurse returned to room to provide education for new colostomy. Daughter April at bedside. Student nurse in room. Earlier colostomy device now has seepage under to left side. Wound Care nurse had daughter April assist with changing appliance. April is a nurse & has had exposure to ostomy care. Yellow flange used. Cut to side due to incision mid abdomen. Barrier ring large placed around stoma, barrier side flange used after bag placement to also provide fixture. Yellow 2.75     flex flange [id number]    drainable bag [id number]   [facility] Wound Care also introduced to family, and will continue education tomorrow also.

## 2023-02-15 NOTE — PROGRESS NOTES
02/15/23 1153   Oxygen Therapy/Pulse Ox   O2 Device Nasal cannula   O2 Flow Rate (L/min) 3 L/min   Resp 19   SpO2 96 %   End Tidal CO2 40 (%)

## 2023-02-15 NOTE — PLAN OF CARE
Problem: Pain  Goal: Verbalizes/displays adequate comfort level or baseline comfort level  2/15/2023 1055 by Azalia Kemp RN  Outcome: Progressing  Flowsheets (Taken 2/15/2023 1055)  Verbalizes/displays adequate comfort level or baseline comfort level:   Encourage patient to monitor pain and request assistance   Assess pain using appropriate pain scale   Administer analgesics based on type and severity of pain and evaluate response   Implement non-pharmacological measures as appropriate and evaluate response  2/14/2023 2308 by Hanh Shay RN  Outcome: Progressing     Problem: ABCDS Injury Assessment  Goal: Absence of physical injury  2/15/2023 1055 by Azalia Kemp RN  Outcome: Progressing  2/14/2023 2308 by Hanh Shay RN  Outcome: Progressing

## 2023-02-15 NOTE — DISCHARGE INSTR - COC
Continuity of Care Form    Patient Name: Yris Kelley   :  1960  MRN:  [id number]    Admit date:  2023  Discharge date:  3/3/2023      Code Status Order: Full Code   Advance Directives:   [facility] Documentation       Date/Time Healthcare Directive Type of Healthcare Directive Copy in [facility] [address] Agent's Name Healthcare Agent's Phone Number    23 1425 No, patient does not have an advance directive for healthcare treatment -- -- -- -- --            Admitting Physician:  Dayo Rodriguez MD  PCP: Yaneth Batista    Discharging Nurse: [facility] Unit/Room#: 9498/8151-26  Discharging Unit Phone Number: [phone]      Emergency Contact:   Extended Emergency Contact Information  Primary Emergency Contact: [facility] Phone: [phone]  Mobile Phone: [phone]  Relation: Child  Secondary Emergency Contact: [address] [place] [address]  Mobile Phone: [phone]  Relation: Child    Past Surgical History:  Past Surgical History:   Procedure Laterality Date    SIGMOID COLECTOMY N/A 2023    BOWEL RESECTION SIGMOID COLECTOMY WITH COLOSTOMY CREATION performed by Steve Treviño MD at [place] [address]       Immunization History: There is no immunization history on file for this patient.     Active Problems:  Patient Active Problem List   Diagnosis Code    Colonic obstruction ([address] K56.609    Bowel obstruction ([address] K56.609    Diverticulitis K57.92       Isolation/Infection:   Isolation            No Isolation          Patient Infection Status       None to display            Nurse Assessment:  Last Vital Signs: /75   Pulse 91   Temp 100.2 °F (37.9 °C) (Oral)   Resp 18   Ht 5' 5\" (1.651 m)   Wt 220 lb 12.8 oz (100.2 kg)   LMP  (LMP Unknown) Comment: HYSTERECTOMY  SpO2 96%   BMI 36.74 kg/m²     Last documented pain score (0-10 scale): Pain Level: 7  Last Weight:   Wt Readings from Last 1 Encounters:   23 220 lb 12.8 oz (100.2 kg)     Mental Status:  oriented and alert    IV Access:  - None    Nursing Mobility/ADLs:  Walking   Independent  Transfer  Independent  Bathing  Assisted  Dressing  Independent  [address]  Independent  Med Delivery   whole    Wound Care Documentation and Therapy:     03/01/23 1658   Colostomy LLQ   Placement Date: 02/14/23   Location: LLQ   Stomal Appliance 2 piece;Clean, dry & intact   Flange Size (inches) 2.75 Inches   Stoma  Assessment Pink;Protrudes; Moist;Swelling   Peristomal Assessment Blanchable erythema   Treatment Bag change;Site care;Stoma powder;Stoma paste; Heat applied   Stool Appearance Watery   Stool Color Brown   Stool Amount Small   Output (mL) 50 ml     Incision 02/16/23 Abdomen Anterior;Medial (Active)   Wound Image    02/20/23 1608   Dressing Status Breakthrough drainage noted;New dressing applied 02/20/23 1608   Dressing Change Due 02/23/23 02/20/23 1608   Incision Cleansed Cleansed with saline 02/20/23 1608   Dressing/Treatment Foam 02/20/23 1608   Incision Length (cm) 19.5 02/20/23 1608   Incision Width (cm) 0 cm 02/20/23 1608   Incision Depth (cm) 0 cm 02/20/23 1608   Closure Retention sutures; Staples 02/20/23 1608   Margins Approximated 02/20/23 1608   Incision Assessment Other (Comment);Dry 02/20/23 1608   Drainage Amount Small 02/20/23 1608   Drainage Description Yellow 02/20/23 1608   Odor None 02/20/23 1608   Juana-incision Assessment Blanchable erythema 02/20/23 1608   Number of days: 3           Elimination:  Continence: Bowel: Yes NEW colsotomy  Bladder: Yes  Urinary Catheter: None   NEW Colostomy: Yes      Wound/ostomy history: Patient presented to emergency room with complaint of abdominal pain. Patient had been experiencing abdominal pain since Carlito 2/10/23 PTA. Patient's abdominal pain gradually worsened. Patient also started experiencing abdominal distention. Patient has not had a bowel movement in last 4 to 5 days.  [employer] to OR 2/14/23 for bowel resection, sigmoid colectomy and creation of colostomy by Dr Solange Hughes. Returned to OR on 2/17/23 with Dr Menard for Hemoperitoneum. Exploratory lap with evacuation of hemoperitoneum (3 Liters). Stoma size:  2 inch = 50 mm round protrudes. Appliance size:  Sensura Dylan YELLOW convex flange # [id number] with Drainable bag # [id number]. Add ostomy belt to assist with securing of ostomy seal.                        Call to [facility] [phone] and free samples ordered and sent to patients home:   Convex flange  Lock and roll bag  One piece convex with lock and roll bag    # 47346 [employer] Paste  # 7906 Powder  # 8805 Paste rings  # [id number] Barrier extenders  # 3748 skin prep wipe barrier film  # 7760 Barrier removal wipes  # 86213 Deodorant  #    Belt large / medium  Curved scissors will be provided in packet kit          Call to [address] [place] [address] and Free samples ordered and sent to patients home:   #   Sensura Dylan convex flange  #   Pouch for convex    # 92535 Deodorant packet  # 81893 Bravo paste seal rings  # [id number] for Sensura Dylan  # [id number] Adhesive remover wipes  # [id number] Barrier film wipes  # [id number] Brava Elastic barrier extender strips  # Small scissors will be provided in packet kit           Extra Supplies supplies provided;    Stoma size:  2 inch = 50 mm round protrudes. Appliance size:  Sensura Manderson YELLOW convex flange # [id number]    Drainable bag # [id number]. ostomy belt to assist with securing of ostomy seal  Barrier flange seals  Heat packs  Barrier powder  Barrier film  Barrier paste  Barrier rings  Box gloves  Package of hanh    Date of Last BM: 3/2/2023        Intake/Output Summary (Last 24 hours) at 2/15/2023 1405  Last data filed at 2/15/2023 0902  Gross per 24 hour   Intake 1503.4 ml   Output 5375 ml   Net -3871.6 ml     I/O last 3 completed shifts:   In: 1503.4 [I.V.:1503.4]  Out: 0986 [Urine:1050; Emesis/NG output:825; Stool:1250; Blood:50]    Safety Concerns: At Risk for Falls    Impairments/Disabilities:      None    Nutrition Therapy:  Current Nutrition Therapy:   - Oral Diet:  General  - Oral Nutrition Supplement:  Standard  twice a day    Routes of Feeding: Oral  Liquids: No Restrictions  Daily Fluid Restriction: no  Last Modified Barium Swallow with Video (Video Swallowing Test): not done    Treatments at the Time of Hospital Discharge:   Respiratory Treatments:   Oxygen Therapy:  is not on home oxygen therapy. Ventilator:    - No ventilator support    Rehab Therapies: Physical Therapy  Weight Bearing Status/Restrictions: No weight bearing restrictions  Other Medical Equipment (for information only, NOT a DME order): Other Treatments:     Patient's personal belongings (please select all that are sent with patient):  Xochitl Adams clothing     RN SIGNATURE:  Electronically signed by Venkatesh Alarcon RN on 3/3/23 at 3:05 PM EST    CASE MANAGEMENT/SOCIAL WORK SECTION    Inpatient Status Date: 2/9/23    Readmission Risk Assessment Score:  Readmission Risk              Risk of Unplanned Readmission:  12           Discharging to Facility/ [address] [place] [address]; service area  Ask for Prateek Richardsonar [phone]  Fax number [phone]  / signature: Electronically signed by Pradip Jean Baptiste RN on 3/3/23 at 2:02 PM EST    PHYSICIAN SECTION    Prognosis: Fair    Condition at Discharge: Stable    Rehab Potential (if transferring to Rehab): Fair    Recommended Labs or Other Treatments After Discharge:   Colostomy teaching:  Stoma Care - New colostomy - Patient to empty appliance when 1/3 to 1/2 full. Cleanse inside and outside of the drain spout prior to rolling closed. Change appliance every 3-5 days or 1-2 times a week. Assist patient / family how to order supplies - please do not order the supplies for them, mick how to do it please.     Wound care:  Clean left lower distal staple line wound with normal. Lightly pack with alginate AG, cover with dry dressing and medipore tape daily. Physician Certification: I certify the above information and transfer of Roxy Murillo  is necessary for the continuing treatment of the diagnosis listed and that she requires [address] for less 30 days.      Update Admission H&P: No change in H&P    PHYSICIAN SIGNATURE:  Electronically signed by Roni Fleming MD on 3/3/23 at 2:20 PM EST

## 2023-02-15 NOTE — CARE COORDINATION
Spoke with patient. Will be returning to [place] at d/c. Would like referral to [facility], done.  Spoke with Boris Herrera, will review referral. Alec yS RN

## 2023-02-15 NOTE — PROGRESS NOTES
Physical Therapy/Occupational Therapy  Re-evaluation order received. Attempted to see pt for PT/OT. Pt stating she just returned to bed after ambulating in room. Does report feeling lightheaded with ambulation. Requesting therapy return at a later time. Will re-attempt 2/16. Thank you.   Kellie Butler, PT, DPT  Leydi Herr, OTR/L

## 2023-02-15 NOTE — PROGRESS NOTES
PT colostomy was changed around midnight. Large amounts of brown stool noted. López with good UO. NG tube with 800mL of green output overnight. A+O x4. VSS. No insulin required overnight. Midline dressing was changed d/t becoming saturated with stool. No c/o of nausea. Bed in lowest position with call light within reach.

## 2023-02-15 NOTE — CONSULTS
Ostomy Referral Progress Note      NAME:  Jaylin Carey RECORD NUMBER:  [id number]  AGE: 63 y.o. GENDER:  female  :  1960  TODAY'S DATE:  2/15/2023    Subjective; I'm still going. Daughter at bedside. Brisa Dias is a 63 y.o. female referred by:   [] Physician  [x] Nursing  [] Other:     NEW  2023 Dr. Subhash Pascual colostomy bowel resection sigmoid colostomy    Stoma; 2\"  50 mm  Appliance:  Coloplasty:    Yellow 2.75     flex flange 41322    drainable bag 05267      PAST MEDICAL HISTORY:    History reviewed. No pertinent past medical history. MEDICATIONS:    No current facility-administered medications on file prior to encounter. Current Outpatient Medications on File Prior to Encounter   Medication Sig Dispense Refill    apixaban (ELIQUIS) 5 MG TABS tablet Take 5 mg by mouth 2 times daily Patient unsure of dose. \"Thinks\" it is 5mg      Levothyroxine Sodium 200 MCG CAPS Take by mouth daily Unsure of dose      propranolol (INDERAL) 40 MG tablet Take 40 mg by mouth 2 times daily      gabapentin (NEURONTIN) 300 MG capsule Take 300 mg by mouth 3 times daily. oxyCODONE-acetaminophen (PERCOCET)  MG per tablet Take 1 tablet by mouth every 6 hours as needed for Pain. atorvastatin (LIPITOR) 40 MG tablet Take 40 mg by mouth daily      cyclobenzaprine (FLEXERIL) 10 MG tablet Take 10 mg by mouth 3 times daily      Plecanatide (TRULANCE) 3 MG TABS Take 3 mg by mouth daily      allopurinol (ZYLOPRIM) 100 MG tablet Take 100 mg by mouth daily      liothyronine (CYTOMEL) 5 MCG tablet Take 5 mcg by mouth daily      aspirin 81 MG chewable tablet Take 81 mg by mouth daily         ALLERGIES:    No Known Allergies    PAST SURGICAL HISTORY:    History reviewed. No pertinent surgical history. FAMILY HISTORY:    family history is not on file.     SOCIAL HISTORY:    Social History     Tobacco Use    Smoking status: Former     Packs/day: 1.00     Years: 34.00     Pack years: 34.00     Types: Cigarettes     Start date: 1977     Quit date: 2021     Years since quittin.1    Smokeless tobacco: Current   Substance Use Topics    Alcohol use: Never    Drug use: Never       LABS:  WBC:    Lab Results   Component Value Date/Time    WBC 9.1 02/15/2023 05:15 AM     H/H:    Lab Results   Component Value Date/Time    HGB 9.7 02/15/2023 05:15 AM    HCT 29.8 02/15/2023 05:15 AM     BMP:    Lab Results   Component Value Date/Time     02/15/2023 05:14 AM    K 4.6 02/15/2023 05:14 AM     02/15/2023 05:14 AM    CO2 27 02/15/2023 05:14 AM    BUN 12 02/15/2023 05:14 AM    LABALBU 2.6 02/15/2023 05:14 AM    CREATININE 0.9 02/15/2023 05:14 AM    CALCIUM 7.9 02/15/2023 05:14 AM    LABGLOM >60 02/15/2023 05:14 AM    GLUCOSE 126 02/15/2023 05:14 AM     PTT:    Lab Results   Component Value Date/Time    APTT 33.1 2023 01:53 PM   [APTT}  PT/INR:    Lab Results   Component Value Date/Time    PROTIME 15.9 2023 01:53 PM    INR 1.27 2023 01:53 PM       Objective; nurses, Elvin Boast, student, at bedside. Patient lying in bed    /67   Pulse 98   Temp 98.1 °F (36.7 °C) (Oral)   Resp 16   Ht 5' 5\" (1.651 m)   Wt 220 lb 12.8 oz (100.2 kg)   LMP  (LMP Unknown) Comment: HYSTERECTOMY  SpO2 96%   BMI 36.74 kg/m²     Brendan Risk Score Brendan Scale Score: 18    Patient Active Problem List   Diagnosis Code    Colonic obstruction (HCC) K56.609    Bowel obstruction ([facility] K56.609    Diverticulitis K57.92       Assessment;Stoma large swollen, divites to sides. Red, moist, stitches to edges. Colostomy LLQ (Active)   Stomal Appliance 2 piece 02/15/23 0847   Flange Size (inches) 2.75 Inches 02/15/23 0847   Stoma  Assessment Moist;Red;Protrudes; Swelling 02/15/23 0847   Peristomal Assessment Red;Edema 02/15/23 0847   Treatment Site care; Bag change; Heat applied; Other (Comment) 02/15/23 0847   Stool Appearance Soft;Loose 02/15/23 0847 Stool Color Brown 02/15/23 0847   Stool Amount Large 02/15/23 0847   Output (mL) 100 ml 02/15/23 0847   Number of days: 1                    Intake/Output Summary (Last 24 hours) at 2/15/2023 0849  Last data filed at 2/15/2023 0847  Gross per 24 hour   Intake 1503.4 ml   Output 4275 ml   Net -2771.6 ml         Plan   Plan for Ostomy Care:   Stoma measure then re measured due to swelling. Measured 2\"  50mm. Site care provided related to bowel discharging. Stoma paste removed with adhesive remover. Site dried, paste ring placed around stoma due to creases. Flange cut to fit and placed over stoma. Bag placed over stoma. Heat applied to site. Stoma Care - New ileostomy - Patient to empty appliance when 1/3 to 1/2 full with assistance of the staff. Cleanse inside and outside of the drain spout prior to rolling closed. Change appliance every 3-5 days or 1-2 times a week. Call for problems with seal [phone]      Stoma; 2\"  50 mm  Appliance:  Coloplasty:    Yellow 2.75     flex flange 95649    drainable bag [id number]      Daughter stated doctor will reverse stoma in few months. Wound Care unable to stay for education at this time. Encouraged daughter and patient Wound Care will be back shortly to start education and will bring more educational material.      Supplies left in room. Ostomy Plan of Care  [x] Supplies/Instructions left in room  [] Patient using home supplies  [x] Brand/supplies at bedside  Coloplast  [] Current pouching system     Current Diet: Diet NPO Exceptions are: Sips of Water with Meds, Ice Chips  Dietician consult:  Yes    Discharge Plan:  Placement for patient upon discharge: home with support    Outpatient visit plan Yes  Supplies given Yes   Samples requested N/A    will do    Referrals:  [x]   following  [] [facility]  [] Supplies  [] Other      Patient/Caregiver Teaching:  Written Instructions given to patient/family daughter at bedside.    Other family member in room but did not wish to see. Will return  Teaching provided:  [] Reviewed GI and A&P        [] Supplies  [] Pouch emptying      [] Manipulate closure  [] Routine Care         [] Comment  [] Pouch maintenance           Level of patient/caregiver understanding able to:  [] Indicates understanding       [] Needs reinforcement  [] Unsuccessful      [x] Verbal Understanding  [] Demonstrated understanding       [] No evidence of learning  [] Refused teaching         [] N/A    Electronically signed by Dina Montoya RN, on 2/15/2023 at 8:49 AM

## 2023-02-15 NOTE — CARE COORDINATION
Chart reviewed day 6. Care managed per GI, Surgery and IM. POD 1, Haylie's Procedure, new ostomy. NGT, PCA, IVATBX. Patient IPTA, would benefit from [facility]. Will seek agency close to home if able.  Aileen Mcnamara RN

## 2023-02-15 NOTE — PROGRESS NOTES
Received order to give patient one time dose of 0.5 mg dilaudid, and change PCA pump to lockout every 8 minutes with a max of 1.5 mg per hour. Pharmacy said ok to give one time dilaudid dose through IV push.

## 2023-02-15 NOTE — PLAN OF CARE
Problem: Pain  Goal: Verbalizes/displays adequate comfort level or baseline comfort level  2/14/2023 2308 by Colleen Luong RN  Outcome: Progressing  2/14/2023 1057 by Shanika Rios RN  Outcome: Progressing  Flowsheets (Taken 2/14/2023 1057)  Verbalizes/displays adequate comfort level or baseline comfort level:   Encourage patient to monitor pain and request assistance   Administer analgesics based on type and severity of pain and evaluate response   Assess pain using appropriate pain scale     Problem: ABCDS Injury Assessment  Goal: Absence of physical injury  2/14/2023 2308 by Colleen Luong RN  Outcome: Progressing  2/14/2023 1057 by Shanika Rios RN  Outcome: Progressing     Problem: Safety - Adult  Goal: Free from fall injury  2/14/2023 2308 by Colleen Luong RN  Outcome: Dannis Phenes  2/14/2023 1057 by Shanika Rios RN  Outcome: Progressing     Problem: Discharge Planning  Goal: Discharge to home or other facility with appropriate resources  Outcome: Progressing     Problem: Skin/Tissue Integrity  Goal: Absence of new skin breakdown  Description: 1. Monitor for areas of redness and/or skin breakdown  2. Assess vascular access sites hourly  3. Every 4-6 hours minimum:  Change oxygen saturation probe site  4. Every 4-6 hours:  If on nasal continuous positive airway pressure, respiratory therapy assess nares and determine need for appliance change or resting period.   Outcome: Progressing

## 2023-02-15 NOTE — PROGRESS NOTES
[facility] GENERAL SURGERY    Surgery Progress Note           POD # 1    PATIENT NAME: Emily Garza     TODAY'S DATE: 2/15/2023    INTERVAL HISTORY:    Pt  with pain as expected - using PCA and required additional dose last night. With ostomy output. Having cramping as well. OBJECTIVE:   VITALS:  /75   Pulse 91   Temp 100.2 °F (37.9 °C) (Oral)   Resp 18   Ht 5' 5\" (1.651 m)   Wt 220 lb 12.8 oz (100.2 kg)   LMP  (LMP Unknown) Comment: HYSTERECTOMY  SpO2 96%   BMI 36.74 kg/m²     INTAKE/OUTPUT:    I/O last 3 completed shifts: In: 1503.4 [I.V.:1503.4]  Out: 3175 [Urine:1050; Emesis/NG output:825; Stool:1250; Blood:50]  I/O this shift:  In: -   Out: 2200 [Urine:100; Emesis/NG output:2000; Stool:100]              CONSTITUTIONAL:  awake and alert  LUNGS:  no crackles or wheezing  ABDOMEN:   hypoactive bowel sounds, soft, non-distended, tenderness noted midline, stoma healthy with stool in bag   INCISION: clean, dry    Data:  CBC:   Recent Labs     02/13/23  0533 02/14/23  0542 02/15/23  0515   WBC 5.0 5.0 9.1   HGB 10.6* 9.8* 9.7*   HCT 32.9* 31.1* 29.8*    299 325     BMP:    Recent Labs     02/13/23  0533 02/14/23  0542 02/15/23  0514    137 140   K 4.2 4.6 4.6    106 104   CO2 23 23 27   BUN 17 15 12   CREATININE 0.8 0.9 0.9   GLUCOSE 99 94 126*     Hepatic:   Recent Labs     02/13/23  0533 02/14/23  0542 02/15/23  0514   AST 27 48* 50*   ALT 34 40 43*   BILITOT 0.3 0.3 0.3   ALKPHOS 240* 235* 194*         ASSESSMENT AND PLAN:  63 y.o. female status post Haylie's Procedure  GI: continue with ngt to suction, ice chips  : continue with lakhani today to monitor I/O  Pain: continue with PCA - add toradol and IV robaxin today  New ostomy: WOCN following     Electronically signed by JARETT Hernandez CNP       Patient seen and agree with above and more than half of the total time was spent by me on the encounter.      Sameera Henry MD

## 2023-02-15 NOTE — CARE COORDINATION
[facility]    Patient out of service area    Referral sent to [facility]  Spoke jaylan Ochoa  [address] out of service area    [facility]: doesn't service area  [facility] home care; service area  Ask for Nellie No [phone]  Fax number [phone] [facility], BSN CTN  [facility] [phone]

## 2023-02-16 ENCOUNTER — APPOINTMENT (OUTPATIENT)
Dept: CT IMAGING | Age: 63
DRG: 329 | End: 2023-02-16
Attending: INTERNAL MEDICINE
Payer: MEDICAID

## 2023-02-16 ENCOUNTER — APPOINTMENT (OUTPATIENT)
Dept: INTERVENTIONAL RADIOLOGY/VASCULAR | Age: 63
DRG: 329 | End: 2023-02-16
Attending: INTERNAL MEDICINE
Payer: MEDICAID

## 2023-02-16 ENCOUNTER — APPOINTMENT (OUTPATIENT)
Dept: VASCULAR LAB | Age: 63
DRG: 329 | End: 2023-02-16
Attending: INTERNAL MEDICINE
Payer: MEDICAID

## 2023-02-16 ENCOUNTER — ANESTHESIA EVENT (OUTPATIENT)
Dept: ICU | Age: 63
DRG: 329 | End: 2023-02-16
Payer: MEDICAID

## 2023-02-16 ENCOUNTER — ANESTHESIA (OUTPATIENT)
Dept: ICU | Age: 63
DRG: 329 | End: 2023-02-16
Payer: MEDICAID

## 2023-02-16 ENCOUNTER — ANESTHESIA EVENT (OUTPATIENT)
Dept: OPERATING ROOM | Age: 63
End: 2023-02-16
Payer: MEDICAID

## 2023-02-16 ENCOUNTER — ANESTHESIA (OUTPATIENT)
Dept: OPERATING ROOM | Age: 63
End: 2023-02-16
Payer: MEDICAID

## 2023-02-16 PROBLEM — R57.8 HEMORRHAGIC SHOCK (HCC): Status: ACTIVE | Noted: 2023-02-16

## 2023-02-16 PROBLEM — D62 ACUTE BLOOD LOSS ANEMIA: Status: ACTIVE | Noted: 2023-02-16

## 2023-02-16 PROBLEM — N17.9 ACUTE RENAL FAILURE (ARF) (HCC): Status: ACTIVE | Noted: 2023-02-16

## 2023-02-16 LAB
A/G RATIO: 1.1 (ref 1.1–2.2)
ALBUMIN SERPL-MCNC: 2.3 G/DL (ref 3.4–5)
ALP BLD-CCNC: 133 U/L (ref 40–129)
ALT SERPL-CCNC: 26 U/L (ref 10–40)
ANION GAP SERPL CALCULATED.3IONS-SCNC: 13 MMOL/L (ref 3–16)
APTT: 39.9 SEC (ref 23–34.3)
APTT: 40.4 SEC (ref 23–34.3)
AST SERPL-CCNC: 27 U/L (ref 15–37)
BASOPHILS ABSOLUTE: 0 K/UL (ref 0–0.2)
BASOPHILS RELATIVE PERCENT: 0.2 %
BILIRUB SERPL-MCNC: 0.5 MG/DL (ref 0–1)
BLOOD BANK DISPENSE STATUS: NORMAL
BLOOD BANK PRODUCT CODE: NORMAL
BPU ID: NORMAL
BUN BLDV-MCNC: 24 MG/DL (ref 7–20)
CALCIUM SERPL-MCNC: 7.9 MG/DL (ref 8.3–10.6)
CHLORIDE BLD-SCNC: 103 MMOL/L (ref 99–110)
CO2: 22 MMOL/L (ref 21–32)
CREAT SERPL-MCNC: 2.2 MG/DL (ref 0.6–1.2)
DESCRIPTION BLOOD BANK: NORMAL
EKG ATRIAL RATE: 112 BPM
EKG DIAGNOSIS: NORMAL
EKG P AXIS: 69 DEGREES
EKG P-R INTERVAL: 148 MS
EKG Q-T INTERVAL: 354 MS
EKG QRS DURATION: 80 MS
EKG QTC CALCULATION (BAZETT): 483 MS
EKG R AXIS: 44 DEGREES
EKG T AXIS: 87 DEGREES
EKG VENTRICULAR RATE: 112 BPM
EOSINOPHILS ABSOLUTE: 0.1 K/UL (ref 0–0.6)
EOSINOPHILS RELATIVE PERCENT: 0.5 %
FIBRINOGEN: 368 MG/DL (ref 207–509)
FIBRINOGEN: 429 MG/DL (ref 207–509)
GFR SERPL CREATININE-BSD FRML MDRD: 25 ML/MIN/{1.73_M2}
GLUCOSE BLD-MCNC: 137 MG/DL (ref 70–99)
GLUCOSE BLD-MCNC: 139 MG/DL (ref 70–99)
GLUCOSE BLD-MCNC: 139 MG/DL (ref 70–99)
GLUCOSE BLD-MCNC: 141 MG/DL (ref 70–99)
GLUCOSE BLD-MCNC: 147 MG/DL (ref 70–99)
GLUCOSE BLD-MCNC: 153 MG/DL (ref 70–99)
HCT VFR BLD CALC: 16.4 % (ref 36–48)
HCT VFR BLD CALC: 17.2 % (ref 36–48)
HCT VFR BLD CALC: 22.6 % (ref 36–48)
HCT VFR BLD CALC: 24.5 % (ref 36–48)
HCT VFR BLD CALC: 28.1 % (ref 36–48)
HCT VFR BLD CALC: 30.6 % (ref 36–48)
HEMOGLOBIN: 10 G/DL (ref 12–16)
HEMOGLOBIN: 5.2 G/DL (ref 12–16)
HEMOGLOBIN: 5.2 G/DL (ref 12–16)
HEMOGLOBIN: 6.9 G/DL (ref 12–16)
HEMOGLOBIN: 8 G/DL (ref 12–16)
HEMOGLOBIN: 9.7 G/DL (ref 12–16)
INR BLD: 1.48 (ref 0.87–1.14)
INR BLD: 1.56 (ref 0.87–1.14)
LACTIC ACID: 1.7 MMOL/L (ref 0.4–2)
LYMPHOCYTES ABSOLUTE: 1.7 K/UL (ref 1–5.1)
LYMPHOCYTES RELATIVE PERCENT: 12.1 %
MCH RBC QN AUTO: 25.2 PG (ref 26–34)
MCH RBC QN AUTO: 27.8 PG (ref 26–34)
MCH RBC QN AUTO: 27.9 PG (ref 26–34)
MCHC RBC AUTO-ENTMCNC: 29.9 G/DL (ref 31–36)
MCHC RBC AUTO-ENTMCNC: 32.5 G/DL (ref 31–36)
MCHC RBC AUTO-ENTMCNC: 32.6 G/DL (ref 31–36)
MCV RBC AUTO: 84.1 FL (ref 80–100)
MCV RBC AUTO: 85.5 FL (ref 80–100)
MCV RBC AUTO: 85.5 FL (ref 80–100)
MONOCYTES ABSOLUTE: 1.5 K/UL (ref 0–1.3)
MONOCYTES RELATIVE PERCENT: 10.8 %
NEUTROPHILS ABSOLUTE: 10.9 K/UL (ref 1.7–7.7)
NEUTROPHILS RELATIVE PERCENT: 76.4 %
PDW BLD-RTO: 15.3 % (ref 12.4–15.4)
PDW BLD-RTO: 15.8 % (ref 12.4–15.4)
PDW BLD-RTO: 18.7 % (ref 12.4–15.4)
PERFORMED ON: ABNORMAL
PHOSPHORUS: 7.1 MG/DL (ref 2.5–4.9)
PLATELET # BLD: 202 K/UL (ref 135–450)
PLATELET # BLD: 206 K/UL (ref 135–450)
PLATELET # BLD: 295 K/UL (ref 135–450)
PMV BLD AUTO: 7.8 FL (ref 5–10.5)
PMV BLD AUTO: 7.9 FL (ref 5–10.5)
PMV BLD AUTO: 8.1 FL (ref 5–10.5)
POTASSIUM REFLEX MAGNESIUM: 3.7 MMOL/L (ref 3.5–5.1)
PROTHROMBIN TIME: 17.9 SEC (ref 11.7–14.5)
PROTHROMBIN TIME: 18.7 SEC (ref 11.7–14.5)
RBC # BLD: 2.05 M/UL (ref 4–5.2)
RBC # BLD: 2.86 M/UL (ref 4–5.2)
RBC # BLD: 3.58 M/UL (ref 4–5.2)
SODIUM BLD-SCNC: 138 MMOL/L (ref 136–145)
TOTAL PROTEIN: 4.4 G/DL (ref 6.4–8.2)
TRIGL SERPL-MCNC: 119 MG/DL (ref 0–150)
TROPONIN: <0.01 NG/ML
WBC # BLD: 12.2 K/UL (ref 4–11)
WBC # BLD: 14.2 K/UL (ref 4–11)
WBC # BLD: 15 K/UL (ref 4–11)
WBC # BLD: 16 K/UL (ref 4–11)

## 2023-02-16 PROCEDURE — 85048 AUTOMATED LEUKOCYTE COUNT: CPT

## 2023-02-16 PROCEDURE — 74176 CT ABD & PELVIS W/O CONTRAST: CPT

## 2023-02-16 PROCEDURE — 6360000002 HC RX W HCPCS: Performed by: SURGERY

## 2023-02-16 PROCEDURE — 85730 THROMBOPLASTIN TIME PARTIAL: CPT

## 2023-02-16 PROCEDURE — 36430 TRANSFUSION BLD/BLD COMPNT: CPT

## 2023-02-16 PROCEDURE — 02HV33Z INSERTION OF INFUSION DEVICE INTO SUPERIOR VENA CAVA, PERCUTANEOUS APPROACH: ICD-10-PCS | Performed by: INTERNAL MEDICINE

## 2023-02-16 PROCEDURE — 85014 HEMATOCRIT: CPT

## 2023-02-16 PROCEDURE — 6360000002 HC RX W HCPCS: Performed by: INTERNAL MEDICINE

## 2023-02-16 PROCEDURE — 3700000001 HC ADD 15 MINUTES (ANESTHESIA): Performed by: SURGERY

## 2023-02-16 PROCEDURE — 2700000000 HC OXYGEN THERAPY PER DAY

## 2023-02-16 PROCEDURE — 2500000003 HC RX 250 WO HCPCS: Performed by: ANESTHESIOLOGY

## 2023-02-16 PROCEDURE — 2580000003 HC RX 258: Performed by: INTERNAL MEDICINE

## 2023-02-16 PROCEDURE — 2709999900 HC NON-CHARGEABLE SUPPLY: Performed by: SURGERY

## 2023-02-16 PROCEDURE — 84484 ASSAY OF TROPONIN QUANT: CPT

## 2023-02-16 PROCEDURE — 80053 COMPREHEN METABOLIC PANEL: CPT

## 2023-02-16 PROCEDURE — 93010 ELECTROCARDIOGRAM REPORT: CPT | Performed by: INTERNAL MEDICINE

## 2023-02-16 PROCEDURE — C1751 CATH, INF, PER/CENT/MIDLINE: HCPCS

## 2023-02-16 PROCEDURE — 85027 COMPLETE CBC AUTOMATED: CPT

## 2023-02-16 PROCEDURE — APPNB60 APP NON BILLABLE TIME 46-60 MINS: Performed by: CLINICAL NURSE SPECIALIST

## 2023-02-16 PROCEDURE — APPSS45 APP SPLIT SHARED TIME 31-45 MINUTES: Performed by: CLINICAL NURSE SPECIALIST

## 2023-02-16 PROCEDURE — 2580000003 HC RX 258: Performed by: ANESTHESIOLOGY

## 2023-02-16 PROCEDURE — 2000000000 HC ICU R&B

## 2023-02-16 PROCEDURE — 2580000003 HC RX 258: Performed by: SURGERY

## 2023-02-16 PROCEDURE — 85025 COMPLETE CBC W/AUTO DIFF WBC: CPT

## 2023-02-16 PROCEDURE — 94761 N-INVAS EAR/PLS OXIMETRY MLT: CPT

## 2023-02-16 PROCEDURE — 49000 EXPLORATION OF ABDOMEN: CPT | Performed by: SURGERY

## 2023-02-16 PROCEDURE — 3700000000 HC ANESTHESIA ATTENDED CARE: Performed by: SURGERY

## 2023-02-16 PROCEDURE — 84100 ASSAY OF PHOSPHORUS: CPT

## 2023-02-16 PROCEDURE — 3600000004 HC SURGERY LEVEL 4 BASE: Performed by: SURGERY

## 2023-02-16 PROCEDURE — 6360000002 HC RX W HCPCS: Performed by: ANESTHESIOLOGY

## 2023-02-16 PROCEDURE — 2500000003 HC RX 250 WO HCPCS: Performed by: NURSE PRACTITIONER

## 2023-02-16 PROCEDURE — 85610 PROTHROMBIN TIME: CPT

## 2023-02-16 PROCEDURE — 99024 POSTOP FOLLOW-UP VISIT: CPT | Performed by: SURGERY

## 2023-02-16 PROCEDURE — 0W9G0ZZ DRAINAGE OF PERITONEAL CAVITY, OPEN APPROACH: ICD-10-PCS | Performed by: SURGERY

## 2023-02-16 PROCEDURE — 3600000014 HC SURGERY LEVEL 4 ADDTL 15MIN: Performed by: SURGERY

## 2023-02-16 PROCEDURE — 85018 HEMOGLOBIN: CPT

## 2023-02-16 PROCEDURE — A4217 STERILE WATER/SALINE, 500 ML: HCPCS | Performed by: SURGERY

## 2023-02-16 PROCEDURE — 93926 LOWER EXTREMITY STUDY: CPT

## 2023-02-16 PROCEDURE — 83605 ASSAY OF LACTIC ACID: CPT

## 2023-02-16 PROCEDURE — 93005 ELECTROCARDIOGRAM TRACING: CPT | Performed by: INTERNAL MEDICINE

## 2023-02-16 PROCEDURE — 36573 INSJ PICC RS&I 5 YR+: CPT

## 2023-02-16 PROCEDURE — 85384 FIBRINOGEN ACTIVITY: CPT

## 2023-02-16 PROCEDURE — 84478 ASSAY OF TRIGLYCERIDES: CPT

## 2023-02-16 PROCEDURE — 36415 COLL VENOUS BLD VENIPUNCTURE: CPT

## 2023-02-16 RX ORDER — SUCCINYLCHOLINE CHLORIDE 20 MG/ML
INJECTION INTRAMUSCULAR; INTRAVENOUS PRN
Status: DISCONTINUED | OUTPATIENT
Start: 2023-02-16 | End: 2023-02-16 | Stop reason: SDUPTHER

## 2023-02-16 RX ORDER — FENTANYL CITRATE-0.9 % NACL/PF 10 MCG/ML
25-200 PLASTIC BAG, INJECTION (ML) INTRAVENOUS CONTINUOUS
Status: DISCONTINUED | OUTPATIENT
Start: 2023-02-16 | End: 2023-02-17

## 2023-02-16 RX ORDER — SODIUM CHLORIDE 9 MG/ML
INJECTION, SOLUTION INTRAVENOUS PRN
Status: DISCONTINUED | OUTPATIENT
Start: 2023-02-16 | End: 2023-02-17

## 2023-02-16 RX ORDER — ETOMIDATE 2 MG/ML
INJECTION INTRAVENOUS PRN
Status: DISCONTINUED | OUTPATIENT
Start: 2023-02-16 | End: 2023-02-16 | Stop reason: SDUPTHER

## 2023-02-16 RX ORDER — CALCIUM GLUCONATE 20 MG/ML
1000 INJECTION, SOLUTION INTRAVENOUS ONCE
Status: COMPLETED | OUTPATIENT
Start: 2023-02-16 | End: 2023-02-16

## 2023-02-16 RX ORDER — DEXAMETHASONE SODIUM PHOSPHATE 4 MG/ML
INJECTION, SOLUTION INTRA-ARTICULAR; INTRALESIONAL; INTRAMUSCULAR; INTRAVENOUS; SOFT TISSUE PRN
Status: DISCONTINUED | OUTPATIENT
Start: 2023-02-16 | End: 2023-02-16 | Stop reason: SDUPTHER

## 2023-02-16 RX ORDER — PROPOFOL 10 MG/ML
INJECTION, EMULSION INTRAVENOUS CONTINUOUS PRN
Status: DISCONTINUED | OUTPATIENT
Start: 2023-02-16 | End: 2023-02-16 | Stop reason: SDUPTHER

## 2023-02-16 RX ORDER — 0.9 % SODIUM CHLORIDE 0.9 %
30 INTRAVENOUS SOLUTION INTRAVENOUS ONCE
Status: COMPLETED | OUTPATIENT
Start: 2023-02-16 | End: 2023-02-16

## 2023-02-16 RX ORDER — ONDANSETRON 2 MG/ML
INJECTION INTRAMUSCULAR; INTRAVENOUS PRN
Status: DISCONTINUED | OUTPATIENT
Start: 2023-02-16 | End: 2023-02-16 | Stop reason: SDUPTHER

## 2023-02-16 RX ORDER — MAGNESIUM HYDROXIDE 1200 MG/15ML
LIQUID ORAL CONTINUOUS PRN
Status: COMPLETED | OUTPATIENT
Start: 2023-02-16 | End: 2023-02-16

## 2023-02-16 RX ORDER — PROPOFOL 10 MG/ML
5-50 INJECTION, EMULSION INTRAVENOUS CONTINUOUS
Status: DISCONTINUED | OUTPATIENT
Start: 2023-02-16 | End: 2023-02-17

## 2023-02-16 RX ORDER — SODIUM CHLORIDE 9 MG/ML
INJECTION, SOLUTION INTRAVENOUS CONTINUOUS
Status: DISCONTINUED | OUTPATIENT
Start: 2023-02-16 | End: 2023-02-19

## 2023-02-16 RX ORDER — ROCURONIUM BROMIDE 10 MG/ML
INJECTION, SOLUTION INTRAVENOUS PRN
Status: DISCONTINUED | OUTPATIENT
Start: 2023-02-16 | End: 2023-02-16 | Stop reason: SDUPTHER

## 2023-02-16 RX ADMIN — ETOMIDATE 20 MG: 2 INJECTION INTRAVENOUS at 17:17

## 2023-02-16 RX ADMIN — SODIUM CHLORIDE: 9 INJECTION, SOLUTION INTRAVENOUS at 17:01

## 2023-02-16 RX ADMIN — LIDOCAINE HYDROCHLORIDE 5 ML: 10 INJECTION, SOLUTION INFILTRATION; PERINEURAL at 06:15

## 2023-02-16 RX ADMIN — SODIUM CHLORIDE: 9 INJECTION, SOLUTION INTRAVENOUS at 03:41

## 2023-02-16 RX ADMIN — ROCURONIUM BROMIDE 20 MG: 10 SOLUTION INTRAVENOUS at 18:05

## 2023-02-16 RX ADMIN — SODIUM CHLORIDE: 9 INJECTION, SOLUTION INTRAVENOUS at 20:56

## 2023-02-16 RX ADMIN — PROPOFOL 25 MCG/KG/MIN: 10 INJECTION, EMULSION INTRAVENOUS at 22:53

## 2023-02-16 RX ADMIN — SODIUM CHLORIDE 5 MCG/MIN: 9 INJECTION, SOLUTION INTRAVENOUS at 23:52

## 2023-02-16 RX ADMIN — ROCURONIUM BROMIDE 50 MG: 10 SOLUTION INTRAVENOUS at 17:17

## 2023-02-16 RX ADMIN — PROPOFOL 30 MCG/KG/MIN: 10 INJECTION, EMULSION INTRAVENOUS at 17:57

## 2023-02-16 RX ADMIN — ONDANSETRON 4 MG: 2 INJECTION INTRAMUSCULAR; INTRAVENOUS at 17:17

## 2023-02-16 RX ADMIN — PIPERACILLIN AND TAZOBACTAM 3375 MG: 3; .375 INJECTION, POWDER, FOR SOLUTION INTRAVENOUS at 10:34

## 2023-02-16 RX ADMIN — SODIUM CHLORIDE 1000 ML: 9 INJECTION, SOLUTION INTRAVENOUS at 09:51

## 2023-02-16 RX ADMIN — FENTANYL CITRATE 25 MCG/HR: 0.05 INJECTION, SOLUTION INTRAMUSCULAR; INTRAVENOUS at 19:58

## 2023-02-16 RX ADMIN — SUCCINYLCHOLINE CHLORIDE 120 MG: 20 INJECTION, SOLUTION INTRAMUSCULAR; INTRAVENOUS at 17:17

## 2023-02-16 RX ADMIN — PROPOFOL 30 MCG/KG/MIN: 10 INJECTION, EMULSION INTRAVENOUS at 19:00

## 2023-02-16 RX ADMIN — PIPERACILLIN AND TAZOBACTAM 3375 MG: 3; .375 INJECTION, POWDER, FOR SOLUTION INTRAVENOUS at 01:49

## 2023-02-16 RX ADMIN — CALCIUM GLUCONATE 1000 MG: 20 INJECTION, SOLUTION INTRAVENOUS at 01:52

## 2023-02-16 RX ADMIN — HYDROMORPHONE HYDROCHLORIDE 0.5 MG: 0.5 INJECTION, SOLUTION INTRAMUSCULAR; INTRAVENOUS; SUBCUTANEOUS at 13:42

## 2023-02-16 RX ADMIN — DEXAMETHASONE SODIUM PHOSPHATE 10 MG: 4 INJECTION, SOLUTION INTRAMUSCULAR; INTRAVENOUS at 17:17

## 2023-02-16 ASSESSMENT — PAIN - FUNCTIONAL ASSESSMENT
PAIN_FUNCTIONAL_ASSESSMENT: 0-10
PAIN_FUNCTIONAL_ASSESSMENT: PREVENTS OR INTERFERES SOME ACTIVE ACTIVITIES AND ADLS

## 2023-02-16 ASSESSMENT — PAIN DESCRIPTION - DESCRIPTORS: DESCRIPTORS: SHARP

## 2023-02-16 ASSESSMENT — PULMONARY FUNCTION TESTS: PIF_VALUE: 21

## 2023-02-16 NOTE — PROGRESS NOTES
[facility] GENERAL SURGERY    Surgery Progress Note           POD # 2    PATIENT NAME: Marcial Pallas     TODAY'S DATE: 2/16/2023    INTERVAL HISTORY:    Pt events of overnight noted - spoke with resident via phone this morning who indicated they were with the patient and she was transferred to higher level of care. He noted they were giving the pt blood and that asked if we though she needed CT, I said yes, she would warrant a stat CT and he stated he had spoken to his attending and that they were concerned the pt was not yet stable enough for CT. Upon entering the room to evaluate pt, Dr. Rylie Reyes was at bedside further managing the patient. The pt is awake, alert, but is lethargic. BP was improving. Blood was infusing and further fluid bolus was being ordered. There was concern that there was some stool leakage on her midline dressing that wasn't coming from her ostomy, however when midline dressing was removed I noticed that the edge of her stoma flange was lifted and stool leaking from there. Could not express anything from midline wound. Stoma with stool, no blood - nursing states pt has not had any bleeding from rectum either. OBJECTIVE:   VITALS:  BP 91/68   Pulse (!) 116   Temp 99 °F (37.2 °C) (Axillary)   Resp 18   Ht 5' 5\" (1.651 m)   Wt 220 lb 12.8 oz (100.2 kg)   LMP  (LMP Unknown) Comment: HYSTERECTOMY  SpO2 98%   BMI 36.74 kg/m²     INTAKE/OUTPUT:    I/O last 3 completed shifts: In: 133.4 [P.O.:120; I.V.:13.4]  Out: 7680 [Urine:1425; Emesis/NG output:4805; JMXNR:1236]  I/O this shift:   In: 0   Out: 950 [Emesis/NG output:800; Stool:150]              CONSTITUTIONAL:  lethargic  LUNGS:  no crackles or wheezing  ABDOMEN:   soft, non-distended, tenderness noted midline - staples intact, no drainage, stoma healthy with stool in bag - some leakage from medial side of flange   INCISION: no drainage    Data:  CBC:   Recent Labs     02/14/23  0542 02/15/23  0515 02/15/23  4322 02/16/23  0159 02/16/23  0539   WBC 5.0 9.1  --  12.2* 14.2*   HGB 9.8* 9.7* 7.4*  --  5.2*   HCT 31.1* 29.8* 24.2*  --  17.2*    325  --   --  295       BMP:    Recent Labs     02/15/23  0514 02/15/23  2157 02/16/23  0539    134* 138   K 4.6 4.2 3.7    98* 103   CO2 27 23 22   BUN 12 21* 24*   CREATININE 0.9 2.2* 2.2*   GLUCOSE 126* 158* 137*       Hepatic:   Recent Labs     02/14/23  0542 02/15/23  0514 02/16/23  0539   AST 48* 50* 27   ALT 40 43* 26   BILITOT 0.3 0.3 0.5   ALKPHOS 235* 194* 133*           ASSESSMENT AND PLAN:  63 y.o. female status post Haylie's Procedure  GI: continue with ngt to suction - stat CT  Anemia: transfuse - serial H/H  : continue with lakhani today to monitor I/O  TARSHA: fluid bolus per primary team - toradol stopped  New ostomy: WOCN following     Electronically signed by JARETT Blair CNP     1000:     Spoke with Dr. Edwin Upton by phone who relayed that CT showed likely hemorrhagic ascites with 14x7cm fluid collection in LLQ and 6x7cm fluid collection in pelvis. I called the OR and spoke with Dr. Estella Rudd to notify him of the CT results. Continue to resuscitate the pt and move to ICU. Pt will likely require return to the OR this JARETT Abdullahi CNP    1250 addendum:     While in PACU, 2nd unit of blood was infusing, and the pt evaluated by myself, Dr. Estella Rudd as well as anesthesia and pt became stable. She remained in PACU for over an hour an vitals continued to remain stable - repeat labs revealed increase in H/H to 6.9/22. 6. Decision was made not to take the pt to the OR as she is stabilizing and bleeding likely tamponading off. Will follow closely throughout the day with repeat labs and monitoring. IF her condition were to change then will consider OR at that time. To note, while in PACU area, was also noted that pt had large bulging at the upper right thigh incision site.  This was evaluated by vascular surgery and doppler was completed - see those results. The pt had palpable pulses distally. No intervention needed at this time. Dr. Jimenez Sawyer and Katarina aware of decision not to take the pt to the [place] [address], APRN - CNP    Surgery Staff    I have examined this patient, and read and agree with the note by Yogesh Aragon CNP from today; more than half of the total time was spent by me on the encounter. Pt likely had intra-abdominal hemorrhage related to recent colonic surgery combined with the anticoagulation therapy. As described above, during our evaluation earlier this afternoon in the Preop Holding area, it seemed as if she was truly stabilizing with SBP>120, -120, improving H/H. Plan at this point has been to continue to observe closely for further signs of bleeding, and if bleeding seems to recur then we might still need to consider returning to OR to explore for source of bleeding. I discussed these issues in detail w/ patient, and later with her family. They appear to understand and agree with the plan. Will closely monitor with you.     Nancy Ramírez MD

## 2023-02-16 NOTE — PROGRESS NOTES
Occupational Therapy/Physical Therapy  OT/PT orders. Pt is to be transferred to ICU per RN. OT/PT will sign off. Please reorder when pt is able to participate in therapy activity. Thank you.   Stuart Sandoval OT  Maeve Young PT

## 2023-02-16 NOTE — PROGRESS NOTES
Arrived to bedside for PICC placement. Chart reviewed and timeout done with RN Shon Urban. No issues accessing L brachial vein. Good blood return and easy flush. Tip in proper position as evidenced by peaked P waves with no initial deflection. Handoff to RN.

## 2023-02-16 NOTE — ANESTHESIA PRE PROCEDURE
Department of Anesthesiology  Preprocedure Note       Name:  Wicho Ojeda   Age:  63 y.o.  :  1960                                          MRN:  [id number]         Date:  2023      Surgeon: Igor San):  Elaine Li MD    Procedure: Procedure(s):  LAPAROTOMY EXPLORATORY    Medications prior to admission:   Prior to Admission medications    Medication Sig Start Date End Date Taking? Authorizing Provider   apixaban (ELIQUIS) 5 MG TABS tablet Take 5 mg by mouth 2 times daily Patient unsure of dose. \"Thinks\" it is 5mg   Yes Historical Provider, MD   Levothyroxine Sodium 200 MCG CAPS Take by mouth daily Unsure of dose   Yes Historical Provider, MD   propranolol (INDERAL) 40 MG tablet Take 40 mg by mouth 2 times daily   Yes Historical Provider, MD   gabapentin (NEURONTIN) 300 MG capsule Take 300 mg by mouth 3 times daily. Yes Historical Provider, MD   oxyCODONE-acetaminophen (PERCOCET)  MG per tablet Take 1 tablet by mouth every 6 hours as needed for Pain.    Yes Historical Provider, MD   atorvastatin (LIPITOR) 40 MG tablet Take 40 mg by mouth daily   Yes Historical Provider, MD   cyclobenzaprine (FLEXERIL) 10 MG tablet Take 10 mg by mouth 3 times daily   Yes Historical Provider, MD   Plecanatide (TRULANCE) 3 MG TABS Take 3 mg by mouth daily   Yes Historical Provider, MD   allopurinol (ZYLOPRIM) 100 MG tablet Take 100 mg by mouth daily   Yes Historical Provider, MD   liothyronine (CYTOMEL) 5 MCG tablet Take 5 mcg by mouth daily   Yes Historical Provider, MD   aspirin 81 MG chewable tablet Take 81 mg by mouth daily   Yes Historical Provider, MD       Current medications:    Current Facility-Administered Medications   Medication Dose Route Frequency Provider Last Rate Last Admin    vancomycin (VANCOCIN) intermittent dosing (placeholder)   Other RX Placeholder Kendal Bound, APRN - CNP        vancomycin (VANCOCIN) 2,000 mg in sodium chloride 0.9 % 500 mL IVPB  2,000 mg IntraVENous Once Amee Edwardsheim JARETT Joshi - CNP        0.9 % sodium chloride bolus  30 mL/kg IntraVENous Once Essence Juarez MD 1,490.6 mL/hr at 02/16/23 0951 1,000 mL at 02/16/23 0951    0.9 % sodium chloride infusion   IntraVENous PRN Essence Juarez MD        0.9 % sodium chloride infusion   IntraVENous Continuous Essence Juarez MD        sodium chloride flush 0.9 % injection 5-40 mL  5-40 mL IntraVENous 2 times per day JARETT Patterson - CNP   10 mL at 02/15/23 2209    sodium chloride flush 0.9 % injection 5-40 mL  5-40 mL IntraVENous PRN Tj Dwyer, APRN - CNP        0.9 % sodium chloride infusion  25 mL IntraVENous PRN Tj Dwyer, APRN - CNP        0.9 % sodium chloride infusion   IntraVENous PRN Tj Dwyer, JARETT - CNP        naloxone 0.4 mg in 10 mL sodium chloride syringe   IntraVENous PRN Girard Schlatter, MD        HYDROmorphone (DILAUDID) 1 mg/mL PCA   IntraVENous Continuous Diamond Mendosa MD   Paused at 02/15/23 2207    glucose chewable tablet 16 g  4 tablet Oral PRN Girard Schlatter, MD        dextrose bolus 10% 125 mL  125 mL IntraVENous PRN Girard Schlatter, MD        Or    dextrose bolus 10% 250 mL  250 mL IntraVENous PRN Girard Schlatter, MD        glucagon (rDNA) injection 1 mg  1 mg SubCUTAneous PRN Girard Schlatter, MD        dextrose 10 % infusion   IntraVENous Continuous PRN Girard Schlatter, MD        insulin lispro (HUMALOG) injection vial 0-8 Units  0-8 Units SubCUTAneous Q4H Girard Schlatter, MD        benzocaine (HURRICAINE) 20 % oral spray   Mouth/Throat 4x Daily PRN JARETT Patterson - CNP        docusate sodium (COLACE) capsule 100 mg  100 mg Oral Daily Girard Schlatter, MD   100 mg at 02/15/23 0839    piperacillin-tazobactam (ZOSYN) 3,375 mg in sodium chloride 0.9 % 50 mL IVPB (mini-bag)  3,375 mg IntraVENous Q8H Girard Schlatter, MD 12.5 mL/hr at 02/16/23 1034 3,375 mg at 02/16/23 1034    sodium chloride flush 0.9 % injection 5-40 mL  5-40 mL IntraVENous 2 times per day Gus Ford MD   10 mL at 02/15/23 0840    sodium chloride flush 0.9 % injection 5-40 mL  5-40 mL IntraVENous PRN Gus Ford MD        0.9 % sodium chloride infusion   IntraVENous PRN Gus Ford MD   Stopped at 02/12/23 1857    ondansetron (ZOFRAN-ODT) disintegrating tablet 4 mg  4 mg Oral Q8H PRN Gus Ford MD   4 mg at 02/12/23 2343    Or    ondansetron (ZOFRAN) injection 4 mg  4 mg IntraVENous Q6H PRN Gus Ford MD   4 mg at 02/15/23 6230    acetaminophen (TYLENOL) tablet 650 mg  650 mg Oral Q6H PRN Gus Ford MD        Or    acetaminophen (TYLENOL) suppository 650 mg  650 mg Rectal Q6H PRN Gus Ford MD        potassium chloride (KLOR-CON M) extended release tablet 40 mEq  40 mEq Oral PRN Gus Ford MD        Or    potassium bicarb-citric acid (EFFER-K) effervescent tablet 40 mEq  40 mEq Oral PRN Gus Ford MD        Or    potassium chloride 10 mEq/100 mL IVPB (Peripheral Line)  10 mEq IntraVENous PRN Gus Ford MD        magnesium sulfate 2000 mg in 50 mL IVPB premix  2,000 mg IntraVENous PRN Gus Ford MD        sodium phosphate 10 mmol in sodium chloride 0.9 % 250 mL IVPB  10 mmol IntraVENous PRN Gus Ford MD        Or    sodium phosphate 15 mmol in sodium chloride 0.9 % 250 mL IVPB  15 mmol IntraVENous PRN Gus Ford MD        Or    sodium phosphate 20 mmol in sodium chloride 0.9 % 500 mL IVPB  20 mmol IntraVENous PRN Gus Ford MD        allopurinol (ZYLOPRIM) tablet 100 mg  100 mg Oral Daily Gus Ford MD   100 mg at 02/15/23 0838    atorvastatin (LIPITOR) tablet 40 mg  40 mg Oral Daily Gus Ford MD   40 mg at 02/15/23 0839    aspirin chewable tablet 81 mg  81 mg Oral Daily Gus Ford MD   81 mg at 02/15/23 0839    gabapentin (NEURONTIN) capsule 300 mg  300 mg Oral TID Gus Ford MD   300 mg at 02/15/23 1449    levothyroxine (SYNTHROID) tablet 200 mcg  200 mcg Oral Daily Bart Escoto MD   200 mcg at 02/15/23 0616    propranolol (INDERAL) tablet 40 mg  40 mg Oral BID Bart Escoto MD   40 mg at 02/15/23 0840    [Held by provider] enoxaparin (LOVENOX) injection 100 mg  1 mg/kg SubCUTAneous BID Bart Escoto MD   100 mg at 02/15/23 2246       Allergies:  No Known Allergies    Problem List:    Patient Active Problem List   Diagnosis Code    Colonic obstruction ([facility] [address] K56.609    Bowel obstruction ([address] K56.609    Diverticulitis K57.92    Hemorrhagic shock ([address] R57.8    Acute blood loss anemia D62    Acute renal failure (ARF) ([address] N17.9       Past Medical History:  History reviewed. No pertinent past medical history.     Past Surgical History:        Procedure Laterality Date    SIGMOID COLECTOMY N/A 2023    BOWEL RESECTION SIGMOID COLECTOMY WITH COLOSTOMY CREATION performed by Bart Escoto MD at [place] [address] History:    Social History     Tobacco Use    Smoking status: Former     Packs/day: 1.00     Years: 34.00     Pack years: 34.00     Types: Cigarettes     Start date: 1977     Quit date: 2021     Years since quittin.1    Smokeless tobacco: Current   Substance Use Topics    Alcohol use: Never                                Ready to quit: Not Answered  Counseling given: Not Answered      Vital Signs (Current):   Vitals:    23 0954 23 1006 23 1011 23 1026   BP: 100/74 (!) 100/57 (!) 92/57 99/74   Pulse: (!) 121 (!) 116 (!) 110 (!) 113   Resp: 18 18 18 20   Temp: 98.8 °F (37.1 °C) 98.8 °F (37.1 °C) 98.4 °F (36.9 °C) 98.4 °F (36.9 °C)   TempSrc: Oral Axillary Axillary Axillary   SpO2: 99% 94% 96% 90%   Weight:       Height:                                                  BP Readings from Last 3 Encounters:   23 99/74       NPO Status: Time of last liquid consumption: 0900                                                 Date of last liquid consumption: 02/14/23                        Date of last solid food consumption: 02/12/23    BMI:   Wt Readings from Last 3 Encounters:   02/09/23 220 lb 12.8 oz (100.2 kg)     Body mass index is 36.74 kg/m².     CBC:   Lab Results   Component Value Date/Time    WBC 14.2 02/16/2023 05:39 AM    RBC 2.05 02/16/2023 05:39 AM    HGB 5.2 02/16/2023 05:39 AM    HCT 17.2 02/16/2023 05:39 AM    MCV 84.1 02/16/2023 05:39 AM    RDW 18.7 02/16/2023 05:39 AM     02/16/2023 05:39 AM       CMP:   Lab Results   Component Value Date/Time     02/16/2023 05:39 AM    K 3.7 02/16/2023 05:39 AM     02/16/2023 05:39 AM    CO2 22 02/16/2023 05:39 AM    BUN 24 02/16/2023 05:39 AM    CREATININE 2.2 02/16/2023 05:39 AM    AGRATIO 1.1 02/16/2023 05:39 AM    LABGLOM 25 02/16/2023 05:39 AM    GLUCOSE 137 02/16/2023 05:39 AM    PROT 4.4 02/16/2023 05:39 AM    CALCIUM 7.9 02/16/2023 05:39 AM    BILITOT 0.5 02/16/2023 05:39 AM    ALKPHOS 133 02/16/2023 05:39 AM    AST 27 02/16/2023 05:39 AM    ALT 26 02/16/2023 05:39 AM       POC Tests:   Recent Labs     02/16/23  0722   POCGLU 147*       Coags:   Lab Results   Component Value Date/Time    PROTIME 15.9 02/09/2023 01:53 PM    INR 1.27 02/09/2023 01:53 PM    APTT 33.1 02/09/2023 01:53 PM       HCG (If Applicable): No results found for: PREGTESTUR, PREGSERUM, HCG, HCGQUANT     ABGs: No results found for: PHART, PO2ART, NPD0MMF, PER9WJJ, BEART, B7MSANWH     Type & Screen (If Applicable):  No results found for: LABABO, LABRH    Drug/Infectious Status (If Applicable):  No results found for: HIV, HEPCAB    COVID-19 Screening (If Applicable): No results found for: COVID19        Anesthesia Evaluation  Patient summary reviewed and Nursing notes reviewed no history of anesthetic complications:   Airway: Mallampati: II     Neck ROM: full     Dental:          Pulmonary:Negative Pulmonary ROS and normal exam                               Cardiovascular:Negative CV ROS                   ROS comment: Hemorrhagic shock       Neuro/Psych:   Negative Neuro/Psych ROS              GI/Hepatic/Renal: Neg GI/Hepatic/Renal ROS  (+) renal disease: ARF,      (-) hiatal hernia and GERD       Endo/Other: Negative Endo/Other ROS                    Abdominal:             Vascular: Other Findings:           Anesthesia Plan      general     ASA 4 - emergent     (I discussed with the patient the risks and benefits of PIV, general anesthesia, IV Narcotics, PACU. All questions were answered the patient agrees with the plan and wishes to proceed. Patient with likely intraabdominal bleed, urgent to OR. Currently receiving 2nd unit of blood. On oxygen patient aware she may remain intubated postop based on transfusion and fluids given. She understands and wishes to proceed. Blood pressure improved with blood and fluids infusing.)  Induction: intravenous. Sinus tachycardiaNonspecific T wave abnormalityWhen compared with ECG of 2023 10:48,ST no longer depressed in Anterior leads    Pre-Operative Diagnosis: Colonic obstruction ([address] [K56.609]; Bowel obstruction ([address] [K56.609]    63 y.o.   BMI:  Body mass index is 36.74 kg/m².      Vitals:    23 0954 23 1006 23 1011 23 1026   BP: 100/74 (!) 100/57 (!) 92/57 99/74   Pulse: (!) 121 (!) 116 (!) 110 (!) 113   Resp: 18 18 18 20   Temp: 98.8 °F (37.1 °C) 98.8 °F (37.1 °C) 98.4 °F (36.9 °C) 98.4 °F (36.9 °C)   TempSrc: Oral Axillary Axillary Axillary   SpO2: 99% 94% 96% 90%   Weight:       Height:           No Known Allergies    Social History     Tobacco Use    Smoking status: Former     Packs/day: 1.00     Years: 34.00     Pack years: 34.00     Types: Cigarettes     Start date: 1977     Quit date: 2021     Years since quittin.1    Smokeless tobacco: Current   Substance Use Topics    Alcohol use: Never       LABS:    CBC  Lab Results   Component Value Date/Time    WBC 14.2 (H) 2023 05:39 AM    HGB 5.2 (LL) 02/16/2023 05:39 AM    HCT 17.2 (LL) 02/16/2023 05:39 AM     02/16/2023 05:39 AM     RENAL  Lab Results   Component Value Date/Time     02/16/2023 05:39 AM    K 3.7 02/16/2023 05:39 AM     02/16/2023 05:39 AM    CO2 22 02/16/2023 05:39 AM    BUN 24 (H) 02/16/2023 05:39 AM    CREATININE 2.2 (H) 02/16/2023 05:39 AM    GLUCOSE 137 (H) 02/16/2023 05:39 AM     COAGS  Lab Results   Component Value Date/Time    PROTIME 15.9 (H) 02/09/2023 01:53 PM    INR 1.27 (H) 02/09/2023 01:53 PM    APTT 33.1 02/09/2023 01:53 PM       Tri Bailey MD   2/16/2023

## 2023-02-16 NOTE — PROGRESS NOTES
Perfect serve sent to cross cover NP MURALI Joshi, \"Are you able to come up to bedside to evaluate? Pt BP low 60's and 70's or 40's. Tachy Temp 100.9. I have clinical up here to see her as well. Pt did have surgery yesterday. Primary RN Mayito Settler. Thanks! \"     NP came up to bedside to evaluate pt.

## 2023-02-16 NOTE — CONSULTS
Pharmacy to Dose Vancomycin    Dx: intra-abd infection  Goal trough = 15-20  Pt wt = 100.2 kg  Recent Labs     02/14/23  0542 02/15/23  0514 02/15/23  2157   CREATININE 0.9 0.9 2.2*     Recent Labs     02/13/23  0533 02/14/23  0542 02/15/23  0515   WBC 5.0 5.0 9.1     Vanc 2g x1  Pulse dose for now.   Vancomycin random 2/16 1600  Shaun Ventura, [address] [place] [address] 2/16/2023 1:40 AM    ----------------------------------------------------------------------    Adjusted time for Vancomycin to today at 1500  Therefore, Vancomycin random level re-timed for tomorrow (2/17) AM  Maciej Liu PharmD 2/16/2023  1:51 PM

## 2023-02-16 NOTE — PROGRESS NOTES
Perfect serve sent to rayshawn Liu, \"Primary RN Anushka Rausch got a critical hemoglobin of 5.2 and hematocrit of 17.2. Merlin Gonzales NP ordered one unit PRBCs earlier when she came up to evaluate the pt at 2100. Pt was unable to get the one unit PRBCs that was ordered due to not have appropriate IV access. Pt just got PICC line placed. Getting to start the one unit her shortly. Pt was admitted with colonic obstruction and had a colectomy and ostomy placed on 2/14. Message read at [phone]. No new orders.

## 2023-02-16 NOTE — ANESTHESIA PROCEDURE NOTES
Arterial Line:    An arterial line was placed using ultrasound guidance and surface landmarks, in the OR for the following indication(s): continuous blood pressure monitoring and blood sampling needed. A 20 gauge (size), 1 and 3/8 inch (length), Angiocath (type) catheter was placed, Seldinger technique not used, into the right radial artery, secured by tape.   Anesthesia type: Local    Events:  patient tolerated procedure well with no complications and EBL [id number] 5:30 PM2/16/2023 5:31 PM  Anesthesiologist: Tasha Bermeo MD  Performed: Anesthesiologist   Preanesthetic Checklist  Completed: patient identified, IV checked, site marked, risks and benefits discussed, surgical/procedural consents, equipment checked, pre-op evaluation, timeout performed, anesthesia consent given, oxygen available and monitors applied/VS acknowledged

## 2023-02-16 NOTE — CARE COORDINATION
Spoke with patient and daughter at bedside for introduction. Patient currently with NG tube. Was earlier thought to need surgery but patient stabilized while in PACU receiving blood so decision was made not to take patient to surgery. Per previous CM note, plan was for patient to discharge to home with [facility] home care. Placed call to Eva with [facility] at [phone]. She confirmed that they will be able to follow at discharge for home care needs. CM will continue to follow.

## 2023-02-16 NOTE — PLAN OF CARE
Problem: Pain  Goal: Verbalizes/displays adequate comfort level or baseline comfort level  Outcome: Progressing     Problem: ABCDS Injury Assessment  Goal: Absence of physical injury  Outcome: Progressing ADMIT

## 2023-02-16 NOTE — PROGRESS NOTES
Pt having chest pain. Stser MD made verbally aware. EKG STAT ordered and troponin. EKG made aware of order.

## 2023-02-16 NOTE — PROGRESS NOTES
Cross cover asked to come to bedside to evaluate patient for hypotension. The patient is POD#1 of bowel resection sigmoid colectomy with colostomy creation. Upon entering the room, the patient appears to be pale, but alert and oriented. The patient denies any pain at this time. She does have a PCA pump that is set to demand only. PCA currently off due to hypotension. Patient febrile 100.9, tachycardic 115, hypotensive 63/46. Patient is currently on Piperacillin.    -500 ML LR bolus  -albumin given  -stat H/H  -stat lactate  -stat procalcitonin  -stat BMP  -stat blood cultures  -continued  ml/hr    -H/H resulted at 7.4/24.2.  1U PRBC ordered for transfusion  -Lactate WNL 1.6  -Procalcitonin elevated 0.44  -vancomycin added to treatment plan    This case has been discussed with my attending Dr. Rob Wallis whom is agreement with treatment thus far.    Gladis Alcantara, APRN - CNP

## 2023-02-16 NOTE — PROGRESS NOTES
Comprehensive Nutrition Assessment    Type and Reason for Visit:  RD Nutrition Re-Screen/LOS    Nutrition Recommendations/Plan:   NPO per MD, advance diet as tolerated  Poor nutritional status since admission consider alternative needs of nutrition. Consult RD for recommendations. Monitor nutrition adequacy, pertinent labs, bowel habits, wt changes, and clinical progress     Malnutrition Assessment:  Malnutrition Status: At risk for malnutrition (Comment) (02/16/23 4555)    Context:  Acute Illness     Findings of the 6 clinical characteristics of malnutrition:  Energy Intake:  50% or less of estimated energy requirements for 5 or more days      Nutrition Assessment:    LOS Assessment: Pt with hx of bowel resection, sigmoid colon edema with diverticula, and ventral hernia repair. Admitted d/t diverticulitis with mild thickening sigmoid colon. Pt s/p bowel resection colectomy with colostomy creation 2/14. Rapid was called this morning, CT found hemorrhage in LLQ. S/p laparotomy exploratory 2/16, waiting on results. Pt reported poor appetite. Diet downgraded from full liquids to NPO 2/12. PO intake of full liquid diet 1-25% 2/14 per EMR. Unable to assess wt loss d/t lack of wt hx in EMR. Updated wt ordered. May want to consider alternative nutrition if unable to advance PO diet. Will continue to monitor. Nutrition Related Findings:    Colostomy 2/14. Wound Type: Surgical Incision       Current Nutrition Intake & Therapies:    Average Meal Intake: NPO  Average Supplements Intake: None Ordered  Diet NPO Exceptions are: Ice Chips    Anthropometric Measures:  Height: 5' 5\" (165.1 cm)  Ideal Body Weight (IBW): 125 lbs (57 kg)       Current Body Weight: 220 lb 12.8 oz (100.2 kg), 176.6 % IBW.  Weight Source: Not Specified  Current BMI (kg/m2): 36.7        Weight Adjustment For: No Adjustment                 BMI Categories: Obese Class 2 (BMI 35.0 -39.9)    Estimated Daily Nutrient Needs:  Energy Requirements Based On: Kcal/kg  Weight Used for Energy Requirements: Ideal  Energy (kcal/day): 4095-6428  Weight Used for Protein Requirements: Ideal  Protein (g/day): 68-80g  Method Used for Fluid Requirements: 1 ml/kcal  Fluid (ml/day):      Nutrition Diagnosis:   Inadequate oral intake related to inadequate protein-energy intake as evidenced by NPO or clear liquid status due to medical condition    Nutrition Interventions:   Food and/or Nutrient Delivery: Start Oral Diet  Nutrition Education/Counseling: No recommendation at this time  Coordination of Nutrition Care: Continue to monitor while inpatient       Goals:     Goals: PO intake 50% or greater, prior to discharge       Nutrition Monitoring and Evaluation:   Behavioral-Environmental Outcomes: None Identified  Food/Nutrient Intake Outcomes: Food and Nutrient Intake, Diet Advancement/Tolerance  Physical Signs/Symptoms Outcomes: Biochemical Data, Nutrition Focused Physical Findings, Weight    Discharge Planning:     Too soon to determine     Onur Diego: Office: [phone]; [address]: [phone]

## 2023-02-16 NOTE — PROGRESS NOTES
Angelika Carpenter at bedside to discuss surgery options. Dr. Samuel Alex at the bedside to evaluate hematoma on her right leg. Lower leg ultra sound ordered.  Roque Reynoso RN

## 2023-02-16 NOTE — PROGRESS NOTES
Hospitalist ICU Progress Note    CC: Colonic obstruction [facility])    Hospital course:  63year old female who presented to [facility] from [facility] with abdominal pain and constipation on 2/9. She had a lower extremity vascular bypass surgery on her right leg a few weeks ago. She reports constipation and abdominal distension following her surgery with ng tube placement as well as a rectal tube. Patient reports her follow-up colonoscopy revealed sigmoid edema with diverticula and an ulcer, she then improved and was discharged. She again developed abdominal pain and distension and has not had a BM since 2/5. She denies trying any stool softeners or motility agents at home. 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 She also has had surgeries for adhesions and ventral hernia repair. Called for rapid 2/16 for hypotension. Admit date: 2/9/2023  Days in hospital:  7    24 Hour Events: pt will have stat CT scan and aggressive IV fluid resuscitation    Subjective: pt had rapid response this AM - overnight, hgb dropped to 5.2 from 10.6 and creatinine increased to 2.2 from normal  Pt hypotensive overnight  Hgb now down to 5.2. I did order stat CT which shows hemorrhage in LLQ - 14 x 7cm and in pelvis 6 x 7cm    Free air is seen, but she did have surgery two days ago - also an area of possible hemorrhagic ascites around liver and spleen. Have discussed at bedside with surgical PAYogesh.     ROS:  A comprehensive review of systems was negative except for: abd pain and discomfort      Objective:    VITALS:  /74   Pulse (!) 121   Temp 98.8 °F (37.1 °C) (Oral)   Resp 18   Ht 5' 5\" (1.651 m)   Wt 220 lb 12.8 oz (100.2 kg)   LMP  (LMP Unknown) Comment: HYSTERECTOMY  SpO2 99%   BMI 36.74 kg/m²     Gen: ill appearing, pale  HEENT: NC/AT, moist mucous membranes, no oropharyngeal erythema or exudate  Unable to fully assess due to mechanical ventilation and /or BiPAP  Neck: supple, trachea midline, no anterior cervical or SC LAD  Heart:  Normal s1/s2, RRR, no murmurs, gallops, or rubs. no leg edema  Lungs:  diminished bilaterally, no wheeze, no rales, no rhonchi, no crackles, no use of accessory muscles  Abd: bowel sounds present, soft, tender, distended, no masses, colostomy with output  Extrem:  No clubbing, cyanosis,  no edema, peripheral pulses 1+, sluggish capillary refill  Skin: no rashes or lesions, pale color/perfusion  Psych:  A & O x3    Neuro: grossly intact, moves all four extremities. Radiology (personally reviewed by me):  abd CT - LLQ hemorrhage 14 x 7cm, pelvis 6 x 6cm, free air seen, but pt is 2 days post colostomy, 4.9x4.1 subcut hemorrhage near colostomy site    Radiology CT official read: Impression:     Large amount of complex fluid within the abdomen and pelvis, most notably   within the pelvis where hematocrit levels are seen, compatible with   hemorrhage. Smaller 4.9 cm collection within subcutaneous fat adjacent to the left lower   quadrant stoma, compatible with hematoma. Amorphous inflammation or   hemorrhage is seen within the soft tissues immediately adjacent. Mural thickening of the ascending colon and transverse colon, which can   reflect colitis in the appropriate clinical setting. Mild left pelviectasis. Pneumoperitoneum, in keeping with the recent postoperative state. Bibasilar atelectasis. 1 cm right lower lobe pulmonary nodule, for which follow-up recommendations   are as below. Assessment:    Principal Problem:    Colonic obstruction (HCC)  Active Problems:    Bowel obstruction (HCC)    Diverticulitis    Hemorrhagic shock (HCC)    Acute blood loss anemia    Acute renal failure (ARF) ([facility] [address]  Resolved Problems:    * No resolved hospital problems.  *      Plan:   Acute blood loss anemia -  due to bleeding in abdomen pelvis - aggressive fluid resuscitation and blood transfusion - surgery notified, will keep NPO in anticipation of possible surgical exploration today  Hemorrhagic shock - due to acute blood loss anemia - will need to hold lovenox  Acute renal failure - due to hypotension from blood loss  Leukocytosis likely from bleeding    I spent 45 minutes providing critical care for hemorrhage shock. This time is excluding time spent performing procedures.     Prognosis:  Fair    Code status:  Full code    DVT prophylaxis:  SCD on R leg only  GI prophylaxis: Proton Pump Inhibitor  Antibiotic prophylaxis indicated:   no  VAP:   not indicated  Nasal decolonization:  Bactroban  Diet:  Diet NPO Exceptions are: Ice Chips    Disposition:  Home with home health    Medications:  Scheduled Meds:   vancomycin (VANCOCIN) intermittent dosing (placeholder)   Other RX Placeholder    vancomycin  2,000 mg IntraVENous Once    sodium chloride  30 mL/kg IntraVENous Once    sodium chloride flush  5-40 mL IntraVENous 2 times per day    insulin lispro  0-8 Units SubCUTAneous Q4H    docusate sodium  100 mg Oral Daily    piperacillin-tazobactam  3,375 mg IntraVENous Q8H    sodium chloride flush  5-40 mL IntraVENous 2 times per day    allopurinol  100 mg Oral Daily    atorvastatin  40 mg Oral Daily    aspirin  81 mg Oral Daily    gabapentin  300 mg Oral TID    levothyroxine  200 mcg Oral Daily    propranolol  40 mg Oral BID    [Held by provider] enoxaparin  1 mg/kg SubCUTAneous BID       PRN Meds:  sodium chloride, sodium chloride flush, sodium chloride, sodium chloride, naloxone, glucose, dextrose bolus **OR** dextrose bolus, glucagon (rDNA), dextrose, benzocaine, sodium chloride flush, sodium chloride, ondansetron **OR** ondansetron, acetaminophen **OR** acetaminophen, potassium chloride **OR** potassium alternative oral replacement **OR** potassium chloride, magnesium sulfate, sodium phosphate IVPB **OR** sodium phosphate IVPB **OR** sodium phosphate IVPB    IV:   sodium chloride      sodium chloride      sodium chloride      sodium chloride      HYDROmorphone Stopped (02/15/23 2207)    dextrose      sodium chloride Stopped (02/12/23 1857)         Intake/Output Summary (Last 24 hours) at 2/16/2023 1005  Last data filed at 2/16/2023 0954  Gross per 24 hour   Intake 342.5 ml   Output 3705 ml   Net -3362.5 ml       Results:  CBC:   Recent Labs     02/14/23  0542 02/15/23  0515 02/15/23  2157 02/16/23  0159 02/16/23  0539   WBC 5.0 9.1  --  12.2* 14.2*   HGB 9.8* 9.7* 7.4*  --  5.2*   HCT 31.1* 29.8* 24.2*  --  17.2*   MCV 80.3 80.3  --   --  84.1    325  --   --  295     BMP:   Recent Labs     02/15/23  0514 02/15/23  2157 02/16/23  0539    134* 138   K 4.6 4.2 3.7    98* 103   CO2 27 23 22   BUN 12 21* 24*   CREATININE 0.9 2.2* 2.2*     Mag: No results for input(s): MAG in the last 72 hours. LIVER PROFILE:   Recent Labs     02/14/23  0542 02/15/23  0514 02/16/23  0539   AST 48* 50* 27   ALT 40 43* 26   BILITOT 0.3 0.3 0.5   ALKPHOS 235* 194* 133*     PT/INR: No results for input(s): PROTIME, INR in the last 72 hours. APTT: No results for input(s): APTT in the last 72 hours. UA:No results for input(s): NITRITE, COLORU, PHUR, LABCAST, WBCUA, RBCUA, MUCUS, TRICHOMONAS, YEAST, BACTERIA, CLARITYU, SPECGRAV, LEUKOCYTESUR, UROBILINOGEN, BILIRUBINUR, BLOODU, GLUCOSEU, AMORPHOUS in the last 72 hours.     Invalid input(s): KETONESU    ABG: No results found for: PHART, PH, PGJ6LIK, PCO2, PO2ART, PO2, JPF8QSQ, HCO3, BEART, BE, THGBART, THB, KEG2LGJ, J1BINJZR, O2SAT    Lab Results   Component Value Date    CALCIUM 7.9 (L) 02/16/2023    PHOS 4.6 02/09/2023             Electronically signed by John Sierra MD on 2/16/2023 at 10:05 AM

## 2023-02-16 NOTE — PROGRESS NOTES
In past 1-2 hours pt has gradually returned to hypotensive, tachycardic status. Continues to complain of generalized abdominal pain. Repeat H/H done shows decreased to 5.6/16.4. I think these new findings/changes are concerning enough for ongoing bleeding that we should proceed back to the OR as had been planned earlier. I will plan to explore her abdomen and control any active bleeding that is found. If any bowel injury or damage is found that would require resection (to include the actual ostomy itself) then we will manage accordingly. Patient and family appear to understand, ask appropriate questions, and agree with this plan.     MIGUELINA

## 2023-02-16 NOTE — CONSULTS
[facility]  Nephrology Consult Note           Reason for Consult: TARSHA  Requesting Physician:  Dr. Rosalinda Menezes    Chief Complaint:  No chief complaint on file. History of Present Illness on 2/16/2023:    63 y.o. yo female with PMH of hypothyroidism, hyperlipidemia, gout, PAD with history of bilateral popliteal artery aneurysm and stenting, SOLEDAD who is admitted for large bowel obstruction and underwent surgery on 2/14/2023. Patient was transferred from [facility]  In January 2023, patient had right above-knee to below-knee popliteal artery bypass. Patient also on Eliquis  On 2/14/2023, patient underwent Rick's procedure for stricture of sigmoid colon leading to large bowel obstruction which did not improve with conservative measures. During the procedure patient received 1.5 L of LR, intermittent hypotension was noted. She dropped her blood pressure as low as 62/42 on 2/15 at 9 PM and has been hypotensive throughout the night. Patient received 30 ml/kg IV fluid bolus this morning, also had 3 doses of ketorolac given on 2/15; patient had 500 mL of LR n iv albumin bolus overnight  Creatinine increased to 2.2 on 2/15 and remained at that level and nephrology has been consulted  On 2/16, patient had hemoglobin of 5.2 patient received 2 units of PRBCs    Past Medical History:    History reviewed. No pertinent past medical history. Past Surgical History:        Procedure Laterality Date    SIGMOID COLECTOMY N/A 2/14/2023    BOWEL RESECTION SIGMOID COLECTOMY WITH COLOSTOMY CREATION performed by Todd Cannon MD at [address] Medications:    No current facility-administered medications on file prior to encounter. Current Outpatient Medications on File Prior to Encounter   Medication Sig Dispense Refill    apixaban (ELIQUIS) 5 MG TABS tablet Take 5 mg by mouth 2 times daily Patient unsure of dose.  \"Thinks\" it is 5mg      Levothyroxine Sodium 200 MCG CAPS Take by mouth daily Unsure of dose propranolol (INDERAL) 40 MG tablet Take 40 mg by mouth 2 times daily      gabapentin (NEURONTIN) 300 MG capsule Take 300 mg by mouth 3 times daily. oxyCODONE-acetaminophen (PERCOCET)  MG per tablet Take 1 tablet by mouth every 6 hours as needed for Pain. atorvastatin (LIPITOR) 40 MG tablet Take 40 mg by mouth daily      cyclobenzaprine (FLEXERIL) 10 MG tablet Take 10 mg by mouth 3 times daily      Plecanatide (TRULANCE) 3 MG TABS Take 3 mg by mouth daily      allopurinol (ZYLOPRIM) 100 MG tablet Take 100 mg by mouth daily      liothyronine (CYTOMEL) 5 MCG tablet Take 5 mcg by mouth daily      aspirin 81 MG chewable tablet Take 81 mg by mouth daily         Allergies:  Patient has no known allergies. Social History:    Social History     Socioeconomic History    Marital status:      Spouse name: Not on file    Number of children: Not on file    Years of education: Not on file    Highest education level: Not on file   Occupational History    Not on file   Tobacco Use    Smoking status: Former     Packs/day: 1.00     Years: 34.00     Pack years: 34.00     Types: Cigarettes     Start date: 1977     Quit date: 2021     Years since quittin.1    Smokeless tobacco: Current   Substance and Sexual Activity    Alcohol use: Never    Drug use: Never    Sexual activity: Not on file   Other Topics Concern    Not on file   Social History Narrative    Not on file     Social Determinants of Health     Financial Resource Strain: Not on file   Food Insecurity: Not on file   Transportation Needs: Not on file   Physical Activity: Not on file   Stress: Not on file   Social Connections: Not on file   Intimate Partner Violence: Not on file   Housing Stability: Not on file       Family History:   History reviewed. No pertinent family history. Review of Systems:   Pertinent positives stated above in HPI. All other 10 systems were reviewed and were negative.      Physical exam:   Constitutional: VITALS:  /89   Pulse (!) 121   Temp 97.7 °F (36.5 °C) (Temporal)   Resp 18   Ht 5' 5\" (1.651 m)   Wt 220 lb 12.8 oz (100.2 kg)   LMP  (LMP Unknown) Comment: HYSTERECTOMY  SpO2 94%   BMI 36.74 kg/m²   Gen: alert, awake  Neck: No JVD; NGT in situ  Skin: Unremarkable  Cardiovascular:  S1, S2 without m/r/g   Respiratory: decreased at the bases   Abdomen:  soft, distended, colostomy in situ   Extremities: no lower extremity edema; right thigh hematoma around the surgical site of the bypass  Neuro/Psy: AAoriented times 3 ; moves all 4 ext    Data/  Recent Labs     02/14/23  0542 02/15/23  0515 02/15/23  2157 02/16/23  0159 02/16/23  0539 02/16/23  1102   WBC 5.0 9.1  --  12.2* 14.2*  --    HGB 9.8* 9.7* 7.4*  --  5.2* 6.9*   HCT 31.1* 29.8* 24.2*  --  17.2* 22.6*   MCV 80.3 80.3  --   --  84.1  --     325  --   --  295  --      Recent Labs     02/15/23  0514 02/15/23  2157 02/16/23  0539    134* 138   K 4.6 4.2 3.7    98* 103   CO2 27 23 22   GLUCOSE 126* 158* 137*   BUN 12 21* 24*   CREATININE 0.9 2.2* 2.2*   LABGLOM >60 25* 25*     CT scan a/p wo contrast  Impression   Large amount of complex fluid within the abdomen and pelvis, most notably   within the pelvis where hematocrit levels are seen, compatible with   hemorrhage. Smaller 4.9 cm collection within subcutaneous fat adjacent to the left lower   quadrant stoma, compatible with hematoma. Amorphous inflammation or   hemorrhage is seen within the soft tissues immediately adjacent. Mural thickening of the ascending colon and transverse colon, which can   reflect colitis in the appropriate clinical setting. Mild left pelviectasis. Pneumoperitoneum, in keeping with the recent postoperative state. Bibasilar atelectasis. 1 cm right lower lobe pulmonary nodule, for which follow-up recommendations   are as below.        RECOMMENDATIONS:   Fleischner Society guidelines for follow-up and management of incidentally   detected pulmonary nodules:       Nodule size greater than 8 mm           In a low-risk patient, consider CT at 3 months, PET/CT, or tissue sampling. In a high-risk patient, consider CT at 3 months, PET/CT, or tissue sampling.       - Low risk patients include individuals with minimal or absent history of   smoking and other known risk factors. - High risk patients include individuals with a history or smoking or known   risk factors. Radiology 2017 http://pubs. rsna.org/doi/full/10.1148/radiol. 9383812239       Assessment  -TARSHA, pre-renal to early ATN in the setting of hypotension  -Anemia, acute blood loss   s/p 2 PRBCs 2/15-2/16  -hemorrhagic shock , resolved  -right leg hematoma  -large bowel obstruction s/p pati's  w colostomy on 2/14/23   -right above knee to below knee popliteal artery bypass on 1/16/23      Plan  -Keep  SBP>110  -s/p 3l NS bolus; 2 PRBCs on 2/16   -prn bolus of  ml up to 3 times if SBP<100  -cont IVF  ml/h as ordered  -avoid vanc and zosyn combination dt risk of nephrotoxicity  -DC toradol and all NSAIDs  -cautious dose of vancomycin  -cont lakhani  -Serial renal panel  -Daily wts and strict i/o  -Renal dose meds and avoid nephrotoxins      Thank you for the consultation. Please do not hesitate to call with questions. Rosa West MD  Office: [phone]  Fax:    [phone] [address] [facility]

## 2023-02-16 NOTE — PROGRESS NOTES
Urgent Perfect serve sent to Dr. Gage Hsu, \" Primary RN Isabel Oakley. Pt BP has been lower all night long. 60's/40's had come up to 103/68 by this morning. Pt got albumin, calcium gluconate, LR bolus, NS at 125 mL/hr. Pt H&H did drop, it is now 5.2 and 17.2. Last BP was 70/45 then up to 80's/ 50's. Pt is currently getting 1 unit BPRBs. PCA pump paused at this time due to low BP overnight. If you would like more information you can contact the primary RN Yousif Ace. Thanks! \"     Message read [phone]

## 2023-02-16 NOTE — PROGRESS NOTES
Patient currently resting in bed with eyes closed. On oxygen NC 1 lpm. NG in place and hooked  to continuous low wall suction. See vital sign flow sheet. Vital signs stable. Order in place for PICC line. Nurse spoke with PICC line nurse who is expected to arrive before end of shift. Order in place to transfuse one unit of PRBC's. PICC line to be put in place prior to administration of blood products. IV access currently in place is 22 gauge. Several attempts made by several different nurses without success for second IV establishment without success. López cath in place and draining yellow urine. Patient in no distress. Nurse will continue to monitor/reassess. Call light within reach.

## 2023-02-16 NOTE — CONSULTS
Nutrition Note    RD received consult for parenteral nutrition recommendations - provider to manage. Discussed with RN, plans for TPN to start today. Dietitians following with nutrition assessment and recommendations in RD progress notes. Recommend check TG, Mg, Phos, CMP now if not done in last 24 hours. RD to order Phos and TG. Bag 1: Clinimix 5/20 starting at 42 mL/hr  Physician/LIP to monitor closely and correct lytes (Phos,Mg,K+) d/t risk of refeeding syndrome  Bag 2: As long as electrolytes WNL, advance Clinimix 5/20 to goal rate of 70 mL/hr  Recommend 250 mL 20% lipids 2 times per week  Recommend FSBS, monitor glucose, need for insulin  Pharmacy to adjust MVI and Trace Elements as needed   When PN to be discontinued, cut rate by 50% and let current bag run out. Clinimix 5/20 E at goal rate of 70 mL/hr x24 hrs to provide 1680 mL TV, 84 g protein, 336 g dextrose, and 1478 kcal. +250 mL bags of 20% lipids 2x weekly for additional 142 kcal daily. GIR=2.32 mg/kg/min    Will continue to monitor per nutrition standards of care.      Susu Minaya, MS, RD, LD on 2/16/2023 at 4:05 PM  Office: [phone]

## 2023-02-16 NOTE — ANESTHESIA PRE PROCEDURE
Department of Anesthesiology  Preprocedure Note       Name:  Eva Gama   Age:  63 y.o.  :  1960                                          MRN:  [id number]         Date:  2023      Surgeon: Mindy Jean):  Sherlyn Pineda MD    Procedure: Procedure(s):  LAPAROTOMY EXPLORATORY    Medications prior to admission:   Prior to Admission medications    Medication Sig Start Date End Date Taking? Authorizing Provider   apixaban (ELIQUIS) 5 MG TABS tablet Take 5 mg by mouth 2 times daily Patient unsure of dose. \"Thinks\" it is 5mg   Yes Historical Provider, MD   Levothyroxine Sodium 200 MCG CAPS Take by mouth daily Unsure of dose   Yes Historical Provider, MD   propranolol (INDERAL) 40 MG tablet Take 40 mg by mouth 2 times daily   Yes Historical Provider, MD   gabapentin (NEURONTIN) 300 MG capsule Take 300 mg by mouth 3 times daily. Yes Historical Provider, MD   oxyCODONE-acetaminophen (PERCOCET)  MG per tablet Take 1 tablet by mouth every 6 hours as needed for Pain.    Yes Historical Provider, MD   atorvastatin (LIPITOR) 40 MG tablet Take 40 mg by mouth daily   Yes Historical Provider, MD   cyclobenzaprine (FLEXERIL) 10 MG tablet Take 10 mg by mouth 3 times daily   Yes Historical Provider, MD   Plecanatide (TRULANCE) 3 MG TABS Take 3 mg by mouth daily   Yes Historical Provider, MD   allopurinol (ZYLOPRIM) 100 MG tablet Take 100 mg by mouth daily   Yes Historical Provider, MD   liothyronine (CYTOMEL) 5 MCG tablet Take 5 mcg by mouth daily   Yes Historical Provider, MD   aspirin 81 MG chewable tablet Take 81 mg by mouth daily   Yes Historical Provider, MD       Current medications:    Current Facility-Administered Medications   Medication Dose Route Frequency Provider Last Rate Last Admin    0.9 % sodium chloride infusion   IntraVENous PRN Merissa Mckenzie MD        0.9 % sodium chloride infusion   IntraVENous Continuous Merissa Mckenzie MD        HYDROmorphone (DILAUDID) injection 0.5 mg 0.5 mg IntraVENous Q6H PRN Amparo Kumar MD   0.5 mg at 02/16/23 1342    0.9 % sodium chloride infusion   IntraVENous PRN Amparo Kumar MD        sodium chloride flush 0.9 % injection 5-40 mL  5-40 mL IntraVENous 2 times per day Sydrafa Crileigh, APRN - CNP   10 mL at 02/15/23 2209    sodium chloride flush 0.9 % injection 5-40 mL  5-40 mL IntraVENous PRN Naldona Critchstalin, APRN - CNP        0.9 % sodium chloride infusion  25 mL IntraVENous PRN Naldona Critchley, APRN - CNP        0.9 % sodium chloride infusion   IntraVENous PRN Naldona Critchstalin, APRN - CNP        naloxone 0.4 mg in 10 mL sodium chloride syringe   IntraVENous PRN Neeta Smith MD        HYDROmorphone (DILAUDID) 1 mg/mL PCA   IntraVENous Continuous Kiet Avelar MD   Paused at 02/15/23 2207    glucose chewable tablet 16 g  4 tablet Oral PRN Neeta Smith MD        dextrose bolus 10% 125 mL  125 mL IntraVENous PRN Neeta Smith MD        Or    dextrose bolus 10% 250 mL  250 mL IntraVENous PRN Neeta Smith MD        glucagon (rDNA) injection 1 mg  1 mg SubCUTAneous PRN Neeta Smith MD        dextrose 10 % infusion   IntraVENous Continuous PRN Neeta Smith MD        insulin lispro (HUMALOG) injection vial 0-8 Units  0-8 Units SubCUTAneous Q4H Neeta Smith MD        benzocaine (HURRICAINE) 20 % oral spray   Mouth/Throat 4x Daily PRN JARETT Cowart CNP        docusate sodium (COLACE) capsule 100 mg  100 mg Oral Daily Neeta Smith MD   100 mg at 02/15/23 0839    piperacillin-tazobactam (ZOSYN) 3,375 mg in sodium chloride 0.9 % 50 mL IVPB (mini-bag)  3,375 mg IntraVENous Q8H Neeta Smith MD   Stopped at 02/16/23 1249    sodium chloride flush 0.9 % injection 5-40 mL  5-40 mL IntraVENous 2 times per day Neeta Smith MD   10 mL at 02/15/23 0840    sodium chloride flush 0.9 % injection 5-40 mL  5-40 mL IntraVENous PRN Neeta Smith MD        0.9 % sodium chloride infusion IntraVENous PRN Beny Loo MD   Stopped at 02/12/23 1857    ondansetron (ZOFRAN-ODT) disintegrating tablet 4 mg  4 mg Oral Q8H PRN Beny Loo MD   4 mg at 02/12/23 2343    Or    ondansetron (ZOFRAN) injection 4 mg  4 mg IntraVENous Q6H PRN Beny Loo MD   4 mg at 02/15/23 8658    acetaminophen (TYLENOL) tablet 650 mg  650 mg Oral Q6H PRN Beny Loo MD        Or    acetaminophen (TYLENOL) suppository 650 mg  650 mg Rectal Q6H PRN Beny Loo MD        potassium chloride (KLOR-CON M) extended release tablet 40 mEq  40 mEq Oral PRN Beny Loo MD        Or    potassium bicarb-citric acid (EFFER-K) effervescent tablet 40 mEq  40 mEq Oral PRN Beny Loo MD        Or    potassium chloride 10 mEq/100 mL IVPB (Peripheral Line)  10 mEq IntraVENous PRN Beny Loo MD        magnesium sulfate 2000 mg in 50 mL IVPB premix  2,000 mg IntraVENous PRN Beny Loo MD        sodium phosphate 10 mmol in sodium chloride 0.9 % 250 mL IVPB  10 mmol IntraVENous PRN Beny Loo MD        Or    sodium phosphate 15 mmol in sodium chloride 0.9 % 250 mL IVPB  15 mmol IntraVENous PRN Beny Loo MD        Or    sodium phosphate 20 mmol in sodium chloride 0.9 % 500 mL IVPB  20 mmol IntraVENous PRN Beny Loo MD        allopurinol (ZYLOPRIM) tablet 100 mg  100 mg Oral Daily Beny Loo MD   100 mg at 02/15/23 0838    atorvastatin (LIPITOR) tablet 40 mg  40 mg Oral Daily Beny Loo MD   40 mg at 02/15/23 0839    [Held by provider] aspirin chewable tablet 81 mg  81 mg Oral Daily Beny Loo MD   81 mg at 02/15/23 9212    gabapentin (NEURONTIN) capsule 300 mg  300 mg Oral TID Beny Loo MD   300 mg at 02/15/23 1449    levothyroxine (SYNTHROID) tablet 200 mcg  200 mcg Oral Daily Beny Loo MD   200 mcg at 02/15/23 0616    propranolol (INDERAL) tablet 40 mg  40 mg Oral BID Beny Loo MD   40 mg at 02/15/23 0840    [Held by provider] enoxaparin (LOVENOX) injection 100 mg  1 mg/kg SubCUTAneous BID Susana Abrams MD   100 mg at 02/15/23 2246       Allergies:  No Known Allergies    Problem List:    Patient Active Problem List   Diagnosis Code    Colonic obstruction ([facility] K56.609    Bowel obstruction ([facility] K56.609    Diverticulitis K57.92    Hemorrhagic shock ([facility] R57.8    Acute blood loss anemia D62    Acute renal failure (ARF) (HCC) N17.9       Past Medical History:  History reviewed. No pertinent past medical history. Past Surgical History:        Procedure Laterality Date    SIGMOID COLECTOMY N/A 2023    BOWEL RESECTION SIGMOID COLECTOMY WITH COLOSTOMY CREATION performed by Susana Abrams MD at [address] [place] [address] History:    Social History     Tobacco Use    Smoking status: Former     Packs/day: 1.00     Years: 34.00     Pack years: 34.00     Types: Cigarettes     Start date: 1977     Quit date: 2021     Years since quittin.1    Smokeless tobacco: Current   Substance Use Topics    Alcohol use: Never                                Ready to quit: Not Answered  Counseling given: Not Answered      Vital Signs (Current):   Vitals:    23 1245 23 1345 23 1515 23 1538   BP: 113/89  118/75 110/79   Pulse: (!) 121  (!) 120 (!) 136   Resp:   18 18   Temp:   99 °F (37.2 °C) 99 °F (37.2 °C)   TempSrc:   Axillary Axillary   SpO2: 94%  91% 94%   Weight:       Height:  5' 5\" (1.651 m)                                                BP Readings from Last 3 Encounters:   23 110/79       NPO Status: Time of last liquid consumption: 0900                                                 Date of last liquid consumption: 23                        Date of last solid food consumption: 23    BMI:   Wt Readings from Last 3 Encounters:   23 220 lb 12.8 oz (100.2 kg)     Body mass index is 36.74 kg/m².     CBC:   Lab Results   Component Value Date/Time WBC 14.2 02/16/2023 05:39 AM    RBC 2.05 02/16/2023 05:39 AM    HGB 5.2 02/16/2023 02:39 PM    HCT 16.4 02/16/2023 02:39 PM    MCV 84.1 02/16/2023 05:39 AM    RDW 18.7 02/16/2023 05:39 AM     02/16/2023 05:39 AM       CMP:   Lab Results   Component Value Date/Time     02/16/2023 05:39 AM    K 3.7 02/16/2023 05:39 AM     02/16/2023 05:39 AM    CO2 22 02/16/2023 05:39 AM    BUN 24 02/16/2023 05:39 AM    CREATININE 2.2 02/16/2023 05:39 AM    AGRATIO 1.1 02/16/2023 05:39 AM    LABGLOM 25 02/16/2023 05:39 AM    GLUCOSE 137 02/16/2023 05:39 AM    PROT 4.4 02/16/2023 05:39 AM    CALCIUM 7.9 02/16/2023 05:39 AM    BILITOT 0.5 02/16/2023 05:39 AM    ALKPHOS 133 02/16/2023 05:39 AM    AST 27 02/16/2023 05:39 AM    ALT 26 02/16/2023 05:39 AM       POC Tests:   Recent Labs     02/16/23  1113   POCGLU 139*       Coags:   Lab Results   Component Value Date/Time    PROTIME 15.9 02/09/2023 01:53 PM    INR 1.27 02/09/2023 01:53 PM    APTT 33.1 02/09/2023 01:53 PM       HCG (If Applicable): No results found for: PREGTESTUR, PREGSERUM, HCG, HCGQUANT     ABGs: No results found for: PHART, PO2ART, OKN1CAS, PTN4MQX, BEART, M9KLSNCA     Type & Screen (If Applicable):  No results found for: LABABO, LABRH    Drug/Infectious Status (If Applicable):  No results found for: HIV, HEPCAB    COVID-19 Screening (If Applicable): No results found for: COVID19        Anesthesia Evaluation  Patient summary reviewed and Nursing notes reviewed no history of anesthetic complications:   Airway: Mallampati: III     Neck ROM: full     Dental:          Pulmonary:Negative Pulmonary ROS and normal exam                               Cardiovascular:Negative CV ROS                   ROS comment: Acute blood loss anemia       Neuro/Psych:   Negative Neuro/Psych ROS              GI/Hepatic/Renal: Neg GI/Hepatic/Renal ROS  (+) renal disease: ARF,      (-) hiatal hernia and GERD       Endo/Other: Negative Endo/Other ROS Abdominal:             Vascular: Other Findings:           Anesthesia Plan      general     ASA 4 - emergent     (I discussed with the patient the risks and benefits of PIV, general anesthesia, IV Narcotics, likely ICU. All questions were answered the patient agrees with the plan and wishes to proceed. Patient aware of risk of requiring postop vent  )  Induction: intravenous. Pre-Operative Diagnosis: Colonic obstruction ([address] [K56.609]; Bowel obstruction ([address] [K56.609]    63 y.o.   BMI:  Body mass index is 36.74 kg/m².      Vitals:    23 1245 23 1345 23 1515 23 1538   BP: 113/89  118/75 110/79   Pulse: (!) 121  (!) 120 (!) 136   Resp:   18 18   Temp:   99 °F (37.2 °C) 99 °F (37.2 °C)   TempSrc:   Axillary Axillary   SpO2: 94%  91% 94%   Weight:       Height:  5' 5\" (1.651 m)         No Known Allergies    Social History     Tobacco Use    Smoking status: Former     Packs/day: 1.00     Years: 34.00     Pack years: 34.00     Types: Cigarettes     Start date: 1977     Quit date: 2021     Years since quittin.1    Smokeless tobacco: Current   Substance Use Topics    Alcohol use: Never       LABS:    CBC  Lab Results   Component Value Date/Time    WBC 14.2 (H) 2023 05:39 AM    HGB 5.2 (LL) 2023 02:39 PM    HCT 16.4 (LL) 2023 02:39 PM     2023 05:39 AM     RENAL  Lab Results   Component Value Date/Time     2023 05:39 AM    K 3.7 2023 05:39 AM     2023 05:39 AM    CO2 22 2023 05:39 AM    BUN 24 (H) 2023 05:39 AM    CREATININE 2.2 (H) 2023 05:39 AM    GLUCOSE 137 (H) 2023 05:39 AM     COAGS  Lab Results   Component Value Date/Time    PROTIME 15.9 (H) 2023 01:53 PM    INR 1.27 (H) 2023 01:53 PM    APTT 33.1 2023 01:53 PM         Jett Fritz MD   2023

## 2023-02-16 NOTE — PROGRESS NOTES
Charge nurse notified that patient was hyptensive. BP 62/42, p-112. Charge nurse also notified clinical supervisor.

## 2023-02-16 NOTE — PROGRESS NOTES
Hospitalist Progress Note      PCP: Corrie Carpenter    Date of Admission: 2/9/2023    Chief Complaint: Abdominal pain and constipation    Hospital Course:     Ms. Sultana Mcintosh is a 63year old female who presented to [facility] from [facility] with abdominal pain and constipation on 2/9. She had a lower extremity vascular bypass surgery on her right leg a few weeks ago. She reports constipation and abdominal distension following her surgery with ng tube placement as well as a rectal tube. Patient reports her follow-up colonoscopy revealed sigmoid edema with diverticula and an ulcer, she then improved and was discharged. She again developed abdominal pain and distension and has not had a BM since 2/5. She denies trying any stool softeners or motility agents at home. 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 She also has had surgeries for adhesions and ventral hernia repair. She underwent a Haylie's procedure with GS on 2/14/23 with colostomy placement. Rapid response called on 2/16/23 for hypotension (as below). Found to have abdominal/pelvic hemorrhage on CT along with R thigh hematoma noted over previous bypass incision site on US. Subjective:     Overnight, NP called to bedside because of new onset hypotension, fever, and tachycardia. She was started on a 500 mL LR bolus along with continuous 125 mL/hr NS. Given IV albumin. H&H was 7.4/24.2, lactate 1.6 and procal 0.44. Blood cultures taken and pending and Vancomycin ordered. She was ordered 1 unit PRBC. Was on 1L NC with NG placement. PICC line also placed. Only got first PRBC at around 07:05 today. Today, Rapid Response called at 07:44 for BP of 59/43. I attended the Rapid at which time BP came up to 84/56. 1x PRBC was already on. Initial concern by nursing staff for possible transfusion reaction. No hives, petechiae, or purpura noted. Pt up to 3L NC.  Pt was transferred to A2 and Gen Surg was sent Perfect Serve. Attending physician requested second PRBC given H&H of 5.2/17. 2. Spoke with NP for GS and informed them of situation. They came to evaluate pt at bedside. CT ordered after pt initially stabilized, Lactate 1.7. CT showed large amount of fluid, likely hemorrhage, in the abdomen and pelvis along with a smaller 4.9 cm hematoma in the LLQ. Pt was expected to go to the OR for Ex Lap, however, GS noted bleed had likely tamponaded given stable vitals. Vascular Surgery seen pt for R thigh hematoma and ordered US which confirmed this (13 x 10 cm) and showed atherosclerotic plaque with <50% stenosis in the R common femoral, superficial femoral, posterior tib, anterior tib arteries. A peroneal artery on the R appears occluded. L KIRA of 0.6. The colostomy site has continued to remain clear without erythema at this time. Pt noted mild to moderate CP around lunch time and a STAT EKG and Troponin was order. EKG showed Sinus Tach without acute ST elevations but with nonspecific T wave abnormality. Troponin was normal. Her H&H at 11:02 was 6.9/22.6 after transfusing. In the PM, pt's daughter at bedside and visibly upset. She was not notified of last night's Rapid Response and lives far away (about 1-1.5 hours). Pt continues to have abdominal pain, but chest pain resolved. States she is very dehydrated. Attending physician spoke with pt and family at bedside in PM. Repeat H/H was 5.2/16. 4. Additional 2 units of blood ordered and contact Gen Surgery again.             Medications:  Reviewed    Infusion Medications    sodium chloride      sodium chloride      sodium chloride      sodium chloride 125 mL/hr at 02/16/23 0341    HYDROmorphone Stopped (02/15/23 2207)    dextrose      sodium chloride Stopped (02/12/23 2235)     Scheduled Medications    vancomycin (VANCOCIN) intermittent dosing (placeholder)   Other RX Placeholder    vancomycin  2,000 mg IntraVENous Once    sodium chloride  30 mL/kg IntraVENous Once    sodium chloride flush  5-40 mL IntraVENous 2 times per day    insulin lispro  0-8 Units SubCUTAneous Q4H    docusate sodium  100 mg Oral Daily    piperacillin-tazobactam  3,375 mg IntraVENous Q8H    sodium chloride flush  5-40 mL IntraVENous 2 times per day    allopurinol  100 mg Oral Daily    atorvastatin  40 mg Oral Daily    aspirin  81 mg Oral Daily    gabapentin  300 mg Oral TID    levothyroxine  200 mcg Oral Daily    propranolol  40 mg Oral BID    [Held by provider] enoxaparin  1 mg/kg SubCUTAneous BID     PRN Meds: sodium chloride, sodium chloride flush, sodium chloride, sodium chloride, naloxone, glucose, dextrose bolus **OR** dextrose bolus, glucagon (rDNA), dextrose, benzocaine, sodium chloride flush, sodium chloride, ondansetron **OR** ondansetron, acetaminophen **OR** acetaminophen, potassium chloride **OR** potassium alternative oral replacement **OR** potassium chloride, magnesium sulfate, sodium phosphate IVPB **OR** sodium phosphate IVPB **OR** sodium phosphate IVPB      Intake/Output Summary (Last 24 hours) at 2/16/2023 0945  Last data filed at 2/16/2023 0825  Gross per 24 hour   Intake 130 ml   Output 3705 ml   Net -3575 ml       Physical Exam Performed:    BP 91/68   Pulse (!) 116   Temp 99 °F (37.2 °C) (Axillary)   Resp 18   Ht 5' 5\" (1.651 m)   Wt 220 lb 12.8 oz (100.2 kg)   LMP  (LMP Unknown) Comment: HYSTERECTOMY  SpO2 98%   BMI 36.74 kg/m²     General appearance: Moderate amount of distress, appears stated age and cooperative. HEENT: Conjunctivae/corneas clear. Neck: No jugular venous distention. Respiratory:  Normal respiratory effort. Clear to auscultation, bilaterally without Rales/Wheezes/Rhonchi. Cardiovascular: Tachycardic and rhythm without murmurs, rubs or gallops. Abdomen: Soft, diffuse abdominal tenderness, distension improved. Bowel sounds slowed  Musculoskeletal: No clubbing, cyanosis or edema bilaterally.   Skin: Skin color, texture, turgor normal. No rashes or lesions. Neurologic:  Neurovascularly intact without any focal sensory/motor deficits. Psychiatric: Alert and oriented, thought content appropriate, normal insight      Labs:   Recent Labs     02/14/23  0542 02/15/23  0515 02/15/23  2157 02/16/23  0159 02/16/23  0539   WBC 5.0 9.1  --  12.2* 14.2*   HGB 9.8* 9.7* 7.4*  --  5.2*   HCT 31.1* 29.8* 24.2*  --  17.2*    325  --   --  295     Recent Labs     02/15/23  0514 02/15/23  2157 02/16/23  0539    134* 138   K 4.6 4.2 3.7    98* 103   CO2 27 23 22   BUN 12 21* 24*   CREATININE 0.9 2.2* 2.2*   CALCIUM 7.9* 7.7* 7.9*     Recent Labs     02/14/23  0542 02/15/23  0514 02/16/23  0539   AST 48* 50* 27   ALT 40 43* 26   BILITOT 0.3 0.3 0.5   ALKPHOS 235* 194* 133*     No results for input(s): INR in the last 72 hours. No results for input(s): [place] [address] in the last 72 hours. Urinalysis:    No results found for: [place], BACTERIA, RBCUA, BLOODU, SPECGRAV, [address]    Radiology:  IR PICC WO SQ PORT/PUMP > 5 YEARS   Final Result      CT ABDOMEN PELVIS WO CONTRAST Additional Contrast? None   Final Result   1. Barium contrast from prior small-bowel follow-through is seen throughout   the colon and limits evaluation due to streak artifact. No definite abscess   is identified. 2.  Infrarenal abdominal aortic aneurysm measures up to 3.0 cm. See   recommendations for follow-up below. 3.  There is a 1.1 cm right lower lobe pulmonary nodule and a 0.3 cm left   lower lobe pulmonary nodule. Recommend dedicated CT chest for further   evaluation. RECOMMENDATIONS:      Multiple. Worst: 11 mm solid      Pathology: Multiple pulmonary nodules. Most severe: 11 mm solid pulmonary   nodule detected on incomplete chest CT. Recommend prompt non-contrast Chest CT for further evaluation. These guidelines do not apply to immunocompromised patients and patients with   cancer.  Follow up in patients with significant comorbidities as clinically   warranted. For lung cancer screening, adhere to Lung-RADS guidelines. Reference: Radiology. 2017; 284(1):228-43         3 cm AAA      Pathology: 3 cm infrarenal abdominal aortic aneurysm. Recommend follow-up every 3 years. Reference: J Am [place] Radiol 0025;74:547-659. XR ABDOMEN (KUB) (SINGLE AP VIEW)   Final Result   Cancellation of scheduled hypaque enema due to presence of barium throughout   the colon including the rectosigmoid region as described above. FL SMALL BOWEL FOLLOW THROUGH ONLY   Final Result   Mildly dilated small bowel, though small-bowel transit time is upper limits   of normal.         CT ABDOMEN PELVIS W IV CONTRAST Additional Contrast? None   Final Result   Diverticulitis of the descending sigmoid junction. Mild wall thickening but   negative for obstruction. CT ABDOMEN PELVIS WO CONTRAST Additional Contrast? None    (Results Pending)       IP CONSULT TO GENERAL SURGERY  IP CONSULT TO GI  IP CONSULT TO PHARMACY    Assessment/Plan:    Active Hospital Problems    Diagnosis     Diverticulitis [K57.92]      Priority: Medium    Colonic obstruction ([address] [K56.609]      Priority: Medium    Bowel obstruction ([address] [K56.609]      Priority: Medium     1. Diverticulitis  2. History of recurrent bowel obstruction with suspected stricture s/p Haylie Procedure  - CT of the abdomen and pelvis on 2/9/23 showed diverticulitis of the descending and sigmoid colon. Was negative for obstruction  - Small bowel follow-through was completed on 2/11/23 and showed mildly dilated small bowel with transit time the upper limit of normal.  -- Repeat of the CT Abdomen/Pelvis on 2/13/23 showed \"No evidence of bowel obstruction. No definite abscess in the region of the previously described proximal sigmoid diverticulitis, although extensive streak artifact limits evaluation. \" \"Infrarenal AAA measures up to 3.0 cm. \" \"There is a 1.1 cm right lower lobe pulmonary nodule and a 0.3 cm left lower lobe pulmonary nodule. \"  -- Consider ordering Chest CT after any surgical intervention. Pt already aware of lung nodule and AAA for the last several years. -- Pt with abdominal/pelvic hemorrhage today confirmed by CT. GS contacted as above. Held surgery in AM as pt stabilized and bleeding tamponaded. -- Pt switched to Dilaudid 1 mg/mL PCA q8 minutes per GS. Already on Gabapentin 300 mg TID. Pt given Dilaudid 0.5 mg IV q6 hrs PRN for pain by attending.   -- Can continue Colace 100 mg daily. - Continue IV Zosyn. Discontinued Vanc given nephrotoxicity in combination.    - Continue to ambulate the patient as tolerated  -- PT and OT Consulted. - Can continue Ice Chips per GS. Consider TPN for nutritional supplementation.   - Ordered ICU bed pending availability. 3.  Peripheral artery disease  - Status post vascular bypass of the right lower extremity  - Staples already removed. Wound site dry and clear without signs of infection. There is now a large hematoma noted over the proximal R medial thigh. US findings noted above. - Holding home Eliquis for now. - Discontinued Lovenox in the setting of hemorrhage.   - Discontinued 81 mg of aspirin daily  - Hold Propanolol 40 mg BID if SBP <90  - Continue atorvastatin 40 mg daily  - Started on compression devices. - Vascular Surgery following, their recommendations appreciated. 4. Acute Kidney Injury   - New onset, likely in the setting of severe hypotension   - Pt received IVF with NS bolus this morning   - Consulted Nephrology. Their recommendation appreciated:   - Keep SBP >110   - PRN  mL bolus if SBP <100  - Continue IVF  mL/hr  - Discontinued Vanc, kept Zosyn for broad spectrum coverage. Nephro recommending against both given nephrotoxicity. 5.  Gout  - Continue allopurinol 100 mg daily    6. Hypothyroidism  - Continue levothyroxine 200 mcg daily    7.  Hypocalcemia   - 7.9 today, unchanged from yesterday. - Will continue to monitor, replenish if indicated     8. Hypoalbuminemia   - IV albumin given overnight. - Will continue to monitor. 9. Normocytic Anemia   - Secondary to blood loss from hemorrhage.    - Pt received 2 units of PRBC today. - Transfuse if hemoglobin <7. Follow transfusion protocol.   - Will continue to monitor with serial H/H at q4 hours. - Order CBC if SBP <90.     10. Elevated Alkaline Phosphatase   - Improved since yesterday. Almost within normal limits. DVT Prophylaxis: Compression Devices  Diet: Diet NPO Exceptions are: Sips of Water with Meds, Ice Chips  Code Status: Full Code  PT/OT Eval Status: Patient is independent    Dispo - Pending clinical improvement.      Appropriate for A1 Discharge Unit: No      Janice Buckley DO   PGY-1  [facility] and [facility] Mobile Medicine Residency

## 2023-02-16 NOTE — PROGRESS NOTES
OR crew here to  pt. Family @ bedside & they packed up her belongings. Dtr has her upper dentures, glasses & 2 rings.

## 2023-02-16 NOTE — PROGRESS NOTES
02/16/23 0846   Oxygen Therapy/Pulse Ox   O2 Therapy Oxygen   $Oxygen $Daily Charge   O2 Device Nasal cannula   O2 Flow Rate (L/min) 3 L/min   Heart Rate (!) 116   Resp 18   SpO2 98 %   $Pulse Oximeter $Spot check (multiple/continuous)   End Tidal CO2   (Not on)

## 2023-02-16 NOTE — PROGRESS NOTES
[facility] Nurse  Follow-up Progress Note       NAME:  Jaylin Carey RECORD NUMBER:  [id number]  AGE:  63 y.o. GENDER:  female  :  1960  TODAY'S DATE:  2023    Subjective:  I have had a bad day and just to tired to do anything else right now. Wound Identification:  Wound Type:  Surgical abd staple line  Contributing Factors: chronic pressure, decreased mobility, shear force, obesity, decreased tissue oxygenation, and no stools    Wound/ostomy history: Patient presented to emergency room with complaint of abdominal pain. Patient had been experiencing abdominal pain since Carlito 2/10/23 PTA. Patient's abdominal pain gradually worsened. Patient also started experiencing abdominal distention. Patient has not had a bowel movement in last 4 to 5 days. Went to OR 23 for bowel resection, sigmoid colectomy and creation of colostomy by Dr Ana M Canales. Stoma size:  2 inch = 50 mm round protrudes  Appliance size:  Sensura Dylan YELLOW flat flange # [id number] with Drainable bag # [id number]. Patient Goal of Care:  [x] Wound Healing  [] Odor Control   [] Palliative Care  [] Pain Control   [x] Other: New colostomy teaching    Objective: Sister in room with patient. Currently patient resting. PCA pump in place. López. NG tube to wall suction. /89   Pulse (!) 121   Temp 97.7 °F (36.5 °C) (Temporal)   Resp 18   Ht 5' 5\" (1.651 m)   Wt 220 lb 12.8 oz (100.2 kg)   LMP  (LMP Unknown) Comment: HYSTERECTOMY  SpO2 94%   BMI 36.74 kg/m²   Brendan Risk Score: Brendan Scale Score: 19  Assessment:  Current ostomy appliance intact. Abd obese. Mid line staple incision open to air. Emptied 50 ml dark green liquid from colostomy.     Measurements:     Incision 23 Abdomen Medial (Active)   Dressing Status New dressing applied 02/15/23 2103   Incision Cleansed Not Cleansed 23 150   Dressing/Treatment Open to air 23 150   Closure Courtney 02/16/23 1509   Margins Approximated 02/16/23 1509   Incision Assessment Dry 02/16/23 1509   Drainage Amount None 02/16/23 1509   Drainage Description Serosanguinous 02/15/23 2103   Odor None 02/16/23 1509   Juana-incision Assessment Dry/flaky; Induration 02/16/23 1509   Number of days: 2     Colostomy LLQ (Active)   Stomal Appliance Clean, dry & intact 02/15/23 2103   Flange Size (inches) 2.75 Inches 02/16/23 1508   Stoma  Assessment Moist;Protrudes;Donald 02/16/23 1508   Peristomal Assessment ANGE 02/16/23 1508   Treatment Ice applied 02/15/23 1628   Stool Appearance Watery 02/16/23 1508   Stool Color Brown 02/16/23 1508   Stool Amount Small 02/16/23 1508   Output (mL) 50 ml 02/16/23 1508   Number of days: 2     Response to treatment:  no teaching done today     Pain Assessment:  Severity:  3 / 10  Quality of pain: aching  Wound Pain Timing/Severity: intermittent  Premedicated: No  Plan:   Plan of Care: Met with Dr Kyle Raza after he had just seen the patient today. Reported she is most likely not up to any teaching as she has been hypotensive and bleeding. H & H early this am 5.2 & 17.2. patient received 2 units PRBC. After blood H & H increased to 6.9 & 22.6. CT scan showed likely hemorrhagic ascites with 14x7cm fluid collection in LLQ and 6x7cm fluid collection in pelvis. MD reported he is monitoring closely. Abd staple line open to air at this time. Plan for Ostomy Care: Met with patient and sister. No plans for teaching today. Patient worn out. Bag emptied for patient. She watched. 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  Will revisit tomorrow. Ostomy Plan of Care  [x] Supplies/Instructions left in room bags, scissors, wafers, pattern, teaching materials. [] Patient using home supplies  [x] Brand/supplies at bedside [employer]    Specialty Bed Required : Yes Isoflex gel therapy pressure redistribution mattress in place.    [] Low Air Loss   [x] Pressure Redistribution  [] Fluid Immersion  [] Bariatric  [] Total Pressure Relief  [] Other:     Current Diet: Diet NPO Exceptions are: Ice Chips  Dietician consult:  Yes    Discharge Plan:  Placement for patient upon discharge: skilled nursing   Patient appropriate for Outpatient [address]: Yes    Referrals:  []     Plan was for patient to discharge to home with [facility] home care. Placed call to Eriberto De La Cruz with [facility] at [phone]. She confirmed that they will be able to follow at discharge for home care needs. CM will continue to follow.                [] [employer]  [] Supplies  [] Other    Patient/Caregiver Teaching:   Teaching provided:  [] Reviewed GI and A&P        [] Supplies  [x] Pouch emptying      [] Manipulate closure  [] Routine Care         [] Comment  [] Pouch maintenance           Level of patient/caregiver understanding able to:   [x] Indicates understanding       [] Needs reinforcement  [] Unsuccessful      [] Verbal Understanding  [] Demonstrated understanding       [] No evidence of learning  [] Refused teaching         [] N/A       Electronically signed by Yue Newton, RN, MSN, CWOCN on 2/16/2023 at 3:11 PM

## 2023-02-16 NOTE — PROGRESS NOTES
02/15/23 2345   NIV Type   Skin Assessment Clean, dry, & intact   Skin Protection for O2 Device Yes   Orientation Middle   Location Nose   Intervention(s) Skin Barrier   NIV Started/Stopped On   Equipment Type v60   Mode Bilevel   Mask Type Full face mask   Mask Size Small   Bonnet size Small   Settings/Measurements   IPAP 16 cmH20   CPAP/EPAP 6 cmH2O   Rate Ordered 6   Resp 21   FiO2  25 %   Minute Volume (L/min) 15.9 Liters   Mask Leak (lpm) 13 lpm   Comfort Level Good   Using Accessory Muscles No   SpO2 98   Patient's Home Machine No   Alarm Settings   Alarms On Y   Breath Sounds   Right Upper Lobe Diminished   Right Middle Lobe Diminished   Right Lower Lobe Diminished   Left Upper Lobe Diminished   Left Lower Lobe Diminished

## 2023-02-17 PROBLEM — K56.699 SIGMOID STRICTURE (HCC): Status: ACTIVE | Noted: 2023-02-17

## 2023-02-17 LAB
A/G RATIO: 0.9 (ref 1.1–2.2)
ALBUMIN SERPL-MCNC: 1.9 G/DL (ref 3.4–5)
ALP BLD-CCNC: 108 U/L (ref 40–129)
ALT SERPL-CCNC: 18 U/L (ref 10–40)
ANION GAP SERPL CALCULATED.3IONS-SCNC: 10 MMOL/L (ref 3–16)
AST SERPL-CCNC: 20 U/L (ref 15–37)
BILIRUB SERPL-MCNC: 0.4 MG/DL (ref 0–1)
BUN BLDV-MCNC: 23 MG/DL (ref 7–20)
CALCIUM SERPL-MCNC: 6.9 MG/DL (ref 8.3–10.6)
CHLORIDE BLD-SCNC: 106 MMOL/L (ref 99–110)
CO2: 22 MMOL/L (ref 21–32)
CREAT SERPL-MCNC: 1.1 MG/DL (ref 0.6–1.2)
GFR SERPL CREATININE-BSD FRML MDRD: 57 ML/MIN/{1.73_M2}
GLUCOSE BLD-MCNC: 112 MG/DL (ref 70–99)
GLUCOSE BLD-MCNC: 124 MG/DL (ref 70–99)
GLUCOSE BLD-MCNC: 128 MG/DL (ref 70–99)
GLUCOSE BLD-MCNC: 139 MG/DL (ref 70–99)
GLUCOSE BLD-MCNC: 149 MG/DL (ref 70–99)
GLUCOSE BLD-MCNC: 91 MG/DL (ref 70–99)
HCT VFR BLD CALC: 22.5 % (ref 36–48)
HCT VFR BLD CALC: 23.5 % (ref 36–48)
HCT VFR BLD CALC: 25.4 % (ref 36–48)
HCT VFR BLD CALC: 26 % (ref 36–48)
HCT VFR BLD CALC: 27.8 % (ref 36–48)
HEMOGLOBIN: 7.3 G/DL (ref 12–16)
HEMOGLOBIN: 7.8 G/DL (ref 12–16)
HEMOGLOBIN: 8.3 G/DL (ref 12–16)
HEMOGLOBIN: 8.7 G/DL (ref 12–16)
HEMOGLOBIN: 9.1 G/DL (ref 12–16)
PERFORMED ON: ABNORMAL
PERFORMED ON: NORMAL
POTASSIUM REFLEX MAGNESIUM: 3.6 MMOL/L (ref 3.5–5.1)
SODIUM BLD-SCNC: 138 MMOL/L (ref 136–145)
TOTAL PROTEIN: 4 G/DL (ref 6.4–8.2)

## 2023-02-17 PROCEDURE — 80053 COMPREHEN METABOLIC PANEL: CPT

## 2023-02-17 PROCEDURE — 2000000000 HC ICU R&B

## 2023-02-17 PROCEDURE — 6370000000 HC RX 637 (ALT 250 FOR IP): Performed by: NURSE PRACTITIONER

## 2023-02-17 PROCEDURE — 85014 HEMATOCRIT: CPT

## 2023-02-17 PROCEDURE — 6360000002 HC RX W HCPCS: Performed by: SURGERY

## 2023-02-17 PROCEDURE — 99024 POSTOP FOLLOW-UP VISIT: CPT | Performed by: SURGERY

## 2023-02-17 PROCEDURE — 94002 VENT MGMT INPAT INIT DAY: CPT

## 2023-02-17 PROCEDURE — 2500000003 HC RX 250 WO HCPCS: Performed by: INTERNAL MEDICINE

## 2023-02-17 PROCEDURE — 94761 N-INVAS EAR/PLS OXIMETRY MLT: CPT

## 2023-02-17 PROCEDURE — 2580000003 HC RX 258: Performed by: SURGERY

## 2023-02-17 PROCEDURE — 6370000000 HC RX 637 (ALT 250 FOR IP): Performed by: INTERNAL MEDICINE

## 2023-02-17 PROCEDURE — 99291 CRITICAL CARE FIRST HOUR: CPT | Performed by: INTERNAL MEDICINE

## 2023-02-17 PROCEDURE — 6360000002 HC RX W HCPCS: Performed by: INTERNAL MEDICINE

## 2023-02-17 PROCEDURE — 2580000003 HC RX 258: Performed by: INTERNAL MEDICINE

## 2023-02-17 PROCEDURE — 2700000000 HC OXYGEN THERAPY PER DAY

## 2023-02-17 PROCEDURE — 6370000000 HC RX 637 (ALT 250 FOR IP): Performed by: SURGERY

## 2023-02-17 PROCEDURE — 36592 COLLECT BLOOD FROM PICC: CPT

## 2023-02-17 PROCEDURE — 85018 HEMOGLOBIN: CPT

## 2023-02-17 RX ORDER — FAMOTIDINE 10 MG/ML
20 INJECTION, SOLUTION INTRAVENOUS 2 TIMES DAILY
Status: DISCONTINUED | OUTPATIENT
Start: 2023-02-17 | End: 2023-03-03 | Stop reason: HOSPADM

## 2023-02-17 RX ADMIN — ALLOPURINOL 100 MG: 100 TABLET ORAL at 09:28

## 2023-02-17 RX ADMIN — MUPIROCIN: 20 OINTMENT TOPICAL at 20:37

## 2023-02-17 RX ADMIN — BENZOCAINE: 200 SPRAY DENTAL; ORAL; PERIODONTAL at 12:04

## 2023-02-17 RX ADMIN — SODIUM CHLORIDE: 9 INJECTION, SOLUTION INTRAVENOUS at 17:41

## 2023-02-17 RX ADMIN — PIPERACILLIN AND TAZOBACTAM 3375 MG: 3; .375 INJECTION, POWDER, FOR SOLUTION INTRAVENOUS at 09:27

## 2023-02-17 RX ADMIN — HYDROMORPHONE HYDROCHLORIDE 0.5 MG: 0.5 INJECTION, SOLUTION INTRAMUSCULAR; INTRAVENOUS; SUBCUTANEOUS at 17:38

## 2023-02-17 RX ADMIN — SODIUM CHLORIDE: 9 INJECTION, SOLUTION INTRAVENOUS at 10:22

## 2023-02-17 RX ADMIN — HYDROMORPHONE HYDROCHLORIDE 0.5 MG: 1 INJECTION, SOLUTION INTRAMUSCULAR; INTRAVENOUS; SUBCUTANEOUS at 20:37

## 2023-02-17 RX ADMIN — PIPERACILLIN AND TAZOBACTAM 3375 MG: 3; .375 INJECTION, POWDER, FOR SOLUTION INTRAVENOUS at 02:10

## 2023-02-17 RX ADMIN — SODIUM CHLORIDE, PRESERVATIVE FREE 10 ML: 5 INJECTION INTRAVENOUS at 20:19

## 2023-02-17 RX ADMIN — FAMOTIDINE 20 MG: 10 INJECTION, SOLUTION INTRAVENOUS at 11:35

## 2023-02-17 RX ADMIN — LEVOTHYROXINE SODIUM 200 MCG: 0.1 TABLET ORAL at 07:32

## 2023-02-17 RX ADMIN — SODIUM CHLORIDE, PRESERVATIVE FREE 10 ML: 5 INJECTION INTRAVENOUS at 09:31

## 2023-02-17 RX ADMIN — HYDROMORPHONE HYDROCHLORIDE 0.5 MG: 0.5 INJECTION, SOLUTION INTRAMUSCULAR; INTRAVENOUS; SUBCUTANEOUS at 23:59

## 2023-02-17 RX ADMIN — HYDROMORPHONE HYDROCHLORIDE 0.5 MG: 0.5 INJECTION, SOLUTION INTRAMUSCULAR; INTRAVENOUS; SUBCUTANEOUS at 11:41

## 2023-02-17 RX ADMIN — PROPRANOLOL HYDROCHLORIDE 40 MG: 40 TABLET ORAL at 09:28

## 2023-02-17 RX ADMIN — PIPERACILLIN AND TAZOBACTAM 3375 MG: 3; .375 INJECTION, POWDER, FOR SOLUTION INTRAVENOUS at 17:40

## 2023-02-17 RX ADMIN — FAMOTIDINE 20 MG: 10 INJECTION, SOLUTION INTRAVENOUS at 20:19

## 2023-02-17 ASSESSMENT — PULMONARY FUNCTION TESTS
PIF_VALUE: 21
PIF_VALUE: 19
PIF_VALUE: 14
PIF_VALUE: 18
PIF_VALUE: 21
PIF_VALUE: 21
PIF_VALUE: 14
PIF_VALUE: 19
PIF_VALUE: 17
PIF_VALUE: 19
PIF_VALUE: 18
PIF_VALUE: 17
PIF_VALUE: 17
PIF_VALUE: 19
PIF_VALUE: 18
PIF_VALUE: 23
PIF_VALUE: 18
PIF_VALUE: 17
PIF_VALUE: 19

## 2023-02-17 ASSESSMENT — PAIN SCALES - GENERAL
PAINLEVEL_OUTOF10: 8
PAINLEVEL_OUTOF10: 7
PAINLEVEL_OUTOF10: 9

## 2023-02-17 NOTE — PROGRESS NOTES
[facility]  Nephrology follow-up note           Reason for Consult:  TARSHA  Requesting Physician:  Dr. Vera Marin history  Patient is intubated, sedation has been weaned down for spontaneous breathing trial  She underwent emergent laparotomy for hemoperitoneum evacuation, 3 L old clotted blood removed on 2/16  Patient received total of 4 units of PRBC and 2 FFP's, 1.5 L of normal saline during surgery    Last 24 h uop 675 mL urine  NG tube aspirate in the last 24 hours is 925  Patient is net 1 L negative in the last 24 hours    ROS: Unable to obtain  PSFH: No visitor    Scheduled Meds:   mupirocin   Nasal BID    insulin lispro  0-8 Units SubCUTAneous Q4H    docusate sodium  100 mg Oral Daily    piperacillin-tazobactam  3,375 mg IntraVENous Q8H    sodium chloride flush  5-40 mL IntraVENous 2 times per day    allopurinol  100 mg Oral Daily    atorvastatin  40 mg Oral Daily    [Held by provider] aspirin  81 mg Oral Daily    gabapentin  300 mg Oral TID    levothyroxine  200 mcg Oral Daily    propranolol  40 mg Oral BID    [Held by provider] enoxaparin  1 mg/kg SubCUTAneous BID     Continuous Infusions:   sodium chloride      sodium chloride 150 mL/hr at 02/16/23 2056    sodium chloride      sodium chloride      norepinephrine 2 mcg/min (02/17/23 0728)    sodium chloride      propofol 30 mcg/kg/min (02/17/23 0343)    fentaNYL 125 mcg/hr (02/17/23 0331)    sodium chloride      sodium chloride      sodium chloride      HYDROmorphone Stopped (02/15/23 2207)    dextrose      sodium chloride Stopped (02/12/23 1857)     PRN Meds:.sodium chloride, HYDROmorphone, sodium chloride, sodium chloride, sodium chloride, sodium chloride, sodium chloride, sodium chloride, naloxone, glucose, dextrose bolus **OR** dextrose bolus, glucagon (rDNA), dextrose, benzocaine, sodium chloride flush, sodium chloride, ondansetron **OR** ondansetron, acetaminophen **OR** acetaminophen, potassium chloride **OR** potassium alternative oral replacement **OR** potassium chloride, magnesium sulfate, sodium phosphate IVPB **OR** sodium phosphate IVPB **OR** sodium phosphate IVPB      History of Present Illness on 2/16/2023:    63 y.o. yo female with PMH of hypothyroidism, hyperlipidemia, gout, PAD with history of bilateral popliteal artery aneurysm and stenting, SOLEDAD who is admitted for large bowel obstruction and underwent surgery on 2/14/2023. Patient was transferred from [facility]  In January 2023, patient had right above-knee to below-knee popliteal artery bypass. Patient also on Eliquis  On 2/14/2023, patient underwent Rick's procedure for stricture of sigmoid colon leading to large bowel obstruction which did not improve with conservative measures. During the procedure patient received 1.5 L of LR, intermittent hypotension was noted. She dropped her blood pressure as low as 62/42 on 2/15 at 9 PM and has been hypotensive throughout the night. Patient received 30 ml/kg IV fluid bolus this morning, also had 3 doses of ketorolac given on 2/15; patient had 500 mL of LR n iv albumin bolus overnight  Creatinine increased to 2.2 on 2/15 and remained at that level and nephrology has been consulted  On 2/16, patient had hemoglobin of 5.2 patient received 2 units of PRBCs    Review of Systems:   Pertinent positives stated above in HPI. All other 10 systems were reviewed and were negative.      Physical exam:   Constitutional:  VITALS:  /65   Pulse (!) 103   Temp 98.1 °F (36.7 °C) (Axillary)   Resp 17   Ht 5' 5\" (1.651 m)   Wt 220 lb 12.8 oz (100.2 kg)   LMP  (LMP Unknown) Comment: HYSTERECTOMY  SpO2 100%   BMI 36.74 kg/m²   Gen: alert, awake  Neck:  NGT in situ  Skin: Unremarkable  Cardiovascular:  S1, S2 without m/r/g   Respiratory: Clear anteriorly; ET tube in situ  Abdomen:  soft, distended, colostomy in situ   Extremities: no lower extremity edema; right thigh hematoma around the surgical site of the bypass  Neuro/Psy: Follows commands    Data/  Recent Labs     02/16/23  0539 02/16/23  1102 02/16/23  1750 02/16/23 2013 02/16/23  2230 02/17/23  0314 02/17/23  0546   WBC 14.2*  --  16.0* 15.0*  --   --   --    HGB 5.2*   < > 8.0* 10.0* 9.7* 9.1* 8.7*   HCT 17.2*   < > 24.5* 30.6* 28.1* 27.8* 26.0*   MCV 84.1  --  85.5 85.5  --   --   --      --  206 202  --   --   --     < > = values in this interval not displayed. Recent Labs     02/15/23  2157 02/16/23  0539 02/16/23  1102 02/17/23  0545   * 138  --  138   K 4.2 3.7  --  3.6   CL 98* 103  --  106   CO2 23 22  --  22   GLUCOSE 158* 137*  --  149*   PHOS  --   --  7.1*  --    BUN 21* 24*  --  23*   CREATININE 2.2* 2.2*  --  1.1   LABGLOM 25* 25*  --  57*       CT scan a/p wo contrast  Impression   Large amount of complex fluid within the abdomen and pelvis, most notably   within the pelvis where hematocrit levels are seen, compatible with   hemorrhage. Smaller 4.9 cm collection within subcutaneous fat adjacent to the left lower   quadrant stoma, compatible with hematoma. Amorphous inflammation or   hemorrhage is seen within the soft tissues immediately adjacent. Mural thickening of the ascending colon and transverse colon, which can   reflect colitis in the appropriate clinical setting. Mild left pelviectasis. Pneumoperitoneum, in keeping with the recent postoperative state. Bibasilar atelectasis. 1 cm right lower lobe pulmonary nodule, for which follow-up recommendations   are as below. RECOMMENDATIONS:   Fleischner Society guidelines for follow-up and management of incidentally   detected pulmonary nodules:       Nodule size greater than 8 mm           In a low-risk patient, consider CT at 3 months, PET/CT, or tissue sampling. In a high-risk patient, consider CT at 3 months, PET/CT, or tissue sampling.       - Low risk patients include individuals with minimal or absent history of   smoking and other known risk factors. - High risk patients include individuals with a history or smoking or known   risk factors. Radiology 2017 http://pubs. rsna.org/doi/full/10.1148/radiol. 7046966422       Assessment  -TARSHA, pre-renal to early ATN in the setting of hypotension  -Anemia, acute blood loss   s/p 2 PRBCs 2/15-2/16  -hemorrhagic shock , resolved  -right leg hematoma  -large bowel obstruction s/p pati's  w colostomy on 2/14/23   -right above knee to below knee popliteal artery bypass on 1/16/23      Plan  -Keep  SBP>110  -cont IVF  ml/h as ordered  -DC toradol and all NSAIDs  -cautious dose of vancomycin  -cont lakhani  -Serial renal panel  -Daily wts and strict i/o  -Renal dose meds and avoid nephrotoxins      Thank you for the consultation. Please do not hesitate to call with questions. Simone Fowler MD  Office: [phone]  Fax:    [phone]  [facility] [employer] [facility]

## 2023-02-17 NOTE — PROGRESS NOTES
[facility] GENERAL SURGERY    Surgery Progress Note           POD #  1/3    PATIENT NAME: Hakan Riddle     TODAY'S DATE: 2/17/2023    INTERVAL HISTORY:    Pt  stable overnight, hemodynamics stable w/o pressors. Still intubated but awake, responsive. OBJECTIVE:   VITALS:  /65   Pulse (!) 101   Temp 98.1 °F (36.7 °C) (Axillary)   Resp 17   Ht 5' 5\" (1.651 m)   Wt 220 lb 12.8 oz (100.2 kg)   LMP  (LMP Unknown) Comment: HYSTERECTOMY  SpO2 98%   BMI 36.74 kg/m²     INTAKE/OUTPUT:    I/O last 3 completed shifts: In: 4174.2 [I.V.:3170; Blood:1004.2]  Out: 5830 [Urine:850; Emesis/NG output:1505; Stool:475; Blood:3000]  No intake/output data recorded. CONSTITUTIONAL:  awake and alert  LUNGS:     ABDOMEN:    , soft, non-distended, tenderness noted at incision   INCISION: clean, dry, no drainage, colostomy - stool in pouch    Data:  CBC:   Recent Labs     02/16/23  0539 02/16/23  1102 02/16/23  1750 02/16/23  2013 02/16/23  2230 02/17/23  0314 02/17/23  0546   WBC 14.2*  --  16.0* 15.0*  --   --   --    HGB 5.2*   < > 8.0* 10.0* 9.7* 9.1* 8.7*   HCT 17.2*   < > 24.5* 30.6* 28.1* 27.8* 26.0*     --  206 202  --   --   --     < > = values in this interval not displayed. BMP:    Recent Labs     02/15/23  2157 02/16/23  0539 02/17/23  0545   * 138 138   K 4.2 3.7 3.6   CL 98* 103 106   CO2 23 22 22   BUN 21* 24* 23*   CREATININE 2.2* 2.2* 1.1   GLUCOSE 158* 137* 149*     Hepatic:   Recent Labs     02/15/23  0514 02/16/23  0539 02/17/23  0545   AST 50* 27 20   ALT 43* 26 18   BILITOT 0.3 0.5 0.4   ALKPHOS 194* 133* 108     Mag:    No results for input(s): MG in the last 72 hours. Phos:     Recent Labs     02/16/23  1102   PHOS 7.1*      INR:   Recent Labs     02/16/23  1750 02/16/23 2013   INR 1.56* 1.48*         Radiology Review:       ASSESSMENT AND PLAN:  63 y.o. female status post Rick's procedure with postop bleeding, now s/p evacuation of large hemoperitoneum.   No further sx of active abdominal bleeding   - wean and extubate this AM per ICU team   - cont NPO today to ensure no further bleeding issues in abdomen. If remains stable, can begin PO diet tomorrow   - routine ostomy care   - would hold anticoag rx for today, likely resume tomorrow but perhaps at a lower dose than prior.          Electronically signed by Christiano Buck MD

## 2023-02-17 NOTE — PLAN OF CARE
Problem: Pain  Goal: Verbalizes/displays adequate comfort level or baseline comfort level  Outcome: Progressing  Flowsheets (Taken 2/16/2023 2000)  Verbalizes/displays adequate comfort level or baseline comfort level: Assess pain using appropriate pain scale     Problem: ABCDS Injury Assessment  Goal: Absence of physical injury  Outcome: Progressing     Problem: Safety - Adult  Goal: Free from fall injury  Outcome: Progressing     Problem: Discharge Planning  Goal: Discharge to home or other facility with appropriate resources  Outcome: Progressing     Problem: Skin/Tissue Integrity  Goal: Absence of new skin breakdown  Description: 1. Monitor for areas of redness and/or skin breakdown  2. Assess vascular access sites hourly  3. Every 4-6 hours minimum:  Change oxygen saturation probe site  4. Every 4-6 hours:  If on nasal continuous positive airway pressure, respiratory therapy assess nares and determine need for appliance change or resting period. Outcome: Progressing     Problem: Nutrition Deficit:  Goal: Optimize nutritional status  Outcome: Progressing     Problem: Safety - Medical Restraint  Goal: Remains free of injury from restraints (Restraint for Interference with Medical Device)  Description: INTERVENTIONS:  1. Determine that other, less restrictive measures have been tried or would not be effective before applying the restraint  2. Evaluate the patient's condition at the time of restraint application  3. Inform patient/family regarding the reason for restraint  4.  Q2H: Monitor safety, psychosocial status, comfort, nutrition and hydration  Outcome: Progressing

## 2023-02-17 NOTE — PROGRESS NOTES
02/17/23 0211   Patient Observation   Heart Rate (!) 103   Resp 18   SpO2 95 %   Vent Information   Vent Mode AC/VC   Ventilator Settings   FiO2  50 %   Vt (Set, mL) 450 mL   Resp Rate (Set) 18 bmp   PEEP/CPAP (cmH2O) 5   Pressure Support (cm H2O) 0 cm H2O   Vent Patient Data (Readings)   Vt (Measured) 713 mL   Peak Inspiratory Pressure (cmH2O) 19 cmH2O   Rate Measured 19 br/min   Minute Volume (L/min) 8.82 Liters   Mean Airway Pressure (cmH2O) 9.1 cmH20   I:E Ratio 1:2.40   Vent Alarm Settings   High Pressure (cmH2O) 45 cmH2O   Low Minute Volume (lpm) 2 L/min   High Minute Volume (lpm) 20 L/min   Low Exhaled Vt (ml) 200 mL   RR High (bpm) 40 br/min   Apnea (secs) 20 secs   Additional Respiratoray Assessments   Humidification Source HME   Ambu Bag With Mask At Bedside Yes   Airway Clearance   Subglottic Suction Done No

## 2023-02-17 NOTE — PROGRESS NOTES
02/17/23 0825   Patient Observation   Heart Rate (!) 101   Resp 17   SpO2 98 %   Vent Information   Vent Mode AC/VC   Ventilator Settings   FiO2  50 %   Vt (Set, mL) 450 mL   Resp Rate (Set) 18 bmp   PEEP/CPAP (cmH2O) 5   Pressure Support (cm H2O) 0 cm H2O   Vent Patient Data (Readings)   Vt (Measured) 468 mL   Peak Inspiratory Pressure (cmH2O) 21 cmH2O   Rate Measured 18 br/min   Minute Volume (L/min) 8.21 Liters   Mean Airway Pressure (cmH2O) 8.8 cmH20   I:E Ratio 1:2.40   Flow Sensitivity 3 L/min   Vent Alarm Settings   High Pressure (cmH2O) 45 cmH2O   Low Minute Volume (lpm) 2 L/min   Low Exhaled Vt (ml) 200 mL   RR High (bpm) 40 br/min   Apnea (secs) 20 secs   Additional Respiratoray Assessments   Humidification Source HME   Airway Clearance   Suction Device Inline suction catheter   Sputum Method Obtained Endotracheal   Sputum Amount Scant   ETT    Placement Date/Time: 02/16/23 1718   Placed By: In surgery  Placement Verified By: Auscultation;Capnometry  Preoxygenation: Yes  Mask Ventilation: Mask ventilation not attempted (0)  Technique: Video laryngoscopy  Airway Type: Cuffed  Airway Tube Size. ..    Secured At 22 cm   Measured From Lips   ETT Placement Right   Secured By Commercial tube arcos   Site Assessment Dry   Cuff Pressure 25 cm H2O

## 2023-02-17 NOTE — PROGRESS NOTES
Hospitalist Progress Note      PCP: Abdelrahman Davenport    Date of Admission: 2/9/2023    Chief Complaint:   Abd pain    Hospital Course:      Ms. Samantha Cardozo is a 63year old female who presented to [facility] from [facility] with abdominal pain and constipation on 2/9. She had a lower extremity vascular bypass surgery on her right leg a few weeks ago. She reports constipation and abdominal distension following her surgery with ng tube placement as well as a rectal tube. Patient reports her follow-up colonoscopy revealed sigmoid edema with diverticula and an ulcer, she then improved and was discharged. She again developed abdominal pain and distension and has not had a BM since 2/5. She denies trying any stool softeners or motility agents at home. 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 She also has had surgeries for adhesions and ventral hernia repair. She underwent a Haylie's procedure with GS on 2/14/23 with colostomy placement. Rapid response called on 2/16/23 for hypotension (as below). Found to have abdominal/pelvic hemorrhage on CT along with R thigh hematoma noted over previous bypass incision site on US. Patient status post surgery with resection of colon and diverting colostomy on 2/15/2023. Patient developed hypotension on 2/15/2023. Patient had further hypotension on 2/16/2023 and it was noted patient had intra-abdominal bleed with right thigh hematoma. Patient's hemoglobin had dropped to 5. Patient was transfused with 2 units. She appeared to stabilize but then her blood pressure decreased and her heart rate increased. Patient's hemoglobin dropped back down to 5 after being up to 6.9. It was decided to take patient back to surgery. During surgery patient was noted to have old blood in her abdomen no active bleed was noted but 3 L of old blood removed.     Subjective:   Patient on ventilator but alert this AM. Patient requesting to have ET tube removed. Patient is complaining of abdominal pain. Abdominal pain medicines have been held so patient will be awake for extubation attempt. Medications:  Reviewed    Infusion Medications    sodium chloride 150 mL/hr at 02/17/23 1022    sodium chloride      HYDROmorphone Stopped (02/15/23 2207)    dextrose      sodium chloride Stopped (02/12/23 1857)     Scheduled Medications    mupirocin   Nasal BID    famotidine (PEPCID) injection  20 mg IntraVENous BID    insulin lispro  0-8 Units SubCUTAneous Q4H    docusate sodium  100 mg Oral Daily    piperacillin-tazobactam  3,375 mg IntraVENous Q8H    sodium chloride flush  5-40 mL IntraVENous 2 times per day    allopurinol  100 mg Oral Daily    atorvastatin  40 mg Oral Daily    [Held by provider] aspirin  81 mg Oral Daily    gabapentin  300 mg Oral TID    levothyroxine  200 mcg Oral Daily    propranolol  40 mg Oral BID    [Held by provider] enoxaparin  1 mg/kg SubCUTAneous BID     PRN Meds: phenol, HYDROmorphone, sodium chloride, naloxone, glucose, dextrose bolus **OR** dextrose bolus, glucagon (rDNA), dextrose, benzocaine, sodium chloride flush, sodium chloride, ondansetron **OR** ondansetron, acetaminophen **OR** acetaminophen, potassium chloride **OR** potassium alternative oral replacement **OR** potassium chloride, magnesium sulfate, sodium phosphate IVPB **OR** sodium phosphate IVPB **OR** sodium phosphate IVPB      Intake/Output Summary (Last 24 hours) at 2/17/2023 1400  Last data filed at 2/17/2023 1206  Gross per 24 hour   Intake 3951.7 ml   Output 4150 ml   Net -198.3 ml       Physical Exam Performed:    BP 85/63   Pulse 86   Temp 98.1 °F (36.7 °C) (Axillary)   Resp 13   Ht 5' 5\" (1.651 m)   Wt 220 lb 12.8 oz (100.2 kg)   LMP  (LMP Unknown) Comment: HYSTERECTOMY  SpO2 94%   BMI 36.74 kg/m²     General appearance: No apparent distress, appears stated age and cooperative.   HEENT: Pupils equal, round, and reactive to light. Conjunctivae/corneas clear. ET tube present. NG tube present. Neck: Supple, with full range of motion. No jugular venous distention. Trachea midline. Respiratory:  Normal respiratory effort. Clear to auscultation, bilaterally without Rales/Wheezes/Rhonchi. Cardiovascular: Regular rate and rhythm with normal S1/S2 without murmurs, rubs or gallops. Abdomen: Tender. Midline incision dressed. Positive bowel sounds noted. Ostomy left lower quadrant  Musculoskeletal: No clubbing, cyanosis or edema bilaterally. Full range of motion without deformity. Skin: Skin color, texture, turgor normal.  No rashes or lesions. Neurologic:  Neurovascularly intact without any focal sensory/motor deficits. Cranial nerves: II-XII intact, grossly non-focal.  Psychiatric: Alert and oriented, thought content appropriate, normal insight  Capillary Refill: Brisk, 3 seconds, normal   Peripheral Pulses: +2 palpable, equal bilaterally       Labs:   Recent Labs     02/16/23  0539 02/16/23  1102 02/16/23  1750 02/16/23 2013 02/16/23  2230 02/17/23  0314 02/17/23  0546 02/17/23  0940   WBC 14.2*  --  16.0* 15.0*  --   --   --   --    HGB 5.2*   < > 8.0* 10.0*   < > 9.1* 8.7* 8.3*   HCT 17.2*   < > 24.5* 30.6*   < > 27.8* 26.0* 25.4*     --  206 202  --   --   --   --     < > = values in this interval not displayed.      Recent Labs     02/15/23  2157 02/16/23  0539 02/16/23  1102 02/17/23  0545   * 138  --  138   K 4.2 3.7  --  3.6   CL 98* 103  --  106   CO2 23 22  --  22   BUN 21* 24*  --  23*   CREATININE 2.2* 2.2*  --  1.1   CALCIUM 7.7* 7.9*  --  6.9*   PHOS  --   --  7.1*  --      Recent Labs     02/15/23  0514 02/16/23  0539 02/17/23  0545   AST 50* 27 20   ALT 43* 26 18   BILITOT 0.3 0.5 0.4   ALKPHOS 194* 133* 108     Recent Labs     02/16/23  1750 02/16/23 2013   INR 1.56* 1.48*     Recent Labs     02/16/23  1102   TROPONINI <0.01       Urinalysis:    No results found for: Preeti Ballard, [address], BACTERIA, RBCUA, Amador Dorado, [facility]    Radiology:  VL DUP LOWER EXTREMITY ARTERIES RIGHT   Final Result      CT ABDOMEN PELVIS WO CONTRAST Additional Contrast? None   Final Result   Large amount of complex fluid within the abdomen and pelvis, most notably   within the pelvis where hematocrit levels are seen, compatible with   hemorrhage. Smaller 4.9 cm collection within subcutaneous fat adjacent to the left lower   quadrant stoma, compatible with hematoma. Amorphous inflammation or   hemorrhage is seen within the soft tissues immediately adjacent. Mural thickening of the ascending colon and transverse colon, which can   reflect colitis in the appropriate clinical setting. Mild left pelviectasis. Pneumoperitoneum, in keeping with the recent postoperative state. Bibasilar atelectasis. 1 cm right lower lobe pulmonary nodule, for which follow-up recommendations   are as below. RECOMMENDATIONS:   Fleischner Society guidelines for follow-up and management of incidentally   detected pulmonary nodules:      Nodule size greater than 8 mm         In a low-risk patient, consider CT at 3 months, PET/CT, or tissue sampling. In a high-risk patient, consider CT at 3 months, PET/CT, or tissue sampling.      - Low risk patients include individuals with minimal or absent history of   smoking and other known risk factors. - High risk patients include individuals with a history or smoking or known   risk factors. Radiology 2017 http://pubs. rsna.org/doi/full/10.1148/radiol. 6174179998         IR PICC WO SQ PORT/PUMP > 5 YEARS   Final Result      CT ABDOMEN PELVIS WO CONTRAST Additional Contrast? None   Final Result   1. Barium contrast from prior small-bowel follow-through is seen throughout   the colon and limits evaluation due to streak artifact. No definite abscess   is identified. 2.  Infrarenal abdominal aortic aneurysm measures up to 3.0 cm.   See   recommendations for follow-up below.      3.  There is a 1.1 cm right lower lobe pulmonary nodule and a 0.3 cm left   lower lobe pulmonary nodule. Recommend dedicated CT chest for further   evaluation. RECOMMENDATIONS:      Multiple. Worst: 11 mm solid      Pathology: Multiple pulmonary nodules. Most severe: 11 mm solid pulmonary   nodule detected on incomplete chest CT. Recommend prompt non-contrast Chest CT for further evaluation. These guidelines do not apply to immunocompromised patients and patients with   cancer. Follow up in patients with significant comorbidities as clinically   warranted. For lung cancer screening, adhere to Lung-RADS guidelines. Reference: Radiology. 2017; 284(1):228-43         3 cm AAA      Pathology: 3 cm infrarenal abdominal aortic aneurysm. Recommend follow-up every 3 years. Reference: J Am [place] Radiol 8382;57:860-366. XR ABDOMEN (KUB) (SINGLE AP VIEW)   Final Result   Cancellation of scheduled hypaque enema due to presence of barium throughout   the colon including the rectosigmoid region as described above. FL SMALL BOWEL FOLLOW THROUGH ONLY   Final Result   Mildly dilated small bowel, though small-bowel transit time is upper limits   of normal.         CT ABDOMEN PELVIS W IV CONTRAST Additional Contrast? None   Final Result   Diverticulitis of the descending sigmoid junction. Mild wall thickening but   negative for obstruction.              IP CONSULT TO GENERAL SURGERY  IP CONSULT TO GI  IP CONSULT TO PHARMACY  IP CONSULT TO NEPHROLOGY  IP CONSULT TO DIETITIAN    Assessment/Plan:    Active Hospital Problems    Diagnosis     Sigmoid stricture ([address] [K56.699]      Priority: Medium    Hemorrhagic shock ([address] [R57.8]      Priority: Medium    Acute blood loss anemia [D62]      Priority: Medium    Acute renal failure (ARF) (HCC) [N17.9]      Priority: Medium    Diverticulitis [K57.92]      Priority: Medium    Colonic obstruction ([address] [K56.609]      Priority: Medium Bowel obstruction ([facility] [address] [K56.609]      Priority: Medium     Diverticulitis with obstruction. Patient on IV Zosyn. Status post resection with diverting colostomy. General surgery is following. Monitor ostomy output. Peripheral artery disease. Patient status post bypass of right lower extremity. Patient was on therapeutic Lovenox. Patient developed right leg hematoma and intra-abdominal bleed. Lovenox and aspirin held. Vascular surgery did order arterial duplex which demonstrated right peroneal thrombus. Discussed with vascular surgery. Patient with synthetic graft and may require long-term anticoagulation. He suggested Xarelto 2.5 mg twice daily once it safe to anticoagulate. TARSHA. This is secondary to hypotension and hypovolemia. Nephrology consulted. We will continue to monitor closely. Avoid nephrotoxic agents including Toradol. Creatinine improved. Monitor urine output closely. Anticipate renal recovery. Severe protein calorie malnutrition. Patient going to surgery tonight. Patient may need TPN after being evaluated in AM.if not started on diet. Patient with 1 or more chronic illnesses with severe exacerbation or side effects of treatment and/or 1 acute or chronic illness that poses threat to life or bodily function. Decision regarding hospitalization or escalation  Patient on drug therapy that requires intensive monitoring.     DVT Prophylaxis: On hold due to bleed  Diet: Diet NPO Exceptions are: Ice Chips  Code Status: Full Code  PT/OT Eval Status: Pending    Dispo -Home versus SNF when stable    Appropriate for A1 Discharge Unit: Wendy Villagomez MD

## 2023-02-17 NOTE — PROGRESS NOTES
02/17/23 0433   Patient Observation   Heart Rate 96   Resp 19   SpO2 96 %   Breath Sounds   Right Upper Lobe Other (Comment)  (turbulent)   Right Middle Lobe Diminished   Right Lower Lobe Diminished   Left Lower Lobe Diminished   Vent Information   Vent Mode AC/VC   Ventilator Settings   FiO2  50 %   Vt (Set, mL) 450 mL   Resp Rate (Set) 18 bmp   PEEP/CPAP (cmH2O) 5   Pressure Support (cm H2O) 0 cm H2O   Vent Patient Data (Readings)   Vt (Measured) 462 mL   Peak Inspiratory Pressure (cmH2O) 17 cmH2O   Rate Measured 18 br/min   Minute Volume (L/min) 8.33 Liters   Mean Airway Pressure (cmH2O) 8.9 cmH20   I:E Ratio 1:2.40   Vent Alarm Settings   High Pressure (cmH2O) 45 cmH2O   Low Minute Volume (lpm) 2 L/min   High Minute Volume (lpm) 20 L/min   Low Exhaled Vt (ml) 200 mL   RR High (bpm) 40 br/min   Apnea (secs) 20 secs   Additional Respiratoray Assessments   Humidification Source HME   Ambu Bag With Mask At Bedside Yes   Airway Clearance   Suction ET Tube   Subglottic Suction Done Yes   Suction Device Inline suction catheter   Sputum Method Obtained Endotracheal   Sputum Amount Other (comment)  (no secretions)   ETT    Placement Date/Time: 02/16/23 1718   Placed By: In surgery  Placement Verified By: Auscultation;Capnometry  Preoxygenation: Yes  Mask Ventilation: Mask ventilation not attempted (0)  Technique: Video laryngoscopy  Airway Type: Cuffed  Airway Tube Size. ..    Secured At 23 cm   Measured From Lips   ETT Placement Right   Secured By Commercial tube arcos   Site Assessment Dry   Cuff Pressure 30 cm H2O

## 2023-02-17 NOTE — PROGRESS NOTES
02/17/23 0829   Patient Observation   Heart Rate (!) 103   Resp 17   SpO2 100 %   Vent Information   Vent Mode CPAP/PS   Ventilator Settings   FiO2  50 %   Vt (Set, mL) 450 mL   Resp Rate (Set) 18 bmp   PEEP/CPAP (cmH2O) 5   Pressure Support (cm H2O) 8 cm H2O   Vent Patient Data (Readings)   Vt (Measured) 784 mL   Peak Inspiratory Pressure (cmH2O) 14 cmH2O   Rate Measured 15 br/min   Minute Volume (L/min) 10.9 Liters   Mean Airway Pressure (cmH2O) 7.9 cmH20   I:E Ratio 1:2.30   Vent Alarm Settings   High Pressure (cmH2O) 45 cmH2O   Low Minute Volume (lpm) 2 L/min   RR High (bpm) 40 br/min

## 2023-02-17 NOTE — PROGRESS NOTES
Wasted 60mL of Fentanyl  with Marah VALDES. Med disposed of into the Rx Destroyer per protocol.     Primary Nurse eSignature: Electronically signed by Pawan Orlando RN on 2/17/23 at 12:10 PM EST    Co-signer eSignature: Electronically signed by Emeline Hodgkins, RN on 2/17/23 at 12:11 PM EST

## 2023-02-17 NOTE — PROGRESS NOTES
02/16/23 1918   Breath Sounds   Right Upper Lobe Rhonchi   Right Middle Lobe Rhonchi   Right Lower Lobe Rhonchi   Left Upper Lobe Rhonchi   Left Lower Lobe Rhonchi   Vent Information   Vent Mode AC/VC   Ventilator Settings   FiO2  50 %   Vt (Set, mL) 450 mL   Resp Rate (Set) 18 bmp   PEEP/CPAP (cmH2O) 5   Vent Patient Data (Readings)   Vt (Measured) 462 mL   Peak Inspiratory Pressure (cmH2O) 21 cmH2O   Rate Measured 18 br/min   Minute Volume (L/min) 9.82 Liters   Mean Airway Pressure (cmH2O) 11 cmH20   Flow Sensitivity 50 L/min   Vent Alarm Settings   High Pressure (cmH2O) 40 cmH2O   Low Exhaled Vt (ml) 200 mL   Apnea (secs) 20 secs   Additional Respiratoray Assessments   Humidification Source HME   Ambu Bag With Mask At Bedside Yes   Airway Clearance   Suction Oral;ET Tube   Suction Device Robyn; Inline suction catheter   Sputum Amount Small   Sputum Color/Odor White   ETT    Placement Date/Time: 02/16/23 1718   Placed By: In surgery  Placement Verified By: Auscultation;Capnometry  Preoxygenation: Yes  Mask Ventilation: Mask ventilation not attempted (0)  Technique: Video laryngoscopy  Airway Type: Cuffed  Airway Tube Size. ..    Secured At 22 cm   Measured From Lips   ETT Placement Left   Secured By Commercial tube arcos   Cuff Pressure 21 cm H2O

## 2023-02-17 NOTE — PROGRESS NOTES
02/16/23 1858   Vent Information   Ventilator Initiate Yes   Vent Mode AC/VC   Ventilator Settings   FiO2  50 %   Vt (Set, mL) 450 mL   Resp Rate (Set) 18 bmp   PEEP/CPAP (cmH2O) 5   Vent Patient Data (Readings)   Vt (Measured) 478 mL   Rate Measured 18 br/min   Minute Volume (L/min) 8.24 Liters   Mean Airway Pressure (cmH2O) 9 cmH20   Flow Sensitivity 50 L/min   Additional Respiratoray Assessments   Humidification Source HME   Ambu Bag With Mask At Bedside Yes   ETT    Placement Date/Time: 02/16/23 1718   Placed By: In surgery  Placement Verified By: Auscultation;Capnometry  Preoxygenation: Yes  Mask Ventilation: Mask ventilation not attempted (0)  Technique: Video laryngoscopy  Airway Type: Cuffed  Airway Tube Size. ..    Secured At 22 cm   Measured From Lips   ETT Placement [address] [place] [address] By (S)  Commercial tube arcos

## 2023-02-17 NOTE — OP NOTE
[facility]                                OPERATIVE REPORT    PATIENT NAME: Paola Luo                    :        1960  MED REC NO:   [id number]                          ROOM:       0236  ACCOUNT NO:   [de-identified]                           ADMIT DATE: 2023  PROVIDER:     Laila Perdomo MD    DATE OF PROCEDURE:  2023    PREOPERATIVE DIAGNOSES:  Hemoperitoneum, rule out active bleeding. POSTOPERATIVE DIAGNOSIS:  Hemoperitoneum. PROCEDURE PERFORMED:  Exploratory laparotomy with the evacuation of  hemoperitoneum. SURGEON:  Laila Perdomo MD    ANESTHESIA:  General.    ESTIMATED BLOOD LOSS:  Approximately 3000 mL of old/clotted blood. SPECIMEN:  None. COUNTS:  Sponge and needle counts were correct. INDICATIONS:  The patient is a 74-year-old female, who was two days  status post exploratory laparotomy with sigmoidectomy and end colostomy  for severe sigmoid stricture. The night prior to the second surgery,  the patient began to become hemodynamically unstable with hypotension  and tachycardia. She was also noted to have rapidly declining  hemoglobin and hematocrit levels and a CT scan was obtained, which  demonstrated large volumes of fluid in the abdomen consistent with  blood. She was initially resuscitated with the IV fluids and two units  of blood products and seemed to stabilize, but then over the course of  the day, she began to decline again with increasing hypotension and  tachycardia once again. Hemoglobin also declined once again. For these  reasons, we opted her to return to the operating room to explore and  ensure there was not an active bleeding site occurring. FINDINGS:  Large volume of old or clotted blood, approximately 3L, but  with no sign of any active bleeding occurring.     DESCRIPTION OF THE PROCEDURE:  After informed consent from the family  was obtained, the patient was taken to the operating room and placed in  the supine position.  The patient was already on IV antibiotics.   General anesthesia was administered without difficulty.  The abdomen was  prepped and draped in a sterile fashion and a sterile dressing was  placed over the colostomy site.  The midline incision was then opened up  down to the fascia level.  The fascial suture was cut and removed to  open the abdominal wound fully.  Upon entering the abdominal cavity,  immediately we encountered copious amounts of dark, old appearing blood.  We then began to gradually and carefully and gently probe around the  bowel loops to open the pockets of blood and suctioned and cleared.  The  small bowel loops were gently  as well from the interloop  adhesions and some clots that were causing adhesions as well.   Ultimately, we had the abdominal cavity adequately exposed and cleared  off all collections of old blood.  I now explored and examined all these  areas for any signs of active or accumulating bleeding, but none was  noted.  I was satisfied at this point.  There was no active bleeding.   The patient had received several more infusions of blood products by the  Anesthesia team during the case and she was increasingly stable  hemodynamically.    I therefore proceeded to wash the abdominal cavity with 2L of warm  saline.  The bowels were returned to the abdominal cavity and I elected  to close the abdomen with the addition of four #2 nylon retention  sutures placed widely around the fascia.  The midline fascia was closed  with two running looped 0 PDS sutures.  The skin incision was closed  with staples and then the retention suture was secured snugly down under  the skin over bolsters.  The patient was then brought to the intensive  care unit intubated and sedated and otherwise in guarded condition.        JESSEE ZAPIEN MD    D: 02/17/2023 8:23:17       T: 02/17/2023 10:23:57     [id number]  Job#:  [id number]     Doc#: [id number]    CC:

## 2023-02-17 NOTE — BRIEF OP NOTE
Brief Postoperative Note      Patient: Wicho Ojeda  YOB: 1960  MRN: [id number]    Date of Procedure: 2/16/2023    Pre-Op Diagnosis: bleeding    Post-Op Diagnosis: Same       Procedure(s):  LAPAROTOMY EXPLORATORY WITH EVACUATION OF HEMOPERITONEUM    Surgeon(s):  Elaine Li MD    Assistant:  Surgical Assistant: Shahla Perez    Anesthesia: General    Estimated Blood Loss (mL): 3000ml old/clotted blood    Complications: None    Specimens:   * No specimens in log *    Implants:  * No implants in log *      Drains:   NG/OG/NJ/NE Tube Nasoenteric decompression tube (Active)   Surrounding Skin Clean, dry & intact 02/15/23 2103   Status Suction-low continuous 02/15/23 2103   Placement Verified Gastric Contents 02/15/23 2103   Drainage Appearance Green 02/15/23 2103   Output (mL) 800 ml 02/16/23 0701       Colostomy LLQ (Active)   Stomal Appliance Clean, dry & intact 02/15/23 2103   Flange Size (inches) 2.75 Inches 02/16/23 1508   Stoma  Assessment Moist;Protrudes;[place] 02/16/23 1508   Peristomal Assessment ANGE 02/16/23 1508   Treatment Ice applied 02/15/23 1628   Stool Appearance Watery 02/16/23 1508   Stool Color Brown 02/16/23 1508   Stool Amount Small 02/16/23 1508   Output (mL) 75 ml 02/16/23 1630       Urinary Catheter 02/14/23 2 Way (Active)   $ Urethral catheter insertion Inserted for procedure 02/15/23 0902   Catheter Indications Prolonged immobilization (e.g. unstable thoracic or lumbar spine, multiple traumatic injuries such as pelvic fractures) 02/15/23 0902   Site Assessment No urethral drainage 02/15/23 2103   Urine Color Yellow 02/15/23 2235   Urine Appearance Clear 02/15/23 2235   Collection Container Leg bag 02/15/23 0902   Securement Method Securing device (Describe) 02/15/23 0902   Catheter Care  Perineal wipes 02/15/23 0902   Catheter Best Practices  Drainage tube clipped to bed; Bag below bladder 02/15/23 0902   Output (mL) 325 mL 02/16/23 1632       Findings: large volume blood (~3L) that all appeared old               No sx of active bleeding anywhere'    Job#: [id number]    Electronically signed by Yousif Ricardo MD on 2/17/2023 at 8:23 AM

## 2023-02-17 NOTE — CONSENT
Informed Consent for Blood Component Transfusion Note    I have discussed with the patient the rationale for blood component transfusion; its benefits in treating or preventing fatigue, organ damage, or death; and its risk which includes mild transfusion reactions, rare risk of blood borne infection, or more serious but rare reactions. I have discussed the alternatives to transfusion, including the risk and consequences of not receiving transfusion. The patient had an opportunity to ask questions and had agreed to proceed with transfusion of blood components.     Electronically signed by Lul Bryant MD on 2/17/23 at 7:07 AM EST

## 2023-02-17 NOTE — CONSULTS
Consult Note            Date:2/17/2023        Patient Name:Radha Meneses     YOB: 1960     Age:62 y.o. Consults    Chief Complaint   No chief complaint on file. History Obtained From   patient, electronic medical record    History of Present Illness   This is a 82-year-old female who was admitted on 2/9/2023 from [facility] because of possible high-grade sigmoid large bowel obstruction. Initially presenting to the emergency room with abdominal pain. As the sigmoid stricture did not was finally taken to surgery on 2/14/2023. And a Rick's procedure was done. Postop day 1 the patient was having abdominal discomfort secondary to the surgery, which was expected. However that evening the patient started to have hypotension and initial H&H dropped to 7.4. With a lactic acid of 1.6. The night of 2/16/2023 the patient had a critical H&H of 5.2 and another unit of blood was given. Patient had abdominal CT scan done showing hemorrhagic ascites with a 4 x 7 cm fluid collection in the left lower quadrant and a 6. By 7 cm fluid collection in the pelvis. Patient was taken to the OR on the night of 2/16/2020 for an exploratory laparotomy with evacuation of hemoperitoneum. Patient was returned to the ICU on a ventilator    Past Medical History   History reviewed. No pertinent past medical history. Past Surgical History     Past Surgical History:   Procedure Laterality Date    SIGMOID COLECTOMY N/A 2/14/2023    BOWEL RESECTION SIGMOID COLECTOMY WITH COLOSTOMY CREATION performed by Areli Pride MD at [facility] 1        Medications     Prior to Admission medications    Medication Sig Start Date End Date Taking? Authorizing Provider   apixaban (ELIQUIS) 5 MG TABS tablet Take 5 mg by mouth 2 times daily Patient unsure of dose.  \"Thinks\" it is 5mg   Yes Historical Provider, MD   Levothyroxine Sodium 200 MCG CAPS Take by mouth daily Unsure of dose   Yes Historical Provider, MD   propranolol (INDERAL) 40 MG tablet Take 40 mg by mouth 2 times daily   Yes Historical Provider, MD   gabapentin (NEURONTIN) 300 MG capsule Take 300 mg by mouth 3 times daily. Yes Historical Provider, MD   oxyCODONE-acetaminophen (PERCOCET)  MG per tablet Take 1 tablet by mouth every 6 hours as needed for Pain.    Yes Historical Provider, MD   atorvastatin (LIPITOR) 40 MG tablet Take 40 mg by mouth daily   Yes Historical Provider, MD   cyclobenzaprine (FLEXERIL) 10 MG tablet Take 10 mg by mouth 3 times daily   Yes Historical Provider, MD   Plecanatide (TRULANCE) 3 MG TABS Take 3 mg by mouth daily   Yes Historical Provider, MD   allopurinol (ZYLOPRIM) 100 MG tablet Take 100 mg by mouth daily   Yes Historical Provider, MD   liothyronine (CYTOMEL) 5 MCG tablet Take 5 mcg by mouth daily   Yes Historical Provider, MD   aspirin 81 MG chewable tablet Take 81 mg by mouth daily   Yes Historical Provider, MD        mupirocin (BACTROBAN) 2 % ointment, BID  0.9 % sodium chloride infusion, PRN  0.9 % sodium chloride infusion, Continuous  HYDROmorphone (DILAUDID) injection 0.5 mg, Q6H PRN  0.9 % sodium chloride infusion, PRN  0.9 % sodium chloride infusion, PRN  norepinephrine (LEVOPHED) 16 mg in sodium chloride 0.9 % 250 mL infusion, Continuous  0.9 % sodium chloride infusion, PRN  propofol injection, Continuous  fentaNYL (SUBLIMAZE) 1,000 mcg in sodium chloride 0.9% 100 mL infusion, Continuous  0.9 % sodium chloride infusion, PRN  0.9 % sodium chloride infusion, PRN  0.9 % sodium chloride infusion, PRN  naloxone 0.4 mg in 10 mL sodium chloride syringe, PRN  HYDROmorphone (DILAUDID) 1 mg/mL PCA, Continuous  glucose chewable tablet 16 g, PRN  dextrose bolus 10% 125 mL, PRN   Or  dextrose bolus 10% 250 mL, PRN  glucagon (rDNA) injection 1 mg, PRN  dextrose 10 % infusion, Continuous PRN  insulin lispro (HUMALOG) injection vial 0-8 Units, Q4H  benzocaine (HURRICAINE) 20 % oral spray, 4x Daily PRN  docusate sodium (COLACE) capsule 100 mg, Daily  piperacillin-tazobactam (ZOSYN) 3,375 mg in sodium chloride 0.9 % 50 mL IVPB (mini-bag), Q8H  sodium chloride flush 0.9 % injection 5-40 mL, 2 times per day  sodium chloride flush 0.9 % injection 5-40 mL, PRN  0.9 % sodium chloride infusion, PRN  ondansetron (ZOFRAN-ODT) disintegrating tablet 4 mg, Q8H PRN   Or  ondansetron (ZOFRAN) injection 4 mg, Q6H PRN  acetaminophen (TYLENOL) tablet 650 mg, Q6H PRN   Or  acetaminophen (TYLENOL) suppository 650 mg, Q6H PRN  potassium chloride (KLOR-CON M) extended release tablet 40 mEq, PRN   Or  potassium bicarb-citric acid (EFFER-K) effervescent tablet 40 mEq, PRN   Or  potassium chloride 10 mEq/100 mL IVPB (Peripheral Line), PRN  magnesium sulfate 2000 mg in 50 mL IVPB premix, PRN  sodium phosphate 10 mmol in sodium chloride 0.9 % 250 mL IVPB, PRN   Or  sodium phosphate 15 mmol in sodium chloride 0.9 % 250 mL IVPB, PRN   Or  sodium phosphate 20 mmol in sodium chloride 0.9 % 500 mL IVPB, PRN  allopurinol (ZYLOPRIM) tablet 100 mg, Daily  atorvastatin (LIPITOR) tablet 40 mg, Daily  [Held by provider] aspirin chewable tablet 81 mg, Daily  gabapentin (NEURONTIN) capsule 300 mg, TID  levothyroxine (SYNTHROID) tablet 200 mcg, Daily  propranolol (INDERAL) tablet 40 mg, BID  [Held by provider] enoxaparin (LOVENOX) injection 100 mg, BID        Allergies   Patient has no known allergies. Social History     Social History       Tobacco History       Smoking Status  Former Smoking Start Date  2/1/1977 Quit Date  12/9/2021 Smoking Frequency  1 pack/day for 34.00 years (34.00 pk-yrs)    Smoking Tobacco Type  Cigarettes from 2/1/1977 to 12/9/2021      Smokeless Tobacco Use  Current              Alcohol History       Alcohol Use Status  Never              Drug Use       Drug Use Status  Never              Sexual Activity       Sexually Active  Not Asked                    Family History   History reviewed. No pertinent family history.     Review of Systems   Review of Systems     Physical Exam   /65   Pulse 100   Temp 98.1 °F (36.7 °C) (Axillary)   Resp 19   Ht 5' 5\" (1.651 m)   Wt 220 lb 12.8 oz (100.2 kg)   LMP  (LMP Unknown) Comment: HYSTERECTOMY  SpO2 98%   BMI 36.74 kg/m²      Physical Exam    Labs    CBC:  Recent Labs     02/16/23  0539 02/16/23  1102 02/16/23 1750 02/16/23 2013 02/16/23 2230 02/17/23 0314 02/17/23  0546   WBC 14.2*  --  16.0* 15.0*  --   --   --    RBC 2.05*  --  2.86* 3.58*  --   --   --    HGB 5.2*   < > 8.0* 10.0* 9.7* 9.1* 8.7*   HCT 17.2*   < > 24.5* 30.6* 28.1* 27.8* 26.0*   MCV 84.1  --  85.5 85.5  --   --   --    RDW 18.7*  --  15.8* 15.3  --   --   --      --  206 202  --   --   --     < > = values in this interval not displayed. CHEMISTRIES:  Recent Labs     02/15/23  2157 02/16/23  0539 02/16/23  1102 02/17/23  0545   * 138  --  138   K 4.2 3.7  --  3.6   CL 98* 103  --  106   CO2 23 22  --  22   BUN 21* 24*  --  23*   CREATININE 2.2* 2.2*  --  1.1   GLUCOSE 158* 137*  --  149*   PHOS  --   --  7.1*  --      PT/INR:  Recent Labs     02/16/23 1750 02/16/23 2013   PROTIME 18.7* 17.9*   INR 1.56* 1.48*     APTT:  Recent Labs     02/16/23 1750 02/16/23 2013   APTT 40.4* 39.9*     LIVER PROFILE:  Recent Labs     02/15/23  0514 02/16/23  0539 02/17/23  0545   AST 50* 27 20   ALT 43* 26 18   BILITOT 0.3 0.5 0.4   ALKPHOS 194* 133* 108       Imaging/Diagnostics   CT ABDOMEN PELVIS WO CONTRAST Additional Contrast? None    Result Date: 2/16/2023  Large amount of complex fluid within the abdomen and pelvis, most notably within the pelvis where hematocrit levels are seen, compatible with hemorrhage. Smaller 4.9 cm collection within subcutaneous fat adjacent to the left lower quadrant stoma, compatible with hematoma. Amorphous inflammation or hemorrhage is seen within the soft tissues immediately adjacent.  Mural thickening of the ascending colon and transverse colon, which can reflect colitis in the appropriate clinical setting. Mild left pelviectasis. Pneumoperitoneum, in keeping with the recent postoperative state. Bibasilar atelectasis. 1 cm right lower lobe pulmonary nodule, for which follow-up recommendations are as below. RECOMMENDATIONS: Fleischner Society guidelines for follow-up and management of incidentally detected pulmonary nodules: Nodule size greater than 8 mm In a low-risk patient, consider CT at 3 months, PET/CT, or tissue sampling. In a high-risk patient, consider CT at 3 months, PET/CT, or tissue sampling. - Low risk patients include individuals with minimal or absent history of smoking and other known risk factors. - High risk patients include individuals with a history or smoking or known risk factors. Radiology 2017 http://pubs. rsna.org/doi/full/10.1148/radiol. 1269913093     CT ABDOMEN PELVIS WO CONTRAST Additional Contrast? None    Result Date: 2/14/2023  1. Barium contrast from prior small-bowel follow-through is seen throughout the colon and limits evaluation due to streak artifact. No definite abscess is identified. 2.  Infrarenal abdominal aortic aneurysm measures up to 3.0 cm. See recommendations for follow-up below. 3.  There is a 1.1 cm right lower lobe pulmonary nodule and a 0.3 cm left lower lobe pulmonary nodule. Recommend dedicated CT chest for further evaluation. RECOMMENDATIONS: Multiple. Worst: 11 mm solid Pathology: Multiple pulmonary nodules. Most severe: 11 mm solid pulmonary nodule detected on incomplete chest CT. Recommend prompt non-contrast Chest CT for further evaluation. These guidelines do not apply to immunocompromised patients and patients with cancer. Follow up in patients with significant comorbidities as clinically warranted. For lung cancer screening, adhere to Lung-RADS guidelines. Reference: Radiology. 2017; 284(1):228-43 3 cm AAA Pathology: 3 cm infrarenal abdominal aortic aneurysm. Recommend follow-up every 3 years.  Reference: J Am [place] Radiol 2114;65:947-997. XR ABDOMEN (KUB) (SINGLE AP VIEW)    Result Date: 2/14/2023  Cancellation of scheduled hypaque enema due to presence of barium throughout the colon including the rectosigmoid region as described above. FL SMALL BOWEL FOLLOW THROUGH ONLY    Result Date: 2/11/2023  Mildly dilated small bowel, though small-bowel transit time is upper limits of normal.       Assessment      Hospital Problems             Last Modified POA    * (Principal) Colonic obstruction ([address] 2/9/2023 Yes    Bowel obstruction ([address] 2/9/2023 Yes    Diverticulitis 2/10/2023 Yes    Hemorrhagic shock ([address] 2/16/2023 Yes    Acute blood loss anemia 2/16/2023 Yes    Acute renal failure (ARF) ([address] 2/16/2023 Yes       Plan     Neurology  Off all sedation    Continue with  Neurontin 300 mg 3 times daily    Pain control    Cardiovascular    Continue with  Allopurinol 100 mg daily  Lipitor 40 mg daily  Aspirin 81 mg daily-on hold  Lovenox 100 mg twice daily subcu-on hold  Inderal 40 mg twice daily-on hold      Pulmonary  Acute respiratory insufficiency  Ventilator management  VC, tidal line 450, rate of 18, PEEP of 5, FiO2 50%  On SBT  patient extubated      Gastrointestinal   Surgery on 2/14/2023 for sigmoid stricture and Rick's procedure  Surgery exploratory laparotomy on 2/16/2023 for hemoperitoneum    Continue with  Colace 100 mg daily-placed on hold  Zosyn 3.375 IV every 8 hours    Endo  Diabetes  Continue with  Sliding scale insulin    Hypothyroidism  Continue with  Synthroid 200 mcg daily        Renal  Creatinine was as high as 2.2, now down to 1.1      Prophylaxis  Off of anticoagulation secondary to bleed  Pepcid        Lines  ETT  Dillon  PICC      Code Status: full      Hematology  Acute GI bleed, hemoperitoneum  H&H as low as 5.2      Discussed with patient and family  Multidisciplinary rounds-discussed with nursing dietitian and pharmacy    Discussed with surgeon    Critical Care Services Provided:   This patient had a critical illness or injury that acutely impaired one or more vital organ systems such that there was a high probability of imminent or life-threatening deterioration in the patient's condition. This required high complexity decision-making to assess, manipulate, and support vital system function(s) to treat single or multiple vital organ system failure and/or to prevent further life-threatening deterioration of the patient's condition.  Total attending physician time, excluding unbundled procedures, spent at the bedside, and away from the bedside but on the unit or floor, reviewing laboratory test results, discussing care with medical staff, and discussing care with family when the patient was unable or incompetent to participate:   Critical Care Time (Minutes) # 36   (Discontinuous)           Electronically signed by Poli Verdin MD on 2/17/23 at 8:11 AM EST

## 2023-02-17 NOTE — PROGRESS NOTES
[facility] Nurse  Follow-up Progress Note       NAME:  Jaylin Carey RECORD NUMBER:  [id number]  AGE:  63 y.o. GENDER:  female  :  1960  TODAY'S DATE:  2023    Subjective: I had to go back to surgery yesterday. MD found 3000 of blood in by abdomin. I feel a lot better today. Wound Identification:  Wound Type: Mid abdominal incision  Contributing Factors: Chronic pressure, decreased mobility, shear force, obesity, decreased tissue oxygenation         Patient Goal of Care:  [x] Wound Healing  [] Odor Control   [] Palliative Care  [] Pain Control   [x] Other: new colostomy care    Objective:  Lying in bed. Daughter (April) at bed side. Current bag needs to be changed stool on out side of bag and smelly. BP 85/63   Pulse 86   Temp 98.1 °F (36.7 °C) (Axillary)   Resp 13   Ht 5' 5\" (1.651 m)   Wt 220 lb 12.8 oz (100.2 kg)   LMP  (LMP Unknown) Comment: HYSTERECTOMY  SpO2 94%   BMI 36.74 kg/m²   Brendan Risk Score: Brendan Scale Score: 12  Assessment:  Current colostomy pouch leaking. Stoma red, moist protrudes. Only very slight redness around oracio stoma skin. Junction intact. Mid line abd incision staple line intact. Abdomin very distended and tender. Some redness noted over all left mid an dlower quadrant. Measurements:     Incision 23 Abdomen Anterior;Medial (Active)   Dressing Status Clean;Dry; Intact 23   Dressing Change Due 23   Incision Cleansed Cleansed with saline 23   Dressing/Treatment Foam 23   Incision Length (cm) 16 23 141   Incision Width (cm) 0 cm 23   Incision Depth (cm) 0 cm 23 141   Closure [place] 23   Margins Approximated 23   Incision Assessment Dry 23 141   Drainage Amount None 23 141   Odor None 23   Oracio-incision Assessment Dry/flaky; Intact 23 141   Number of days: 0 Colostomy LLQ (Active)   Stomal Appliance 2 piece 02/17/23 1415   Flange Size (inches) 2.75 Inches 02/17/23 1415   Stoma  Assessment Protrudes; Moist;Red;Swelling 02/17/23 1415   Peristomal Assessment Blanchable erythema 02/17/23 1415   Treatment Bag change;Site care;Stoma powder;Stoma paste; Heat applied 02/17/23 1415   Stool Appearance Watery 02/17/23 1415   Stool Color Brown 02/17/23 1415   Stool Amount Small 02/17/23 1415   Output (mL) 50 ml 02/17/23 1415   Number of days: 3       Intake/Output Summary (Last 24 hours) at 2/17/2023 1431  Last data filed at 2/17/2023 1415  Gross per 24 hour   Intake 3951.7 ml   Output 4200 ml   Net -248. 3 ml     Response to treatment:  With complaints of pain. Pain Assessment:  Severity:  3 / 10  Quality of pain: aching  Wound Pain Timing/Severity: intermittent  Premedicated: No  Plan:   Plan of Care:      Mid line dressing removed. Site cleansed with NSS. Pat dry. Foam dressing placed. Plan for Ostomy Care:  Daughter willing to change appliance. Patient watching. Current pouch removed. Site cleansed with warm water. Stoma measured. Convex paste ring placed around stoma. Flange cut and placed around stoma. Bag attached and closed. Daughter demonstrated understanding. Praise well done given. Call Ostomy care for problems with seal at [phone] or or call [phone] and leave message     Plan to paige catalog and order samples at next visit. Specialty Bed Required : Yes   [x] Low Air Loss   [x] Pressure Redistribution  [] Fluid Immersion  [] Bariatric  [] Total Pressure Relief  [] Other:     Current Diet: Diet NPO Exceptions are: Ice Chips  Dietician consult:  Yes    Discharge Plan:  Placement for patient upon discharge: intermediate care facility   Patient appropriate for Outpatient [address]: Yes  Supplies given Yes   Samples requested No  Referrals:  []  following.   [] [address] [employer] [facility]  [] Supplies  [] Other    Patient/Caregiver Teaching: Daughter (April) changed appliance today  Written Instructions given to patient/family  Teaching provided:  [] Reviewed GI and A&P        [] Supplies  [x] Pouch emptying      [x] Manipulate closure  [x] Routine Care         [] Comment  [] Pouch maintenance           Level of patient/caregiver understanding able to:   [] Indicates understanding       [] Needs reinforcement  [] Unsuccessful      [x] Verbal Understanding  [x] Demonstrated understanding       [] No evidence of learning  [] Refused teaching         [] N/A       Electronically signed by Juvencio Alcocer, RN, MSN, Samantha Blanco on 2/17/2023 at 2:31 PM

## 2023-02-17 NOTE — PROGRESS NOTES
Patient admitted to CVU from OR with RN and anesthesia attached to monitors and ventilator. Report received from anesthesiologist, Dr. Mendes Founds. Continue monitoring hemodynamics per protocol.

## 2023-02-18 LAB
A/G RATIO: 1.3 (ref 1.1–2.2)
ALBUMIN SERPL-MCNC: 2 G/DL (ref 3.4–5)
ALP BLD-CCNC: 81 U/L (ref 40–129)
ALT SERPL-CCNC: 13 U/L (ref 10–40)
ANION GAP SERPL CALCULATED.3IONS-SCNC: 6 MMOL/L (ref 3–16)
AST SERPL-CCNC: 16 U/L (ref 15–37)
BILIRUB SERPL-MCNC: 0.4 MG/DL (ref 0–1)
BUN BLDV-MCNC: 15 MG/DL (ref 7–20)
CALCIUM SERPL-MCNC: 6.7 MG/DL (ref 8.3–10.6)
CHLORIDE BLD-SCNC: 108 MMOL/L (ref 99–110)
CO2: 24 MMOL/L (ref 21–32)
CREAT SERPL-MCNC: 0.6 MG/DL (ref 0.6–1.2)
GFR SERPL CREATININE-BSD FRML MDRD: >60 ML/MIN/{1.73_M2}
GLUCOSE BLD-MCNC: 105 MG/DL (ref 70–99)
GLUCOSE BLD-MCNC: 89 MG/DL (ref 70–99)
GLUCOSE BLD-MCNC: 93 MG/DL (ref 70–99)
HCT VFR BLD CALC: 21.9 % (ref 36–48)
HCT VFR BLD CALC: 22.6 % (ref 36–48)
HEMOGLOBIN: 7.4 G/DL (ref 12–16)
HEMOGLOBIN: 7.6 G/DL (ref 12–16)
MAGNESIUM: 2 MG/DL (ref 1.8–2.4)
PERFORMED ON: ABNORMAL
PERFORMED ON: NORMAL
PHOSPHORUS: 2.2 MG/DL (ref 2.5–4.9)
POTASSIUM REFLEX MAGNESIUM: 3.4 MMOL/L (ref 3.5–5.1)
SODIUM BLD-SCNC: 138 MMOL/L (ref 136–145)
TOTAL PROTEIN: 3.5 G/DL (ref 6.4–8.2)

## 2023-02-18 PROCEDURE — 6370000000 HC RX 637 (ALT 250 FOR IP): Performed by: SURGERY

## 2023-02-18 PROCEDURE — 83735 ASSAY OF MAGNESIUM: CPT

## 2023-02-18 PROCEDURE — 94761 N-INVAS EAR/PLS OXIMETRY MLT: CPT

## 2023-02-18 PROCEDURE — 84100 ASSAY OF PHOSPHORUS: CPT

## 2023-02-18 PROCEDURE — 6360000002 HC RX W HCPCS: Performed by: SURGERY

## 2023-02-18 PROCEDURE — 80053 COMPREHEN METABOLIC PANEL: CPT

## 2023-02-18 PROCEDURE — 6360000002 HC RX W HCPCS: Performed by: INTERNAL MEDICINE

## 2023-02-18 PROCEDURE — 2580000003 HC RX 258: Performed by: SURGERY

## 2023-02-18 PROCEDURE — 85014 HEMATOCRIT: CPT

## 2023-02-18 PROCEDURE — 2000000000 HC ICU R&B

## 2023-02-18 PROCEDURE — 2700000000 HC OXYGEN THERAPY PER DAY

## 2023-02-18 PROCEDURE — 2500000003 HC RX 250 WO HCPCS: Performed by: INTERNAL MEDICINE

## 2023-02-18 PROCEDURE — 6370000000 HC RX 637 (ALT 250 FOR IP): Performed by: INTERNAL MEDICINE

## 2023-02-18 PROCEDURE — 99024 POSTOP FOLLOW-UP VISIT: CPT | Performed by: SURGERY

## 2023-02-18 PROCEDURE — 85018 HEMOGLOBIN: CPT

## 2023-02-18 RX ORDER — POTASSIUM CHLORIDE 29.8 MG/ML
20 INJECTION INTRAVENOUS
Status: COMPLETED | OUTPATIENT
Start: 2023-02-18 | End: 2023-02-18

## 2023-02-18 RX ORDER — POTASSIUM CHLORIDE 7.45 MG/ML
10 INJECTION INTRAVENOUS
Status: DISCONTINUED | OUTPATIENT
Start: 2023-02-18 | End: 2023-02-18

## 2023-02-18 RX ORDER — MECOBALAMIN 5000 MCG
5 TABLET,DISINTEGRATING ORAL NIGHTLY
Status: DISCONTINUED | OUTPATIENT
Start: 2023-02-18 | End: 2023-03-03 | Stop reason: HOSPADM

## 2023-02-18 RX ADMIN — PROPRANOLOL HYDROCHLORIDE 40 MG: 40 TABLET ORAL at 09:32

## 2023-02-18 RX ADMIN — ACETAMINOPHEN 650 MG: 325 TABLET ORAL at 20:27

## 2023-02-18 RX ADMIN — HYDROMORPHONE HYDROCHLORIDE 0.5 MG: 0.5 INJECTION, SOLUTION INTRAMUSCULAR; INTRAVENOUS; SUBCUTANEOUS at 16:22

## 2023-02-18 RX ADMIN — ATORVASTATIN CALCIUM 40 MG: 40 TABLET, FILM COATED ORAL at 09:32

## 2023-02-18 RX ADMIN — PIPERACILLIN AND TAZOBACTAM 3375 MG: 3; .375 INJECTION, POWDER, FOR SOLUTION INTRAVENOUS at 16:22

## 2023-02-18 RX ADMIN — MUPIROCIN: 20 OINTMENT TOPICAL at 09:34

## 2023-02-18 RX ADMIN — GABAPENTIN 300 MG: 300 CAPSULE ORAL at 20:23

## 2023-02-18 RX ADMIN — PIPERACILLIN AND TAZOBACTAM 3375 MG: 3; .375 INJECTION, POWDER, FOR SOLUTION INTRAVENOUS at 03:03

## 2023-02-18 RX ADMIN — PIPERACILLIN AND TAZOBACTAM 3375 MG: 3; .375 INJECTION, POWDER, FOR SOLUTION INTRAVENOUS at 09:34

## 2023-02-18 RX ADMIN — SODIUM CHLORIDE, PRESERVATIVE FREE 10 ML: 5 INJECTION INTRAVENOUS at 09:29

## 2023-02-18 RX ADMIN — POTASSIUM CHLORIDE 20 MEQ: 29.8 INJECTION, SOLUTION INTRAVENOUS at 05:51

## 2023-02-18 RX ADMIN — ALLOPURINOL 100 MG: 100 TABLET ORAL at 09:33

## 2023-02-18 RX ADMIN — Medication 5 MG: at 21:01

## 2023-02-18 RX ADMIN — GABAPENTIN 300 MG: 300 CAPSULE ORAL at 16:21

## 2023-02-18 RX ADMIN — FAMOTIDINE 20 MG: 10 INJECTION, SOLUTION INTRAVENOUS at 20:23

## 2023-02-18 RX ADMIN — MUPIROCIN: 20 OINTMENT TOPICAL at 20:24

## 2023-02-18 RX ADMIN — POTASSIUM CHLORIDE 20 MEQ: 29.8 INJECTION, SOLUTION INTRAVENOUS at 06:51

## 2023-02-18 RX ADMIN — SODIUM CHLORIDE, PRESERVATIVE FREE 10 ML: 5 INJECTION INTRAVENOUS at 20:25

## 2023-02-18 RX ADMIN — FAMOTIDINE 20 MG: 10 INJECTION, SOLUTION INTRAVENOUS at 09:29

## 2023-02-18 RX ADMIN — GABAPENTIN 300 MG: 300 CAPSULE ORAL at 09:33

## 2023-02-18 RX ADMIN — POTASSIUM CHLORIDE 40 MEQ: 1500 TABLET, EXTENDED RELEASE ORAL at 20:23

## 2023-02-18 RX ADMIN — PROPRANOLOL HYDROCHLORIDE 40 MG: 40 TABLET ORAL at 20:23

## 2023-02-18 ASSESSMENT — PAIN DESCRIPTION - FREQUENCY: FREQUENCY: CONTINUOUS

## 2023-02-18 ASSESSMENT — PAIN SCALES - GENERAL
PAINLEVEL_OUTOF10: 2
PAINLEVEL_OUTOF10: 2
PAINLEVEL_OUTOF10: 7
PAINLEVEL_OUTOF10: 5
PAINLEVEL_OUTOF10: 9

## 2023-02-18 ASSESSMENT — PAIN DESCRIPTION - ORIENTATION
ORIENTATION: OTHER (COMMENT)
ORIENTATION: MID
ORIENTATION: LEFT

## 2023-02-18 ASSESSMENT — PAIN DESCRIPTION - PAIN TYPE
TYPE: SURGICAL PAIN
TYPE: SURGICAL PAIN

## 2023-02-18 ASSESSMENT — PAIN DESCRIPTION - DESCRIPTORS
DESCRIPTORS: DISCOMFORT
DESCRIPTORS: ACHING
DESCRIPTORS: ACHING

## 2023-02-18 ASSESSMENT — PAIN SCALES - WONG BAKER
WONGBAKER_NUMERICALRESPONSE: 4
WONGBAKER_NUMERICALRESPONSE: 2
WONGBAKER_NUMERICALRESPONSE: 4

## 2023-02-18 ASSESSMENT — PAIN DESCRIPTION - ONSET: ONSET: ON-GOING

## 2023-02-18 ASSESSMENT — PAIN DESCRIPTION - LOCATION
LOCATION: ABDOMEN

## 2023-02-18 NOTE — PROGRESS NOTES
Intensive Care Progress Note    Patient: Karin Carney MRN: [id number]  Date of  Admission: 2/9/2023   YOB: 1960  Age: 63 y.o. Sex: female    Unit: [place] C2 CVU  Room/Bed: 0236/0236-01 Attending Physician: Raúl Garvin MD   Admitting Physician: Nishant Triana          patient, electronic medical record     History of Present Illness   This is a 30-year-old female who was admitted on 2/9/2023 from [facility] because of possible high-grade sigmoid large bowel obstruction. Initially presenting to the emergency room with abdominal pain. As the sigmoid stricture did not was finally taken to surgery on 2/14/2023. And a Rick's procedure was done. Postop day 1 the patient was having abdominal discomfort secondary to the surgery, which was expected. However that evening the patient started to have hypotension and initial H&H dropped to 7.4. With a lactic acid of 1.6. The night of 2/16/2023 the patient had a critical H&H of 5.2 and another unit of blood was given. Patient had abdominal CT scan done showing hemorrhagic ascites with a 4 x 7 cm fluid collection in the left lower quadrant and a 6. By 7 cm fluid collection in the pelvis. Patient was taken to the OR on the night of 2/16/2020 for an exploratory laparotomy with evacuation of hemoperitoneum. Patient was returned to the ICU on a ventilator       Subjective:    No issues overnight  Off of any pressors  Off the ventilator yesterday  No chest pains or palpitations  No abdominal pain  Starting to take p.o. medications      ROS:A comprehensive review of systems was negative except for: Above    Objective:          Intake and Output:   Current Shift:   02/18 0701 - 02/18 1900  In: 10 [I.V.:10]  Out: -   Last three shifts:   02/16 1901 - 02/18 0700  In: 5360 [I.V.:5360]  Out: 5322 [Urine:1615]    Vent settings for last 24 hours:  [unfilled]    Hemodynamic parameters for last 24 hours:  [unfilled]    Physical Exam:   Patient Vitals for the past 24 hrs:   BP Temp Temp src Pulse Resp SpO2 Weight   02/18/23 0914 122/73 -- Oral 84 15 97 % --   02/18/23 0700 107/73 -- -- 88 14 94 % 218 lb (98.9 kg)   02/18/23 0600 111/74 -- -- 84 16 94 % --   02/18/23 0500 109/67 -- -- 85 13 94 % --   02/18/23 0400 104/64 99 °F (37.2 °C) Oral 87 13 91 % --   02/18/23 0300 106/62 -- -- 88 12 94 % --   02/18/23 0200 102/61 -- -- 93 13 94 % --   02/18/23 0100 102/64 -- -- 95 16 92 % --   02/18/23 0000 115/67 -- -- (!) 102 23 96 % --   02/17/23 2300 108/65 -- -- (!) 102 23 93 % --   02/17/23 2200 115/75 -- -- 95 20 91 % --   02/17/23 2100 104/61 -- -- 95 17 94 % --   02/17/23 2000 99/60 99 °F (37.2 °C) Oral 90 19 -- --   02/17/23 1900 96/62 -- -- 88 16 -- --   02/17/23 1808 -- -- -- -- 18 -- --   02/17/23 1800 98/61 -- -- 86 16 -- --   02/17/23 1700 97/62 -- -- 89 15 92 % --   02/17/23 1600 98/63 -- -- 90 20 92 % --   02/17/23 1500 105/61 -- -- 98 29 92 % --   02/17/23 1400 101/61 -- -- 92 18 93 % --   02/17/23 1316 85/63 -- -- 86 13 94 % --   02/17/23 1211 -- -- -- -- 18 -- --   02/17/23 1200 -- -- -- 80 16 96 % --   02/17/23 1131 -- -- -- 81 16 96 % --   02/17/23 1100 109/66 -- -- 80 15 96 % --            Physical Exam  Vitals and nursing note reviewed. Constitutional:       General: She is not in acute distress. Appearance: Normal appearance. HENT:      Head: Normocephalic and atraumatic. Right Ear: External ear normal.      Left Ear: External ear normal.      Nose: Nose normal. No congestion. Mouth/Throat:      Mouth: Mucous membranes are moist.      Pharynx: Oropharynx is clear. Eyes:      Extraocular Movements: Extraocular movements intact. Conjunctiva/sclera: Conjunctivae normal.      Pupils: Pupils are equal, round, and reactive to light. Cardiovascular:      Rate and Rhythm: Normal rate and regular rhythm. Pulses: Normal pulses. Heart sounds: Normal heart sounds. No murmur heard.   Pulmonary: Effort: Pulmonary effort is normal. No respiratory distress. Breath sounds: Normal breath sounds. No wheezing or rales. Abdominal:      General: Abdomen is flat. Bowel sounds are normal. There is no distension. Palpations: Abdomen is soft. There is no mass. Tenderness: There is abdominal tenderness. There is no right CVA tenderness, left CVA tenderness, guarding or rebound. Hernia: No hernia is present. Musculoskeletal:         General: No swelling. Normal range of motion. Cervical back: Normal range of motion. No rigidity. Skin:     General: Skin is warm and dry. Coloration: Skin is not jaundiced or pale. Neurological:      General: No focal deficit present. Mental Status: She is alert and oriented to person, place, and time. Mental status is at baseline. Cranial Nerves: No cranial nerve deficit. Motor: No weakness. Psychiatric:         Mood and Affect: Mood normal.         Behavior: Behavior normal.         Thought Content: Thought content normal.         Judgment: Judgment normal.         Labs:   Recent Labs     02/16/23  0539 02/16/23  1102 02/16/23  1750 02/16/23  2013 02/16/23  2230 02/17/23  1615 02/17/23  2149 02/18/23  0312   WBC 14.2*  --  16.0* 15.0*  --   --   --   --    HGB 5.2*   < > 8.0* 10.0*   < > 7.8* 7.3* 7.4*   HCT 17.2*   < > 24.5* 30.6*   < > 23.5* 22.5* 21.9*     --  206 202  --   --   --   --     < > = values in this interval not displayed. Recent Labs     02/16/23  0539 02/17/23  0545 02/18/23  0312    138 138   K 3.7 3.6 3.4*    106 108   CO2 22 22 24   BUN 24* 23* 15   GLUCOSE 137* 149* 89       Additional labs:    Radiology, images personally reviewed.   Not indicated  Other imaging: Not indicated      Micro: Negative growth to date    Assessment:     Principal Problem:    Colonic obstruction (HCC)  Active Problems:    Bowel obstruction ([address]    Diverticulitis    Hemorrhagic shock ([address]    Acute blood loss anemia    Acute renal failure (ARF) (HCC)    Sigmoid stricture (HCC)  Resolved Problems:    * No resolved hospital problems. *      Discussion / Plan:       Neurology  Off all sedation     Continue with  Neurontin 300 mg 3 times daily     Pain controlled     Cardiovascular     Continue with  Allopurinol 100 mg daily  Lipitor 40 mg daily  Aspirin 81 mg daily-on hold  Lovenox 100 mg twice daily subcu-on hold  Inderal 40 mg twice daily        Pulmonary  Acute respiratory insufficiency  Ventilator management  Extubated 2/17/2023  No need for oxygen  Oxygen requirements are on room air     Gastrointestinal   Surgery on 2/14/2023 for sigmoid stricture and Rick's procedure  Surgery exploratory laparotomy on 2/16/2023 for hemoperitoneum     Continue with  Colace 100 mg daily  Zosyn 3.375 IV every 8 hours     Endo  Diabetes  Continue with  Sliding scale insulin     Hypothyroidism  Continue with  Synthroid 200 mcg daily           Renal  IV fluids running at 150 mL an hour  We will go ahead and stop this  Replace potassium  Creatinine is normalized  Making urine  López will stay in for now     Prophylaxis  Off of anticoagulation secondary to bleed  Pepcid           Lines  PICC  López     Code Status: full        Hematology  Acute GI bleed, hemoperitoneum  H&H as low as 5.2    Staying normal     Okay to move out of the ICU  We will sign off the patient         Critical Care Services Provided: This patient had a critical illness or injury that acutely impaired one or more vital organ systems such that there was a high probability of imminent or life-threatening deterioration in the patient's condition. This required high complexity decision-making to assess, manipulate, and support vital system function(s) to treat single or multiple vital organ system failure and/or to prevent further life-threatening deterioration of the patient's condition.  Total attending physician time, excluding unbundled procedures, spent at the bedside, and away from the bedside but on the unit or floor, reviewing laboratory test results, discussing care with medical staff, and discussing care with family when the patient was unable or incompetent to participate:   Critical Care Time (Minutes) # 36   (Discontinuous)                                Jasmyne Rosario MD  [facility]  Pulmonary, Critical Care and Sleep Medicine  Office : [phone]    Please note that some or all of this record was generated using voice recognition software. If there are any questions about the content of this document, please contact the author as some errors in transcription may have occurred.

## 2023-02-18 NOTE — PROGRESS NOTES
Pt remains w/o N/V, abd pain or distention s/p NG clamping and initiation of clear liquids. NGT D/C at this time per Dr. Kimberley Cushing orders. Pt tolerated well.

## 2023-02-18 NOTE — PROGRESS NOTES
Dr. Romy Rios at bedside at this time. POC reviewed. Per Dr. Romy VILLASENOR to clamp NG and start clear liquid diet. Per Dr. Romy VILLASENOR to D/C NGT if pt able to tolerate diet and NG clamping this afternoon.

## 2023-02-18 NOTE — PROGRESS NOTES
[facility] [place] GENERAL SURGERY    Surgery Progress Note           POD # 2/4    PATIENT NAME: Tesfaye Franco     TODAY'S DATE: 2/18/2023    INTERVAL HISTORY:    Pt extubated. No nausea     OBJECTIVE:   VITALS:  /73   Pulse 84   Temp 99 °F (37.2 °C) (Oral)   Resp 15   Ht 5' 5\" (1.651 m)   Wt 218 lb (98.9 kg)   LMP  (LMP Unknown) Comment: HYSTERECTOMY  SpO2 97%   BMI 36.28 kg/m²     INTAKE/OUTPUT:    I/O last 3 completed shifts: In: 5360 [I.V.:5360]  Out: 2981 [Urine:1615; Emesis/NG output:1075; Stool:175]  I/O this shift:  In: 10 [I.V.:10]  Out: -               CONSTITUTIONAL:  awake and alert  LUNGS:     ABDOMEN:    , soft, non-distended, tenderness noted at incision   INCISION: clean, dry, no drainage, colostomy - stool in pouch    Data:  CBC:   Recent Labs     02/16/23  0539 02/16/23  1102 02/16/23  1750 02/16/23 2013 02/16/23  2230 02/17/23  1615 02/17/23  2149 02/18/23  0312   WBC 14.2*  --  16.0* 15.0*  --   --   --   --    HGB 5.2*   < > 8.0* 10.0*   < > 7.8* 7.3* 7.4*   HCT 17.2*   < > 24.5* 30.6*   < > 23.5* 22.5* 21.9*     --  206 202  --   --   --   --     < > = values in this interval not displayed. BMP:    Recent Labs     02/16/23  0539 02/17/23  0545 02/18/23  0312    138 138   K 3.7 3.6 3.4*    106 108   CO2 22 22 24   BUN 24* 23* 15   CREATININE 2.2* 1.1 0.6   GLUCOSE 137* 149* 89       Hepatic:   Recent Labs     02/16/23  0539 02/17/23  0545 02/18/23  0312   AST 27 20 16   ALT 26 18 13   BILITOT 0.5 0.4 0.4   ALKPHOS 133* 108 81       Mag:      Recent Labs     02/18/23  0312   MG 2.00        Phos:     Recent Labs     02/16/23  1102   PHOS 7.1*        INR:   Recent Labs     02/16/23  1750 02/16/23 2013   INR 1.56* 1.48*           Radiology Review:       ASSESSMENT AND PLAN:  63 y.o. female status post Rick's procedure with postop bleeding, now s/p evacuation of large hemoperitoneum.   No further sx of active abdominal bleeding   - clamp ng and possible removal later today   - clear liquids    - OOB today   - ok transfer from ICU        Electronically signed by Yasmin Roman MD

## 2023-02-18 NOTE — PROGRESS NOTES
[email or web address]  Nephrology follow-up note           Reason for Consult:  TARSHA  Requesting Physician:  Dr. Memo Lopez history  Patient was extubated on 2/17  Feeling better, started on clears and plans to discontinue NG tube per surgery  She underwent emergent laparotomy for hemoperitoneum evacuation, 3 L old clotted blood removed on 2/16  Patient received total of 4 units of PRBC and 2 FFP's, 1.5 L of normal saline during surgery    Last 24 h uop 1265 mL urine  NG tube aspirate in the last 24 hours is 950    ROS: Abdominal discomfort, no nausea vomiting   PSFH: + visitor    Scheduled Meds:   mupirocin   Nasal BID    famotidine (PEPCID) injection  20 mg IntraVENous BID    insulin lispro  0-8 Units SubCUTAneous Q4H    docusate sodium  100 mg Oral Daily    piperacillin-tazobactam  3,375 mg IntraVENous Q8H    sodium chloride flush  5-40 mL IntraVENous 2 times per day    allopurinol  100 mg Oral Daily    atorvastatin  40 mg Oral Daily    [Held by provider] aspirin  81 mg Oral Daily    gabapentin  300 mg Oral TID    levothyroxine  200 mcg Oral Daily    propranolol  40 mg Oral BID    [Held by provider] enoxaparin  1 mg/kg SubCUTAneous BID     Continuous Infusions:   sodium chloride 150 mL/hr at 02/17/23 1741    sodium chloride      HYDROmorphone Stopped (02/15/23 2207)    dextrose      sodium chloride Stopped (02/12/23 1857)     PRN Meds:.phenol, HYDROmorphone, sodium chloride, naloxone, glucose, dextrose bolus **OR** dextrose bolus, glucagon (rDNA), dextrose, benzocaine, sodium chloride flush, sodium chloride, ondansetron **OR** ondansetron, acetaminophen **OR** acetaminophen, potassium chloride **OR** potassium alternative oral replacement **OR** [DISCONTINUED] potassium chloride, magnesium sulfate, sodium phosphate IVPB **OR** sodium phosphate IVPB **OR** sodium phosphate IVPB      History of Present Illness on 2/16/2023:    63 y.o. yo female with PMH of hypothyroidism, hyperlipidemia, gout, PAD with history of bilateral popliteal artery aneurysm and stenting, SOLEDAD who is admitted for large bowel obstruction and underwent surgery on 2/14/2023. Patient was transferred from [facility]  In January 2023, patient had right above-knee to below-knee popliteal artery bypass. Patient also on Eliquis  On 2/14/2023, patient underwent Rick's procedure for stricture of sigmoid colon leading to large bowel obstruction which did not improve with conservative measures. During the procedure patient received 1.5 L of LR, intermittent hypotension was noted. She dropped her blood pressure as low as 62/42 on 2/15 at 9 PM and has been hypotensive throughout the night.   Patient received 30 ml/kg IV fluid bolus this morning, also had 3 doses of ketorolac given on 2/15; patient had 500 mL of LR n iv albumin bolus overnight  Creatinine increased to 2.2 on 2/15 and remained at that level and nephrology has been consulted  On 2/16, patient had hemoglobin of 5.2 patient received 2 units of PRBCs    Physical exam:   Constitutional:  VITALS:  /73   Pulse 84   Temp 99 °F (37.2 °C) (Oral)   Resp 15   Ht 5' 5\" (1.651 m)   Wt 218 lb (98.9 kg)   LMP  (LMP Unknown) Comment: HYSTERECTOMY  SpO2 97%   BMI 36.28 kg/m²   Gen: alert, awake  Neck:  NGT in situ  Skin: Unremarkable  Cardiovascular:  S1, S2 without m/r/g   Respiratory: Clear anteriorly; ET tube in situ  Abdomen:  soft, distended, colostomy in situ   Extremities: no lower extremity edema; right thigh hematoma around the surgical site of the bypass  Neuro/Psy: Follows commands    Data/  Recent Labs     02/16/23  0539 02/16/23  1102 02/16/23  1750 02/16/23 2013 02/16/23  2230 02/17/23  1615 02/17/23  2149 02/18/23  0312   WBC 14.2*  --  16.0* 15.0*  --   --   --   --    HGB 5.2*   < > 8.0* 10.0*   < > 7.8* 7.3* 7.4*   HCT 17.2*   < > 24.5* 30.6*   < > 23.5* 22.5* 21.9*   MCV 84.1  --  85.5 85.5  --   --   --   --      --  206 202  --   --   --   --     < > = values in this interval not displayed. Recent Labs     02/16/23  0539 02/16/23  1102 02/17/23  0545 02/18/23  0312     --  138 138   K 3.7  --  3.6 3.4*     --  106 108   CO2 22  --  22 24   GLUCOSE 137*  --  149* 89   PHOS  --  7.1*  --   --    MG  --   --   --  2.00   BUN 24*  --  23* 15   CREATININE 2.2*  --  1.1 0.6   LABGLOM 25*  --  57* >60       CT scan a/p wo contrast  Impression   Large amount of complex fluid within the abdomen and pelvis, most notably   within the pelvis where hematocrit levels are seen, compatible with   hemorrhage. Smaller 4.9 cm collection within subcutaneous fat adjacent to the left lower   quadrant stoma, compatible with hematoma. Amorphous inflammation or   hemorrhage is seen within the soft tissues immediately adjacent. Mural thickening of the ascending colon and transverse colon, which can   reflect colitis in the appropriate clinical setting. Mild left pelviectasis. Pneumoperitoneum, in keeping with the recent postoperative state. Bibasilar atelectasis. 1 cm right lower lobe pulmonary nodule, for which follow-up recommendations   are as below. RECOMMENDATIONS:   Fleischner Society guidelines for follow-up and management of incidentally   detected pulmonary nodules:       Nodule size greater than 8 mm           In a low-risk patient, consider CT at 3 months, PET/CT, or tissue sampling. In a high-risk patient, consider CT at 3 months, PET/CT, or tissue sampling.       - Low risk patients include individuals with minimal or absent history of   smoking and other known risk factors. - High risk patients include individuals with a history or smoking or known   risk factors. Radiology 2017 http://pubs. rsna.org/doi/full/10.1148/radiol. 4506221240       Assessment  -TARSHA, pre-renal to early ATN in the setting of hypotension  -Anemia, acute blood loss   s/p 2 PRBCs 2/15-2/16  -hemorrhagic shock , resolved  -right leg hematoma  -large bowel obstruction s/p pati's  w colostomy on 2/14/23   -right above knee to below knee popliteal artery bypass on 1/16/23  -Hypokalemia      Plan  -Keep  SBP>110  -Change IV fluid to normal saline at 75 mL/h  -Potassium replaced  -Follow phosphorus   -DC toradol and all NSAIDs  -cautious dose of vancomycin  -cont lakhani, likely can come out tomorrow  -Serial renal panel  -Daily wts and strict i/o  -Renal dose meds and avoid nephrotoxins      Thank you for the consultation. Please do not hesitate to call with questions. Heather Moreno MD  Office: [phone]  Fax:    [phone]  [email or web address]

## 2023-02-19 LAB
A/G RATIO: 1 (ref 1.1–2.2)
ALBUMIN SERPL-MCNC: 2 G/DL (ref 3.4–5)
ALP BLD-CCNC: 88 U/L (ref 40–129)
ALT SERPL-CCNC: 14 U/L (ref 10–40)
ANION GAP SERPL CALCULATED.3IONS-SCNC: 8 MMOL/L (ref 3–16)
AST SERPL-CCNC: 21 U/L (ref 15–37)
BILIRUB SERPL-MCNC: 0.5 MG/DL (ref 0–1)
BILIRUBIN URINE: NEGATIVE
BLOOD BANK DISPENSE STATUS: NORMAL
BLOOD BANK PRODUCT CODE: NORMAL
BLOOD CULTURE, ROUTINE: NORMAL
BLOOD, URINE: ABNORMAL
BPU ID: NORMAL
BUN BLDV-MCNC: 8 MG/DL (ref 7–20)
CALCIUM SERPL-MCNC: 7 MG/DL (ref 8.3–10.6)
CHLORIDE BLD-SCNC: 105 MMOL/L (ref 99–110)
CLARITY: CLEAR
CO2: 23 MMOL/L (ref 21–32)
COLOR: YELLOW
CREAT SERPL-MCNC: 0.6 MG/DL (ref 0.6–1.2)
CULTURE, BLOOD 2: NORMAL
DESCRIPTION BLOOD BANK: NORMAL
EPITHELIAL CELLS, UA: ABNORMAL /HPF (ref 0–5)
GFR SERPL CREATININE-BSD FRML MDRD: >60 ML/MIN/{1.73_M2}
GLUCOSE BLD-MCNC: 101 MG/DL (ref 70–99)
GLUCOSE BLD-MCNC: 106 MG/DL (ref 70–99)
GLUCOSE BLD-MCNC: 109 MG/DL (ref 70–99)
GLUCOSE BLD-MCNC: 114 MG/DL (ref 70–99)
GLUCOSE BLD-MCNC: 125 MG/DL (ref 70–99)
GLUCOSE BLD-MCNC: 128 MG/DL (ref 70–99)
GLUCOSE URINE: NEGATIVE MG/DL
HCT VFR BLD CALC: 20.9 % (ref 36–48)
HCT VFR BLD CALC: 24.1 % (ref 36–48)
HEMOGLOBIN: 7.2 G/DL (ref 12–16)
HEMOGLOBIN: 7.6 G/DL (ref 12–16)
KETONES, URINE: NEGATIVE MG/DL
LEUKOCYTE ESTERASE, URINE: ABNORMAL
MAGNESIUM: 1.9 MG/DL (ref 1.8–2.4)
MICROSCOPIC EXAMINATION: YES
NITRITE, URINE: NEGATIVE
PERFORMED ON: ABNORMAL
PH UA: 6 (ref 5–8)
PHOSPHORUS: 1.9 MG/DL (ref 2.5–4.9)
POTASSIUM REFLEX MAGNESIUM: 3.4 MMOL/L (ref 3.5–5.1)
PROTEIN UA: ABNORMAL MG/DL
RBC UA: ABNORMAL /HPF (ref 0–4)
SODIUM BLD-SCNC: 136 MMOL/L (ref 136–145)
SPECIFIC GRAVITY UA: 1.02 (ref 1–1.03)
TOTAL PROTEIN: 4.1 G/DL (ref 6.4–8.2)
URINE REFLEX TO CULTURE: ABNORMAL
URINE TYPE: ABNORMAL
UROBILINOGEN, URINE: 0.2 E.U./DL
WBC UA: ABNORMAL /HPF (ref 0–5)

## 2023-02-19 PROCEDURE — 6370000000 HC RX 637 (ALT 250 FOR IP): Performed by: SURGERY

## 2023-02-19 PROCEDURE — 2580000003 HC RX 258: Performed by: SURGERY

## 2023-02-19 PROCEDURE — 6370000000 HC RX 637 (ALT 250 FOR IP): Performed by: INTERNAL MEDICINE

## 2023-02-19 PROCEDURE — 2700000000 HC OXYGEN THERAPY PER DAY

## 2023-02-19 PROCEDURE — 6360000002 HC RX W HCPCS: Performed by: INTERNAL MEDICINE

## 2023-02-19 PROCEDURE — 94761 N-INVAS EAR/PLS OXIMETRY MLT: CPT

## 2023-02-19 PROCEDURE — 51702 INSERT TEMP BLADDER CATH: CPT

## 2023-02-19 PROCEDURE — 85014 HEMATOCRIT: CPT

## 2023-02-19 PROCEDURE — 83735 ASSAY OF MAGNESIUM: CPT

## 2023-02-19 PROCEDURE — 85018 HEMOGLOBIN: CPT

## 2023-02-19 PROCEDURE — 87040 BLOOD CULTURE FOR BACTERIA: CPT

## 2023-02-19 PROCEDURE — 6360000002 HC RX W HCPCS: Performed by: SURGERY

## 2023-02-19 PROCEDURE — 2500000003 HC RX 250 WO HCPCS: Performed by: INTERNAL MEDICINE

## 2023-02-19 PROCEDURE — 2580000003 HC RX 258: Performed by: INTERNAL MEDICINE

## 2023-02-19 PROCEDURE — 2500000003 HC RX 250 WO HCPCS: Performed by: SURGERY

## 2023-02-19 PROCEDURE — 84100 ASSAY OF PHOSPHORUS: CPT

## 2023-02-19 PROCEDURE — 1200000000 HC SEMI PRIVATE

## 2023-02-19 PROCEDURE — 81001 URINALYSIS AUTO W/SCOPE: CPT

## 2023-02-19 PROCEDURE — 80053 COMPREHEN METABOLIC PANEL: CPT

## 2023-02-19 PROCEDURE — 99024 POSTOP FOLLOW-UP VISIT: CPT | Performed by: SURGERY

## 2023-02-19 RX ORDER — OXYCODONE HYDROCHLORIDE 5 MG/1
5 TABLET ORAL EVERY 4 HOURS PRN
Status: DISCONTINUED | OUTPATIENT
Start: 2023-02-19 | End: 2023-03-03 | Stop reason: HOSPADM

## 2023-02-19 RX ORDER — OXYCODONE HYDROCHLORIDE 5 MG/1
10 TABLET ORAL EVERY 4 HOURS PRN
Status: DISCONTINUED | OUTPATIENT
Start: 2023-02-19 | End: 2023-03-03 | Stop reason: HOSPADM

## 2023-02-19 RX ORDER — KETOROLAC TROMETHAMINE 30 MG/ML
30 INJECTION, SOLUTION INTRAMUSCULAR; INTRAVENOUS ONCE
Status: COMPLETED | OUTPATIENT
Start: 2023-02-19 | End: 2023-02-19

## 2023-02-19 RX ADMIN — ALLOPURINOL 100 MG: 100 TABLET ORAL at 08:32

## 2023-02-19 RX ADMIN — GABAPENTIN 300 MG: 300 CAPSULE ORAL at 20:24

## 2023-02-19 RX ADMIN — Medication 1500 MG: at 23:45

## 2023-02-19 RX ADMIN — PIPERACILLIN AND TAZOBACTAM 3375 MG: 3; .375 INJECTION, POWDER, FOR SOLUTION INTRAVENOUS at 17:56

## 2023-02-19 RX ADMIN — OXYCODONE HYDROCHLORIDE 10 MG: 5 TABLET ORAL at 22:33

## 2023-02-19 RX ADMIN — KETOROLAC TROMETHAMINE 30 MG: 30 INJECTION, SOLUTION INTRAMUSCULAR at 23:42

## 2023-02-19 RX ADMIN — MUPIROCIN: 20 OINTMENT TOPICAL at 08:57

## 2023-02-19 RX ADMIN — PROPRANOLOL HYDROCHLORIDE 40 MG: 40 TABLET ORAL at 08:32

## 2023-02-19 RX ADMIN — OXYCODONE HYDROCHLORIDE 10 MG: 5 TABLET ORAL at 17:53

## 2023-02-19 RX ADMIN — GABAPENTIN 300 MG: 300 CAPSULE ORAL at 08:32

## 2023-02-19 RX ADMIN — PROPRANOLOL HYDROCHLORIDE 40 MG: 40 TABLET ORAL at 20:24

## 2023-02-19 RX ADMIN — PIPERACILLIN AND TAZOBACTAM 3375 MG: 3; .375 INJECTION, POWDER, FOR SOLUTION INTRAVENOUS at 08:54

## 2023-02-19 RX ADMIN — ACETAMINOPHEN 650 MG: 325 TABLET ORAL at 20:24

## 2023-02-19 RX ADMIN — LEVOTHYROXINE SODIUM 200 MCG: 0.1 TABLET ORAL at 08:57

## 2023-02-19 RX ADMIN — ATORVASTATIN CALCIUM 40 MG: 40 TABLET, FILM COATED ORAL at 08:32

## 2023-02-19 RX ADMIN — ACETAMINOPHEN 650 MG: 325 TABLET ORAL at 08:32

## 2023-02-19 RX ADMIN — FAMOTIDINE 20 MG: 10 INJECTION, SOLUTION INTRAVENOUS at 08:32

## 2023-02-19 RX ADMIN — SODIUM CHLORIDE, PRESERVATIVE FREE 10 ML: 5 INJECTION INTRAVENOUS at 12:21

## 2023-02-19 RX ADMIN — SODIUM PHOSPHATE, MONOBASIC, MONOHYDRATE AND SODIUM PHOSPHATE, DIBASIC, ANHYDROUS 15 MMOL: 276; 142 INJECTION, SOLUTION INTRAVENOUS at 06:54

## 2023-02-19 RX ADMIN — OXYCODONE HYDROCHLORIDE 10 MG: 5 TABLET ORAL at 09:57

## 2023-02-19 RX ADMIN — FAMOTIDINE 20 MG: 10 INJECTION, SOLUTION INTRAVENOUS at 20:25

## 2023-02-19 RX ADMIN — DOCUSATE SODIUM 100 MG: 100 CAPSULE, LIQUID FILLED ORAL at 08:32

## 2023-02-19 RX ADMIN — OXYCODONE HYDROCHLORIDE 10 MG: 5 TABLET ORAL at 13:57

## 2023-02-19 RX ADMIN — HYDROMORPHONE HYDROCHLORIDE 0.5 MG: 0.5 INJECTION, SOLUTION INTRAMUSCULAR; INTRAVENOUS; SUBCUTANEOUS at 08:23

## 2023-02-19 RX ADMIN — GABAPENTIN 300 MG: 300 CAPSULE ORAL at 13:57

## 2023-02-19 RX ADMIN — Medication 5 MG: at 22:36

## 2023-02-19 RX ADMIN — POTASSIUM CHLORIDE 40 MEQ: 1500 TABLET, EXTENDED RELEASE ORAL at 08:32

## 2023-02-19 RX ADMIN — ACETAMINOPHEN 650 MG: 325 TABLET ORAL at 14:31

## 2023-02-19 RX ADMIN — PIPERACILLIN AND TAZOBACTAM 3375 MG: 3; .375 INJECTION, POWDER, FOR SOLUTION INTRAVENOUS at 01:58

## 2023-02-19 RX ADMIN — POTASSIUM PHOSPHATE, MONOBASIC POTASSIUM PHOSPHATE, DIBASIC 30 MMOL: 224; 236 INJECTION, SOLUTION, CONCENTRATE INTRAVENOUS at 11:20

## 2023-02-19 RX ADMIN — MUPIROCIN: 20 OINTMENT TOPICAL at 20:33

## 2023-02-19 RX ADMIN — HYDROMORPHONE HYDROCHLORIDE 0.5 MG: 0.5 INJECTION, SOLUTION INTRAMUSCULAR; INTRAVENOUS; SUBCUTANEOUS at 14:31

## 2023-02-19 RX ADMIN — SODIUM CHLORIDE, PRESERVATIVE FREE 10 ML: 5 INJECTION INTRAVENOUS at 20:25

## 2023-02-19 RX ADMIN — ONDANSETRON 4 MG: 2 INJECTION INTRAMUSCULAR; INTRAVENOUS at 10:50

## 2023-02-19 ASSESSMENT — PAIN DESCRIPTION - DESCRIPTORS
DESCRIPTORS: ACHING;DISCOMFORT
DESCRIPTORS: ACHING;DISCOMFORT
DESCRIPTORS: CRUSHING;DISCOMFORT
DESCRIPTORS: ACHING;DISCOMFORT

## 2023-02-19 ASSESSMENT — PAIN DESCRIPTION - PAIN TYPE
TYPE: SURGICAL PAIN

## 2023-02-19 ASSESSMENT — PAIN DESCRIPTION - ORIENTATION
ORIENTATION: MID

## 2023-02-19 ASSESSMENT — PAIN DESCRIPTION - LOCATION
LOCATION: ABDOMEN

## 2023-02-19 ASSESSMENT — PAIN SCALES - GENERAL
PAINLEVEL_OUTOF10: 8
PAINLEVEL_OUTOF10: 3
PAINLEVEL_OUTOF10: 7
PAINLEVEL_OUTOF10: 8
PAINLEVEL_OUTOF10: 8
PAINLEVEL_OUTOF10: 6
PAINLEVEL_OUTOF10: 8
PAINLEVEL_OUTOF10: 6
PAINLEVEL_OUTOF10: 8
PAINLEVEL_OUTOF10: 7

## 2023-02-19 NOTE — PROGRESS NOTES
Hospitalist Progress Note      PCP: Tello Molina    Date of Admission: 2/9/2023    Chief Complaint:   Abd pain    Hospital Course:      Ms. Umesh Rand is a 63year old female who presented to [facility] from [facility] with abdominal pain and constipation on 2/9. She had a lower extremity vascular bypass surgery on her right leg a few weeks ago. She reports constipation and abdominal distension following her surgery with ng tube placement as well as a rectal tube. Patient reports her follow-up colonoscopy revealed sigmoid edema with diverticula and an ulcer, she then improved and was discharged. She again developed abdominal pain and distension and has not had a BM since 2/5. She denies trying any stool softeners or motility agents at home. 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 She also has had surgeries for adhesions and ventral hernia repair. She underwent a Haylie's procedure with GS on 2/14/23 with colostomy placement. Rapid response called on 2/16/23 for hypotension (as below). Found to have abdominal/pelvic hemorrhage on CT along with R thigh hematoma noted over previous bypass incision site on US. Patient status post surgery with resection of colon and diverting colostomy on 2/15/2023. Patient developed hypotension on 2/15/2023. Patient had further hypotension on 2/16/2023 and it was noted patient had intra-abdominal bleed with right thigh hematoma. Patient's hemoglobin had dropped to 5. Patient was transfused with 2 units. She appeared to stabilize but then her blood pressure decreased and her heart rate increased. Patient's hemoglobin dropped back down to 5 after being up to 6.9. It was decided to take patient back to surgery. During surgery patient was noted to have old blood in her abdomen no active bleed was noted but 3 L of old blood removed. Subjective:   Patient doing much better.   No shortness of breath. Abdominal pain has improved. NG tube has been removed. Patient tolerating clear liquids. No fevers or chills. No emesis.     Medications:  Reviewed    Infusion Medications    sodium chloride 75 mL/hr at 02/18/23 1438    sodium chloride      HYDROmorphone Stopped (02/15/23 2207)    dextrose      sodium chloride Stopped (02/12/23 1857)     Scheduled Medications    potassium phosphate IVPB  30 mmol IntraVENous Once    melatonin  5 mg Oral Nightly    mupirocin   Nasal BID    famotidine (PEPCID) injection  20 mg IntraVENous BID    insulin lispro  0-8 Units SubCUTAneous Q4H    docusate sodium  100 mg Oral Daily    piperacillin-tazobactam  3,375 mg IntraVENous Q8H    sodium chloride flush  5-40 mL IntraVENous 2 times per day    allopurinol  100 mg Oral Daily    atorvastatin  40 mg Oral Daily    [Held by provider] aspirin  81 mg Oral Daily    gabapentin  300 mg Oral TID    levothyroxine  200 mcg Oral Daily    propranolol  40 mg Oral BID    [Held by provider] enoxaparin  1 mg/kg SubCUTAneous BID     PRN Meds: oxyCODONE **OR** oxyCODONE, phenol, HYDROmorphone, sodium chloride, naloxone, glucose, dextrose bolus **OR** dextrose bolus, glucagon (rDNA), dextrose, benzocaine, sodium chloride flush, sodium chloride, ondansetron **OR** ondansetron, acetaminophen **OR** acetaminophen, potassium chloride **OR** potassium alternative oral replacement **OR** [DISCONTINUED] potassium chloride, magnesium sulfate, sodium phosphate IVPB **OR** sodium phosphate IVPB **OR** sodium phosphate IVPB      Intake/Output Summary (Last 24 hours) at 2/19/2023 1225  Last data filed at 2/19/2023 1200  Gross per 24 hour   Intake 4140 ml   Output 2140 ml   Net 2000 ml         Physical Exam Performed:    /84   Pulse 81   Temp 99 °F (37.2 °C)   Resp 18   Ht 5' 5\" (1.651 m)   Wt 218 lb (98.9 kg)   LMP  (LMP Unknown) Comment: HYSTERECTOMY  SpO2 94%   BMI 36.28 kg/m²     General appearance: No apparent distress, appears stated age and cooperative. HEENT: Pupils equal, round, and reactive to light. Conjunctivae/corneas clear. Neck: Supple, with full range of motion. No jugular venous distention. Trachea midline. Respiratory:  Normal respiratory effort. Clear to auscultation, bilaterally without Rales/Wheezes/Rhonchi. Cardiovascular: Regular rate and rhythm with normal S1/S2 without murmurs, rubs or gallops. Abdomen: Tender. Midline incision dressed. Positive bowel sounds noted. Ostomy left lower quadrant  Musculoskeletal: Hematoma noted on right medial thigh improved. Skin: Skin color, texture, turgor normal.  No rashes or lesions. Neurologic:  Neurovascularly intact without any focal sensory/motor deficits. Cranial nerves: II-XII intact, grossly non-focal.  Psychiatric: Alert and oriented, thought content appropriate, normal insight  Capillary Refill: Brisk, 3 seconds, normal   Peripheral Pulses: +2 palpable, equal bilaterally       Labs:   Recent Labs     02/16/23 1750 02/16/23 2013 02/16/23 2230 02/18/23 0312 02/18/23  1640 02/19/23 0440   WBC 16.0* 15.0*  --   --   --   --    HGB 8.0* 10.0*   < > 7.4* 7.6* 7.2*   HCT 24.5* 30.6*   < > 21.9* 22.6* 20.9*    202  --   --   --   --     < > = values in this interval not displayed. Recent Labs     02/17/23  0545 02/18/23 0312 02/19/23 0440    138 136   K 3.6 3.4* 3.4*    108 105   CO2 22 24 23   BUN 23* 15 8   CREATININE 1.1 0.6 0.6   CALCIUM 6.9* 6.7* 7.0*   PHOS  --  2.2* 1.9*       Recent Labs     02/17/23  0545 02/18/23 0312 02/19/23  0440   AST 20 16 21   ALT 18 13 14   BILITOT 0.4 0.4 0.5   ALKPHOS 108 81 88       Recent Labs     02/16/23 1750 02/16/23 2013   INR 1.56* 1.48*       No results for input(s): Media Pale in the last 72 hours.       Urinalysis:    No results found for: Patti Side, BACTERIA, [address] [facility], [facility], [facility], [facility]    Radiology:  VL DUP LOWER EXTREMITY ARTERIES RIGHT   Final Result      CT ABDOMEN PELVIS WO CONTRAST Additional Contrast? None   Final Result   Large amount of complex fluid within the abdomen and pelvis, most notably   within the pelvis where hematocrit levels are seen, compatible with   hemorrhage. Smaller 4.9 cm collection within subcutaneous fat adjacent to the left lower   quadrant stoma, compatible with hematoma. Amorphous inflammation or   hemorrhage is seen within the soft tissues immediately adjacent. Mural thickening of the ascending colon and transverse colon, which can   reflect colitis in the appropriate clinical setting. Mild left pelviectasis. Pneumoperitoneum, in keeping with the recent postoperative state. Bibasilar atelectasis. 1 cm right lower lobe pulmonary nodule, for which follow-up recommendations   are as below. RECOMMENDATIONS:   Fleischner Society guidelines for follow-up and management of incidentally   detected pulmonary nodules:      Nodule size greater than 8 mm         In a low-risk patient, consider CT at 3 months, PET/CT, or tissue sampling. In a high-risk patient, consider CT at 3 months, PET/CT, or tissue sampling.      - Low risk patients include individuals with minimal or absent history of   smoking and other known risk factors. - High risk patients include individuals with a history or smoking or known   risk factors. Radiology 2017 http://pubs. rsna.org/doi/full/10.1148/radiol. 0240672695         IR PICC WO SQ PORT/PUMP > 5 YEARS   Final Result      CT ABDOMEN PELVIS WO CONTRAST Additional Contrast? None   Final Result   1. Barium contrast from prior small-bowel follow-through is seen throughout   the colon and limits evaluation due to streak artifact. No definite abscess   is identified. 2.  Infrarenal abdominal aortic aneurysm measures up to 3.0 cm. See   recommendations for follow-up below. 3.  There is a 1.1 cm right lower lobe pulmonary nodule and a 0.3 cm left   lower lobe pulmonary nodule.   Recommend dedicated CT chest for further   evaluation.      RECOMMENDATIONS:      Multiple. Worst: 11 mm solid      Pathology: Multiple pulmonary nodules. Most severe: 11 mm solid pulmonary   nodule detected on incomplete chest CT.      Recommend prompt non-contrast Chest CT for further evaluation.      These guidelines do not apply to immunocompromised patients and patients with   cancer. Follow up in patients with significant comorbidities as clinically   warranted. For lung cancer screening, adhere to Lung-RADS guidelines.   Reference: Radiology. 2017; 284(1):228-43         3 cm AAA      Pathology: 3 cm infrarenal abdominal aortic aneurysm.      Recommend follow-up every 3 years.      Reference: J Am [facility]ia Radiol 2013;10:789-794.         XR ABDOMEN (KUB) (SINGLE AP VIEW)   Final Result   Cancellation of scheduled hypaque enema due to presence of barium throughout   the colon including the rectosigmoid region as described above.         FL SMALL BOWEL FOLLOW THROUGH ONLY   Final Result   Mildly dilated small bowel, though small-bowel transit time is upper limits   of normal.         CT ABDOMEN PELVIS W IV CONTRAST Additional Contrast? None   Final Result   Diverticulitis of the descending sigmoid junction.  Mild wall thickening but   negative for obstruction.             IP CONSULT TO GENERAL SURGERY  IP CONSULT TO GI  IP CONSULT TO PHARMACY  IP CONSULT TO NEPHROLOGY  IP CONSULT TO DIETITIAN    Assessment/Plan:    Active Hospital Problems    Diagnosis     Sigmoid stricture (HCC) [K56.699]      Priority: Medium    Hemorrhagic shock (HCC) [R57.8]      Priority: Medium    Acute blood loss anemia [D62]      Priority: Medium    Acute renal failure (ARF) (HCC) [N17.9]      Priority: Medium    Diverticulitis [K57.92]      Priority: Medium    Colonic obstruction (HCC) [K56.609]      Priority: Medium    Bowel obstruction (HCC) [K56.609]      Priority: Medium     Diverticulitis with obstruction.  Patient on IV Zosyn.  Status post  resection with diverting colostomy. General surgery is following. Good ostomy output. Peripheral artery disease. Patient status post bypass of right lower extremity. Patient was on therapeutic Lovenox. Patient developed right leg hematoma and intra-abdominal bleed. Lovenox and aspirin held. Discussed with vascular surgery. Patient with synthetic graft and may require long-term anticoagulation. He suggested Xarelto 2.5 mg twice daily once it safe to anticoagulate. Right leg hematoma improved. TARSHA. This is secondary to hypotension and hypovolemia. Nephrology consulted. We will continue to monitor closely. Avoid nephrotoxic agents including Toradol. Creatinine normal.  Monitor urine output. Severe protein calorie malnutrition. Patient started on p.o. diet. NG tube to be removed and patient tolerating p.o. Advance diet per surgery. Patient went to be started on full liquids. Hypokalemia. We will supplement. Patient with 1 or more chronic illnesses with severe exacerbation or side effects of treatment and/or 1 acute or chronic illness that poses threat to life or bodily function. Decision regarding hospitalization or escalation  Patient on drug therapy that requires intensive monitoring. DVT Prophylaxis: On hold due to bleed  Diet: ADULT DIET;  Full Liquid  Code Status: Full Code  PT/OT Eval Status: Pending    Dispo -Home versus SNF when stable    Appropriate for A1 Discharge Unit: No      Thesupriya Huggins MD

## 2023-02-19 NOTE — PROGRESS NOTES
Hospitalist Progress Note      PCP: Beny Niño    Date of Admission: 2/9/2023    Chief Complaint:   Abd pain    Hospital Course:      Ms. uLcian Carrillo is a 63year old female who presented to [facility] from [facility] with abdominal pain and constipation on 2/9. She had a lower extremity vascular bypass surgery on her right leg a few weeks ago. She reports constipation and abdominal distension following her surgery with ng tube placement as well as a rectal tube. Patient reports her follow-up colonoscopy revealed sigmoid edema with diverticula and an ulcer, she then improved and was discharged. She again developed abdominal pain and distension and has not had a BM since 2/5. She denies trying any stool softeners or motility agents at home. 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 She also has had surgeries for adhesions and ventral hernia repair. She underwent a Haylie's procedure with GS on 2/14/23 with colostomy placement. Rapid response called on 2/16/23 for hypotension (as below). Found to have abdominal/pelvic hemorrhage on CT along with R thigh hematoma noted over previous bypass incision site on US. Patient status post surgery with resection of colon and diverting colostomy on 2/15/2023. Patient developed hypotension on 2/15/2023. Patient had further hypotension on 2/16/2023 and it was noted patient had intra-abdominal bleed with right thigh hematoma. Patient's hemoglobin had dropped to 5. Patient was transfused with 2 units. She appeared to stabilize but then her blood pressure decreased and her heart rate increased. Patient's hemoglobin dropped back down to 5 after being up to 6.9. It was decided to take patient back to surgery. During surgery patient was noted to have old blood in her abdomen no active bleed was noted but 3 L of old blood removed. Subjective:   Patient extubated yesterday.   She is feeling better, tolerating. No nausea vomiting. No fevers or chills. Medications:  Reviewed    Infusion Medications    sodium chloride 75 mL/hr at 02/18/23 1438    sodium chloride      HYDROmorphone Stopped (02/15/23 2207)    dextrose      sodium chloride Stopped (02/12/23 1857)     Scheduled Medications    mupirocin   Nasal BID    famotidine (PEPCID) injection  20 mg IntraVENous BID    insulin lispro  0-8 Units SubCUTAneous Q4H    docusate sodium  100 mg Oral Daily    piperacillin-tazobactam  3,375 mg IntraVENous Q8H    sodium chloride flush  5-40 mL IntraVENous 2 times per day    allopurinol  100 mg Oral Daily    atorvastatin  40 mg Oral Daily    [Held by provider] aspirin  81 mg Oral Daily    gabapentin  300 mg Oral TID    levothyroxine  200 mcg Oral Daily    propranolol  40 mg Oral BID    [Held by provider] enoxaparin  1 mg/kg SubCUTAneous BID     PRN Meds: phenol, HYDROmorphone, sodium chloride, naloxone, glucose, dextrose bolus **OR** dextrose bolus, glucagon (rDNA), dextrose, benzocaine, sodium chloride flush, sodium chloride, ondansetron **OR** ondansetron, acetaminophen **OR** acetaminophen, potassium chloride **OR** potassium alternative oral replacement **OR** [DISCONTINUED] potassium chloride, magnesium sulfate, sodium phosphate IVPB **OR** sodium phosphate IVPB **OR** sodium phosphate IVPB      Intake/Output Summary (Last 24 hours) at 2/18/2023 1916  Last data filed at 2/18/2023 1377  Gross per 24 hour   Intake 3710 ml   Output 1415 ml   Net 2295 ml         Physical Exam Performed:    /70   Pulse 87   Temp 99 °F (37.2 °C) (Oral)   Resp 16   Ht 5' 5\" (1.651 m)   Wt 218 lb (98.9 kg)   LMP  (LMP Unknown) Comment: HYSTERECTOMY  SpO2 90%   BMI 36.28 kg/m²     General appearance: No apparent distress, appears stated age and cooperative. HEENT: Pupils equal, round, and reactive to light. Conjunctivae/corneas clear. NG tube present. Neck: Supple, with full range of motion.  No jugular venous distention. Trachea midline. Respiratory:  Normal respiratory effort. Clear to auscultation, bilaterally without Rales/Wheezes/Rhonchi. Cardiovascular: Regular rate and rhythm with normal S1/S2 without murmurs, rubs or gallops. Abdomen: Tender. Midline incision dressed. Positive bowel sounds noted. Ostomy left lower quadrant  Musculoskeletal: Hematoma noted on right medial thigh underlying previous incision. Skin: Skin color, texture, turgor normal.  No rashes or lesions. Neurologic:  Neurovascularly intact without any focal sensory/motor deficits. Cranial nerves: II-XII intact, grossly non-focal.  Psychiatric: Alert and oriented, thought content appropriate, normal insight  Capillary Refill: Brisk, 3 seconds, normal   Peripheral Pulses: +2 palpable, equal bilaterally       Labs:   Recent Labs     02/16/23  0539 02/16/23  1102 02/16/23 1750 02/16/23 2013 02/16/23 2230 02/17/23  2149 02/18/23 0312 02/18/23  1640   WBC 14.2*  --  16.0* 15.0*  --   --   --   --    HGB 5.2*   < > 8.0* 10.0*   < > 7.3* 7.4* 7.6*   HCT 17.2*   < > 24.5* 30.6*   < > 22.5* 21.9* 22.6*     --  206 202  --   --   --   --     < > = values in this interval not displayed.        Recent Labs     02/16/23  0539 02/16/23  1102 02/17/23 0545 02/18/23 0312     --  138 138   K 3.7  --  3.6 3.4*     --  106 108   CO2 22  --  22 24   BUN 24*  --  23* 15   CREATININE 2.2*  --  1.1 0.6   CALCIUM 7.9*  --  6.9* 6.7*   PHOS  --  7.1*  --  2.2*       Recent Labs     02/16/23  0539 02/17/23 0545 02/18/23 0312   AST 27 20 16   ALT 26 18 13   BILITOT 0.5 0.4 0.4   ALKPHOS 133* 108 81       Recent Labs     02/16/23 1750 02/16/23 2013   INR 1.56* 1.48*       Recent Labs     02/16/23  1102   TROPONINI <0.01         Urinalysis:    No results found for: Merl Spencer, BACTERIA, RBCUA, BLOODU, [address], GLUCOSEU    Radiology:  VL DUP LOWER EXTREMITY ARTERIES RIGHT   Final Result      CT ABDOMEN PELVIS WO CONTRAST Additional Contrast? None   Final Result   Large amount of complex fluid within the abdomen and pelvis, most notably   within the pelvis where hematocrit levels are seen, compatible with   hemorrhage. Smaller 4.9 cm collection within subcutaneous fat adjacent to the left lower   quadrant stoma, compatible with hematoma. Amorphous inflammation or   hemorrhage is seen within the soft tissues immediately adjacent. Mural thickening of the ascending colon and transverse colon, which can   reflect colitis in the appropriate clinical setting. Mild left pelviectasis. Pneumoperitoneum, in keeping with the recent postoperative state. Bibasilar atelectasis. 1 cm right lower lobe pulmonary nodule, for which follow-up recommendations   are as below. RECOMMENDATIONS:   Fleischner Society guidelines for follow-up and management of incidentally   detected pulmonary nodules:      Nodule size greater than 8 mm         In a low-risk patient, consider CT at 3 months, PET/CT, or tissue sampling. In a high-risk patient, consider CT at 3 months, PET/CT, or tissue sampling.      - Low risk patients include individuals with minimal or absent history of   smoking and other known risk factors. - High risk patients include individuals with a history or smoking or known   risk factors. Radiology 2017 http://pubs. rsna.org/doi/full/10.1148/radiol. 0024097979         IR PICC WO SQ PORT/PUMP > 5 YEARS   Final Result      CT ABDOMEN PELVIS WO CONTRAST Additional Contrast? None   Final Result   1. Barium contrast from prior small-bowel follow-through is seen throughout   the colon and limits evaluation due to streak artifact. No definite abscess   is identified. 2.  Infrarenal abdominal aortic aneurysm measures up to 3.0 cm. See   recommendations for follow-up below. 3.  There is a 1.1 cm right lower lobe pulmonary nodule and a 0.3 cm left   lower lobe pulmonary nodule.   Recommend dedicated CT chest for further   evaluation. RECOMMENDATIONS:      Multiple. Worst: 11 mm solid      Pathology: Multiple pulmonary nodules. Most severe: 11 mm solid pulmonary   nodule detected on incomplete chest CT. Recommend prompt non-contrast Chest CT for further evaluation. These guidelines do not apply to immunocompromised patients and patients with   cancer. Follow up in patients with significant comorbidities as clinically   warranted. For lung cancer screening, adhere to Lung-RADS guidelines. Reference: Radiology. 2017; 284(1):228-43         3 cm AAA      Pathology: 3 cm infrarenal abdominal aortic aneurysm. Recommend follow-up every 3 years. Reference: J Am Gardenia Radiol 5743;40:728-300. XR ABDOMEN (KUB) (SINGLE AP VIEW)   Final Result   Cancellation of scheduled hypaque enema due to presence of barium throughout   the colon including the rectosigmoid region as described above. FL SMALL BOWEL FOLLOW THROUGH ONLY   Final Result   Mildly dilated small bowel, though small-bowel transit time is upper limits   of normal.         CT ABDOMEN PELVIS W IV CONTRAST Additional Contrast? None   Final Result   Diverticulitis of the descending sigmoid junction. Mild wall thickening but   negative for obstruction. IP CONSULT TO GENERAL SURGERY  IP CONSULT TO GI  IP CONSULT TO PHARMACY  IP CONSULT TO NEPHROLOGY  IP CONSULT TO DIETITIAN    Assessment/Plan:    Active Hospital Problems    Diagnosis     Sigmoid stricture ([address] [K56.699]      Priority: Medium    Hemorrhagic shock ([address] [R57.8]      Priority: Medium    Acute blood loss anemia [D62]      Priority: Medium    Acute renal failure (ARF) (HCC) [N17.9]      Priority: Medium    Diverticulitis [K57.92]      Priority: Medium    Colonic obstruction ([address] [K56.609]      Priority: Medium    Bowel obstruction ([address] [K56.609]      Priority: Medium     Diverticulitis with obstruction. Patient on IV Zosyn.   Status post resection with diverting colostomy. General surgery is following. Monitor ostomy output. Peripheral artery disease. Patient status post bypass of right lower extremity. Patient was on therapeutic Lovenox. Patient developed right leg hematoma and intra-abdominal bleed. Lovenox and aspirin held. Vascular surgery did order arterial duplex which demonstrated right peroneal thrombus. Discussed with vascular surgery. Patient with synthetic graft and may require long-term anticoagulation. He suggested Xarelto 2.5 mg twice daily once it safe to anticoagulate. Right leg hematoma improved. TARSHA. This is secondary to hypotension and hypovolemia. Nephrology consulted. We will continue to monitor closely. Avoid nephrotoxic agents including Toradol. Creatinine normalizing. Monitor urine output. Severe protein calorie malnutrition. Patient started on p.o. diet. NG tube to be removed and patient tolerating p.o. Advance diet per surgery. Patient with 1 or more chronic illnesses with severe exacerbation or side effects of treatment and/or 1 acute or chronic illness that poses threat to life or bodily function. Decision regarding hospitalization or escalation  Patient on drug therapy that requires intensive monitoring. DVT Prophylaxis: On hold due to bleed  Diet: ADULT DIET;  Clear Liquid  Code Status: Full Code  PT/OT Eval Status: Pending    Dispo -Home versus SNF when stable    Appropriate for A1 Discharge Unit: No      Long Thomas MD

## 2023-02-19 NOTE — PLAN OF CARE
Problem: Pain  Goal: Verbalizes/displays adequate comfort level or baseline comfort level  Outcome: Progressing  Flowsheets  Taken 2/18/2023 2000 by Jame Guerra RN  Verbalizes/displays adequate comfort level or baseline comfort level:   Encourage patient to monitor pain and request assistance   Assess pain using appropriate pain scale  Taken 2/18/2023 0914 by Niranjan Martínez RN  Verbalizes/displays adequate comfort level or baseline comfort level:   Encourage patient to monitor pain and request assistance   Assess pain using appropriate pain scale   Administer analgesics based on type and severity of pain and evaluate response   Implement non-pharmacological measures as appropriate and evaluate response     Problem: ABCDS Injury Assessment  Goal: Absence of physical injury  Outcome: Progressing     Problem: Safety - Adult  Goal: Free from fall injury  Outcome: Progressing  Flowsheets (Taken 2/18/2023 0914 by Niranjan Martínez RN)  Free From Fall Injury:   Instruct family/caregiver on patient safety   Based on caregiver fall risk screen, instruct family/caregiver to ask for assistance with transferring infant if caregiver noted to have fall risk factors     Problem: Discharge Planning  Goal: Discharge to home or other facility with appropriate resources  Outcome: Progressing  Flowsheets (Taken 2/18/2023 2000)  Discharge to home or other facility with appropriate resources: Identify barriers to discharge with patient and caregiver     Problem: Skin/Tissue Integrity  Goal: Absence of new skin breakdown  Description: 1. Monitor for areas of redness and/or skin breakdown  2. Assess vascular access sites hourly  3. Every 4-6 hours minimum:  Change oxygen saturation probe site  4. Every 4-6 hours:  If on nasal continuous positive airway pressure, respiratory therapy assess nares and determine need for appliance change or resting period.   Outcome: Progressing     Problem: Nutrition Deficit:  Goal: Optimize nutritional status  Outcome: Progressing

## 2023-02-19 NOTE — PROGRESS NOTES
[facility] GENERAL SURGERY    Surgery Progress Note           POD # 3/5    PATIENT NAME: Jerry Coates     TODAY'S DATE: 2/19/2023    INTERVAL HISTORY:    Pt without nuasea, having ostomy output     OBJECTIVE:   VITALS:  /75   Pulse 83   Temp 99.7 °F (37.6 °C) (Oral)   Resp 14   Ht 5' 5\" (1.651 m)   Wt 218 lb (98.9 kg)   LMP  (LMP Unknown) Comment: HYSTERECTOMY  SpO2 94%   BMI 36.28 kg/m²     INTAKE/OUTPUT:    I/O last 3 completed shifts: In: 0187 [P.O.:1260; I.V.:7070]  Out: 8284 [IDPQF:0819; Emesis/NG output:600; LVADU:4503]  No intake/output data recorded. CONSTITUTIONAL:  awake and alert  LUNGS:     ABDOMEN:    , soft, non-distended, tenderness noted at incision   INCISION: clean, dry, no drainage, colostomy - stool in pouch    Data:  CBC:   Recent Labs     02/16/23  1750 02/16/23 2013 02/16/23  2230 02/18/23  0312 02/18/23  1640 02/19/23  0440   WBC 16.0* 15.0*  --   --   --   --    HGB 8.0* 10.0*   < > 7.4* 7.6* 7.2*   HCT 24.5* 30.6*   < > 21.9* 22.6* 20.9*    202  --   --   --   --     < > = values in this interval not displayed. BMP:    Recent Labs     02/17/23  0545 02/18/23 0312 02/19/23  0440    138 136   K 3.6 3.4* 3.4*    108 105   CO2 22 24 23   BUN 23* 15 8   CREATININE 1.1 0.6 0.6   GLUCOSE 149* 89 106*       Hepatic:   Recent Labs     02/17/23  0545 02/18/23  0312 02/19/23  0440   AST 20 16 21   ALT 18 13 14   BILITOT 0.4 0.4 0.5   ALKPHOS 108 81 88       Mag:      Recent Labs     02/18/23  0312 02/19/23  0440   MG 2.00 1.90        Phos:     Recent Labs     02/18/23  0312 02/19/23  0440   PHOS 2.2* 1.9*        INR:   Recent Labs     02/16/23  1750 02/16/23 2013   INR 1.56* 1.48*           Radiology Review:       ASSESSMENT AND PLAN:  63 y.o. female status post Rick's procedure with postop bleeding, now s/p evacuation of large hemoperitoneum.   No further sx of active abdominal bleeding   - full liquids   - po pain control   - OOB today - ok transfer from ICU        Electronically signed by Anthony Balderas MD

## 2023-02-19 NOTE — PROGRESS NOTES
[facility] Serious Parody  Nephrology follow-up note           Reason for Consult:  TARSHA  Requesting Physician:  Dr. Machelle Gutierrez history  Patient was extubated on 2/17    Tolerating liquid diet with plans to advance  She underwent emergent laparotomy for hemoperitoneum evacuation, 3 L old clotted blood removed on 2/16  Patient received total of 4 units of PRBC and 2 FFP's, 1.5 L of normal saline during surgery    ROS: no nausea vomiting   PSFH: + visitor    Scheduled Meds:   potassium phosphate IVPB  30 mmol IntraVENous Once    melatonin  5 mg Oral Nightly    mupirocin   Nasal BID    famotidine (PEPCID) injection  20 mg IntraVENous BID    insulin lispro  0-8 Units SubCUTAneous Q4H    docusate sodium  100 mg Oral Daily    piperacillin-tazobactam  3,375 mg IntraVENous Q8H    sodium chloride flush  5-40 mL IntraVENous 2 times per day    allopurinol  100 mg Oral Daily    atorvastatin  40 mg Oral Daily    [Held by provider] aspirin  81 mg Oral Daily    gabapentin  300 mg Oral TID    levothyroxine  200 mcg Oral Daily    propranolol  40 mg Oral BID    [Held by provider] enoxaparin  1 mg/kg SubCUTAneous BID     Continuous Infusions:   sodium chloride 75 mL/hr at 02/18/23 1438    sodium chloride      HYDROmorphone Stopped (02/15/23 2207)    dextrose      sodium chloride Stopped (02/12/23 1857)     PRN Meds:.oxyCODONE **OR** oxyCODONE, phenol, HYDROmorphone, sodium chloride, naloxone, glucose, dextrose bolus **OR** dextrose bolus, glucagon (rDNA), dextrose, benzocaine, sodium chloride flush, sodium chloride, ondansetron **OR** ondansetron, acetaminophen **OR** acetaminophen, potassium chloride **OR** potassium alternative oral replacement **OR** [DISCONTINUED] potassium chloride, magnesium sulfate, sodium phosphate IVPB **OR** sodium phosphate IVPB **OR** sodium phosphate IVPB      History of Present Illness on 2/16/2023:    63 y.o. yo female with PMH of hypothyroidism, hyperlipidemia, gout, PAD with history of bilateral popliteal artery aneurysm and stenting, SOLEDAD who is admitted for large bowel obstruction and underwent surgery on 2/14/2023. Patient was transferred from [facility]  In January 2023, patient had right above-knee to below-knee popliteal artery bypass. Patient also on Eliquis  On 2/14/2023, patient underwent Rick's procedure for stricture of sigmoid colon leading to large bowel obstruction which did not improve with conservative measures. During the procedure patient received 1.5 L of LR, intermittent hypotension was noted. She dropped her blood pressure as low as 62/42 on 2/15 at 9 PM and has been hypotensive throughout the night. Patient received 30 ml/kg IV fluid bolus this morning, also had 3 doses of ketorolac given on 2/15; patient had 500 mL of LR n iv albumin bolus overnight  Creatinine increased to 2.2 on 2/15 and remained at that level and nephrology has been consulted  On 2/16, patient had hemoglobin of 5.2 patient received 2 units of PRBCs    Physical exam:   Constitutional:  VITALS:  /75   Pulse 83   Temp 99.7 °F (37.6 °C) (Oral)   Resp 14   Ht 5' 5\" (1.651 m)   Wt 218 lb (98.9 kg)   LMP  (LMP Unknown) Comment: HYSTERECTOMY  SpO2 94%   BMI 36.28 kg/m²   Gen: alert, awake  Neck:  NGT in situ  Skin: Unremarkable  Cardiovascular:  S1, S2 without m/r/g   Respiratory: Clear anteriorly; ET tube in situ  Abdomen:  soft, distended, colostomy in situ   Extremities: no lower extremity edema; right thigh hematoma around the surgical site of the bypass  Neuro/Psy: Follows commands    Data/  Recent Labs     02/16/23  1750 02/16/23 2013 02/16/23  2230 02/18/23  0312 02/18/23  1640 02/19/23  0440   WBC 16.0* 15.0*  --   --   --   --    HGB 8.0* 10.0*   < > 7.4* 7.6* 7.2*   HCT 24.5* 30.6*   < > 21.9* 22.6* 20.9*   MCV 85.5 85.5  --   --   --   --     202  --   --   --   --     < > = values in this interval not displayed.        Recent Labs     02/16/23  1102 02/17/23  0574 02/18/23  0312 02/19/23  0440   NA  --  138 138 136   K  --  3.6 3.4* 3.4*   CL  --  106 108 105   CO2  --  22 24 23   GLUCOSE  --  149* 89 106*   PHOS 7.1*  --  2.2* 1.9*   MG  --   --  2.00 1.90   BUN  --  23* 15 8   CREATININE  --  1.1 0.6 0.6   LABGLOM  --  57* >60 >60       CT scan a/p wo contrast  Impression   Large amount of complex fluid within the abdomen and pelvis, most notably   within the pelvis where hematocrit levels are seen, compatible with   hemorrhage. Smaller 4.9 cm collection within subcutaneous fat adjacent to the left lower   quadrant stoma, compatible with hematoma. Amorphous inflammation or   hemorrhage is seen within the soft tissues immediately adjacent. Mural thickening of the ascending colon and transverse colon, which can   reflect colitis in the appropriate clinical setting. Mild left pelviectasis. Pneumoperitoneum, in keeping with the recent postoperative state. Bibasilar atelectasis. 1 cm right lower lobe pulmonary nodule, for which follow-up recommendations   are as below. RECOMMENDATIONS:   Fleischner Society guidelines for follow-up and management of incidentally   detected pulmonary nodules:       Nodule size greater than 8 mm           In a low-risk patient, consider CT at 3 months, PET/CT, or tissue sampling. In a high-risk patient, consider CT at 3 months, PET/CT, or tissue sampling.       - Low risk patients include individuals with minimal or absent history of   smoking and other known risk factors. - High risk patients include individuals with a history or smoking or known   risk factors. Radiology 2017 http://pubs. rsna.org/doi/full/10.1148/radiol. 5761233529       Assessment  -TARSHA, pre-renal to early ATN in the setting of hypotension  -Anemia, acute blood loss   s/p 2 PRBCs 2/15-2/16  -hemorrhagic shock , resolved  -right leg hematoma  -large bowel obstruction s/p pati's  w colostomy on 2/14/23   -right above knee to below knee popliteal artery bypass on 1/16/23  -Hypokalemia      Plan  -DC IVF after current bag  -Potassium replaced- 40 me po and kphos 30 mmol  -DC toradol and all NSAIDs  -cautious dose of vancomycin  -ok to discontinue lakhani  -Serial renal panel  -Daily wts and strict i/o  -Renal dose meds and avoid nephrotoxins      Thank you for the consultation. Please do not hesitate to call with questions. Sharyn Boxer, MD  Office: [phone]  Fax:    [phone] [address] [facility]

## 2023-02-20 LAB
A/G RATIO: 0.8 (ref 1.1–2.2)
ALBUMIN SERPL-MCNC: 1.8 G/DL (ref 3.4–5)
ALP BLD-CCNC: 98 U/L (ref 40–129)
ALT SERPL-CCNC: 14 U/L (ref 10–40)
ANION GAP SERPL CALCULATED.3IONS-SCNC: 8 MMOL/L (ref 3–16)
AST SERPL-CCNC: 15 U/L (ref 15–37)
BASOPHILS ABSOLUTE: 0 K/UL (ref 0–0.2)
BASOPHILS RELATIVE PERCENT: 0.3 %
BILIRUB SERPL-MCNC: 0.5 MG/DL (ref 0–1)
BUN BLDV-MCNC: 8 MG/DL (ref 7–20)
CALCIUM SERPL-MCNC: 7.1 MG/DL (ref 8.3–10.6)
CHLORIDE BLD-SCNC: 104 MMOL/L (ref 99–110)
CO2: 22 MMOL/L (ref 21–32)
CREAT SERPL-MCNC: 0.7 MG/DL (ref 0.6–1.2)
EOSINOPHILS ABSOLUTE: 0.4 K/UL (ref 0–0.6)
EOSINOPHILS RELATIVE PERCENT: 2.8 %
GFR SERPL CREATININE-BSD FRML MDRD: >60 ML/MIN/{1.73_M2}
GLUCOSE BLD-MCNC: 112 MG/DL (ref 70–99)
GLUCOSE BLD-MCNC: 113 MG/DL (ref 70–99)
GLUCOSE BLD-MCNC: 126 MG/DL (ref 70–99)
GLUCOSE BLD-MCNC: 127 MG/DL (ref 70–99)
GLUCOSE BLD-MCNC: 130 MG/DL (ref 70–99)
GLUCOSE BLD-MCNC: 153 MG/DL (ref 70–99)
HCT VFR BLD CALC: 21.3 % (ref 36–48)
HCT VFR BLD CALC: 22.1 % (ref 36–48)
HEMOGLOBIN: 7.1 G/DL (ref 12–16)
HEMOGLOBIN: 7.4 G/DL (ref 12–16)
LYMPHOCYTES ABSOLUTE: 1 K/UL (ref 1–5.1)
LYMPHOCYTES RELATIVE PERCENT: 6.9 %
MCH RBC QN AUTO: 28.7 PG (ref 26–34)
MCHC RBC AUTO-ENTMCNC: 33.3 G/DL (ref 31–36)
MCV RBC AUTO: 86.2 FL (ref 80–100)
MONOCYTES ABSOLUTE: 1.4 K/UL (ref 0–1.3)
MONOCYTES RELATIVE PERCENT: 9.4 %
NEUTROPHILS ABSOLUTE: 11.6 K/UL (ref 1.7–7.7)
NEUTROPHILS RELATIVE PERCENT: 80.6 %
PDW BLD-RTO: 16.1 % (ref 12.4–15.4)
PERFORMED ON: ABNORMAL
PHOSPHORUS: 3.2 MG/DL (ref 2.5–4.9)
PLATELET # BLD: 309 K/UL (ref 135–450)
PMV BLD AUTO: 6.8 FL (ref 5–10.5)
POTASSIUM REFLEX MAGNESIUM: 3.6 MMOL/L (ref 3.5–5.1)
RBC # BLD: 2.47 M/UL (ref 4–5.2)
SODIUM BLD-SCNC: 134 MMOL/L (ref 136–145)
TOTAL PROTEIN: 4.2 G/DL (ref 6.4–8.2)
VANCOMYCIN RANDOM: 10.7 UG/ML
WBC # BLD: 14.5 K/UL (ref 4–11)

## 2023-02-20 PROCEDURE — 6360000002 HC RX W HCPCS: Performed by: SURGERY

## 2023-02-20 PROCEDURE — 2580000003 HC RX 258: Performed by: SURGERY

## 2023-02-20 PROCEDURE — 6360000002 HC RX W HCPCS: Performed by: INTERNAL MEDICINE

## 2023-02-20 PROCEDURE — 6370000000 HC RX 637 (ALT 250 FOR IP): Performed by: SURGERY

## 2023-02-20 PROCEDURE — 80053 COMPREHEN METABOLIC PANEL: CPT

## 2023-02-20 PROCEDURE — 85025 COMPLETE CBC W/AUTO DIFF WBC: CPT

## 2023-02-20 PROCEDURE — 6370000000 HC RX 637 (ALT 250 FOR IP): Performed by: INTERNAL MEDICINE

## 2023-02-20 PROCEDURE — 2500000003 HC RX 250 WO HCPCS: Performed by: INTERNAL MEDICINE

## 2023-02-20 PROCEDURE — 94761 N-INVAS EAR/PLS OXIMETRY MLT: CPT

## 2023-02-20 PROCEDURE — 1200000000 HC SEMI PRIVATE

## 2023-02-20 PROCEDURE — 2580000003 HC RX 258: Performed by: INTERNAL MEDICINE

## 2023-02-20 PROCEDURE — 85014 HEMATOCRIT: CPT

## 2023-02-20 PROCEDURE — 2700000000 HC OXYGEN THERAPY PER DAY

## 2023-02-20 PROCEDURE — 85018 HEMOGLOBIN: CPT

## 2023-02-20 PROCEDURE — 84100 ASSAY OF PHOSPHORUS: CPT

## 2023-02-20 PROCEDURE — 80202 ASSAY OF VANCOMYCIN: CPT

## 2023-02-20 PROCEDURE — 99024 POSTOP FOLLOW-UP VISIT: CPT | Performed by: SURGERY

## 2023-02-20 RX ADMIN — GABAPENTIN 300 MG: 300 CAPSULE ORAL at 09:04

## 2023-02-20 RX ADMIN — GABAPENTIN 300 MG: 300 CAPSULE ORAL at 20:49

## 2023-02-20 RX ADMIN — ACETAMINOPHEN 650 MG: 325 TABLET ORAL at 14:44

## 2023-02-20 RX ADMIN — PIPERACILLIN AND TAZOBACTAM 3375 MG: 3; .375 INJECTION, POWDER, FOR SOLUTION INTRAVENOUS at 18:53

## 2023-02-20 RX ADMIN — PIPERACILLIN AND TAZOBACTAM 3375 MG: 3; .375 INJECTION, POWDER, FOR SOLUTION INTRAVENOUS at 10:04

## 2023-02-20 RX ADMIN — FAMOTIDINE 20 MG: 10 INJECTION, SOLUTION INTRAVENOUS at 09:05

## 2023-02-20 RX ADMIN — SODIUM CHLORIDE, PRESERVATIVE FREE 10 ML: 5 INJECTION INTRAVENOUS at 20:50

## 2023-02-20 RX ADMIN — FAMOTIDINE 20 MG: 10 INJECTION, SOLUTION INTRAVENOUS at 20:49

## 2023-02-20 RX ADMIN — PIPERACILLIN AND TAZOBACTAM 3375 MG: 3; .375 INJECTION, POWDER, FOR SOLUTION INTRAVENOUS at 02:11

## 2023-02-20 RX ADMIN — OXYCODONE HYDROCHLORIDE 10 MG: 5 TABLET ORAL at 14:44

## 2023-02-20 RX ADMIN — LEVOTHYROXINE SODIUM 200 MCG: 0.1 TABLET ORAL at 09:03

## 2023-02-20 RX ADMIN — OXYCODONE 5 MG: 5 TABLET ORAL at 18:55

## 2023-02-20 RX ADMIN — PROPRANOLOL HYDROCHLORIDE 40 MG: 40 TABLET ORAL at 09:03

## 2023-02-20 RX ADMIN — ACETAMINOPHEN 650 MG: 325 TABLET ORAL at 20:49

## 2023-02-20 RX ADMIN — OXYCODONE HYDROCHLORIDE 10 MG: 5 TABLET ORAL at 09:03

## 2023-02-20 RX ADMIN — SODIUM CHLORIDE, PRESERVATIVE FREE 10 ML: 5 INJECTION INTRAVENOUS at 09:05

## 2023-02-20 RX ADMIN — GABAPENTIN 300 MG: 300 CAPSULE ORAL at 14:44

## 2023-02-20 RX ADMIN — VANCOMYCIN HYDROCHLORIDE 1500 MG: 10 INJECTION, POWDER, LYOPHILIZED, FOR SOLUTION INTRAVENOUS at 18:46

## 2023-02-20 RX ADMIN — ATORVASTATIN CALCIUM 40 MG: 40 TABLET, FILM COATED ORAL at 09:03

## 2023-02-20 RX ADMIN — ALLOPURINOL 100 MG: 100 TABLET ORAL at 09:03

## 2023-02-20 RX ADMIN — Medication 5 MG: at 20:49

## 2023-02-20 RX ADMIN — ONDANSETRON 4 MG: 2 INJECTION INTRAMUSCULAR; INTRAVENOUS at 21:00

## 2023-02-20 ASSESSMENT — PAIN DESCRIPTION - ORIENTATION
ORIENTATION: MID
ORIENTATION: LOWER

## 2023-02-20 ASSESSMENT — PAIN SCALES - GENERAL
PAINLEVEL_OUTOF10: 6
PAINLEVEL_OUTOF10: 3
PAINLEVEL_OUTOF10: 6
PAINLEVEL_OUTOF10: 10

## 2023-02-20 ASSESSMENT — PAIN DESCRIPTION - PAIN TYPE
TYPE: SURGICAL PAIN
TYPE: SURGICAL PAIN

## 2023-02-20 ASSESSMENT — PAIN DESCRIPTION - LOCATION
LOCATION: ABDOMEN
LOCATION: ABDOMEN;BACK
LOCATION: ABDOMEN

## 2023-02-20 ASSESSMENT — PAIN DESCRIPTION - DESCRIPTORS
DESCRIPTORS: ACHING
DESCRIPTORS: ACHING

## 2023-02-20 NOTE — PROGRESS NOTES
[facility] Nurse  Follow-up Progress Note       NAME:  Jaylin Carey RECORD NUMBER:  [id number]  AGE:  63 y.o. GENDER:  female  :  1960  TODAY'S DATE:  2023    Subjective:  I want to get out of bed and sit in the chair. My left side is still very sore, red, warm and swollen. Per daughter, leaked yesterday and the appliance was changed. Wound Identification:  Wound Type:  Surgical abd staple line  Contributing Factors: chronic pressure, decreased mobility, shear force, obesity, decreased tissue oxygenation, and no stools     Wound/ostomy history: Patient presented to emergency room with complaint of abdominal pain. Patient had been experiencing abdominal pain since Carlito 2/10/23 PTA. Patient's abdominal pain gradually worsened. Patient also started experiencing abdominal distention. Patient has not had a bowel movement in last 4 to 5 days. Went to OR 23 for bowel resection, sigmoid colectomy and creation of colostomy by Dr Dao Gifford. Returned to OR on 23 with Dr Fariba Boothe for Hemoperitoneum. Exploratory lap with evacuation of hemoperitoneum (3 Liters). Stoma size:  2 inch = 50 mm round protrudes. Appliance size:  Sensura Dylan YELLOW convex flange # [id number] with Drainable bag # [id number]. Add ostomy belt to assist with securing of ostomy seal.      Patient Goal of Care:  [x] Wound Healing  [] Odor Control   [] Palliative Care  [] Pain Control   [x] Other: colostomy teaching. Objective:  Lying in bed. Daughter (April) at bed side. López and NG out now. Reports eating solid food. Reports out of bed over the weekend. Moving slowly but getting up. Orders for transfer out of ICU today. Will be moving soon.     /70   Pulse 85   Temp (!) (P) 101.9 °F (38.8 °C)   Resp 16   Ht 5' 5\" (1.651 m)   Wt 243 lb 13.3 oz (110.6 kg)   LMP  (LMP Unknown) Comment: HYSTERECTOMY  SpO2 100%   BMI 40.58 kg/m²   Brendan Risk Score: Brendan Scale Score: 17  Assessment:  Stool thin liquid yellow. Stoma red, protrudes, moist. Juana stoma red, warm to wound, slightly indurated, tender to touch. Junction intact. Main line abd incision with staples and stay sutures intact and closed. See photo's   Measurements:     Incision 02/16/23 Abdomen Anterior;Medial (Active)   Wound Image    02/20/23 1608   Dressing Status Breakthrough drainage noted;New dressing applied 02/20/23 1608   Dressing Change Due 02/23/23 02/20/23 1608   Incision Cleansed Cleansed with saline 02/20/23 1608   Dressing/Treatment Foam 02/20/23 1608   Incision Length (cm) 19.5 02/20/23 1608   Incision Width (cm) 0 cm 02/20/23 1608   Incision Depth (cm) 0 cm 02/20/23 1608   Closure Retention sutures; Staples 02/20/23 1608   Margins Approximated 02/20/23 1608   Incision Assessment Other (Comment);Dry 02/20/23 1608   Drainage Amount Small 02/20/23 1608   Drainage Description Yellow 02/20/23 1608   Odor None 02/20/23 1608   Juana-incision Assessment Blanchable erythema 02/20/23 1608   Number of days: 3     Colostomy LLQ (Active)   Stomal Appliance Leaking; Changed;Clean, dry & intact 02/20/23 1607   Flange Size (inches) 2.75 Inches 02/20/23 1607   Stoma  Assessment Protrudes; Moist;Red;Swelling 02/20/23 1607   Peristomal Assessment Blanchable erythema;Edema;Painful 02/20/23 1607   Treatment Bag change;Site care;Stoma powder;Liquid skin barrier;Stoma paste; Heat applied 02/20/23 1607   Stool Appearance Loose; Watery 02/20/23 1607   Stool Color Yellow 02/20/23 1607   Stool Amount Small 02/20/23 1607   Output (mL) 100 ml 02/20/23 1607   Number of days: 6     Colostomy and abd incision:            Intake/Output Summary (Last 24 hours) at 2/20/2023 1609  Last data filed at 2/20/2023 1607  Gross per 24 hour   Intake 808.7 ml   Output 1735 ml   Net -926.3 ml     Response to treatment:  With complaints of pain.      Pain Assessment:  Severity:  8 / 10  Quality of pain: sharp, burning  Wound Pain Timing/Severity: intermittent  Premedicated: Yes  Plan:   Plan of Care:    Mid line dressing removed. Site cleansed with NSS. Pat dry. Foam dressing placed. Plan for Ostomy Care:  Daughter willing to change appliance. Participated again in changing the appliance. Patient watching. Current pouch removed. Site cleansed with warm water. Stoma measured. Convex paste ring placed around stoma today. Flange cut and placed around stoma. Bag attached and closed. Patient sat on the side of the bed to place ostomy belt. Ostomy belt will help waver stay secured. Patient able to  stand up and take a few steps to get her self up to the top of the bed before lying back down. Ostomy belt applied. Daughter demonstrated understanding. Call Ostomy care for problems with seal at [phone] or or call [phone] and leave message     Stoma Care - New colostomy - Patient to empty appliance when 1/3 to 1/2 full with assistance of the staff. Cleanse inside and outside of the drain spout prior to rolling closed. Change appliance every 3-5 days or 1-2 times a week. Call for problems with seal [phone]     Before sending in samples and marking catalog, want to ensure convex wafer is working. Daughter sign permission to send sample. If convex wafer remains intact tomorrow, will send in orders for samples. Specialty Bed Required : Yes Isolibrium gel therapy pressure redistribution mattress with low air loss in place. [x] Low Air Loss   [x] Pressure Redistribution  [] Fluid Immersion  [] Bariatric  [] Total Pressure Relief  [] Other:     Current Diet: ADULT DIET;  Regular  ADULT ORAL NUTRITION SUPPLEMENT; Breakfast, Dinner; Standard High Calorie/High Protein Oral Supplement  Dietician consult:  Yes    Discharge Plan:  Placement for patient upon discharge: unknown at this time   Patient appropriate for Outpatient [address]: Yes  Supplies given Yes   Samples requested No but daughter signed approval to order supplies. Referrals:  []  following  [] [facility]  [] Supplies  [] Other    Patient/Caregiver Teaching: Convex water and belt applied and explained its use in popping stoma out so stool with go into the bag instead of under the wafer. Level of patient/caregiver understanding able to:   Teaching provided:  [] Reviewed GI and A&P        [] Supplies  [x] Pouch emptying      [] Manipulate closure  [x] Routine Care         [x] Comment convex wafer and belt.   [x] Pouch maintenance             [] Indicates understanding       [x] Needs reinforcement  [] Unsuccessful      [x] Verbal Understanding  [] Demonstrated understanding       [] No evidence of learning  [] Refused teaching         [] N/A       Electronically signed by Heather Hdz RN, MSN, Kathy Flanagan on 2/20/2023 at 4:09 PM

## 2023-02-20 NOTE — PROGRESS NOTES
.4 Eyes Skin Assessment     The patient is being assess for  Shift Handoff    I agree that 2 RN's have performed a thorough Head to Toe Skin Assessment on the patient. ALL assessment sites listed below have been assessed. Areas assessed by both nurses: Yuko and Sherry  [x]   Head, Face, and Ears   [x]   Shoulders, Back, and Chest  [x]   Arms, Elbows, and Hands   [x]   Coccyx, Sacrum, and IschIum  [x]   Legs, Feet, and Heels        Does the Patient have Skin Breakdown?   Yes [facility] WOUND CARE was Initiated documentation include the Juana-wound, Wound Assessment, Measurements, Dressing Treatment, Drainage, and Color\",         Brendan Prevention initiated:  Yes   Wound Care Orders initiated:  Yes      [id number] nurse consulted for Pressure Injury (Stage 3,4, Unstageable, DTI, NWPT, and Complex wounds), New and Established Ostomies:  Yes      Nurse 1 eSignature: Electronically signed by Sheryle Boning, RN on 2/20/23 at 6:10 PM EST    **SHARE this note so that the co-signing nurse is able to place an eSignature**    Nurse 2 eSignature: {Esignature:[id number]}

## 2023-02-20 NOTE — PROGRESS NOTES
Comprehensive Nutrition Assessment    Type and Reason for Visit:  Reassess    Nutrition Recommendations/Plan:   Continue regular diet and encourage PO intake   RD to add strawberry ensure BID   Monitor nutrition adequacy, pertinent labs, bowel habits, wt changes, and clinical progress     Malnutrition Assessment:  Malnutrition Status: At risk for malnutrition (Comment) (02/20/23 1217)    Context:  Acute Illness     Findings of the 6 clinical characteristics of malnutrition:  Energy Intake:  50% or less of estimated energy requirements for 5 or more days    Nutrition Assessment:    Follow up: Pt previously on vent and extubated on 2/17. Diet advanced to clear liquids on 2/18, full liquid on 2/19 and regular diet today. Pt reports fair appetite, getting tired of full liquid menu options. Reports eager to try food from regular diet, RD provided pt w/ menu to read over. Pt reports intakes of about 50% of meals. Willing to trial strawberry ensure, RD to add BID. Continue to encourage PO intake, will continue to monitor. Nutrition Related Findings:    Na 134. Colostomy, + 475 ml output today. No Nausea reported. Wound Type: Surgical Incision       Current Nutrition Intake & Therapies:    Average Meal Intake: 26-50%  Average Supplements Intake: None Ordered  ADULT DIET; Regular    Anthropometric Measures:  Height: 5' 5\" (165.1 cm)  Ideal Body Weight (IBW): 125 lbs (57 kg)       Current Body Weight: 243 lb (110.2 kg), 176.6 % IBW.  Weight Source: Bed Scale  Current BMI (kg/m2): 40.4                    BMI Categories: Obese Class 2 (BMI 35.0 -39.9)    Estimated Daily Nutrient Needs:  Energy Requirements Based On: Kcal/kg  Weight Used for Energy Requirements: Ideal  Energy (kcal/day): 5374-6973  Weight Used for Protein Requirements: Ideal  Protein (g/day): 68-80g  Method Used for Fluid Requirements: 1 ml/kcal    Nutrition Diagnosis:   Inadequate oral intake related to inadequate protein-energy intake, altered GI function as evidenced by GI abnormality, intake 26-50%    Nutrition Interventions:   Food and/or Nutrient Delivery: Continue Current Diet, Start Oral Nutrition Supplement  Nutrition Education/Counseling: No recommendation at this time  Coordination of Nutrition Care: Continue to monitor while inpatient       Goals:  Previous Goal Met: No Progress toward Goal(s)  Goals: PO intake 50% or greater, prior to discharge       Nutrition Monitoring and Evaluation:   Behavioral-Environmental Outcomes: None Identified  Food/Nutrient Intake Outcomes: Food and Nutrient Intake, Diet Advancement/Tolerance  Physical Signs/Symptoms Outcomes: Biochemical Data, Nutrition Focused Physical Findings, Weight    Discharge Planning:     Too soon to determine     Loyd Pineda Lopez [address], [address] [place] [address],   Contact: Office: [phone]; [address]: [address]

## 2023-02-20 NOTE — PROGRESS NOTES
[facility] GENERAL SURGERY    Surgery Progress Note           POD #  4/6    PATIENT NAME: Karin Carney     TODAY'S DATE: 2/20/2023    INTERVAL HISTORY:    Pt  feels OK except for pain. Tolerating PO and ostomy functional. She wants to eat. Fevers overnight but now resolved. OBJECTIVE:   VITALS:  /78   Pulse 73   Temp 98.7 °F (37.1 °C) (Bladder)   Resp 11   Ht 5' 5\" (1.651 m)   Wt 243 lb 13.3 oz (110.6 kg)   LMP  (LMP Unknown) Comment: HYSTERECTOMY  SpO2 97%   BMI 40.58 kg/m²     INTAKE/OUTPUT:    I/O last 3 completed shifts: In: 3421.7 [P.O.:780; I.V.:2193; IV Piggyback:448.7]  Out: 3500 [Urine:2075; WMZOL:7902]  No intake/output data recorded. CONSTITUTIONAL:  awake and alert  ABDOMEN:   normal bowel sounds, soft, non-distended, ostomy functional   INCISION: clean, dry    Data:  CBC:   Recent Labs     02/19/23  0440 02/19/23  1630 02/20/23  0500   HGB 7.2* 7.6* 7.4*   HCT 20.9* 24.1* 22.1*     BMP:    Recent Labs     02/18/23  0312 02/19/23  0440 02/20/23  0500    136 134*   K 3.4* 3.4* 3.6    105 104   CO2 24 23 22   BUN 15 8 8   CREATININE 0.6 0.6 0.7   GLUCOSE 89 106* 112*     Hepatic:   Recent Labs     02/18/23  0312 02/19/23  0440 02/20/23  0500   AST 16 21 15   ALT 13 14 14   BILITOT 0.4 0.5 0.5   ALKPHOS 81 88 98     Mag:      Recent Labs     02/18/23  0312 02/19/23  0440   MG 2.00 1.90      Phos:     Recent Labs     02/18/23  0312 02/19/23  0440 02/20/23  0500   PHOS 2.2* 1.9* 3.2      INR: No results for input(s): INR in the last 72 hours. Radiology Review:  NA    ASSESSMENT AND PLAN:  63 y.o. female status post Rick's procedure with postop bleeding, now s/p evacuation of large hemoperitoneum.   No further sx of active abdominal bleeding   - regular diet   - po pain control-D/C PCA   - OOB today   - ok transfer from ICU         Electronically signed by Shai Villalba MD

## 2023-02-20 NOTE — CONSULTS
Pharmacy to Dose Vancomycin    Dx: SSTI  Goal trough = 10-20  Pt wt = 98.9 kg  Recent Labs     02/17/23  0545 02/18/23  0312 02/19/23  0440   CREATININE 1.1 0.6 0.6     No results for input(s): WBC in the last 72 hours. Per Nephrology note : \"cautious dose of vancomycin\"  Will pulse dose for now  Give Vancomycin 1500 mg x1. Vancomycin random 2/20 1200  [facility] 2/19/2023 11:02 PM    Vanc level = 10.7 at 1605. Give Vancomycin 1500mg IVPB x1. Vanc level in AM  [address]  2/20/2023 at 5:23 PM    Vancomycin Level, Random [[id number]] (Abnormal)    Collected: 02/21/23 0449    Updated: 02/21/23 0530    Specimen Type: Blood     Vancomycin Rm 20.1 High Panic   ug/mL     Recent Labs     02/19/23  0440 02/20/23  0500 02/21/23  0449   CREATININE 0.6 0.7 1.9*       Estimated Creatinine Clearance: 39 mL/min (A) (based on SCr of 1.9 mg/dL (H)). Will hold Vanc for now given worsening renal function.   Check another random level at 1800  [facility] 2/21/2023 5:46 AM

## 2023-02-20 NOTE — PROGRESS NOTES
[facility] Progress Note           Subjective/   63y.o. year old female who we are seeing in consultation for TARSHA. HPI:  Renal function normalized, non-oliguric. Diet being advanced. ROS:  +weak, denies any shortness of breath. Objective/   GEN:  Chronically ill, /78   Pulse 73   Temp 98.7 °F (37.1 °C) (Bladder)   Resp 11   Ht 5' 5\" (1.651 m)   Wt 243 lb 13.3 oz (110.6 kg)   LMP  (LMP Unknown) Comment: HYSTERECTOMY  SpO2 97%   BMI 40.58 kg/m²   HEENT: non-icteric, no JVD  CV: S1, S2 without m/r/g; no LE edema, right thigh hematoma around surgical site of bypass  RESP: CTA B without w/r/r; breathing wnl  ABD: +bs, soft, nt, no hsm  SKIN: warm, no rashes    Data/  Recent Labs     02/19/23  0440 02/19/23  1630 02/20/23  0500   HGB 7.2* 7.6* 7.4*   HCT 20.9* 24.1* 22.1*     Recent Labs     02/18/23  0312 02/19/23  0440 02/20/23  0500    136 134*   K 3.4* 3.4* 3.6    105 104   CO2 24 23 22   GLUCOSE 89 106* 112*   PHOS 2.2* 1.9* 3.2   MG 2.00 1.90  --    BUN 15 8 8   CREATININE 0.6 0.6 0.7   LABGLOM >60 >60 >60       Assessment/     -TARSHA, pre-renal to early ATN in the setting of hypotension    -Anemia, acute blood loss   s/p 2 PRBCs 2/15-2/16    -Hemorrhagic shock , resolved    -Right leg hematoma    -Large bowel obstruction s/p pati's  & colostomy on 2/14/23     -Right above knee to below knee popliteal artery bypass on 1/16/23      Plan/     - Trial off IVF's  - Trend labs, bp's, & urine output    No further recommendations at this time  Will sign off for now  Call with questions    ____________________________________  Poncho Velásquez MD  [facility]  [email or web address]  Office: [phone]

## 2023-02-20 NOTE — PROGRESS NOTES
Pt arrived to c3. Report received from ICU. Pt and family orientated to room and surroundings. Jose Wolfe RN

## 2023-02-20 NOTE — PLAN OF CARE
Problem: Pain  Goal: Verbalizes/displays adequate comfort level or baseline comfort level  Outcome: Progressing     Problem: ABCDS Injury Assessment  Goal: Absence of physical injury  Outcome: Progressing     Problem: Safety - Adult  Goal: Free from fall injury  Outcome: Progressing     Problem: Discharge Planning  Goal: Discharge to home or other facility with appropriate resources  Outcome: Progressing  Flowsheets (Taken 2/19/2023 2000)  Discharge to home or other facility with appropriate resources:   Identify barriers to discharge with patient and caregiver   Arrange for needed discharge resources and transportation as appropriate   Identify discharge learning needs (meds, wound care, etc)   Arrange for interpreters to assist at discharge as needed   Refer to discharge planning if patient needs post-hospital services based on physician order or complex needs related to functional status, cognitive ability or social support system     Problem: Skin/Tissue Integrity  Goal: Absence of new skin breakdown  Description: 1. Monitor for areas of redness and/or skin breakdown  2. Assess vascular access sites hourly  3. Every 4-6 hours minimum:  Change oxygen saturation probe site  4. Every 4-6 hours:  If on nasal continuous positive airway pressure, respiratory therapy assess nares and determine need for appliance change or resting period. Outcome: Progressing     Problem: Nutrition Deficit:  Goal: Optimize nutritional status  Outcome: Progressing     Problem: Safety - Medical Restraint  Goal: Remains free of injury from restraints (Restraint for Interference with Medical Device)  Description: INTERVENTIONS:  1. Determine that other, less restrictive measures have been tried or would not be effective before applying the restraint  2. Evaluate the patient's condition at the time of restraint application  3. Inform patient/family regarding the reason for restraint  4.  Q2H: Monitor safety, psychosocial status, comfort, nutrition and hydration  Outcome: Progressing

## 2023-02-21 LAB
A/G RATIO: 1 (ref 1.1–2.2)
ALBUMIN SERPL-MCNC: 1.9 G/DL (ref 3.4–5)
ALP BLD-CCNC: 113 U/L (ref 40–129)
ALT SERPL-CCNC: 12 U/L (ref 10–40)
ANION GAP SERPL CALCULATED.3IONS-SCNC: 11 MMOL/L (ref 3–16)
AST SERPL-CCNC: 17 U/L (ref 15–37)
BILIRUB SERPL-MCNC: 0.7 MG/DL (ref 0–1)
BUN BLDV-MCNC: 15 MG/DL (ref 7–20)
CALCIUM SERPL-MCNC: 6.6 MG/DL (ref 8.3–10.6)
CHLORIDE BLD-SCNC: 102 MMOL/L (ref 99–110)
CO2: 20 MMOL/L (ref 21–32)
CREAT SERPL-MCNC: 1.9 MG/DL (ref 0.6–1.2)
GFR SERPL CREATININE-BSD FRML MDRD: 30 ML/MIN/{1.73_M2}
GLUCOSE BLD-MCNC: 109 MG/DL (ref 70–99)
GLUCOSE BLD-MCNC: 115 MG/DL (ref 70–99)
GLUCOSE BLD-MCNC: 119 MG/DL (ref 70–99)
GLUCOSE BLD-MCNC: 123 MG/DL (ref 70–99)
GLUCOSE BLD-MCNC: 129 MG/DL (ref 70–99)
GLUCOSE BLD-MCNC: 134 MG/DL (ref 70–99)
PERFORMED ON: ABNORMAL
PHOSPHORUS: 3.7 MG/DL (ref 2.5–4.9)
POTASSIUM REFLEX MAGNESIUM: 3.7 MMOL/L (ref 3.5–5.1)
SODIUM BLD-SCNC: 133 MMOL/L (ref 136–145)
TOTAL PROTEIN: 3.9 G/DL (ref 6.4–8.2)
VANCOMYCIN RANDOM: 20.1 UG/ML

## 2023-02-21 PROCEDURE — 6370000000 HC RX 637 (ALT 250 FOR IP): Performed by: SURGERY

## 2023-02-21 PROCEDURE — APPNB60 APP NON BILLABLE TIME 46-60 MINS: Performed by: CLINICAL NURSE SPECIALIST

## 2023-02-21 PROCEDURE — 99024 POSTOP FOLLOW-UP VISIT: CPT | Performed by: SURGERY

## 2023-02-21 PROCEDURE — 84100 ASSAY OF PHOSPHORUS: CPT

## 2023-02-21 PROCEDURE — 87186 SC STD MICRODIL/AGAR DIL: CPT

## 2023-02-21 PROCEDURE — 80053 COMPREHEN METABOLIC PANEL: CPT

## 2023-02-21 PROCEDURE — 94761 N-INVAS EAR/PLS OXIMETRY MLT: CPT

## 2023-02-21 PROCEDURE — APPSS30 APP SPLIT SHARED TIME 16-30 MINUTES: Performed by: CLINICAL NURSE SPECIALIST

## 2023-02-21 PROCEDURE — 2580000003 HC RX 258: Performed by: SURGERY

## 2023-02-21 PROCEDURE — 87075 CULTR BACTERIA EXCEPT BLOOD: CPT

## 2023-02-21 PROCEDURE — 1200000000 HC SEMI PRIVATE

## 2023-02-21 PROCEDURE — 80202 ASSAY OF VANCOMYCIN: CPT

## 2023-02-21 PROCEDURE — 6360000002 HC RX W HCPCS: Performed by: SURGERY

## 2023-02-21 PROCEDURE — 87076 CULTURE ANAEROBE IDENT EACH: CPT

## 2023-02-21 PROCEDURE — 2700000000 HC OXYGEN THERAPY PER DAY

## 2023-02-21 PROCEDURE — 87070 CULTURE OTHR SPECIMN AEROBIC: CPT

## 2023-02-21 PROCEDURE — 2500000003 HC RX 250 WO HCPCS: Performed by: INTERNAL MEDICINE

## 2023-02-21 PROCEDURE — 87077 CULTURE AEROBIC IDENTIFY: CPT

## 2023-02-21 PROCEDURE — 87205 SMEAR GRAM STAIN: CPT

## 2023-02-21 RX ORDER — SODIUM CHLORIDE 9 MG/ML
INJECTION, SOLUTION INTRAVENOUS CONTINUOUS
Status: DISCONTINUED | OUTPATIENT
Start: 2023-02-21 | End: 2023-02-24

## 2023-02-21 RX ORDER — 0.9 % SODIUM CHLORIDE 0.9 %
500 INTRAVENOUS SOLUTION INTRAVENOUS ONCE
Status: COMPLETED | OUTPATIENT
Start: 2023-02-21 | End: 2023-02-22

## 2023-02-21 RX ADMIN — ATORVASTATIN CALCIUM 40 MG: 40 TABLET, FILM COATED ORAL at 09:01

## 2023-02-21 RX ADMIN — OXYCODONE HYDROCHLORIDE 10 MG: 5 TABLET ORAL at 18:38

## 2023-02-21 RX ADMIN — OXYCODONE HYDROCHLORIDE 10 MG: 5 TABLET ORAL at 06:13

## 2023-02-21 RX ADMIN — HYDROMORPHONE HYDROCHLORIDE 0.5 MG: 0.5 INJECTION, SOLUTION INTRAMUSCULAR; INTRAVENOUS; SUBCUTANEOUS at 10:57

## 2023-02-21 RX ADMIN — LEVOTHYROXINE SODIUM 200 MCG: 0.1 TABLET ORAL at 06:12

## 2023-02-21 RX ADMIN — PROPRANOLOL HYDROCHLORIDE 40 MG: 40 TABLET ORAL at 09:01

## 2023-02-21 RX ADMIN — ACETAMINOPHEN 650 MG: 325 TABLET ORAL at 05:07

## 2023-02-21 RX ADMIN — PIPERACILLIN AND TAZOBACTAM 3375 MG: 3; .375 INJECTION, POWDER, FOR SOLUTION INTRAVENOUS at 18:43

## 2023-02-21 RX ADMIN — SODIUM CHLORIDE, PRESERVATIVE FREE 10 ML: 5 INJECTION INTRAVENOUS at 09:01

## 2023-02-21 RX ADMIN — PIPERACILLIN AND TAZOBACTAM 3375 MG: 3; .375 INJECTION, POWDER, FOR SOLUTION INTRAVENOUS at 10:18

## 2023-02-21 RX ADMIN — OXYCODONE HYDROCHLORIDE 10 MG: 5 TABLET ORAL at 10:16

## 2023-02-21 RX ADMIN — GABAPENTIN 300 MG: 300 CAPSULE ORAL at 09:01

## 2023-02-21 RX ADMIN — SODIUM CHLORIDE, PRESERVATIVE FREE 10 ML: 5 INJECTION INTRAVENOUS at 20:57

## 2023-02-21 RX ADMIN — FAMOTIDINE 20 MG: 10 INJECTION, SOLUTION INTRAVENOUS at 20:56

## 2023-02-21 RX ADMIN — GABAPENTIN 300 MG: 300 CAPSULE ORAL at 14:30

## 2023-02-21 RX ADMIN — FAMOTIDINE 20 MG: 10 INJECTION, SOLUTION INTRAVENOUS at 09:01

## 2023-02-21 RX ADMIN — ALLOPURINOL 100 MG: 100 TABLET ORAL at 09:01

## 2023-02-21 RX ADMIN — OXYCODONE HYDROCHLORIDE 10 MG: 5 TABLET ORAL at 14:30

## 2023-02-21 RX ADMIN — ONDANSETRON 4 MG: 2 INJECTION INTRAMUSCULAR; INTRAVENOUS at 09:07

## 2023-02-21 RX ADMIN — PIPERACILLIN AND TAZOBACTAM 3375 MG: 3; .375 INJECTION, POWDER, FOR SOLUTION INTRAVENOUS at 03:32

## 2023-02-21 RX ADMIN — SODIUM CHLORIDE 500 ML: 9 INJECTION, SOLUTION INTRAVENOUS at 22:00

## 2023-02-21 ASSESSMENT — PAIN SCALES - GENERAL
PAINLEVEL_OUTOF10: 8
PAINLEVEL_OUTOF10: 5
PAINLEVEL_OUTOF10: 4
PAINLEVEL_OUTOF10: 6
PAINLEVEL_OUTOF10: 8

## 2023-02-21 ASSESSMENT — PAIN DESCRIPTION - LOCATION
LOCATION: ABDOMEN

## 2023-02-21 ASSESSMENT — PAIN DESCRIPTION - ORIENTATION
ORIENTATION: MID
ORIENTATION: LEFT;MID
ORIENTATION: MID
ORIENTATION: MID

## 2023-02-21 ASSESSMENT — PAIN DESCRIPTION - PAIN TYPE: TYPE: SURGICAL PAIN

## 2023-02-21 NOTE — PROGRESS NOTES
[facility] GENERAL SURGERY    Surgery Progress Note           POD # 5 & 7    PATIENT NAME: Wicho Ojeda     TODAY'S DATE: 2/21/2023    INTERVAL HISTORY:    Pt with lower abd pain - still with fevers - tmax 101.1     OBJECTIVE:   VITALS:  /65   Pulse 93   Temp 97.9 °F (36.6 °C) (Oral)   Resp 22   Ht 5' 5\" (1.651 m)   Wt 243 lb 13.3 oz (110.6 kg)   LMP  (LMP Unknown) Comment: HYSTERECTOMY  SpO2 92%   BMI 40.58 kg/m²     INTAKE/OUTPUT:    I/O last 3 completed shifts: In: 1233 [P.O.:360; IV Piggyback:873]  Out: 2385 [Urine:960; Stool:1425]  I/O this shift:  In: 600 [P.O.:600]  Out: 675 [Urine:225; Stool:450]              CONSTITUTIONAL:  awake and alert  ABDOMEN:   normal bowel sounds, soft, non-distended, ostomy functional, tender lower abd, eccymosis left flank   INCISION: copious amount (~100ml) of hemo-purulent drainage from inferior wound expressed after dressing was removed - wound depth 5.5cm with 5cm tunneling 12 o'clock, wound packed with aquacel silver    Data:  CBC:   Recent Labs     02/19/23  1630 02/20/23  0500 02/20/23  1640   WBC  --   --  14.5*   HGB 7.6* 7.4* 7.1*   HCT 24.1* 22.1* 21.3*   PLT  --   --  309       BMP:    Recent Labs     02/19/23  0440 02/20/23  0500 02/21/23  0449    134* 133*   K 3.4* 3.6 3.7    104 102   CO2 23 22 20*   BUN 8 8 15   CREATININE 0.6 0.7 1.9*   GLUCOSE 106* 112* 129*       Hepatic:   Recent Labs     02/19/23  0440 02/20/23  0500 02/21/23  0449   AST 21 15 17   ALT 14 14 12   BILITOT 0.5 0.5 0.7   ALKPHOS 88 98 113       Mag:      Recent Labs     02/19/23  0440   MG 1.90        Phos:     Recent Labs     02/19/23  0440 02/20/23  0500 02/21/23  0449   PHOS 1.9* 3.2 3.7         ASSESSMENT AND PLAN:  63 y.o. female status post Rick's procedure with postop bleeding, and  s/p evacuation of large hemoperitoneum.   No further sx of active abdominal bleeding  GI: continue with reg diet  TARSHA: worsening today - possibly from vancomycin - nephrology already following  Wound infection: cultures sent - hopefully this is the source of the fever and leukocytosis  Acute blood loss anemia: slight trending down h/h - no active bleeding - transfuse if h/h drops below 7    Electronically signed by JARETT Shahid - CNP     Patient seen and agree with above and more than half of the total time was spent by me on the encounter.      Steph Forbes MD

## 2023-02-21 NOTE — PROGRESS NOTES
Hospitalist Progress Note      PCP: Mery Brady    Date of Admission: 2/9/2023    Chief Complaint:   Abd pain    Hospital Course:      Ms. Alberto Vivas is a 63year old female who presented to [facility] from [facility] [place] with abdominal pain and constipation on 2/9. She had a lower extremity vascular bypass surgery on her right leg a few weeks ago. She reports constipation and abdominal distension following her surgery with ng tube placement as well as a rectal tube. Patient reports her follow-up colonoscopy revealed sigmoid edema with diverticula and an ulcer, she then improved and was discharged. She again developed abdominal pain and distension and has not had a BM since 2/5. She denies trying any stool softeners or motility agents at home. 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 She also has had surgeries for adhesions and ventral hernia repair. She underwent a Haylie's procedure with GS on 2/14/23 with colostomy placement. Rapid response called on 2/16/23 for hypotension (as below). Found to have abdominal/pelvic hemorrhage on CT along with R thigh hematoma noted over previous bypass incision site on US. Patient status post surgery with resection of colon and diverting colostomy on 2/15/2023. Patient developed hypotension on 2/15/2023. Patient had further hypotension on 2/16/2023 and it was noted patient had intra-abdominal bleed with right thigh hematoma. Patient's hemoglobin had dropped to 5. Patient was transfused with 2 units. She appeared to stabilize but then her blood pressure decreased and her heart rate increased. Patient's hemoglobin dropped back down to 5 after being up to 6.9. It was decided to take patient back to surgery. During surgery patient was noted to have old blood in her abdomen no active bleed was noted but 3 L of old blood removed. Subjective:   Patient doing better.   No shortness of breath. Abdominal pain has improved. NG tube has been removed. Patient tolerating clear liquids. No fevers or chills. No emesis. Fever reported overnight per RN with Tmax 102 F .  Will get blood c/s if continue to be febrile > 100.4 F     Medications:  Reviewed    Infusion Medications    sodium chloride      dextrose      sodium chloride Stopped (02/12/23 1087)     Scheduled Medications    vancomycin (VANCOCIN) intermittent dosing (placeholder)   Other RX Placeholder    melatonin  5 mg Oral Nightly    mupirocin   Nasal BID    famotidine (PEPCID) injection  20 mg IntraVENous BID    insulin lispro  0-8 Units SubCUTAneous Q4H    docusate sodium  100 mg Oral Daily    piperacillin-tazobactam  3,375 mg IntraVENous Q8H    sodium chloride flush  5-40 mL IntraVENous 2 times per day    allopurinol  100 mg Oral Daily    atorvastatin  40 mg Oral Daily    [Held by provider] aspirin  81 mg Oral Daily    gabapentin  300 mg Oral TID    levothyroxine  200 mcg Oral Daily    propranolol  40 mg Oral BID    [Held by provider] enoxaparin  1 mg/kg SubCUTAneous BID     PRN Meds: oxyCODONE **OR** oxyCODONE, HYDROmorphone, sodium chloride, glucose, dextrose bolus **OR** dextrose bolus, glucagon (rDNA), dextrose, benzocaine, sodium chloride flush, sodium chloride, ondansetron **OR** ondansetron, acetaminophen **OR** acetaminophen, potassium chloride **OR** potassium alternative oral replacement **OR** [DISCONTINUED] potassium chloride, magnesium sulfate, sodium phosphate IVPB **OR** sodium phosphate IVPB **OR** sodium phosphate IVPB      Intake/Output Summary (Last 24 hours) at 2/20/2023 2149  Last data filed at 2/20/2023 1814  Gross per 24 hour   Intake 688.7 ml   Output 960 ml   Net -271.3 ml         Physical Exam Performed:    /63   Pulse 92   Temp (!) 101.1 °F (38.4 °C) (Oral)   Resp 17   Ht 5' 5\" (1.651 m)   Wt 243 lb 13.3 oz (110.6 kg)   LMP  (LMP Unknown) Comment: HYSTERECTOMY  SpO2 91%   BMI 40.58 kg/m²     General appearance: No apparent distress, appears stated age and cooperative.  HEENT: Pupils equal, round, and reactive to light. Conjunctivae/corneas clear.     Neck: Supple, with full range of motion. No jugular venous distention. Trachea midline.  Respiratory:  Normal respiratory effort. Clear to auscultation, bilaterally without Rales/Wheezes/Rhonchi.  Cardiovascular: Regular rate and rhythm with normal S1/S2 without murmurs, rubs or gallops.  Abdomen: Tender.  Midline incision dressed.  Positive bowel sounds noted.  Ostomy left lower quadrant  Musculoskeletal: Hematoma noted on right medial thigh improved.  Skin: Skin color, texture, turgor normal.  No rashes or lesions.  Neurologic:  Neurovascularly intact without any focal sensory/motor deficits. Cranial nerves: II-XII intact, grossly non-focal.  Psychiatric: Alert and oriented, thought content appropriate, normal insight  Capillary Refill: Brisk, 3 seconds, normal   Peripheral Pulses: +2 palpable, equal bilaterally       Labs:   Recent Labs     02/19/23  1630 02/20/23  0500 02/20/23  1640   WBC  --   --  14.5*   HGB 7.6* 7.4* 7.1*   HCT 24.1* 22.1* 21.3*   PLT  --   --  309       Recent Labs     02/18/23  0312 02/19/23  0440 02/20/23  0500    136 134*   K 3.4* 3.4* 3.6    105 104   CO2 24 23 22   BUN 15 8 8   CREATININE 0.6 0.6 0.7   CALCIUM 6.7* 7.0* 7.1*   PHOS 2.2* 1.9* 3.2       Recent Labs     02/18/23  0312 02/19/23  0440 02/20/23  0500   AST 16 21 15   ALT 13 14 14   BILITOT 0.4 0.5 0.5   ALKPHOS 81 88 98       Urinalysis:      Lab Results   Component Value Date/Time    NITRU Negative 02/19/2023 09:00 PM    WBCUA 3-5 02/19/2023 09:00 PM    RBCUA 3-4 02/19/2023 09:00 PM    BLOODU SMALL 02/19/2023 09:00 PM    SPECGRAV 1.020 02/19/2023 09:00 PM    GLUCOSEU Negative 02/19/2023 09:00 PM       Radiology:  VL DUP LOWER EXTREMITY ARTERIES RIGHT   Final Result      CT ABDOMEN PELVIS WO CONTRAST Additional Contrast? None   Final Result   Large amount of  complex fluid within the abdomen and pelvis, most notably   within the pelvis where hematocrit levels are seen, compatible with   hemorrhage. Smaller 4.9 cm collection within subcutaneous fat adjacent to the left lower   quadrant stoma, compatible with hematoma. Amorphous inflammation or   hemorrhage is seen within the soft tissues immediately adjacent. Mural thickening of the ascending colon and transverse colon, which can   reflect colitis in the appropriate clinical setting. Mild left pelviectasis. Pneumoperitoneum, in keeping with the recent postoperative state. Bibasilar atelectasis. 1 cm right lower lobe pulmonary nodule, for which follow-up recommendations   are as below. RECOMMENDATIONS:   Fleischner Society guidelines for follow-up and management of incidentally   detected pulmonary nodules:      Nodule size greater than 8 mm         In a low-risk patient, consider CT at 3 months, PET/CT, or tissue sampling. In a high-risk patient, consider CT at 3 months, PET/CT, or tissue sampling.      - Low risk patients include individuals with minimal or absent history of   smoking and other known risk factors. - High risk patients include individuals with a history or smoking or known   risk factors. Radiology 2017 http://pubs. rsna.org/doi/full/10.1148/radiol. 2025806631         IR PICC WO SQ PORT/PUMP > 5 YEARS   Final Result      CT ABDOMEN PELVIS WO CONTRAST Additional Contrast? None   Final Result   1. Barium contrast from prior small-bowel follow-through is seen throughout   the colon and limits evaluation due to streak artifact. No definite abscess   is identified. 2.  Infrarenal abdominal aortic aneurysm measures up to 3.0 cm. See   recommendations for follow-up below. 3.  There is a 1.1 cm right lower lobe pulmonary nodule and a 0.3 cm left   lower lobe pulmonary nodule. Recommend dedicated CT chest for further   evaluation.       RECOMMENDATIONS: Multiple. Worst: 11 mm solid      Pathology: Multiple pulmonary nodules. Most severe: 11 mm solid pulmonary   nodule detected on incomplete chest CT. Recommend prompt non-contrast Chest CT for further evaluation. These guidelines do not apply to immunocompromised patients and patients with   cancer. Follow up in patients with significant comorbidities as clinically   warranted. For lung cancer screening, adhere to Lung-RADS guidelines. Reference: Radiology. 2017; 284(1):228-43         3 cm AAA      Pathology: 3 cm infrarenal abdominal aortic aneurysm. Recommend follow-up every 3 years. Reference: J Am [place] Radiol 3928;18:774-613. XR ABDOMEN (KUB) (SINGLE AP VIEW)   Final Result   Cancellation of scheduled hypaque enema due to presence of barium throughout   the colon including the rectosigmoid region as described above. FL SMALL BOWEL FOLLOW THROUGH ONLY   Final Result   Mildly dilated small bowel, though small-bowel transit time is upper limits   of normal.         CT ABDOMEN PELVIS W IV CONTRAST Additional Contrast? None   Final Result   Diverticulitis of the descending sigmoid junction. Mild wall thickening but   negative for obstruction. IP CONSULT TO GENERAL SURGERY  IP CONSULT TO GI  IP CONSULT TO PHARMACY  IP CONSULT TO NEPHROLOGY  IP CONSULT TO DIETITIAN  PHARMACY TO DOSE VANCOMYCIN    Assessment/Plan:    Active Hospital Problems    Diagnosis     Sigmoid stricture ([address] [K56.699]      Priority: Medium    Hemorrhagic shock ([address] [R57.8]      Priority: Medium    Acute blood loss anemia [D62]      Priority: Medium    Acute renal failure (ARF) ([address] [N17.9]      Priority: Medium    Diverticulitis [K57.92]      Priority: Medium    Colonic obstruction ([address] [K56.609]      Priority: Medium    Bowel obstruction ([address] [K56.609]      Priority: Medium     Diverticulitis with obstruction due to sigmoid stricture . Patient on IV Zosyn.   Status post resection with diverting colostomy (Rick's procedure)  2/14/22 . General surgery is following. Good ostomy output. Peripheral artery disease. Patient status post bypass of right lower extremity. Patient was on therapeutic Lovenox. Patient developed right leg hematoma and intra-abdominal bleed. Lovenox and aspirin held. Discussed with vascular surgery. Patient with synthetic graft and may require long-term anticoagulation. He suggested Xarelto 2.5 mg twice daily once it safe to anticoagulate. Not yet started . Right leg hematoma improved. TARSHA. This is secondary to hypotension and hypovolemia. Nephrology consulted - following and asssited with Mx. Resolved . continue to monitor closely. Avoid nephrotoxic agents including Toradol. Creatinine normalized . Monitor urine output. Severe protein calorie malnutrition. Patient started on p.o. diet. NG tube  removed and patient tolerating p.o. Advance diet per surgery. Patient tolerating   full liquids. Hypokalemia. Supplemented . Patient with 1 or more chronic illnesses with severe exacerbation or side effects of treatment and/or 1 acute or chronic illness that poses threat to life or bodily function. DVT Prophylaxis: On hold due to bleed ; Will start back on Xarelto 2.5 mg BID if ok with gen surg   Diet: ADULT DIET;  Regular  ADULT ORAL NUTRITION SUPPLEMENT; Breakfast, Dinner; Standard High Calorie/High Protein Oral Supplement  Code Status: Full Code  PT/OT Eval Status: Pending    Dispo -Home versus SNF when stable    Appropriate for A1 Discharge Unit: Wendy Bryson MD

## 2023-02-21 NOTE — PROGRESS NOTES
Physician Progress Note      PATIENT:               Jese Moralez  CSN #:                  [id number]  :                       1960  ADMIT DATE:       2023 9:19 AM  DISCH DATE:  RESPONDING  PROVIDER #:        Jhonatan Burk MD          QUERY TEXT:    Patient admitted with abdominal pain. Noted documentation of \"at risk for   malnutrition\" per RD consult  and \"severe protein calorie malnutrition\"   per attending PN . If possible, please document in progress notes and discharge summary if you   are evaluating and /or treating any of the following: The medical record reflects the following:  Risk Factors: bowel resection/hemoperitoneum post op  Clinical Indicators: per RD consult -\"At risk for malnutrition\"  per attending PN -\"Severe protein calorie malnutrition\"  Treatment: RD consult, diet advancement post op, ONS bid added per RD,   supportive care    ASPEN Criteria:    https://aspenjournals. onlinelibrary. chandra. com/doi/full/10.1177/157965029516749  5    thank you,  Suma Sunshine RN BSN  Options provided:  -- No Malnutrition  -- Mild Malnutrition  -- Moderate Malnutrition  -- Severe Malnutrition, please add supporting ASPEN indicators, please add   supporting ASPEN indicators. -- Other - I will add my own diagnosis  -- Disagree - Not applicable / Not valid  -- Disagree - Clinically unable to determine / Unknown  -- Refer to Clinical Documentation Reviewer    PROVIDER RESPONSE TEXT:    This patient has no malnutrition.     Query created by: Cecil Villanueva on 2023 1:00 PM      Electronically signed by:  Jhoantan Burk MD 2023 4:55 PM

## 2023-02-21 NOTE — PROGRESS NOTES
Ostomy Referral Follow-up Progress Note      NAME:  Jaylin Carey RECORD NUMBER:  [id number]  AGE: 63 y.o. GENDER:  female  :  1960  TODAY'S DATE:  2023    Subjective:  My abdominal incision leaked a little today. The nurse put a new bandage on it earlier today. Charlie Andrade is a 63 y.o. female referred by:   [x] Physician  [] Nursing  [] Other:     New Colostomy: Went to OR 23 for bowel resection, sigmoid colectomy and creation of colostomy by Dr Marita Harris. Returned to OR on 23 with Dr Bette Fuentes for Hemoperitoneum. Exploratory lap with evacuation of hemoperitoneum (3 Liters). Stoma size:  2 inch = 50 mm round protrudes. Appliance size:  Sensura Dylan YELLOW convex flange # [id number] with Drainable bag # [id number]. Add ostomy belt to assist with securing of ostomy seal.          Objective  Lying in bed. Hands shaky today. States eating little bites of food and it is going down OK. Running fevers - Fever 100.7 this am, WBC's 14.5. Continues with IV zosyn. Left lower quadrant still red, swollen and warm to touch. /65   Pulse 93   Temp 97.9 °F (36.6 °C) (Oral)   Resp 22   Ht 5' 5\" (1.651 m)   Wt 243 lb 13.3 oz (110.6 kg)   LMP  (LMP Unknown) Comment: HYSTERECTOMY  SpO2 92%   BMI 40.58 kg/m²     Brendan Risk Score Brendan Scale Score: 20    Assessment Stoma through the bag intact, moist, rd, protrudes. Current appliance remains intact. Stool now brown today. Surgeon        Colostomy  & abd staple line    Incision 23 Abdomen Anterior;Medial (Active)   Wound Image    23 1608   Dressing Status Breakthrough drainage noted;New dressing applied 23 1608   Dressing Change Due 23 1608   Incision Cleansed Cleansed with saline 23 1608   Dressing/Treatment Foam 23 1608   Incision Length (cm) 19.5 23 1608   Incision Width (cm) 0 cm 23 1608   Incision Depth (cm) 0 cm 23 1608   Closure Retention sutures; Staples 02/20/23 1608   Margins Approximated 02/20/23 1608   Incision Assessment Other (Comment);Dry 02/20/23 1608   Drainage Amount Small 02/20/23 1608   Drainage Description Yellow 02/20/23 1608   Odor None 02/20/23 1608   Juana-incision Assessment Blanchable erythema 02/20/23 1608   Number of days: 3     abd incision:           Colostomy LLQ (Active)   Stomal Appliance 2 piece 02/21/23 1139   Flange Size (inches) 2.75 Inches 02/21/23 1139   Stoma  Assessment Protrudes; Moist;Red 02/21/23 1139   Peristomal Assessment ANGE 02/21/23 1139   Treatment Other (Comment) 02/21/23 1139   Stool Appearance Loose; Watery 02/21/23 1139   Stool Color Brown 02/21/23 1139   Stool Amount Medium 02/21/23 1139   Output (mL) 200 ml 02/21/23 1139   Number of days: 7      Colostomy          Intake/Output Summary (Last 24 hours) at 2/21/2023 1140  Last data filed at 2/21/2023 1139  Gross per 24 hour   Intake 1024.3 ml   Output 1625 ml   Net -600.7 ml       Plan:   Plan for Ostomy Care:  Patient willing to listen @ teaching today. States : To shaky to participate. Watched CWOCN empty pouch. Was able to verbalize steps in the process. Reviewed with patient samples ordered. Stoma Care - New colostomy - Patient to empty appliance when 1/3 to 1/2 full with assistance of the staff. Cleanse inside and outside of the drain spout prior to rolling closed. Change appliance every 3-5 days or 1-2 times a week. Call for problems with seal [phone]     MARIALUISA updated. Ostomy Plan of Care  [x] Supplies/Instructions left in room bags, wafers, scissors, pattern, deodorant, paste, teaching materials etc.  [] Patient using home supplies  [x] Brand/supplies at bedside Coloplast    Current Diet: ADULT DIET;  Regular  ADULT ORAL NUTRITION SUPPLEMENT; Breakfast, Dinner; Standard High Calorie/High Protein Oral Supplement  Dietician consult:  Yes    Discharge Plan:  Placement for patient upon discharge: skilled nursing   Plan for home with [facility] [place] [facility] when medically ready. Outpatient visit plan No  Supplies given Yes   Samples requested Yes    Call to [facility] [phone] and free samples ordered and sent to patients home:   Convex flange  Lock and roll bag  One piece convex with lock and roll bag    # 54516 [employer] Paste  # 7906 Powder  # 8805 Paste rings  # [id number] Barrier extenders  # 8619 skin prep wipe barrier film  # 7760 Barrier removal wipes  # 43570 Deodorant  #    Belt large / medium  Curved scissors will be provided in packet kit          Call to [address] [place] [address] and Free samples ordered and sent to patients home:   #   Sensura Dylan convex flange  #   Pouch for convex    # 24051 Deodorant packet  # 66808 Bravo paste seal rings  # [id number] for Sensura Dylan  # [id number] Adhesive remover wipes  # [id number] Barrier film wipes  # [id number] Brava Elastic barrier extender strips  # Small scissors will be provided in packet kit           Referrals:  [x]  setting up home care  [] [facility]  [] Supplies  [] Other      Patient/Caregiver Teaching:  Written Instructions given to patient - reviewed how to empty appliance. Samples supplies reviewed.   Teaching provided:  [] Reviewed GI and A&P        [] Supplies  [x] Pouch emptying      [] Manipulate closure  [] Routine Care         [x] Comment Sample supplies  [] Pouch maintenance           Level of patient/caregiver understanding able to:  [] Indicates understanding       [x] Needs reinforcement  [] Unsuccessful      [x] Verbal Understanding  [] Demonstrated understanding       [] No evidence of learning  [] Refused teaching         [] N/A    Electronically signed by Hilario Michaels, RN, MSN, Curt Matt on 2/21/2023 at 11:40 AM

## 2023-02-21 NOTE — PROGRESS NOTES
Pt. Resting in bed. Alert/oriented. Vitals and assessment stable as charted. Afebrile this AM (had tylenol at 0507; apparently febrile overnight). She was 88% on RA at rest; placed back on 2.5L NC and is up to 92-93%. Reminded on use of I\S. Lungs clear\bases diminished. Ostomy functioning with liquid brown 250 ml output now. States she is tolerating eating and drinking. Abd surgical pain 6/10; will administer Elina PO as per prn order. SCDs on. Plan to get OOB to chair today when pain more controlled. Call light in reach. Bed alarm in place. Will continue to monitor. Reviewed incentive spirometry with patient. Education on reason for use. Instructed patient how to use incentive spirometer. Pt demonstrated understanding of use. Encouraged frequent use of I\S while patient is awake.

## 2023-02-21 NOTE — PLAN OF CARE
Problem: ABCDS Injury Assessment  Goal: Absence of physical injury  2/21/2023 1002 by Jose Joe RN  Outcome: Progressing    Problem: Safety - Adult  Goal: Free from fall injury  2/21/2023 1002 by Jose Joe RN  Outcome: Progressing    Patient remains free from falls\injury this shift. Bed alarm in place. Pt calls for assistance with transfer.

## 2023-02-21 NOTE — CARE COORDINATION
Chart reviewed day 12. Care managed per surgery and IM. POD 5/6 status post Rick's procedure with postop bleeding, s/p evacuation of large hemoperitoneum. Moved to C3. Fever 100.7 this am, WBC's 14.5. Wound cultures sent. Continues with IV zosyn. Regular diet with po pain control. Ostomy functional. Plan for home with [facility] [place] [facility] when medically ready. Would benefit from therapy when appropriate.  Freedom Torres, RN

## 2023-02-21 NOTE — PROGRESS NOTES
Hospitalist Progress Note      PCP: Willam Aguillon    Date of Admission: 2/9/2023    Chief Complaint:   Abd pain    Hospital Course: Ms. Bk Yeung is a 63year old female who presented to [facility] with abdominal pain and constipation on 2/9. She had a lower extremity vascular bypass surgery on her right leg a few weeks ago. She reports constipation and abdominal distension following her surgery with ng tube placement as well as a rectal tube. Patient reports her follow-up colonoscopy revealed sigmoid edema with diverticula and an ulcer, she then improved and was discharged. She again developed abdominal pain and distension and has not had a BM since 2/5. She denies trying any stool softeners or motility agents at home. 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 She also has had surgeries for adhesions and ventral hernia repair. She underwent a Haylie's procedure with GS on 2/14/23 with colostomy placement. Rapid response called on 2/16/23 for hypotension (as below). Found to have abdominal/pelvic hemorrhage on CT along with R thigh hematoma noted over previous bypass incision site on US. Patient status post surgery with resection of colon and diverting colostomy on 2/15/2023. Patient developed hypotension on 2/15/2023. Patient had further hypotension on 2/16/2023 and it was noted patient had intra-abdominal bleed with right thigh hematoma. Patient's hemoglobin had dropped to 5. Patient was transfused with 2 units. She appeared to stabilize but then her blood pressure decreased and her heart rate increased. Patient's hemoglobin dropped back down to 5 after being up to 6.9. It was decided to take patient back to surgery. During surgery patient was noted to have old blood in her abdomen no active bleed was noted but 3 L of old blood removed. Subjective:   Patient reports feeling the same.   Denies any new complaints. Reports that she was evaluated by general surgery at bedside and expressed a lot of purulent material from her surgical wound site and sent for cultures. Discussed with Dr. Stevie Palacio (surgery) regarding care plan . No shortness of breath. Abdominal pain has improved. Patient tolerating clear liquids. No emesis. Fever reported overnight per RN with Tmax 102 F . Follow wound and Blood C/s .      Medications:  Reviewed    Infusion Medications    sodium chloride      dextrose      sodium chloride Stopped (02/12/23 0898)     Scheduled Medications    melatonin  5 mg Oral Nightly    famotidine (PEPCID) injection  20 mg IntraVENous BID    insulin lispro  0-8 Units SubCUTAneous Q4H    docusate sodium  100 mg Oral Daily    piperacillin-tazobactam  3,375 mg IntraVENous Q8H    sodium chloride flush  5-40 mL IntraVENous 2 times per day    allopurinol  100 mg Oral Daily    atorvastatin  40 mg Oral Daily    [Held by provider] aspirin  81 mg Oral Daily    gabapentin  300 mg Oral TID    levothyroxine  200 mcg Oral Daily    propranolol  40 mg Oral BID    [Held by provider] enoxaparin  1 mg/kg SubCUTAneous BID     PRN Meds: oxyCODONE **OR** oxyCODONE, HYDROmorphone, sodium chloride, glucose, dextrose bolus **OR** dextrose bolus, glucagon (rDNA), dextrose, benzocaine, sodium chloride flush, sodium chloride, ondansetron **OR** ondansetron, acetaminophen **OR** acetaminophen, potassium chloride **OR** potassium alternative oral replacement **OR** [DISCONTINUED] potassium chloride, magnesium sulfate, sodium phosphate IVPB **OR** sodium phosphate IVPB **OR** sodium phosphate IVPB      Intake/Output Summary (Last 24 hours) at 2/21/2023 1655  Last data filed at 2/21/2023 1553  Gross per 24 hour   Intake 1575.56 ml   Output 1825 ml   Net -249.44 ml       Physical Exam Performed:  BP (!) 90/56   Pulse 82   Temp 99.1 °F (37.3 °C) (Oral)   Resp 20   Ht 5' 5\" (1.651 m)   Wt 243 lb 13.3 oz (110.6 kg)   LMP  (LMP Unknown) Comment: HYSTERECTOMY  SpO2 90%   BMI 40.58 kg/m²     General appearance: No apparent distress, appears stated age and cooperative. HEENT: Pupils equal, round, and reactive to light. Conjunctivae/corneas clear. Neck: Supple, with full range of motion. No jugular venous distention. Trachea midline. Respiratory:  Normal respiratory effort. Clear to auscultation, bilaterally without Rales/Wheezes/Rhonchi. Cardiovascular: Regular rate and rhythm with normal S1/S2 without murmurs, rubs or gallops. Abdomen: Tender. Midline incision dressed. Positive bowel sounds noted. Ostomy left lower quadrant  Musculoskeletal: Hematoma noted on right medial thigh improved. Skin: Skin color, texture, turgor normal.  No rashes or lesions. Neurologic:  Neurovascularly intact without any focal sensory/motor deficits.  Cranial nerves: II-XII intact, grossly non-focal.  Psychiatric: Alert and oriented, thought content appropriate, normal insight  Capillary Refill: Brisk, 3 seconds, normal   Peripheral Pulses: +2 palpable, equal bilaterally     Labs:   Recent Labs     02/19/23  1630 02/20/23  0500 02/20/23  1640   WBC  --   --  14.5*   HGB 7.6* 7.4* 7.1*   HCT 24.1* 22.1* 21.3*   PLT  --   --  309       Recent Labs     02/19/23  0440 02/20/23  0500 02/21/23  0449    134* 133*   K 3.4* 3.6 3.7    104 102   CO2 23 22 20*   BUN 8 8 15   CREATININE 0.6 0.7 1.9*   CALCIUM 7.0* 7.1* 6.6*   PHOS 1.9* 3.2 3.7       Recent Labs     02/19/23  0440 02/20/23  0500 02/21/23  0449   AST 21 15 17   ALT 14 14 12   BILITOT 0.5 0.5 0.7   ALKPHOS 88 98 113       Urinalysis:    Lab Results   Component Value Date/Time    NITRU Negative 02/19/2023 09:00 PM    WBCUA 3-5 02/19/2023 09:00 PM    RBCUA 3-4 02/19/2023 09:00 PM    BLOODU SMALL 02/19/2023 09:00 PM    SPECGRAV 1.020 02/19/2023 09:00 PM    GLUCOSEU Negative 02/19/2023 09:00 PM       Radiology:  VL DUP LOWER EXTREMITY ARTERIES RIGHT   Final Result      CT ABDOMEN PELVIS WO CONTRAST Additional Contrast? None   Final Result   Large amount of complex fluid within the abdomen and pelvis, most notably   within the pelvis where hematocrit levels are seen, compatible with   hemorrhage. Smaller 4.9 cm collection within subcutaneous fat adjacent to the left lower   quadrant stoma, compatible with hematoma. Amorphous inflammation or   hemorrhage is seen within the soft tissues immediately adjacent. Mural thickening of the ascending colon and transverse colon, which can   reflect colitis in the appropriate clinical setting. Mild left pelviectasis. Pneumoperitoneum, in keeping with the recent postoperative state. Bibasilar atelectasis. 1 cm right lower lobe pulmonary nodule, for which follow-up recommendations   are as below. RECOMMENDATIONS:   Fleischner Society guidelines for follow-up and management of incidentally   detected pulmonary nodules:      Nodule size greater than 8 mm         In a low-risk patient, consider CT at 3 months, PET/CT, or tissue sampling. In a high-risk patient, consider CT at 3 months, PET/CT, or tissue sampling.      - Low risk patients include individuals with minimal or absent history of   smoking and other known risk factors. - High risk patients include individuals with a history or smoking or known   risk factors. Radiology 2017 http://pubs. rsna.org/doi/full/10.1148/radiol. 9673591266         IR PICC WO SQ PORT/PUMP > 5 YEARS   Final Result      CT ABDOMEN PELVIS WO CONTRAST Additional Contrast? None   Final Result   1. Barium contrast from prior small-bowel follow-through is seen throughout   the colon and limits evaluation due to streak artifact. No definite abscess   is identified. 2.  Infrarenal abdominal aortic aneurysm measures up to 3.0 cm. See   recommendations for follow-up below. 3.  There is a 1.1 cm right lower lobe pulmonary nodule and a 0.3 cm left   lower lobe pulmonary nodule. Recommend dedicated CT chest for further   evaluation. RECOMMENDATIONS:      Multiple. Worst: 11 mm solid      Pathology: Multiple pulmonary nodules. Most severe: 11 mm solid pulmonary   nodule detected on incomplete chest CT. Recommend prompt non-contrast Chest CT for further evaluation. These guidelines do not apply to immunocompromised patients and patients with   cancer. Follow up in patients with significant comorbidities as clinically   warranted. For lung cancer screening, adhere to Lung-RADS guidelines. Reference: Radiology. 2017; 284(1):228-43         3 cm AAA      Pathology: 3 cm infrarenal abdominal aortic aneurysm. Recommend follow-up every 3 years. Reference: J Am [place] Radiol 2830;34:849-054. XR ABDOMEN (KUB) (SINGLE AP VIEW)   Final Result   Cancellation of scheduled hypaque enema due to presence of barium throughout   the colon including the rectosigmoid region as described above. FL SMALL BOWEL FOLLOW THROUGH ONLY   Final Result   Mildly dilated small bowel, though small-bowel transit time is upper limits   of normal.         CT ABDOMEN PELVIS W IV CONTRAST Additional Contrast? None   Final Result   Diverticulitis of the descending sigmoid junction. Mild wall thickening but   negative for obstruction.              IP CONSULT TO GENERAL SURGERY  IP CONSULT TO GI  IP CONSULT TO PHARMACY  IP CONSULT TO NEPHROLOGY  IP CONSULT TO DIETITIAN  PHARMACY TO DOSE VANCOMYCIN    Assessment/Plan:  Active Hospital Problems    Diagnosis     Sigmoid stricture ([address] [K56.699]      Priority: Medium    Hemorrhagic shock ([address] [R57.8]      Priority: Medium    Acute blood loss anemia [D62]      Priority: Medium    Acute renal failure (ARF) ([address] [N17.9]      Priority: Medium    Diverticulitis [K57.92]      Priority: Medium    Colonic obstruction ([address] [K56.609]      Priority: Medium    Bowel obstruction ([address] [K56.609]      Priority: Medium     Diverticulitis with obstruction due to sigmoid stricture . Patient on IV Zosyn. Status post resection with diverting colostomy (Rick's procedure)  2/14/22 . General surgery is following. Good ostomy output. Noted general surgery expressed pus out of her wound site on 2/21/2023 and sent for cultures. Continue current antibiotics will monitor. Peripheral artery disease. Patient status post bypass of right lower extremity. Patient was on therapeutic Lovenox. Patient developed right leg hematoma and intra-abdominal bleed. Lovenox and aspirin held. Discussed with vascular surgery. Patient with synthetic graft and may require long-term anticoagulation. He suggested Xarelto 2.5 mg twice daily once it safe to anticoagulate. Not yet started . Right leg hematoma improved. TARSHA. This is secondary to hypotension and hypovolemia. Nephrology consulted - following and asssited with Mx.  continue to monitor closely. Avoid nephrotoxic agents including Toradol. Creatinine normalized but again started worsening up to 1.9 this morning. DC vancomycin. Monitor urine output. Hypokalemia. Supplemented . DVT Prophylaxis: On hold due to bleed ; Will start back on Xarelto 2.5 mg BID if ok with gen surg   Diet: ADULT DIET;  Regular  ADULT ORAL NUTRITION SUPPLEMENT; Breakfast, Dinner; Standard High Calorie/High Protein Oral Supplement  Code Status: Full Code  PT/OT Eval Status: Pending    Dispo -Home versus SNF when stable    Appropriate for A1 Discharge Unit: Wendy Dominguez MD

## 2023-02-21 NOTE — PROGRESS NOTES
Consult Call Back    Who: Dr. Jhon Bourne (rounding doctor)   Date:2/21/2023,  Time:2:19 PM    Electronically signed by Alexey Adam on 2/21/23 at 2:19 PM EST

## 2023-02-22 LAB
A/G RATIO: 0.7 (ref 1.1–2.2)
ALBUMIN SERPL-MCNC: 1.9 G/DL (ref 3.4–5)
ALBUMIN SERPL-MCNC: 2.2 G/DL (ref 3.4–5)
ALP BLD-CCNC: 132 U/L (ref 40–129)
ALT SERPL-CCNC: 12 U/L (ref 10–40)
AMORPHOUS: ABNORMAL /HPF
ANION GAP SERPL CALCULATED.3IONS-SCNC: 11 MMOL/L (ref 3–16)
ANION GAP SERPL CALCULATED.3IONS-SCNC: 12 MMOL/L (ref 3–16)
APTT: 39.2 SEC (ref 23–34.3)
AST SERPL-CCNC: 20 U/L (ref 15–37)
BANDED NEUTROPHILS RELATIVE PERCENT: 8 % (ref 0–7)
BASOPHILS ABSOLUTE: 0 K/UL (ref 0–0.2)
BASOPHILS RELATIVE PERCENT: 0 %
BILIRUB SERPL-MCNC: 0.9 MG/DL (ref 0–1)
BILIRUBIN URINE: NEGATIVE
BLOOD, URINE: ABNORMAL
BUN BLDV-MCNC: 26 MG/DL (ref 7–20)
BUN BLDV-MCNC: 29 MG/DL (ref 7–20)
CALCIUM SERPL-MCNC: 7.2 MG/DL (ref 8.3–10.6)
CALCIUM SERPL-MCNC: 7.7 MG/DL (ref 8.3–10.6)
CHLORIDE BLD-SCNC: 100 MMOL/L (ref 99–110)
CHLORIDE BLD-SCNC: 101 MMOL/L (ref 99–110)
CLARITY: ABNORMAL
CO2: 18 MMOL/L (ref 21–32)
CO2: 18 MMOL/L (ref 21–32)
COLOR: YELLOW
CREAT SERPL-MCNC: 3 MG/DL (ref 0.6–1.2)
CREAT SERPL-MCNC: 3.3 MG/DL (ref 0.6–1.2)
CREATININE URINE: 89.8 MG/DL (ref 28–259)
EOSINOPHILS ABSOLUTE: 0.2 K/UL (ref 0–0.6)
EOSINOPHILS RELATIVE PERCENT: 1 %
EPITHELIAL CELLS, UA: ABNORMAL /HPF (ref 0–5)
GFR SERPL CREATININE-BSD FRML MDRD: 15 ML/MIN/{1.73_M2}
GFR SERPL CREATININE-BSD FRML MDRD: 17 ML/MIN/{1.73_M2}
GLUCOSE BLD-MCNC: 108 MG/DL (ref 70–99)
GLUCOSE BLD-MCNC: 110 MG/DL (ref 70–99)
GLUCOSE BLD-MCNC: 111 MG/DL (ref 70–99)
GLUCOSE BLD-MCNC: 117 MG/DL (ref 70–99)
GLUCOSE BLD-MCNC: 118 MG/DL (ref 70–99)
GLUCOSE URINE: NEGATIVE MG/DL
HCT VFR BLD CALC: 21.8 % (ref 36–48)
HEMOGLOBIN: 7.1 G/DL (ref 12–16)
INR BLD: 1.39 (ref 0.87–1.14)
KETONES, URINE: NEGATIVE MG/DL
LACTIC ACID: 0.8 MMOL/L (ref 0.4–2)
LEUKOCYTE ESTERASE, URINE: NEGATIVE
LYMPHOCYTES ABSOLUTE: 0.9 K/UL (ref 1–5.1)
LYMPHOCYTES RELATIVE PERCENT: 6 %
MCH RBC QN AUTO: 28.3 PG (ref 26–34)
MCHC RBC AUTO-ENTMCNC: 32.6 G/DL (ref 31–36)
MCV RBC AUTO: 86.9 FL (ref 80–100)
METAMYELOCYTES RELATIVE PERCENT: 3 %
MICROSCOPIC EXAMINATION: YES
MONOCYTES ABSOLUTE: 0.6 K/UL (ref 0–1.3)
MONOCYTES RELATIVE PERCENT: 4 %
MYELOCYTE PERCENT: 1 %
NEUTROPHILS ABSOLUTE: 14.1 K/UL (ref 1.7–7.7)
NEUTROPHILS RELATIVE PERCENT: 77 %
NITRITE, URINE: NEGATIVE
PDW BLD-RTO: 16.3 % (ref 12.4–15.4)
PERFORMED ON: ABNORMAL
PH UA: 5.5 (ref 5–8)
PHOSPHORUS: 4.9 MG/DL (ref 2.5–4.9)
PLATELET # BLD: 482 K/UL (ref 135–450)
PMV BLD AUTO: 6.9 FL (ref 5–10.5)
POTASSIUM REFLEX MAGNESIUM: 3.7 MMOL/L (ref 3.5–5.1)
POTASSIUM SERPL-SCNC: 3.8 MMOL/L (ref 3.5–5.1)
PROTEIN UA: 30 MG/DL
PROTHROMBIN TIME: 17 SEC (ref 11.7–14.5)
RBC # BLD: 2.5 M/UL (ref 4–5.2)
RBC UA: ABNORMAL /HPF (ref 0–4)
SODIUM BLD-SCNC: 130 MMOL/L (ref 136–145)
SODIUM BLD-SCNC: 130 MMOL/L (ref 136–145)
SODIUM URINE: <20 MMOL/L
SPECIFIC GRAVITY UA: 1.01 (ref 1–1.03)
TOTAL PROTEIN: 4.7 G/DL (ref 6.4–8.2)
URINE TYPE: ABNORMAL
UROBILINOGEN, URINE: 0.2 E.U./DL
WBC # BLD: 15.8 K/UL (ref 4–11)
WBC UA: ABNORMAL /HPF (ref 0–5)

## 2023-02-22 PROCEDURE — 97116 GAIT TRAINING THERAPY: CPT

## 2023-02-22 PROCEDURE — 6360000002 HC RX W HCPCS: Performed by: SURGERY

## 2023-02-22 PROCEDURE — 81001 URINALYSIS AUTO W/SCOPE: CPT

## 2023-02-22 PROCEDURE — 2500000003 HC RX 250 WO HCPCS: Performed by: SURGERY

## 2023-02-22 PROCEDURE — 2580000003 HC RX 258: Performed by: SURGERY

## 2023-02-22 PROCEDURE — 2580000003 HC RX 258: Performed by: INTERNAL MEDICINE

## 2023-02-22 PROCEDURE — 6370000000 HC RX 637 (ALT 250 FOR IP): Performed by: INTERNAL MEDICINE

## 2023-02-22 PROCEDURE — 84300 ASSAY OF URINE SODIUM: CPT

## 2023-02-22 PROCEDURE — 2500000003 HC RX 250 WO HCPCS: Performed by: INTERNAL MEDICINE

## 2023-02-22 PROCEDURE — 6360000002 HC RX W HCPCS: Performed by: INTERNAL MEDICINE

## 2023-02-22 PROCEDURE — 85610 PROTHROMBIN TIME: CPT

## 2023-02-22 PROCEDURE — 1200000000 HC SEMI PRIVATE

## 2023-02-22 PROCEDURE — 97168 OT RE-EVAL EST PLAN CARE: CPT

## 2023-02-22 PROCEDURE — 85025 COMPLETE CBC W/AUTO DIFF WBC: CPT

## 2023-02-22 PROCEDURE — 6360000002 HC RX W HCPCS: Performed by: NURSE PRACTITIONER

## 2023-02-22 PROCEDURE — 80053 COMPREHEN METABOLIC PANEL: CPT

## 2023-02-22 PROCEDURE — 6370000000 HC RX 637 (ALT 250 FOR IP): Performed by: SURGERY

## 2023-02-22 PROCEDURE — 97164 PT RE-EVAL EST PLAN CARE: CPT

## 2023-02-22 PROCEDURE — 83605 ASSAY OF LACTIC ACID: CPT

## 2023-02-22 PROCEDURE — 36592 COLLECT BLOOD FROM PICC: CPT

## 2023-02-22 PROCEDURE — 2700000000 HC OXYGEN THERAPY PER DAY

## 2023-02-22 PROCEDURE — 97530 THERAPEUTIC ACTIVITIES: CPT

## 2023-02-22 PROCEDURE — 99024 POSTOP FOLLOW-UP VISIT: CPT | Performed by: SURGERY

## 2023-02-22 PROCEDURE — 94761 N-INVAS EAR/PLS OXIMETRY MLT: CPT

## 2023-02-22 PROCEDURE — 85730 THROMBOPLASTIN TIME PARTIAL: CPT

## 2023-02-22 PROCEDURE — 82570 ASSAY OF URINE CREATININE: CPT

## 2023-02-22 RX ORDER — 0.9 % SODIUM CHLORIDE 0.9 %
500 INTRAVENOUS SOLUTION INTRAVENOUS ONCE
Status: COMPLETED | OUTPATIENT
Start: 2023-02-22 | End: 2023-02-22

## 2023-02-22 RX ORDER — PROCHLORPERAZINE EDISYLATE 5 MG/ML
10 INJECTION INTRAMUSCULAR; INTRAVENOUS ONCE
Status: COMPLETED | OUTPATIENT
Start: 2023-02-22 | End: 2023-02-22

## 2023-02-22 RX ORDER — CIPROFLOXACIN 2 MG/ML
400 INJECTION, SOLUTION INTRAVENOUS EVERY 24 HOURS
Status: DISCONTINUED | OUTPATIENT
Start: 2023-02-22 | End: 2023-03-02

## 2023-02-22 RX ORDER — METRONIDAZOLE 500 MG/100ML
500 INJECTION, SOLUTION INTRAVENOUS EVERY 8 HOURS
Status: DISCONTINUED | OUTPATIENT
Start: 2023-02-22 | End: 2023-03-02

## 2023-02-22 RX ADMIN — SODIUM CHLORIDE 500 ML: 9 INJECTION, SOLUTION INTRAVENOUS at 12:35

## 2023-02-22 RX ADMIN — SODIUM CHLORIDE: 9 INJECTION, SOLUTION INTRAVENOUS at 21:37

## 2023-02-22 RX ADMIN — SODIUM CHLORIDE: 9 INJECTION, SOLUTION INTRAVENOUS at 00:06

## 2023-02-22 RX ADMIN — OXYCODONE HYDROCHLORIDE 10 MG: 5 TABLET ORAL at 18:57

## 2023-02-22 RX ADMIN — PROCHLORPERAZINE EDISYLATE 10 MG: 5 INJECTION INTRAMUSCULAR; INTRAVENOUS at 21:32

## 2023-02-22 RX ADMIN — ALLOPURINOL 100 MG: 100 TABLET ORAL at 08:24

## 2023-02-22 RX ADMIN — LEVOTHYROXINE SODIUM 200 MCG: 0.1 TABLET ORAL at 05:35

## 2023-02-22 RX ADMIN — SODIUM CHLORIDE: 9 INJECTION, SOLUTION INTRAVENOUS at 06:39

## 2023-02-22 RX ADMIN — SODIUM CHLORIDE, PRESERVATIVE FREE 10 ML: 5 INJECTION INTRAVENOUS at 08:24

## 2023-02-22 RX ADMIN — OXYCODONE HYDROCHLORIDE 10 MG: 5 TABLET ORAL at 15:13

## 2023-02-22 RX ADMIN — SODIUM CHLORIDE, PRESERVATIVE FREE 10 ML: 5 INJECTION INTRAVENOUS at 21:32

## 2023-02-22 RX ADMIN — ONDANSETRON 4 MG: 2 INJECTION INTRAMUSCULAR; INTRAVENOUS at 05:35

## 2023-02-22 RX ADMIN — METRONIDAZOLE 500 MG: 500 INJECTION, SOLUTION INTRAVENOUS at 17:30

## 2023-02-22 RX ADMIN — FAMOTIDINE 20 MG: 10 INJECTION, SOLUTION INTRAVENOUS at 21:32

## 2023-02-22 RX ADMIN — FAMOTIDINE 20 MG: 10 INJECTION, SOLUTION INTRAVENOUS at 08:24

## 2023-02-22 RX ADMIN — GABAPENTIN 300 MG: 300 CAPSULE ORAL at 08:24

## 2023-02-22 RX ADMIN — OXYCODONE HYDROCHLORIDE 10 MG: 5 TABLET ORAL at 05:35

## 2023-02-22 RX ADMIN — PIPERACILLIN AND TAZOBACTAM 3375 MG: 3; .375 INJECTION, POWDER, FOR SOLUTION INTRAVENOUS at 01:53

## 2023-02-22 RX ADMIN — OXYCODONE HYDROCHLORIDE 10 MG: 5 TABLET ORAL at 10:53

## 2023-02-22 RX ADMIN — CIPROFLOXACIN 400 MG: 2 INJECTION, SOLUTION INTRAVENOUS at 09:34

## 2023-02-22 RX ADMIN — Medication 5 MG: at 21:32

## 2023-02-22 RX ADMIN — ONDANSETRON 4 MG: 4 TABLET, ORALLY DISINTEGRATING ORAL at 18:58

## 2023-02-22 RX ADMIN — ALTEPLASE 1 MG: 2.2 INJECTION, POWDER, LYOPHILIZED, FOR SOLUTION INTRAVENOUS at 09:33

## 2023-02-22 RX ADMIN — METRONIDAZOLE 500 MG: 500 INJECTION, SOLUTION INTRAVENOUS at 10:53

## 2023-02-22 RX ADMIN — ATORVASTATIN CALCIUM 40 MG: 40 TABLET, FILM COATED ORAL at 08:24

## 2023-02-22 ASSESSMENT — PAIN SCALES - GENERAL
PAINLEVEL_OUTOF10: 4
PAINLEVEL_OUTOF10: 9
PAINLEVEL_OUTOF10: 8
PAINLEVEL_OUTOF10: 8
PAINLEVEL_OUTOF10: 7
PAINLEVEL_OUTOF10: 7
PAINLEVEL_OUTOF10: 5
PAINLEVEL_OUTOF10: 5

## 2023-02-22 ASSESSMENT — PAIN DESCRIPTION - LOCATION
LOCATION: ABDOMEN

## 2023-02-22 ASSESSMENT — PAIN DESCRIPTION - ORIENTATION: ORIENTATION: MID;LEFT;RIGHT

## 2023-02-22 ASSESSMENT — PAIN DESCRIPTION - DESCRIPTORS: DESCRIPTORS: ACHING;STABBING

## 2023-02-22 NOTE — PLAN OF CARE
Problem: Safety - Adult  Goal: Free from fall injury  Outcome: Progressing  Flowsheets (Taken 2/22/2023 0101)  Free From Fall Injury: Instruct family/caregiver on patient safety  Note: Patietn is drowsy, standby-assist

## 2023-02-22 NOTE — PROGRESS NOTES
Kalyani served Dr. Evaristo Thrasher regarding patient's episodes of low blood pressure. He called the unit to order fluids. He advised to wait for the hospitalist's response for further management. Dr. Waleska Nicole have read the perfect serve but no order was placed.

## 2023-02-22 NOTE — PROGRESS NOTES
[facility] Nurse  Follow-up Progress Note       NAME:  Jaylin Carey RECORD NUMBER:  [id number]  AGE:  63 y.o. GENDER:  female  :  1960  TODAY'S DATE:  2023    Subjective:   Wound Identification:  Wound Type: mid line surgical abd staple line with distal edged open 1 x 0.3 x 6.5 cm. Contributing Factors:  Chronic pressure, decreased mobility, shear force, obesity    New Colostomy: Went to OR 23 for bowel resection, sigmoid colectomy and creation of colostomy by Dr Edson Sherman. Returned to OR on 23 with Dr Antoinette Nunn for Hemoperitoneum. Exploratory lap with evacuation of hemoperitoneum (3 Liters). Stoma size:  2 inch = 50 mm round protrudes. Appliance size:  Sensura Augusta YELLOW convex flange # [id number] with Drainable bag # [id number]. Add ostomy belt to assist with securing of ostomy seal.              Patient Goal of Care:  [x] Wound Healing  [] Odor Control   [] Palliative Care  [] Pain Control   [x] Other: New colostomy    Objective:  Sitting up in chair. Up to bed side commode to void then returned to bed. Shaky on feet. When up to bathroom, used walker and one RN standby assist. Voided then colostomy appliance emptied. Colostomy appliance remains intact with secure seal.  López out. BUN 29 & Cret 3.3.    /70   Pulse 97   Temp 99.1 °F (37.3 °C) (Oral)   Resp 18   Ht 5' 5\" (1.651 m)   Wt 243 lb 13.3 oz (110.6 kg)   LMP  (LMP Unknown) Comment: HYSTERECTOMY  SpO2 94%   BMI 40.58 kg/m²   Brendan Risk Score: Brendan Scale Score: 20  Assessment:  Colotomy drainage brown liquid. Mid line abd staple line intact except distal edge which is not open and draining. Wound culture has been sent.      Measurements:     Incision 23 Abdomen Anterior;Medial (Active)   Wound Image    23 1446   Dressing Status New dressing applied 23 1446   Dressing Change Due 23 1446   Incision Cleansed Cleansed with saline 02/22/23 1446   Dressing/Treatment Alginate with Ag;Dry dressing;Tape/Soft cloth adhesive tape 02/22/23 1446   Incision Length (cm) 19.5 02/22/23 1446   Incision Width (cm) 0.3 cm 02/22/23 1446   Incision Depth (cm) 6.5 cm 02/22/23 1446   Closure Staples; Retention sutures 02/22/23 1446   Margins Other (Comment) 02/22/23 1446   Incision Assessment Other (Comment) 02/22/23 1446   Drainage Amount Moderate 02/22/23 1446   Drainage Description Serosanguinous 02/22/23 1446   Odor None 02/22/23 1446   Juana-incision Assessment Dry/flaky; Intact 02/22/23 1446   Number of days: 5     Mid line abd staple line:    Distal edge open now        Colostomy LLQ (Active)   Stomal Appliance 1 piece 02/22/23 1446   Flange Size (inches) 2.75 Inches 02/22/23 1446   Stoma  Assessment Protrudes; Moist;Red;Swelling 02/22/23 1446   Peristomal Assessment ANGE 02/22/23 1446   Treatment Other (Comment) 02/21/23 2100   Stool Appearance Loose; Watery 02/22/23 1446   Stool Color Brown 02/22/23 1446   Stool Amount Small 02/22/23 1446   Output (mL) 100 ml 02/22/23 1446   Number of days: 8     Response to treatment:  With complaints of pain. Pain Assessment:  Severity:  5 / 10  Quality of pain: sharp  Wound Pain Timing/Severity: intermittent  Premedicated: Yes  Plan:   Plan of wound Care:      Spoke to A Huy DENSON CNP regarding distal edge of open wound bed. Reports aware and cultures sent. MD does not want negative pressure wound therapy at this time, continue to pack with alginate Ag, dry dressing and Medipore tape daily    Recommend:  Clean mid line abd staple line and distal open edge with normal saline. Lightly pack alginateAg into distal edge (6.5 cm deep), cover with dry dressing and Medipore tape daily. Wound care to continue to follow. Monitor for drainage, order, edema or signs and symptoms of infection. Call wound care for deterioration [phone] or call [phone] and leave message.       Plan for Ostomy Care:  Current appliance intact. Will leave on one more day and change tomorrow. Daughter here and OK with that as well. Daughter report the catalog was taken home but it was marked with the other supplies she was using prior to the change. Explained to Daughter that will write the catalog numbers on her chart so that she will have the correct numbers for ordering at discharge. Patient willing to listen @ teaching today. Up to bathroom to empty colostomy bag. With assistance emptied bag into graduate cylinder. Currently the bag is facing to the left. Tomorrow when changing the appliance, will face bag to the 600 pm position so she can start emptying the appliance in the toilet. Stoma Care - New colostomy - Patient to empty appliance when 1/3 to 1/2 full with assistance of the staff. Cleanse inside and outside of the drain spout prior to rolling closed. Change appliance every 3-5 days or 1-2 times a week. Call for problems with seal [phone]      MARIALUISA updated. Ostomy Plan of Care  [x] Supplies/Instructions left in room bags, wafers, scissors, pattern, deodorant, paste, teaching materials etc.  [] Patient using home supplies  [x] Brand/supplies at bedside Coloplast  Specialty Bed Required : Yes Isoflex gel therapy pressure redistribution mattress in place. [] Low Air Loss   [x] Pressure Redistribution  [] Fluid Immersion  [] Bariatric  [] Total Pressure Relief  [] Other:     Current Diet: ADULT DIET; Regular  ADULT ORAL NUTRITION SUPPLEMENT; Breakfast, Dinner; Standard High Calorie/High Protein Oral Supplement  Dietician consult:  Yes    Discharge Plan:  Placement for patient upon discharge: skilled nursing   Patient appropriate for Outpatient [address]: Yes    Referrals:  []  following  [] [facility] [employer] [employer] [facility]  [] Supplies  [] Other    Patient/Caregiver Teaching: instructed raji and patient @ dressing change.   Level of patient/caregiver understanding able to:   [] Indicates understanding       [x] Needs reinforcement  [] Unsuccessful      [x] Verbal Understanding  [] Demonstrated understanding       [] No evidence of learning  [] Refused teaching         [] N/A       Electronically signed by Ryan Lovett, RN, MSN, Jairo Vinson on 2/22/2023 at 2:52 PM

## 2023-02-22 NOTE — PROGRESS NOTES
[facility] Progress Note           Subjective/   63y.o. year old female who we are seeing in consultation for TARSHA. HPI:  Renal function worsening, non-oliguric. Intake improving. ROS:  +weak, denies any shortness of breath, c/o abdominal pain. Objective/   GEN:  Chronically ill, /71   Pulse 92   Temp 98.3 °F (36.8 °C) (Oral)   Resp 20   Ht 5' 5\" (1.651 m)   Wt 243 lb 13.3 oz (110.6 kg)   LMP  (LMP Unknown) Comment: HYSTERECTOMY  SpO2 92%   BMI 40.58 kg/m²   HEENT: non-icteric, no JVD  CV: S1, S2 without m/r/g; + LE edema  RESP: CTA B without w/r/r; breathing wnl  ABD: +bs, soft, tender with palpation, no hsm  SKIN: warm, no rashes    Data/  Recent Labs     02/19/23  1630 02/20/23  0500 02/20/23  1640   WBC  --   --  14.5*   HGB 7.6* 7.4* 7.1*   HCT 24.1* 22.1* 21.3*   MCV  --   --  86.2   PLT  --   --  309       Recent Labs     02/20/23  0500 02/21/23  0449 02/22/23  0550   * 133* 130*   K 3.6 3.7 3.7    102 100   CO2 22 20* 18*   GLUCOSE 112* 129* 111*   PHOS 3.2 3.7  --    BUN 8 15 26*   CREATININE 0.7 1.9* 3.0*   LABGLOM >60 30* 17*     UA 1+ protein, trace blood, s.g. 1.015  Urine sodium <20    Assessment/     -TARSHA, pre-renal to early ATN in the setting of hypotension & vancomycin/zosyn exposure    -Anemia, acute blood loss   s/p 2 PRBCs 2/15-2/16    -Hemorrhagic shock , resolved    -Metabolic acidosis    -Right leg hematoma    -Large bowel obstruction s/p pati's  & colostomy on 2/14/23     -Right above knee to below knee popliteal artery bypass on 1/16/23      Plan/     - Resumed IVF's with  ml/hour  - Agree with change in antibiotics to prevent nephrotoxicity  - Monitoring for dialysis needs, discussed possibility with patient  - Recheck labs in PM, check lactate  - Trend bp's & urine output    Case d/w Admitting team  ____________________________________  Ion Farr MD  [facility]  www[facility]  Office: [phone]

## 2023-02-22 NOTE — PROGRESS NOTES
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 Assessment   Assessment: Pt seen for re-evaluation this date, Pt is 62 yo F presenting with colonic obstruction and extended hospital stay due to multiple complications. PTA pt lives with family in single story house with 1 MARY JO. Pt was independent with transfers and ambulation with no AD. Pt currently requires min A for bed mobility and sit<>stand. Able to ambulate in shabazz with RW and CGA, gait is very slow and antalgic, requires cues for walker managment and posture. Pt will benefit from skilled Pt services to address current deficts. It is anticipated pt jen be safe to DC home with 24/7 assist and HHPT when deemed medically appropriate. Co-tx collaboration this date to safely meet goals and will have better occupational performance outcomes with in a co-treatment than 1:1 session. Activity Tolerance: Patient tolerated evaluation without incident;Patient tolerated treatment well;Patient limited by pain; Patient limited by fatigue  Equipment Needed: No     Plan    Physcial Therapy Plan  General Plan: 3-5 times per week  Current Treatment Recommendations: Strengthening;ROM;Balance training;Gait training;Home exercise program;Safety education & training;Stair training;Functional mobility training;Neuromuscular re-education;Transfer training; Therapeutic activities; Patient/Caregiver education & training; Endurance training;Pain management;Positioning     Restrictions  Restrictions/Precautions  Restrictions/Precautions: General Precautions, Fall Risk  Position Activity Restriction  Other position/activity restrictions: IV, tele     Subjective    Subjective  Subjective: Pt in bed upon arrival, RN cleared for PT/OT re-evaluation. Pt pleasant and agreeable to therapy  Pain: Reports 6/10 pain in back and abdomen  Orientation  Overall Orientation Status: Within Functional Limits  Orientation Level: Oriented X4  Cognition  Overall Cognitive Status: WFL     Objective   Vitals  Heart Rate: 97  BP: 109/70  BP Location: Right upper arm  MAP (Calculated): 83  SpO2: 94 %  O2 Device: None (Room air)  Bed Mobility Training  Bed Mobility Training: Yes  Overall Level of Assistance: Minimum assistance;Assist X1;Adaptive equipment; Additional time (HOB raised, bed rails)  Supine to Sit: Minimum assistance;Assist X1;Adaptive equipment; Additional time (HOB raised, bed rails, to R side)  Scooting: Stand-by assistance (seated EOB and back in chair)  Balance  Sitting: Intact (seated EOB)  Standing: With support (RW and CGA)  Transfer Training  Transfer Training: Yes  Overall Level of Assistance: Minimum assistance;Assist X1;Adaptive equipment; Additional time (RW)  Interventions: Weight shifting training/pressure relief; Safety awareness training;Verbal cues  Sit to Stand: Minimum assistance;Assist X1;Adaptive equipment; Additional time (from EOB to RW)  Stand to Sit: Minimum assistance;Assist X1;Adaptive equipment; Additional time (from RW to recliner with arm rests)  Gait Training  Gait Training: Yes  Gait  Overall Level of Assistance: Contact-guard assistance; Adaptive equipment; Additional time (RW ambulate in room and in shabazz. CGA to faciliate balance and improved posture)  Interventions: Safety awareness training;Weight shifting training/pressure relief;Verbal cues (Cues for walker managment)  Step Length: Left shortened;Right shortened  Gait Abnormalities: Decreased step clearance;Shuffling gait  Distance (ft): 100 Feet  Assistive Device: Gait belt;Walker, rolling           Safety Devices  Type of Devices:  All fall risk precautions in place;Call light within reach; Chair alarm in place; All giovanni prominences offloaded;Gait belt; Heels elevated for pressure relief;Patient at risk for falls; Left in chair;Nurse notified  Restraints  Restraints Initially in Place: No     [facility] 6 Clicks Inpatient Mobility:  AM-PAC Basic Mobility - Inpatient   How much help is needed turning from your back to your side while in a flat bed without using bedrails?: A Little  How much help is needed moving from lying on your back to sitting on the side of a flat bed without using bedrails?: A Little  How much help is needed moving to and from a bed to a chair?: A Little  How much help is needed standing up from a chair using your arms?: A Little  How much help is needed walking in hospital room?: A Little  How much help is needed climbing 3-5 steps with a railing?: A Little  AM-[facility] Inpatient Mobility Raw Score : 18  AM-PAC Inpatient T-Scale Score : 43.63  Mobility Inpatient CMS 0-100% Score: 46.58  Mobility Inpatient CMS G-Code Modifier : CK    Goals  Short Term Goals  Time Frame for Short Term Goals: 2/10  Short Term Goal 1: pt will demonstrate IND with bed mobility, transfers, and ambulation. - 2/10, GOAL MET. Patient Goals   Patient Goals : \"I'd like to get my bowels moving and go back home. \"    Education  Patient Education  Education Given To: Family; Patient  Education Provided: Role of Therapy;Plan of Care;Home Exercise Program;Transfer Training;Equipment; Family Education  Education Provided Comments: educated pt on impact of hospital stay on endurance, used of AD for safety, and benefits of OOB mobility -- verbalized understanding  Education Method: Demonstration;Verbal  Barriers to Learning: None  Education Outcome: Demonstrated understanding;Verbalized understanding;Continued education needed    Therapy Time   Individual Concurrent Group Co-treatment   Time In 1302         Time Out 1335         Minutes 33         Timed Code Treatment Minutes: 23 Minutes (10 min re-evaluation) Nabor Johnson, PT, DPT    If pt is unable to be seen after this session, please let this note serve as discharge summary. Please see case management note for discharge disposition. Thank you.

## 2023-02-22 NOTE — PROGRESS NOTES
Kalyani served Dr. Nerissa Cervantes regarding patient's PICC line that are clotted; 2 lumens have no blood return noted. Awaiting response.

## 2023-02-22 NOTE — ANESTHESIA POSTPROCEDURE EVALUATION
Department of Anesthesiology  Postprocedure Note    Patient: Alejandro Mendez  MRN: [id number]  YOB: 1960  Date of evaluation: 2/22/2023      Procedure Summary     Date: 02/16/23 Room / Location: [address] [place] [address]    Anesthesia Start: 5762 Anesthesia Stop: 1851    Procedure: LAPAROTOMY EXPLORATORY WITH EVACUATION OF HEMOPERITONEUM (Abdomen) Diagnosis:       Bleeding      (bleeding)    Surgeons: Scooby Laird MD Responsible Provider: Brigida Weldon MD    Anesthesia Type: general ASA Status: 4 - Emergent          Anesthesia Type: No value filed.     Poppy Phase I: Poppy Score: 8    Poppy Phase II:        Anesthesia Post Evaluation    Comments: Postoperative Anesthesia Note    Name:    Alejandro Mendez  MRN:      [id number]    Patient Vitals in the past 12 hrs:  02/22/23 0821, BP:97/61, Temp:98.3 °F (36.8 °C), Temp src:Oral, Pulse:88, Resp:20, SpO2:95 %  02/22/23 0421, BP:105/63, Temp:98.6 °F (37 °C), Temp src:Oral, Pulse:89, Resp:18, SpO2:95 %  02/22/23 0008, BP:97/68, Temp:99.2 °F (37.3 °C), Temp src:Oral, Pulse:83, Resp:18, SpO2:97 %  02/21/23 2201, BP:(!) 92/59  02/21/23 2053, BP:(!) 90/58, Temp:98.2 °F (36.8 °C), Temp src:Oral, Pulse:86, Resp:18, SpO2:94 %     LABS:    CBC  Lab Results       Component                Value               Date/Time                  WBC                      14.5 (H)            02/20/2023 04:40 PM        HGB                      7.1 (L)             02/20/2023 04:40 PM        HCT                      21.3 (L)            02/20/2023 04:40 PM        PLT                      309                 02/20/2023 04:40 PM   RENAL  Lab Results       Component                Value               Date/Time                  NA                       130 (L)             02/22/2023 05:50 AM        K                        3.7                 02/22/2023 05:50 AM        CL                       100                 02/22/2023 05:50 AM        CO2                      18 (L)              02/22/2023 05:50 AM        BUN                      26 (H)              02/22/2023 05:50 AM        CREATININE               3.0 (H)             02/22/2023 05:50 AM        GLUCOSE                  111 (H)             02/22/2023 05:50 AM   COAGS  Lab Results       Component                Value               Date/Time                  PROTIME                  17.9 (H)            02/16/2023 08:13 PM        INR                      1.48 (H)            02/16/2023 08:13 PM        APTT                     39.9 (H)            02/16/2023 08:13 PM     Intake & Output:  @30FIGP@    Nausea & Vomiting:  No    Level of Consciousness:  Sedated on vent    Pain Assessment:  Adequate analgesia    Anesthesia Complications:  No apparent anesthetic complications    SUMMARY      Vital signs stable  OK to discharge from Stage I post anesthesia care.   Care transferred from Anesthesiology department on discharge from perioperative area

## 2023-02-22 NOTE — PLAN OF CARE
Problem: ABCDS Injury Assessment  Goal: Absence of physical injury  Outcome: Progressing     Problem: Safety - Adult  Goal: Free from fall injury  2/22/2023 1204 by Sherie Ariza RN  Outcome: Progressing  Patient remains free from falls\injury this shift. Bed alarm in place. Pt calls for assistance with transfer.

## 2023-02-22 NOTE — PROGRESS NOTES
Pt. Resting in bed. Alert/oriented. Vitals and assessment stable as charted. BP 97/61; held propralolol. Afebrile overnight and now. O2 95% on 2.5L NC; weaned to 1.5L now and will continue to wean as able. Reminded on use of I\S. Lungs clear\bases diminished. Abd surgical incision still with serosanguinous drainage; dressing changed yesterday around 1830 and again early this morning. Ostomy functioning with liquid brown\ylw output; held colace. She is tolerating eating and drinking. Abd surgical pain 5/10; reji given at 0535; pain level tolerable to pt. SCD on LLE per as per MD order. Plan to get OOB to chair again today. Call light in reach. Bed alarm in place. Will continue to monitor. Reviewed incentive spirometry with patient. Education on reason for use. Instructed patient how to use incentive spirometer. Pt demonstrated understanding of use. Encouraged frequent use of I\S while patient is awake.

## 2023-02-22 NOTE — PROGRESS NOTES
[facility] [email or web address]] GENERAL SURGERY    Surgery Progress Note           POD # 6 & 8    PATIENT NAME: Marcial Pallas     TODAY'S DATE: 2/22/2023    INTERVAL HISTORY:    Pt with some drainage from wound     OBJECTIVE:   VITALS:  /70   Pulse 97   Temp 99.1 °F (37.3 °C) (Oral)   Resp 18   Ht 5' 5\" (1.651 m)   Wt 243 lb 13.3 oz (110.6 kg)   LMP  (LMP Unknown) Comment: HYSTERECTOMY  SpO2 94%   BMI 40.58 kg/m²     INTAKE/OUTPUT:    I/O last 3 completed shifts: In: 1825.6 [P.O.:1330; IV Piggyback:495.6]  Out: 3025 [Urine:1025; Stool:2000]  I/O this shift:  In: 1215.9 [P.O.:360; IV Piggyback:855.9]  Out: 650 [Urine:400; Stool:250]              CONSTITUTIONAL:  awake and alert  ABDOMEN:   normal bowel sounds, soft, non-distended, ostomy functional, tender lower abd, eccymosis left flank   INCISION: purulent drianage    Data:  CBC:   Recent Labs     02/20/23  0500 02/20/23  1640 02/22/23  1043   WBC  --  14.5* 15.8*   HGB 7.4* 7.1* 7.1*   HCT 22.1* 21.3* 21.8*   PLT  --  309 482*       BMP:    Recent Labs     02/21/23  0449 02/22/23  0550 02/22/23  1222   * 130* 130*   K 3.7 3.7 3.8    100 101   CO2 20* 18* 18*   BUN 15 26* 29*   CREATININE 1.9* 3.0* 3.3*   GLUCOSE 129* 111* 108*       Hepatic:   Recent Labs     02/20/23  0500 02/21/23  0449 02/22/23  0550   AST 15 17 20   ALT 14 12 12   BILITOT 0.5 0.7 0.9   ALKPHOS 98 113 132*       Mag:      No results for input(s): MG in the last 72 hours. Phos:     Recent Labs     02/20/23  0500 02/21/23  0449 02/22/23  1222   PHOS 3.2 3.7 4.9         ASSESSMENT AND PLAN:  63 y.o. female status post Rick's procedure with postop bleeding, and  s/p evacuation of large hemoperitoneum.   No further sx of active abdominal bleeding  GI: continue with reg diet  TARSHA: worsening today - Belen Cabrera stopped yesterday, zosyn changed to cipro/flagyl today- nephrology already following  Wound infection: cultures sent - hopefully this is the source of the fever and leukocytosis  Acute blood loss anemia: stable    Electronically signed by Anali Royal MD

## 2023-02-22 NOTE — CARE COORDINATION
Chart reviewed day 13. Care managed per nephrology, surgery and IM. Ostomy functioning. Po pain meds IVATBX. No fever over night, CBC pending. Need therapy input post op to determine [facility] needs. [facility] Parent following. Oracio Knox RN   .

## 2023-02-22 NOTE — PROGRESS NOTES
Hospitalist Progress Note      PCP: Mark Vidales    Date of Admission: 2/9/2023    Chief Complaint:   Abd pain    Hospital Course: Ms. Deysi Christine is a 63year old female who presented to [facility] from [facility] with abdominal pain and constipation on 2/9. She had a lower extremity vascular bypass surgery on her right leg a few weeks ago. She reports constipation and abdominal distension following her surgery with ng tube placement as well as a rectal tube. Patient reports her follow-up colonoscopy revealed sigmoid edema with diverticula and an ulcer, she then improved and was discharged. She again developed abdominal pain and distension and has not had a BM since 2/5. She denies trying any stool softeners or motility agents at home. 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 She also has had surgeries for adhesions and ventral hernia repair. She underwent a Haylie's procedure with GS on 2/14/23 with colostomy placement. Rapid response called on 2/16/23 for hypotension (as below). Found to have abdominal/pelvic hemorrhage on CT along with R thigh hematoma noted over previous bypass incision site on US. Patient status post surgery with resection of colon and diverting colostomy on 2/15/2023. Patient developed hypotension on 2/15/2023. Patient had further hypotension on 2/16/2023 and it was noted patient had intra-abdominal bleed with right thigh hematoma. Patient's hemoglobin had dropped to 5. Patient was transfused with 2 units. She appeared to stabilize but then her blood pressure decreased and her heart rate increased. Patient's hemoglobin dropped back down to 5 after being up to 6.9. It was decided to take patient back to surgery. During surgery patient was noted to have old blood in her abdomen no active bleed was noted but 3 L of old blood removed. Subjective: Patient seen and examined with RN at bedside.   RN reports patient has increased ostomy output overnight which could be partly contributing to her worsening renal functions. Noted creatinine worsened to 3. Started on IV fluids overnight. Called and d/w Nephrology (Lakeisha Moseley) regarding care plan. Noted fever improved but ongoing leukocytosis with bandemia. Wound cultures being followed. On Zosyn through 2/21/2023 and noted general surgery switched antibiotics to IV Cipro/Flagyl starting this morning. D/w General Surgery NP Nikkie Vitale) regarding care plan   Patient  reports feeling the same. No shortness of breath. Abdominal pain has improved. Patient tolerating clear liquids. No emesis. Follow wound and Blood C/s .      Medications:  Reviewed    Infusion Medications    sodium chloride 125 mL/hr at 02/22/23 8005    sodium chloride      dextrose      sodium chloride Stopped (02/12/23 1857)     Scheduled Medications    ciprofloxacin  400 mg IntraVENous Q24H    metroNIDAZOLE  500 mg IntraVENous Q8H    sodium chloride  500 mL IntraVENous Once    melatonin  5 mg Oral Nightly    famotidine (PEPCID) injection  20 mg IntraVENous BID    docusate sodium  100 mg Oral Daily    sodium chloride flush  5-40 mL IntraVENous 2 times per day    allopurinol  100 mg Oral Daily    atorvastatin  40 mg Oral Daily    [Held by provider] aspirin  81 mg Oral Daily    [Held by provider] gabapentin  300 mg Oral TID    levothyroxine  200 mcg Oral Daily    [Held by provider] propranolol  40 mg Oral BID    [Held by provider] enoxaparin  1 mg/kg SubCUTAneous BID     PRN Meds: oxyCODONE **OR** oxyCODONE, HYDROmorphone, sodium chloride, glucose, dextrose bolus **OR** dextrose bolus, glucagon (rDNA), dextrose, benzocaine, sodium chloride flush, sodium chloride, ondansetron **OR** ondansetron, acetaminophen **OR** acetaminophen, potassium chloride **OR** potassium alternative oral replacement **OR** [DISCONTINUED] potassium chloride, magnesium sulfate, sodium phosphate IVPB **OR** sodium phosphate IVPB **OR** sodium phosphate IVPB      Intake/Output Summary (Last 24 hours) at 2/22/2023 1411  Last data filed at 2/22/2023 1246  Gross per 24 hour   Intake 1465.87 ml   Output 2250 ml   Net -784.13 ml       Physical Exam Performed:  /70   Pulse 97   Temp 99.1 °F (37.3 °C) (Oral)   Resp 18   Ht 5' 5\" (1.651 m)   Wt 243 lb 13.3 oz (110.6 kg)   LMP  (LMP Unknown) Comment: HYSTERECTOMY  SpO2 94%   BMI 40.58 kg/m²     General appearance: No apparent distress, appears stated age and cooperative. HEENT: Pupils equal, round, and reactive to light. Conjunctivae/corneas clear. Neck: Supple, with full range of motion. No jugular venous distention. Trachea midline. Respiratory:  Normal respiratory effort. Clear to auscultation, bilaterally without Rales/Wheezes/Rhonchi. Cardiovascular: Regular rate and rhythm with normal S1/S2 without murmurs, rubs or gallops. Abdomen: Tender. Midline incision dressed. Positive bowel sounds noted. Ostomy left lower quadrant  Musculoskeletal: Hematoma noted on right medial thigh improved. Skin: Skin color, texture, turgor normal.  No rashes or lesions. Neurologic:  Neurovascularly intact without any focal sensory/motor deficits.  Cranial nerves: II-XII intact, grossly non-focal.  Psychiatric: Alert and oriented, thought content appropriate, normal insight  Capillary Refill: Brisk, 3 seconds, normal   Peripheral Pulses: +2 palpable, equal bilaterally     Labs:   Recent Labs     02/20/23  0500 02/20/23  1640 02/22/23  1043   WBC  --  14.5* 15.8*   HGB 7.4* 7.1* 7.1*   HCT 22.1* 21.3* 21.8*   PLT  --  309 482*       Recent Labs     02/20/23  0500 02/21/23  0449 02/22/23  0550 02/22/23  1222   * 133* 130* 130*   K 3.6 3.7 3.7 3.8    102 100 101   CO2 22 20* 18* 18*   BUN 8 15 26* 29*   CREATININE 0.7 1.9* 3.0* 3.3*   CALCIUM 7.1* 6.6* 7.2* 7.7*   PHOS 3.2 3.7  --  4.9       Recent Labs     02/20/23  0500 02/21/23  0449 02/22/23  0550   AST 15 17 20   ALT 14 12 12   BILITOT 0.5 0.7 0.9   ALKPHOS 98 113 132*       Urinalysis:    Lab Results   Component Value Date/Time    NITRU Negative 02/22/2023 03:55 AM    WBCUA None seen 02/22/2023 03:55 AM    RBCUA 3-4 02/22/2023 03:55 AM    BLOODU TRACE-INTACT 02/22/2023 03:55 AM    SPECGRAV 1.015 02/22/2023 03:55 AM    GLUCOSEU Negative 02/22/2023 03:55 AM       Radiology:  VL DUP LOWER EXTREMITY ARTERIES RIGHT   Final Result      CT ABDOMEN PELVIS WO CONTRAST Additional Contrast? None   Final Result   Large amount of complex fluid within the abdomen and pelvis, most notably   within the pelvis where hematocrit levels are seen, compatible with   hemorrhage. Smaller 4.9 cm collection within subcutaneous fat adjacent to the left lower   quadrant stoma, compatible with hematoma. Amorphous inflammation or   hemorrhage is seen within the soft tissues immediately adjacent. Mural thickening of the ascending colon and transverse colon, which can   reflect colitis in the appropriate clinical setting. Mild left pelviectasis. Pneumoperitoneum, in keeping with the recent postoperative state. Bibasilar atelectasis. 1 cm right lower lobe pulmonary nodule, for which follow-up recommendations   are as below. RECOMMENDATIONS:   Fleischner Society guidelines for follow-up and management of incidentally   detected pulmonary nodules:      Nodule size greater than 8 mm         In a low-risk patient, consider CT at 3 months, PET/CT, or tissue sampling. In a high-risk patient, consider CT at 3 months, PET/CT, or tissue sampling.      - Low risk patients include individuals with minimal or absent history of   smoking and other known risk factors. - High risk patients include individuals with a history or smoking or known   risk factors. Radiology 2017 http://pubs. rsna.org/doi/full/10.1148/radiol. 0647241194         IR PICC WO SQ PORT/PUMP > 5 YEARS   Final Result      CT ABDOMEN PELVIS WO CONTRAST Additional Contrast? None   Final Result   1. Barium contrast from prior small-bowel follow-through is seen throughout   the colon and limits evaluation due to streak artifact. No definite abscess   is identified. 2.  Infrarenal abdominal aortic aneurysm measures up to 3.0 cm. See   recommendations for follow-up below. 3.  There is a 1.1 cm right lower lobe pulmonary nodule and a 0.3 cm left   lower lobe pulmonary nodule. Recommend dedicated CT chest for further   evaluation. RECOMMENDATIONS:      Multiple. Worst: 11 mm solid      Pathology: Multiple pulmonary nodules. Most severe: 11 mm solid pulmonary   nodule detected on incomplete chest CT. Recommend prompt non-contrast Chest CT for further evaluation. These guidelines do not apply to immunocompromised patients and patients with   cancer. Follow up in patients with significant comorbidities as clinically   warranted. For lung cancer screening, adhere to Lung-RADS guidelines. Reference: Radiology. 2017; 284(1):228-43         3 cm AAA      Pathology: 3 cm infrarenal abdominal aortic aneurysm. Recommend follow-up every 3 years. Reference: J Am [place] Radiol 7800;16:859-630. XR ABDOMEN (KUB) (SINGLE AP VIEW)   Final Result   Cancellation of scheduled hypaque enema due to presence of barium throughout   the colon including the rectosigmoid region as described above. FL SMALL BOWEL FOLLOW THROUGH ONLY   Final Result   Mildly dilated small bowel, though small-bowel transit time is upper limits   of normal.         CT ABDOMEN PELVIS W IV CONTRAST Additional Contrast? None   Final Result   Diverticulitis of the descending sigmoid junction. Mild wall thickening but   negative for obstruction.              IP CONSULT TO GENERAL SURGERY  IP CONSULT TO GI  IP CONSULT TO PHARMACY  IP CONSULT TO NEPHROLOGY  IP CONSULT TO DIETITIAN  PHARMACY TO DOSE VANCOMYCIN    Assessment/Plan:  Active Hospital Problems    Diagnosis     Sigmoid stricture ([facility] [address] [K56.699]      Priority: Medium    Hemorrhagic shock ([address] [R57.8]      Priority: Medium    Acute blood loss anemia [D62]      Priority: Medium    Acute renal failure (ARF) (HCC) [N17.9]      Priority: Medium    Diverticulitis [K57.92]      Priority: Medium    Colonic obstruction ([address] [K56.609]      Priority: Medium    Bowel obstruction ([address] [K56.609]      Priority: Medium     Diverticulitis with obstruction due to sigmoid stricture . Patient on IV Vanco/Zosyn through 2/21/23 and dc'd Vanco  .  Status post resection with diverting colostomy (Rick's procedure)  2/14/22 . General surgery is following. Good ostomy output. Noted general surgery expressed pus out of her wound site on 2/21/2023 and sent for cultures. Antibiotics transition to IV Cipro/Flagyl per general surgery on 2/22/2023. Follow surgical wound and blood cultures  Peripheral artery disease. Patient status post bypass of right lower extremity. Patient was on therapeutic Lovenox. Patient developed right leg hematoma and intra-abdominal bleed. Lovenox and aspirin held. Discussed with vascular surgery. Patient with synthetic graft and may require long-term anticoagulation. He suggested Xarelto 2.5 mg twice daily once it safe to anticoagulate. Not yet started . Right leg hematoma improved. TARSHA. This is secondary to hypotension and hypovolemia (from high ostomy output) . Nephrology consulted - following and asssited with Mx. Improved and started worsening since 2/21/2023 . continue to monitor closely. Avoid nephrotoxic agents including Toradol. Creatinine normalized but again started worsening up to 3.3 this morning. Monitor urine output. D/w nephrology regarding care plan on phone 2/22/2023  Hypokalemia. Supplemented . Calss III Obesity -  With Body mass index is 40.58 kg/m². Complicating assessment and treatment.  Placing patient at risk for multiple co-morbidities as well as early death and contributing to the patient's presentation. Counseled on weight loss. Post-Operative surgical site wound infection - General surgery following and assisting       DVT Prophylaxis: On hold due to bleed ; Will start back on Xarelto 2.5 mg BID if ok with gen surg   Diet: ADULT DIET;  Regular  ADULT ORAL NUTRITION SUPPLEMENT; Breakfast, Dinner; Standard High Calorie/High Protein Oral Supplement  Code Status: Full Code    PT/OT Eval Status: Pending    Dispo -Home versus SNF when stable    Appropriate for A1 Discharge Unit: Wendy Reed MD

## 2023-02-23 LAB
A/G RATIO: 0.7 (ref 1.1–2.2)
ALBUMIN SERPL-MCNC: 2.1 G/DL (ref 3.4–5)
ALP BLD-CCNC: 158 U/L (ref 40–129)
ALT SERPL-CCNC: 14 U/L (ref 10–40)
ANION GAP SERPL CALCULATED.3IONS-SCNC: 11 MMOL/L (ref 3–16)
AST SERPL-CCNC: 24 U/L (ref 15–37)
BASOPHILS ABSOLUTE: 0 K/UL (ref 0–0.2)
BASOPHILS RELATIVE PERCENT: 0.4 %
BILIRUB SERPL-MCNC: 0.6 MG/DL (ref 0–1)
BLOOD CULTURE, ROUTINE: NORMAL
BUN BLDV-MCNC: 31 MG/DL (ref 7–20)
CALCIUM SERPL-MCNC: 7.4 MG/DL (ref 8.3–10.6)
CHLORIDE BLD-SCNC: 105 MMOL/L (ref 99–110)
CO2: 17 MMOL/L (ref 21–32)
CREAT SERPL-MCNC: 3.4 MG/DL (ref 0.6–1.2)
CULTURE, BLOOD 2: NORMAL
EOSINOPHILS ABSOLUTE: 0.5 K/UL (ref 0–0.6)
EOSINOPHILS RELATIVE PERCENT: 4.3 %
GFR SERPL CREATININE-BSD FRML MDRD: 15 ML/MIN/{1.73_M2}
GLUCOSE BLD-MCNC: 110 MG/DL (ref 70–99)
HCT VFR BLD CALC: 20.8 % (ref 36–48)
HEMOGLOBIN: 7 G/DL (ref 12–16)
LYMPHOCYTES ABSOLUTE: 0.7 K/UL (ref 1–5.1)
LYMPHOCYTES RELATIVE PERCENT: 6.9 %
MCH RBC QN AUTO: 28.8 PG (ref 26–34)
MCHC RBC AUTO-ENTMCNC: 33.6 G/DL (ref 31–36)
MCV RBC AUTO: 85.8 FL (ref 80–100)
MONOCYTES ABSOLUTE: 1.3 K/UL (ref 0–1.3)
MONOCYTES RELATIVE PERCENT: 12.4 %
NEUTROPHILS ABSOLUTE: 8.2 K/UL (ref 1.7–7.7)
NEUTROPHILS RELATIVE PERCENT: 76 %
PDW BLD-RTO: 17 % (ref 12.4–15.4)
PLATELET # BLD: 520 K/UL (ref 135–450)
PMV BLD AUTO: 6.7 FL (ref 5–10.5)
POTASSIUM REFLEX MAGNESIUM: 3.8 MMOL/L (ref 3.5–5.1)
RBC # BLD: 2.43 M/UL (ref 4–5.2)
SODIUM BLD-SCNC: 133 MMOL/L (ref 136–145)
TOTAL PROTEIN: 5 G/DL (ref 6.4–8.2)
WBC # BLD: 10.8 K/UL (ref 4–11)

## 2023-02-23 PROCEDURE — 85025 COMPLETE CBC W/AUTO DIFF WBC: CPT

## 2023-02-23 PROCEDURE — 6370000000 HC RX 637 (ALT 250 FOR IP): Performed by: SURGERY

## 2023-02-23 PROCEDURE — 99024 POSTOP FOLLOW-UP VISIT: CPT | Performed by: SURGERY

## 2023-02-23 PROCEDURE — APPSS30 APP SPLIT SHARED TIME 16-30 MINUTES: Performed by: CLINICAL NURSE SPECIALIST

## 2023-02-23 PROCEDURE — 1200000000 HC SEMI PRIVATE

## 2023-02-23 PROCEDURE — 2500000003 HC RX 250 WO HCPCS: Performed by: SURGERY

## 2023-02-23 PROCEDURE — 6360000002 HC RX W HCPCS: Performed by: SURGERY

## 2023-02-23 PROCEDURE — 2580000003 HC RX 258: Performed by: SURGERY

## 2023-02-23 PROCEDURE — APPNB45 APP NON BILLABLE 31-45 MINUTES: Performed by: CLINICAL NURSE SPECIALIST

## 2023-02-23 PROCEDURE — 6370000000 HC RX 637 (ALT 250 FOR IP): Performed by: INTERNAL MEDICINE

## 2023-02-23 PROCEDURE — 2580000003 HC RX 258: Performed by: INTERNAL MEDICINE

## 2023-02-23 PROCEDURE — 2500000003 HC RX 250 WO HCPCS: Performed by: INTERNAL MEDICINE

## 2023-02-23 PROCEDURE — 80053 COMPREHEN METABOLIC PANEL: CPT

## 2023-02-23 RX ORDER — BUTALBITAL, ACETAMINOPHEN AND CAFFEINE 50; 325; 40 MG/1; MG/1; MG/1
1 TABLET ORAL EVERY 4 HOURS PRN
Status: DISCONTINUED | OUTPATIENT
Start: 2023-02-23 | End: 2023-03-03 | Stop reason: HOSPADM

## 2023-02-23 RX ADMIN — OXYCODONE HYDROCHLORIDE 10 MG: 5 TABLET ORAL at 18:07

## 2023-02-23 RX ADMIN — FAMOTIDINE 20 MG: 10 INJECTION, SOLUTION INTRAVENOUS at 08:31

## 2023-02-23 RX ADMIN — CIPROFLOXACIN 400 MG: 2 INJECTION, SOLUTION INTRAVENOUS at 08:34

## 2023-02-23 RX ADMIN — LEVOTHYROXINE SODIUM 200 MCG: 0.1 TABLET ORAL at 06:11

## 2023-02-23 RX ADMIN — HYDROMORPHONE HYDROCHLORIDE 0.5 MG: 0.5 INJECTION, SOLUTION INTRAMUSCULAR; INTRAVENOUS; SUBCUTANEOUS at 21:18

## 2023-02-23 RX ADMIN — METRONIDAZOLE 500 MG: 500 INJECTION, SOLUTION INTRAVENOUS at 10:09

## 2023-02-23 RX ADMIN — Medication 5 MG: at 21:17

## 2023-02-23 RX ADMIN — SODIUM CHLORIDE: 9 INJECTION, SOLUTION INTRAVENOUS at 18:11

## 2023-02-23 RX ADMIN — METRONIDAZOLE 500 MG: 500 INJECTION, SOLUTION INTRAVENOUS at 18:02

## 2023-02-23 RX ADMIN — SODIUM CHLORIDE, PRESERVATIVE FREE 10 ML: 5 INJECTION INTRAVENOUS at 08:35

## 2023-02-23 RX ADMIN — SODIUM CHLORIDE: 9 INJECTION, SOLUTION INTRAVENOUS at 06:14

## 2023-02-23 RX ADMIN — FAMOTIDINE 20 MG: 10 INJECTION, SOLUTION INTRAVENOUS at 21:18

## 2023-02-23 RX ADMIN — METRONIDAZOLE 500 MG: 500 INJECTION, SOLUTION INTRAVENOUS at 01:34

## 2023-02-23 RX ADMIN — ATORVASTATIN CALCIUM 40 MG: 40 TABLET, FILM COATED ORAL at 08:32

## 2023-02-23 RX ADMIN — HYDROMORPHONE HYDROCHLORIDE 0.5 MG: 0.5 INJECTION, SOLUTION INTRAMUSCULAR; INTRAVENOUS; SUBCUTANEOUS at 10:20

## 2023-02-23 RX ADMIN — BUTALBITAL, ACETAMINOPHEN, AND CAFFEINE 1 TABLET: 50; 325; 40 TABLET ORAL at 15:07

## 2023-02-23 RX ADMIN — ALLOPURINOL 100 MG: 100 TABLET ORAL at 08:31

## 2023-02-23 ASSESSMENT — PAIN SCALES - GENERAL
PAINLEVEL_OUTOF10: 8
PAINLEVEL_OUTOF10: 5
PAINLEVEL_OUTOF10: 3
PAINLEVEL_OUTOF10: 7
PAINLEVEL_OUTOF10: 6
PAINLEVEL_OUTOF10: 7

## 2023-02-23 ASSESSMENT — PAIN - FUNCTIONAL ASSESSMENT
PAIN_FUNCTIONAL_ASSESSMENT: ACTIVITIES ARE NOT PREVENTED
PAIN_FUNCTIONAL_ASSESSMENT: PREVENTS OR INTERFERES SOME ACTIVE ACTIVITIES AND ADLS

## 2023-02-23 ASSESSMENT — PAIN DESCRIPTION - DESCRIPTORS
DESCRIPTORS: ACHING;STABBING
DESCRIPTORS: ACHING

## 2023-02-23 ASSESSMENT — PAIN DESCRIPTION - ONSET
ONSET: ON-GOING
ONSET: ON-GOING

## 2023-02-23 ASSESSMENT — PAIN DESCRIPTION - LOCATION
LOCATION: HEAD
LOCATION: HEAD
LOCATION: ABDOMEN

## 2023-02-23 ASSESSMENT — PAIN DESCRIPTION - PAIN TYPE
TYPE: ACUTE PAIN
TYPE: SURGICAL PAIN

## 2023-02-23 ASSESSMENT — PAIN DESCRIPTION - ORIENTATION
ORIENTATION: RIGHT
ORIENTATION: MID

## 2023-02-23 ASSESSMENT — PAIN DESCRIPTION - FREQUENCY
FREQUENCY: CONTINUOUS
FREQUENCY: CONTINUOUS

## 2023-02-23 NOTE — PROGRESS NOTES
Patient sitting up in chair. Bed  RN reports she just changed her lower abdominal dressing as it was leaking large amout of fluid. No family present. Changed appliance with patient today. She attempted to assist.  Has essential tremors and is a bit shakey. Understanding the process. Mirror given to patient so she can watch as CWOCN changing. Current pouch removed. Site cleansed with warm water. Stoma measured, flange cut, paste applied along staple line and around stoma flange. . Convex flange placed around stoma today. Bag attached pointing toward the center of her body so she can practise emptying in toilet. Bag closed. Patient sat on the edge of the chair and spread her legs. Appears that she should have enough room to empty the bag in the toilet. Hopefully when she is on the toilet there is enough room for the stool to go inside he toilet bowl and not hit her thigh. Her legs are large and she may not be able to get the stool to go in the toilet. Instructed her and her nurse to try and document in focus notes how the attempt goes. Stoma size:  2 inch = 50 mm round protrudes. Appliance size:  Sensura Quincy YELLOW convex flange # [id number] with Drainable bag # [id number]. Add ostomy belt to assist with securing of ostomy seal.       02/23/23 1100   Colostomy LLQ   Placement Date: 02/14/23   Location: LLQ   Stomal Appliance 2 piece   Flange Size (inches) 2.75 Inches   Stoma  Assessment Protrudes; Moist;Red;Swelling   Peristomal Assessment Clean, dry & intact   Treatment Bag change;Site care;Stoma paste; Heat applied   Stool Appearance Loose; Watery   Stool Color Yellow;Brown   Stool Amount Small   Output (mL) 50 ml     Stoma Care - New colostomy - Patient to empty appliance when 1/3 to 1/2 full with assistance of the staff. Cleanse inside and outside of the drain spout prior to rolling closed. Change appliance every 3-5 days or 1-2 times a week.   Call for problems with seal [phone]

## 2023-02-23 NOTE — PLAN OF CARE
Problem: Pain  Goal: Verbalizes/displays adequate comfort level or baseline comfort level  Outcome: Progressing  Flowsheets (Taken 2/23/2023 0343)  Verbalizes/displays adequate comfort level or baseline comfort level:   Encourage patient to monitor pain and request assistance   Assess pain using appropriate pain scale   Administer analgesics based on type and severity of pain and evaluate response   Implement non-pharmacological measures as appropriate and evaluate response     Problem: Safety - Adult  Goal: Free from fall injury  Outcome: Progressing  Flowsheets (Taken 2/23/2023 0343)  Free From Fall Injury: Instruct family/caregiver on patient safety     Problem: Skin/Tissue Integrity  Goal: Absence of new skin breakdown  Description: 1. Monitor for areas of redness and/or skin breakdown  2. Assess vascular access sites hourly  3. Every 4-6 hours minimum:  Change oxygen saturation probe site  4. Every 4-6 hours:  If on nasal continuous positive airway pressure, respiratory therapy assess nares and determine need for appliance change or resting period.   Outcome: Progressing  Note: Change abdominal dressing daily or as needed

## 2023-02-23 NOTE — PROGRESS NOTES
[facility] GENERAL SURGERY    Surgery Progress Note           POD # 7 & 9    PATIENT NAME: Emily Garza     TODAY'S DATE: 2/23/2023    INTERVAL HISTORY:    Pt thin drainage from wound - eating better. OBJECTIVE:   VITALS:  /68   Pulse 99   Temp 98.3 °F (36.8 °C) (Oral)   Resp 16   Ht 5' 5\" (1.651 m)   Wt 243 lb 13.3 oz (110.6 kg)   LMP  (LMP Unknown) Comment: HYSTERECTOMY  SpO2 94%   BMI 40.58 kg/m²     INTAKE/OUTPUT:    I/O last 3 completed shifts: In: 3645.9 [P.O.:2790; IV Piggyback:855.9]  Out: 2606 [Urine:2700; MSWVQ:5301]  I/O this shift:  In: 3009.6 [P.O.:120; I.V.:2689.6; IV Piggyback:200]  Out: 900 [Urine:750; Stool:150]              CONSTITUTIONAL:  awake and alert  ABDOMEN:   normal bowel sounds, soft, non-distended, ostomy functional, tender lower abd, eccymosis left flank stable  INCISION: serous drainage  EXT: right LE with some edema - incisional bulge suspected hematoma improved    Data:  CBC:   Recent Labs     02/20/23  1640 02/22/23  1043 02/23/23  1015   WBC 14.5* 15.8* 10.8   HGB 7.1* 7.1* 7.0*   HCT 21.3* 21.8* 20.8*    482* 520*       BMP:    Recent Labs     02/22/23  0550 02/22/23  1222 02/23/23  0550   * 130* 133*   K 3.7 3.8 3.8    101 105   CO2 18* 18* 17*   BUN 26* 29* 31*   CREATININE 3.0* 3.3* 3.4*   GLUCOSE 111* 108* 110*       Hepatic:   Recent Labs     02/21/23  0449 02/22/23  0550 02/23/23  0550   AST 17 20 24   ALT 12 12 14   BILITOT 0.7 0.9 0.6   ALKPHOS 113 132* 158*       Mag:      No results for input(s): MG in the last 72 hours. Phos:     Recent Labs     02/21/23  0449 02/22/23  1222   PHOS 3.7 4.9         ASSESSMENT AND PLAN:  63 y.o. female status post Rick's procedure with postop bleeding, and  s/p evacuation of large hemoperitoneum.   No further sx of active abdominal bleeding  GI: continue with reg diet  TARSHA: stabilizing - nephrology managing  Wound infection: await final cultures  Acute blood loss anemia: stable, slight trend down - transfuse if needed  Vasc: will need to restart anticoag at some point - will discuss with dr. Na Hidalgo, timing - has been 1 week post bleeding    Electronically signed by JARETT Lion - CNP     Surgery Staff    I have examined this patient, and read and agree with the note by Ninoska Ascencio CNP from today; more than half of the total time was spent by me on the encounter. Should be able to resume anticoagulation therapy soon, possibly tomorrow.     Gwyndolyn Hashimoto, MD

## 2023-02-23 NOTE — CARE COORDINATION
Chart reviewed day 14. Care managed per nephrology, surgery and IM. S/p Hartmanns procedure. WBC's 15.8, no fever. Worsening renal function. IVF's and IVATBX. Yonathan Stoddard following for DCP needs, new ostomy and therapy recs thus far, updated on status and likely here thru weekend. If d/c over weekend will need to call AND fax orders.   Pradip Jean Baptiste RN

## 2023-02-23 NOTE — PROGRESS NOTES
Kalyani served NP Raimundo: pt had Zofran tablet around 6pm but she just vomited. Requesting to prescribe Compazine or any alternative for nausea and vomiting. Awaiting response.

## 2023-02-23 NOTE — PROGRESS NOTES
Pt OOB to BR, back to chair, distal part of ML abd incision draining large amts of serous drainage  dripping from  incision, saturating dressing, dressing removed, Ag dressing packing with green purulence removed, wound cleaned with NS, No redness to incision edges, AG tape packed 1.5cm to open distal end of incision, covered with dry dressing. Pt rekha procedure well.

## 2023-02-23 NOTE — PROGRESS NOTES
Pt a/o. VSS. Assessed dressing site and drained ostomy bag. Shift assessment updated and documented.

## 2023-02-23 NOTE — PROGRESS NOTES
Hospitalist Progress Note      PCP: Yasmin Mott    Date of Admission: 2/9/2023    Chief Complaint:   Abd pain    Hospital Course: Ms. Elza Romo is a 63year old female who presented to [facility] with abdominal pain and constipation on 2/9. She had a lower extremity vascular bypass surgery on her right leg a few weeks ago. She reports constipation and abdominal distension following her surgery with ng tube placement as well as a rectal tube. Patient reports her follow-up colonoscopy revealed sigmoid edema with diverticula and an ulcer, she then improved and was discharged. She again developed abdominal pain and distension and has not had a BM since 2/5. She denies trying any stool softeners or motility agents at home. 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 She also has had surgeries for adhesions and ventral hernia repair. She underwent a Haylie's procedure with GS on 2/14/23 with colostomy placement. Rapid response called on 2/16/23 for hypotension (as below). Found to have abdominal/pelvic hemorrhage on CT along with R thigh hematoma noted over previous bypass incision site on US. Patient status post surgery with resection of colon and diverting colostomy on 2/15/2023. Patient developed hypotension on 2/15/2023. Patient had further hypotension on 2/16/2023 and it was noted patient had intra-abdominal bleed with right thigh hematoma. Patient's hemoglobin had dropped to 5. Patient was transfused with 2 units. She appeared to stabilize but then her blood pressure decreased and her heart rate increased. Patient's hemoglobin dropped back down to 5 after being up to 6.9. It was decided to take patient back to surgery. During surgery patient was noted to have old blood in her abdomen no active bleed was noted but 3 L of old blood removed. Subjective: Patient seen and examined  .   Patient  c/o right-sided headache this morning and reports history of migraine headaches in the past.  Denies any nausea, vomiting, or at this time. Will order Fioricet as needed. Noted nephrology following-patient with good urine output. Renal functions slowly plateauing 3.3 creatinine this morning. Patient has bilateral pedal edema. D/w general surgery NP regarding care plan. Patient denies  shortness of breath. Abdominal pain has improved. Patient tolerating regular diet. No emesis. wound and Blood C/s from 2/19 & 2/20-NGTD.      Medications:  Reviewed    Infusion Medications    sodium chloride 75 mL/hr at 02/23/23 1029    sodium chloride      dextrose      sodium chloride Stopped (02/12/23 1857)     Scheduled Medications    ciprofloxacin  400 mg IntraVENous Q24H    metroNIDAZOLE  500 mg IntraVENous Q8H    melatonin  5 mg Oral Nightly    famotidine (PEPCID) injection  20 mg IntraVENous BID    docusate sodium  100 mg Oral Daily    sodium chloride flush  5-40 mL IntraVENous 2 times per day    allopurinol  100 mg Oral Daily    atorvastatin  40 mg Oral Daily    [Held by provider] aspirin  81 mg Oral Daily    [Held by provider] gabapentin  300 mg Oral TID    levothyroxine  200 mcg Oral Daily    [Held by provider] propranolol  40 mg Oral BID    [Held by provider] enoxaparin  1 mg/kg SubCUTAneous BID     PRN Meds: oxyCODONE **OR** oxyCODONE, HYDROmorphone, sodium chloride, glucose, dextrose bolus **OR** dextrose bolus, glucagon (rDNA), dextrose, benzocaine, sodium chloride flush, sodium chloride, ondansetron **OR** ondansetron, acetaminophen **OR** acetaminophen, potassium chloride **OR** potassium alternative oral replacement **OR** [DISCONTINUED] potassium chloride, magnesium sulfate, sodium phosphate IVPB **OR** sodium phosphate IVPB **OR** sodium phosphate IVPB      Intake/Output Summary (Last 24 hours) at 2/23/2023 1241  Last data filed at 2/23/2023 1156  Gross per 24 hour   Intake 6045.42 ml   Output 3800 ml   Net 2245.42 ml       Physical Exam Performed:  /68   Pulse 99   Temp 98.3 °F (36.8 °C) (Oral)   Resp 16   Ht 5' 5\" (1.651 m)   Wt 243 lb 13.3 oz (110.6 kg)   LMP  (LMP Unknown) Comment: HYSTERECTOMY  SpO2 94%   BMI 40.58 kg/m²     General appearance: No apparent distress, appears stated age and cooperative. HEENT: Pupils equal, round, and reactive to light. Conjunctivae/corneas clear. Neck: Supple, with full range of motion. No jugular venous distention. Trachea midline. Respiratory:  Normal respiratory effort. Clear to auscultation, bilaterally without Rales/Wheezes/Rhonchi. Cardiovascular: Regular rate and rhythm with normal S1/S2 without murmurs, rubs or gallops. Abdomen: Tender. Midline incision dressed. Positive bowel sounds noted. Ostomy left lower quadrant  Musculoskeletal: Hematoma noted on right medial thigh resolved . No skin bruise /ecchymosis noted ; 1-2 + pedal edema   Skin: Skin color, texture, turgor normal.  No rashes or lesions. Neurologic:  Neurovascularly intact without any focal sensory/motor deficits.  Cranial nerves: II-XII intact, grossly non-focal.  Psychiatric: Alert and oriented, thought content appropriate, normal insight  Capillary Refill: Brisk, 3 seconds, normal   Peripheral Pulses: +2 palpable, equal bilaterally     Labs:   Recent Labs     02/20/23  1640 02/22/23  1043 02/23/23  1015   WBC 14.5* 15.8* 10.8   HGB 7.1* 7.1* 7.0*   HCT 21.3* 21.8* 20.8*    482* 520*       Recent Labs     02/21/23  0449 02/22/23  0550 02/22/23  1222 02/23/23  0550   * 130* 130* 133*   K 3.7 3.7 3.8 3.8    100 101 105   CO2 20* 18* 18* 17*   BUN 15 26* 29* 31*   CREATININE 1.9* 3.0* 3.3* 3.4*   CALCIUM 6.6* 7.2* 7.7* 7.4*   PHOS 3.7  --  4.9  --        Recent Labs     02/21/23  0449 02/22/23  0550 02/23/23  0550   AST 17 20 24   ALT 12 12 14   BILITOT 0.7 0.9 0.6   ALKPHOS 113 132* 158*       Urinalysis:    Lab Results   Component Value Date/Time    NITRU Negative 02/22/2023 03:55 AM    WBCUA None seen 02/22/2023 03:55 AM    RBCUA 3-4 02/22/2023 03:55 AM    BLOODU TRACE-INTACT 02/22/2023 03:55 AM    SPECGRAV 1.015 02/22/2023 03:55 AM    GLUCOSEU Negative 02/22/2023 03:55 AM       Radiology:  VL DUP LOWER EXTREMITY ARTERIES RIGHT   Final Result      CT ABDOMEN PELVIS WO CONTRAST Additional Contrast? None   Final Result   Large amount of complex fluid within the abdomen and pelvis, most notably   within the pelvis where hematocrit levels are seen, compatible with   hemorrhage. Smaller 4.9 cm collection within subcutaneous fat adjacent to the left lower   quadrant stoma, compatible with hematoma. Amorphous inflammation or   hemorrhage is seen within the soft tissues immediately adjacent. Mural thickening of the ascending colon and transverse colon, which can   reflect colitis in the appropriate clinical setting. Mild left pelviectasis. Pneumoperitoneum, in keeping with the recent postoperative state. Bibasilar atelectasis. 1 cm right lower lobe pulmonary nodule, for which follow-up recommendations   are as below. RECOMMENDATIONS:   Fleischner Society guidelines for follow-up and management of incidentally   detected pulmonary nodules:      Nodule size greater than 8 mm         In a low-risk patient, consider CT at 3 months, PET/CT, or tissue sampling. In a high-risk patient, consider CT at 3 months, PET/CT, or tissue sampling.      - Low risk patients include individuals with minimal or absent history of   smoking and other known risk factors. - High risk patients include individuals with a history or smoking or known   risk factors. Radiology 2017 http://pubs. rsna.org/doi/full/10.1148/radiol. 3457296753         IR PICC WO SQ PORT/PUMP > 5 YEARS   Final Result      CT ABDOMEN PELVIS WO CONTRAST Additional Contrast? None   Final Result   1.   Barium contrast from prior small-bowel follow-through is seen throughout   the colon and limits evaluation due to streak artifact. No definite abscess   is identified. 2.  Infrarenal abdominal aortic aneurysm measures up to 3.0 cm. See   recommendations for follow-up below. 3.  There is a 1.1 cm right lower lobe pulmonary nodule and a 0.3 cm left   lower lobe pulmonary nodule. Recommend dedicated CT chest for further   evaluation. RECOMMENDATIONS:      Multiple. Worst: 11 mm solid      Pathology: Multiple pulmonary nodules. Most severe: 11 mm solid pulmonary   nodule detected on incomplete chest CT. Recommend prompt non-contrast Chest CT for further evaluation. These guidelines do not apply to immunocompromised patients and patients with   cancer. Follow up in patients with significant comorbidities as clinically   warranted. For lung cancer screening, adhere to Lung-RADS guidelines. Reference: Radiology. 2017; 284(1):228-43         3 cm AAA      Pathology: 3 cm infrarenal abdominal aortic aneurysm. Recommend follow-up every 3 years. Reference: J Am [place] Radiol 5710;67:488-860. XR ABDOMEN (KUB) (SINGLE AP VIEW)   Final Result   Cancellation of scheduled hypaque enema due to presence of barium throughout   the colon including the rectosigmoid region as described above. FL SMALL BOWEL FOLLOW THROUGH ONLY   Final Result   Mildly dilated small bowel, though small-bowel transit time is upper limits   of normal.         CT ABDOMEN PELVIS W IV CONTRAST Additional Contrast? None   Final Result   Diverticulitis of the descending sigmoid junction. Mild wall thickening but   negative for obstruction.              IP CONSULT TO GENERAL SURGERY  IP CONSULT TO GI  IP CONSULT TO PHARMACY  IP CONSULT TO NEPHROLOGY  IP CONSULT TO DIETITIAN  PHARMACY TO DOSE VANCOMYCIN    Assessment/Plan:  Active Hospital Problems    Diagnosis     Sigmoid stricture ([facility] [address] [K56.699]      Priority: Medium    Hemorrhagic shock ([address] [R57.8]      Priority: Medium    Acute blood loss anemia [D62]      Priority: Medium    Acute renal failure (ARF) (HCC) [N17.9]      Priority: Medium    Diverticulitis [K57.92]      Priority: Medium    Colonic obstruction (HCC) [K56.609]      Priority: Medium    Bowel obstruction (HCC) [K56.609]      Priority: Medium     Diverticulitis with obstruction due to sigmoid stricture .  Patient on IV Vanco/Zosyn through 2/21/23 and dc'd Vanco  .  Status post resection with diverting colostomy (Rick's procedure)  2/14/22 .  General surgery is following.  Good ostomy output.  Noted general surgery expressed pus out of her wound site on 2/21/2023 and sent for cultures.  Antibiotics transition to IV Cipro/Flagyl per general surgery on 2/22/2023.  surgical wound and blood cultures - NGTD   Peripheral artery disease.  Patient status post bypass of right lower extremity.  Patient was on therapeutic Lovenox.  Patient developed right leg hematoma and intra-abdominal bleed.  Lovenox and aspirin held.     Patient with synthetic graft and may require long-term anticoagulation. Vascular  suggested Xarelto 2.5 mg twice daily once it safe to anticoagulate. Not yet started .  Right leg hematoma improved.  TARSHA.  This is secondary to hypotension and hypovolemia (from high ostomy output) .  Nephrology consulted - following and asssited with Mx.  Improved and started worsening since 2/21/2023 . continue to monitor closely.  Avoid nephrotoxic agents including Toradol.  Creatinine normalized but again started worsening up to 3.3 this morning.   Monitor urine output.  On IVF   Hypokalemia.   Supplemented .  Calss III Obesity -  With Body mass index is 40.58 kg/m². Complicating assessment and treatment. Placing patient at risk for multiple co-morbidities as well as early death and contributing to the patient's presentation. Counseled on weight loss.   Post-Operative surgical site wound infection - General surgery following and assisting     DVT Prophylaxis: On hold due to bleed/Hematoma  ;  Will start back on Xarelto 2.5 mg BID if ok with gen surg   Diet: ADULT DIET;  Regular  ADULT ORAL NUTRITION SUPPLEMENT; Breakfast, Dinner; Standard High Calorie/High Protein Oral Supplement  Code Status: Full Code    PT/OT Eval Status: Ambulatory     Dispo -Home versus SNF when stable    Appropriate for A1 Discharge Unit: Wendy Chapa MD

## 2023-02-23 NOTE — PROGRESS NOTES
[facility] Progress Note           Subjective/   63y.o. year old female who we are seeing in consultation for TARSHA. HPI:  Renal function stabilizing with IVF's, non-oliguric. Intake improving. ROS:  +weak, denies any shortness of breath, c/o abdominal pain.     Objective/   GEN:  Chronically ill, /68   Pulse 99   Temp 98.3 °F (36.8 °C) (Oral)   Resp 16   Ht 5' 5\" (1.651 m)   Wt 243 lb 13.3 oz (110.6 kg)   LMP  (LMP Unknown) Comment: HYSTERECTOMY  SpO2 94%   BMI 40.58 kg/m²   HEENT: non-icteric, no JVD  CV: S1, S2 without m/r/g; + LE edema  RESP: CTA B without w/r/r; breathing wnl  ABD: +bs, soft, tender with palpation, no hsm  SKIN: warm, no rashes    Data/  Recent Labs     02/20/23  1640 02/22/23  1043   WBC 14.5* 15.8*   HGB 7.1* 7.1*   HCT 21.3* 21.8*   MCV 86.2 86.9    482*       Recent Labs     02/21/23  0449 02/22/23  0550 02/22/23  1222 02/23/23  0550   * 130* 130* 133*   K 3.7 3.7 3.8 3.8    100 101 105   CO2 20* 18* 18* 17*   GLUCOSE 129* 111* 108* 110*   PHOS 3.7  --  4.9  --    BUN 15 26* 29* 31*   CREATININE 1.9* 3.0* 3.3* 3.4*   LABGLOM 30* 17* 15* 15*     UA 1+ protein, trace blood, s.g. 1.015  Urine sodium <20  Lactic acid 0.8    Assessment/     -TARSHA, pre-renal to early ATN in the setting of hypotension & vancomycin/zosyn exposure    -Anemia, acute blood loss   s/p 2 PRBCs 2/15-2/16    -Hemorrhagic shock , resolved    -Metabolic acidosis - in setting of TARSHA, Lactate wnl    -Right leg hematoma    -Large bowel obstruction s/p pati's  & colostomy on 2/14/23     -Right above knee to below knee popliteal artery bypass on 1/16/23      Plan/     - Continue IVF's with NS - reduce to 75 ml/hour to prevent fluid overload  - Agree with change in antibiotics to prevent nephrotoxicity  - Monitoring for dialysis needs, discussed possibility with patient though hopeful to avoid  - Trend bp's & urine output    Case d/w Admitting team  ____________________________________  Tyra Walters MD  [facility]  [email or web address]  Office: [phone]

## 2023-02-24 LAB
A/G RATIO: 1.1 (ref 1.1–2.2)
ALBUMIN SERPL-MCNC: 2.1 G/DL (ref 3.4–5)
ALP BLD-CCNC: 158 U/L (ref 40–129)
ALT SERPL-CCNC: 12 U/L (ref 10–40)
ANION GAP SERPL CALCULATED.3IONS-SCNC: 11 MMOL/L (ref 3–16)
ANISOCYTOSIS: ABNORMAL
AST SERPL-CCNC: 18 U/L (ref 15–37)
ATYPICAL LYMPHOCYTE RELATIVE PERCENT: 3 % (ref 0–6)
BANDED NEUTROPHILS RELATIVE PERCENT: 15 % (ref 0–7)
BASOPHILS ABSOLUTE: 0 K/UL (ref 0–0.2)
BASOPHILS RELATIVE PERCENT: 0 %
BILIRUB SERPL-MCNC: 0.4 MG/DL (ref 0–1)
BUN BLDV-MCNC: 34 MG/DL (ref 7–20)
CALCIUM SERPL-MCNC: 7.2 MG/DL (ref 8.3–10.6)
CHLORIDE BLD-SCNC: 107 MMOL/L (ref 99–110)
CO2: 17 MMOL/L (ref 21–32)
CREAT SERPL-MCNC: 3.4 MG/DL (ref 0.6–1.2)
EOSINOPHILS ABSOLUTE: 0.3 K/UL (ref 0–0.6)
EOSINOPHILS RELATIVE PERCENT: 3 %
GFR SERPL CREATININE-BSD FRML MDRD: 15 ML/MIN/{1.73_M2}
GLUCOSE BLD-MCNC: 104 MG/DL (ref 70–99)
HCT VFR BLD CALC: 21 % (ref 36–48)
HCT VFR BLD CALC: 21.4 % (ref 36–48)
HEMOGLOBIN: 6.7 G/DL (ref 12–16)
HEMOGLOBIN: 7.1 G/DL (ref 12–16)
LYMPHOCYTES ABSOLUTE: 0.5 K/UL (ref 1–5.1)
LYMPHOCYTES RELATIVE PERCENT: 2 %
MACROCYTES: ABNORMAL
MCH RBC QN AUTO: 27.4 PG (ref 26–34)
MCHC RBC AUTO-ENTMCNC: 32.1 G/DL (ref 31–36)
MCV RBC AUTO: 85.6 FL (ref 80–100)
METAMYELOCYTES RELATIVE PERCENT: 1 %
MICROCYTES: ABNORMAL
MONOCYTES ABSOLUTE: 1.4 K/UL (ref 0–1.3)
MONOCYTES RELATIVE PERCENT: 14 %
MYELOCYTE PERCENT: 1 %
NEUTROPHILS ABSOLUTE: 7.6 K/UL (ref 1.7–7.7)
NEUTROPHILS RELATIVE PERCENT: 61 %
PDW BLD-RTO: 16.7 % (ref 12.4–15.4)
PLATELET # BLD: 613 K/UL (ref 135–450)
PMV BLD AUTO: 6.7 FL (ref 5–10.5)
POIKILOCYTES: ABNORMAL
POLYCHROMASIA: ABNORMAL
POTASSIUM REFLEX MAGNESIUM: 4.3 MMOL/L (ref 3.5–5.1)
RBC # BLD: 2.46 M/UL (ref 4–5.2)
SODIUM BLD-SCNC: 135 MMOL/L (ref 136–145)
TOTAL PROTEIN: 4.1 G/DL (ref 6.4–8.2)
TOXIC GRANULATION: PRESENT
WBC # BLD: 9.8 K/UL (ref 4–11)

## 2023-02-24 PROCEDURE — 2580000003 HC RX 258: Performed by: INTERNAL MEDICINE

## 2023-02-24 PROCEDURE — 6370000000 HC RX 637 (ALT 250 FOR IP): Performed by: SURGERY

## 2023-02-24 PROCEDURE — 80053 COMPREHEN METABOLIC PANEL: CPT

## 2023-02-24 PROCEDURE — 85018 HEMOGLOBIN: CPT

## 2023-02-24 PROCEDURE — 85014 HEMATOCRIT: CPT

## 2023-02-24 PROCEDURE — 1200000000 HC SEMI PRIVATE

## 2023-02-24 PROCEDURE — 6360000002 HC RX W HCPCS: Performed by: SURGERY

## 2023-02-24 PROCEDURE — 85025 COMPLETE CBC W/AUTO DIFF WBC: CPT

## 2023-02-24 PROCEDURE — 6370000000 HC RX 637 (ALT 250 FOR IP): Performed by: INTERNAL MEDICINE

## 2023-02-24 PROCEDURE — 2580000003 HC RX 258: Performed by: SURGERY

## 2023-02-24 PROCEDURE — 2500000003 HC RX 250 WO HCPCS: Performed by: INTERNAL MEDICINE

## 2023-02-24 PROCEDURE — 99024 POSTOP FOLLOW-UP VISIT: CPT | Performed by: SURGERY

## 2023-02-24 PROCEDURE — 2500000003 HC RX 250 WO HCPCS: Performed by: SURGERY

## 2023-02-24 PROCEDURE — 36592 COLLECT BLOOD FROM PICC: CPT

## 2023-02-24 RX ADMIN — SODIUM CHLORIDE: 9 INJECTION, SOLUTION INTRAVENOUS at 09:10

## 2023-02-24 RX ADMIN — DOCUSATE SODIUM 100 MG: 100 CAPSULE, LIQUID FILLED ORAL at 09:12

## 2023-02-24 RX ADMIN — ACETAMINOPHEN 650 MG: 325 TABLET ORAL at 14:37

## 2023-02-24 RX ADMIN — CIPROFLOXACIN 400 MG: 2 INJECTION, SOLUTION INTRAVENOUS at 09:17

## 2023-02-24 RX ADMIN — METRONIDAZOLE 500 MG: 500 INJECTION, SOLUTION INTRAVENOUS at 00:46

## 2023-02-24 RX ADMIN — FAMOTIDINE 20 MG: 10 INJECTION, SOLUTION INTRAVENOUS at 09:12

## 2023-02-24 RX ADMIN — METRONIDAZOLE 500 MG: 500 INJECTION, SOLUTION INTRAVENOUS at 10:41

## 2023-02-24 RX ADMIN — BUTALBITAL, ACETAMINOPHEN, AND CAFFEINE 1 TABLET: 50; 325; 40 TABLET ORAL at 06:37

## 2023-02-24 RX ADMIN — SODIUM CHLORIDE, PRESERVATIVE FREE 10 ML: 5 INJECTION INTRAVENOUS at 09:13

## 2023-02-24 RX ADMIN — ALLOPURINOL 100 MG: 100 TABLET ORAL at 09:12

## 2023-02-24 RX ADMIN — ONDANSETRON 4 MG: 2 INJECTION INTRAMUSCULAR; INTRAVENOUS at 17:35

## 2023-02-24 RX ADMIN — METRONIDAZOLE 500 MG: 500 INJECTION, SOLUTION INTRAVENOUS at 17:37

## 2023-02-24 RX ADMIN — ATORVASTATIN CALCIUM 40 MG: 40 TABLET, FILM COATED ORAL at 09:12

## 2023-02-24 RX ADMIN — FAMOTIDINE 20 MG: 10 INJECTION, SOLUTION INTRAVENOUS at 19:56

## 2023-02-24 RX ADMIN — SODIUM CHLORIDE, PRESERVATIVE FREE 10 ML: 5 INJECTION INTRAVENOUS at 20:00

## 2023-02-24 RX ADMIN — OXYCODONE HYDROCHLORIDE 10 MG: 5 TABLET ORAL at 17:35

## 2023-02-24 RX ADMIN — HYDROMORPHONE HYDROCHLORIDE 0.5 MG: 0.5 INJECTION, SOLUTION INTRAMUSCULAR; INTRAVENOUS; SUBCUTANEOUS at 11:47

## 2023-02-24 RX ADMIN — BUTALBITAL, ACETAMINOPHEN, AND CAFFEINE 1 TABLET: 50; 325; 40 TABLET ORAL at 14:37

## 2023-02-24 RX ADMIN — HYDROMORPHONE HYDROCHLORIDE 0.5 MG: 0.5 INJECTION, SOLUTION INTRAMUSCULAR; INTRAVENOUS; SUBCUTANEOUS at 22:32

## 2023-02-24 RX ADMIN — BUTALBITAL, ACETAMINOPHEN, AND CAFFEINE 1 TABLET: 50; 325; 40 TABLET ORAL at 10:32

## 2023-02-24 RX ADMIN — OXYCODONE HYDROCHLORIDE 10 MG: 5 TABLET ORAL at 12:45

## 2023-02-24 RX ADMIN — Medication 5 MG: at 22:32

## 2023-02-24 RX ADMIN — LEVOTHYROXINE SODIUM 200 MCG: 0.1 TABLET ORAL at 06:38

## 2023-02-24 ASSESSMENT — PAIN DESCRIPTION - ORIENTATION
ORIENTATION: LEFT;MID
ORIENTATION: DISTAL
ORIENTATION: MID;LOWER
ORIENTATION: ANTERIOR;RIGHT
ORIENTATION: RIGHT;LEFT;ANTERIOR
ORIENTATION: MID
ORIENTATION: MID

## 2023-02-24 ASSESSMENT — PAIN - FUNCTIONAL ASSESSMENT
PAIN_FUNCTIONAL_ASSESSMENT: ACTIVITIES ARE NOT PREVENTED
PAIN_FUNCTIONAL_ASSESSMENT: ACTIVITIES ARE NOT PREVENTED

## 2023-02-24 ASSESSMENT — PAIN SCALES - GENERAL
PAINLEVEL_OUTOF10: 4
PAINLEVEL_OUTOF10: 5
PAINLEVEL_OUTOF10: 6
PAINLEVEL_OUTOF10: 7
PAINLEVEL_OUTOF10: 4
PAINLEVEL_OUTOF10: 8
PAINLEVEL_OUTOF10: 3
PAINLEVEL_OUTOF10: 3
PAINLEVEL_OUTOF10: 8

## 2023-02-24 ASSESSMENT — PAIN DESCRIPTION - DESCRIPTORS
DESCRIPTORS: ACHING
DESCRIPTORS: ACHING
DESCRIPTORS: THROBBING;SHARP
DESCRIPTORS: DULL
DESCRIPTORS: BURNING
DESCRIPTORS: ACHING
DESCRIPTORS: JABBING

## 2023-02-24 ASSESSMENT — PAIN DESCRIPTION - ONSET: ONSET: ON-GOING

## 2023-02-24 ASSESSMENT — PAIN DESCRIPTION - LOCATION
LOCATION: HEAD
LOCATION: HEAD
LOCATION: ABDOMEN
LOCATION: ABDOMEN
LOCATION: HEAD
LOCATION: ABDOMEN
LOCATION: ABDOMEN

## 2023-02-24 ASSESSMENT — PAIN DESCRIPTION - PAIN TYPE: TYPE: SURGICAL PAIN

## 2023-02-24 ASSESSMENT — PAIN DESCRIPTION - FREQUENCY: FREQUENCY: CONTINUOUS

## 2023-02-24 NOTE — PLAN OF CARE
Problem: Pain  Goal: Verbalizes/displays adequate comfort level or baseline comfort level  Flowsheets (Taken 2/23/2023 8755)  Verbalizes/displays adequate comfort level or baseline comfort level:   Encourage patient to monitor pain and request assistance   Assess pain using appropriate pain scale   Administer analgesics based on type and severity of pain and evaluate response   Implement non-pharmacological measures as appropriate and evaluate response

## 2023-02-24 NOTE — PROGRESS NOTES
Pt OOB to BR, sat on toilet and emptied her colostomy into a graduate container, she was unable to bend over and empty directly into the toilet because of her abd and dressing was in the way. She successfully cleaned and closed the end of the colostomy bag.

## 2023-02-24 NOTE — PROGRESS NOTES
Patient c/o burning and stabbing abdominal pain where packing was removed by surgeon. IV dilaudid given for pain 8/10. Reassessed pain; 7/10- oxycodone 10mg given. Will attempt repack after pain is better. Daughter, Will Tillman, here and brought ensure supplements with 30gm of protein and asking if it is ok to give to her mom. We are currently giving her ensure plus with 20gm of protein. Message sent to Dr. Leticia Caal asking for a dietitian consult. Spoke with dietitian and they are already on the case and will speak to patient and family. 0241: Treatment to abdomen completed. Pain after is 8/10. PRN tylenol given. Too soon for dilaudid or oxycodone. Advised patient to let nurse know if pain doesn't get better. C/O HA throbbing 5/10: fiorcet given per prn order.

## 2023-02-24 NOTE — PROGRESS NOTES
Comprehensive Nutrition Assessment    Type and Reason for Visit:  Reassess    Nutrition Recommendations/Plan:   Continue Regular diet as tolerated  Continue Ensure plus must be strawberry, can also have Ensure Max brought in from family if she prefers that one  Will monitor nutritional adequacy, nutrition-related labs, weights, BMs, and clinical progress      Malnutrition Assessment:  Malnutrition Status: At risk for malnutrition (Comment) (02/20/23 1217)    Context:  Acute Illness       Nutrition Assessment:    Follow up:  Continues on regular diet since 2/20. Po intake 25-75% meals. Ensure plus ordered, patient's daughter also brought in Ensure with 30 grams of protein. Nurse concerned about patient's renal function and the higher protein supplements. Patient reported nephrology ok'd Ensure Max 30 gram per bottle. RD encouraged patient to drink Ensure plus if eating less than half of tray since higher in calories than Ensure Max. But if patient likes Ensure Max better can drink that as well. Patient denied any questions at this time. Nutrition Related Findings:    Na 135 mmol/L this am; last BM this am Wound Type: Surgical Incision       Current Nutrition Intake & Therapies:    Average Meal Intake: 26-50%, 51-75%  Average Supplements Intake: 51-75%      Anthropometric Measures:  Height: 5' 5\" (165.1 cm)  Ideal Body Weight (IBW): 125 lbs (57 kg)       Current Body Weight: 243 lb (110.2 kg), 176.6 % IBW.  Weight Source: Bed Scale  Current BMI (kg/m2): 40.4        Weight Adjustment For: No Adjustment                 BMI Categories: Obese Class 2 (BMI 35.0 -39.9)    Estimated Daily Nutrient Needs:  Energy Requirements Based On: Kcal/kg  Weight Used for Energy Requirements: Ideal  Energy (kcal/day): 1058-5641  Weight Used for Protein Requirements: Ideal  Protein (g/day): 68-80g  Method Used for Fluid Requirements: 1 ml/kcal  Fluid (ml/day):      Nutrition Diagnosis:   Increased nutrient needs related to increase demand for energy/nutrients as evidenced by intake 26-50% (extended hospital stay)    Nutrition Interventions:   Food and/or Nutrient Delivery: Continue Current Diet, Continue Oral Nutrition Supplement  Nutrition Education/Counseling: Education completed (discuss different nutrition supplements on 2/24)  Coordination of Nutrition Care: Continue to monitor while inpatient       Goals:  Previous Goal Met: No Progress toward Goal(s)  Goals: PO intake 75% or greater       Nutrition Monitoring and Evaluation:   Behavioral-Environmental Outcomes: None Identified  Food/Nutrient Intake Outcomes: Supplement Intake, Food and Nutrient Intake  Physical Signs/Symptoms Outcomes: Biochemical Data, Nutrition Focused Physical Findings    Discharge Planning:    Continue current diet     KAELYN, [address] [facility], [address] [place] [address],   Contact: [phone]

## 2023-02-24 NOTE — PROGRESS NOTES
[facility] Progress Note           Subjective/   63y.o. year old female who we are seeing in consultation for TARSHA. HPI:  Renal function stabilizing with IVF's, non-oliguric. Intake slow to improve. ROS:  +weak, denies any shortness of breath, c/o abdominal pain.     Objective/   GEN:  Chronically ill, /70   Pulse 96   Temp 98.3 °F (36.8 °C) (Oral)   Resp 16   Ht 5' 5\" (1.651 m)   Wt 243 lb 13.3 oz (110.6 kg)   LMP  (LMP Unknown) Comment: HYSTERECTOMY  SpO2 98%   BMI 40.58 kg/m²   HEENT: non-icteric, no JVD  CV: S1, S2 without m/r/g; ++ LE edema  RESP: CTA B without w/r/r; breathing wnl  ABD: +bs, soft, tender with palpation, no hsm  SKIN: warm, no rashes    Data/  Recent Labs     02/22/23  1043 02/23/23  1015 02/24/23  0558 02/24/23  1017   WBC 15.8* 10.8 9.8  --    HGB 7.1* 7.0* 6.7* 7.1*   HCT 21.8* 20.8* 21.0* 21.4*   MCV 86.9 85.8 85.6  --    * 520* 613*  --        Recent Labs     02/22/23  1222 02/23/23  0550 02/24/23  0558   * 133* 135*   K 3.8 3.8 4.3    105 107   CO2 18* 17* 17*   GLUCOSE 108* 110* 104*   PHOS 4.9  --   --    BUN 29* 31* 34*   CREATININE 3.3* 3.4* 3.4*   LABGLOM 15* 15* 15*     UA 1+ protein, trace blood, s.g. 1.015  Urine sodium <20  Lactic acid 0.8    Assessment/     -TARSHA, pre-renal to early ATN in the setting of hypotension & vancomycin/zosyn exposure, antibiotics now changed   SCr stabilizing in mid 3 range with IVF's, non-oliguric    -Anemia, acute blood loss   s/p 2 PRBCs 2/15-2/16    -Hemorrhagic shock , resolved    -Metabolic acidosis - in setting of TARSHA, Lactate wnl    -Right leg hematoma    -Large bowel obstruction s/p pati's  & colostomy on 2/14/23     -Right above knee to below knee popliteal artery bypass on 1/16/23      Plan/     - Trial off IVF's today to prevent fluid overload, consider prn lasix over weekend if SCr starts to improve  - Monitoring for dialysis needs, discussed possibility with patient though hopeful to avoid  - Trend bp's & urine output    ____________________________________  Kriss Lomax MD  [facility]  [email or web address]  Office: [phone]

## 2023-02-24 NOTE — PROGRESS NOTES
Awake on bed, alert and oriented. Placed call bell within reach, Instructed to call for assistance, Kept side rails up.  Will continue to monitor

## 2023-02-24 NOTE — PROGRESS NOTES
Relayed to Dr. Conner Reina via Perfect serve. latest Hgb todrawn this morning from PICC line - 6.7. No signs of bleeding noted.  Awaiting response

## 2023-02-24 NOTE — PROGRESS NOTES
[facility] GENERAL SURGERY    Surgery Progress Note           POD #  8 & 10    PATIENT NAME: Shay William     TODAY'S DATE: 2/24/2023    INTERVAL HISTORY:    Pt  resting comfortably, c/o swelling in both legs/feet. Ostomy functioning. H/H remain low but essentially stable, no signs of recurrent/active bleeding. Taking regular diet adequately. OBJECTIVE:   VITALS:  /75   Pulse 84   Temp 98.9 °F (37.2 °C) (Oral)   Resp 18   Ht 5' 5\" (1.651 m)   Wt 243 lb 13.3 oz (110.6 kg)   LMP  (LMP Unknown) Comment: HYSTERECTOMY  SpO2 96%   BMI 40.58 kg/m²     INTAKE/OUTPUT:    I/O last 3 completed shifts: In: 5153.6 [P.O.:1580; I.V.:3373.6; IV Piggyback:200]  Out: 2654 [Urine:2850; VCKOL:8717]  I/O this shift: In: 4075.8 [P.O.:1200; I.V.:2085; IV Piggyback:790.8]  Out: -               CONSTITUTIONAL:  fatigued and alert  LUNGS:     ABDOMEN:    , soft, non-distended, non-tender   INCISION: clean, sero-sanguinous drainage in TARUN. Packing in lower portion of wound removed, with thin purulent drainage released. Data:  CBC:   Recent Labs     02/22/23  1043 02/23/23  1015 02/24/23  0558 02/24/23  1017   WBC 15.8* 10.8 9.8  --    HGB 7.1* 7.0* 6.7* 7.1*   HCT 21.8* 20.8* 21.0* 21.4*   * 520* 613*  --      BMP:    Recent Labs     02/22/23  1222 02/23/23  0550 02/24/23  0558   * 133* 135*   K 3.8 3.8 4.3    105 107   CO2 18* 17* 17*   BUN 29* 31* 34*   CREATININE 3.3* 3.4* 3.4*   GLUCOSE 108* 110* 104*     Hepatic:   Recent Labs     02/22/23  0550 02/23/23  0550 02/24/23  0558   AST 20 24 18   ALT 12 14 12   BILITOT 0.9 0.6 0.4   ALKPHOS 132* 158* 158*     Mag:    No results for input(s): MG in the last 72 hours. Phos:     Recent Labs     02/22/23  1222   PHOS 4.9      INR:   Recent Labs     02/22/23  1222   INR 1.39*         Radiology Review:       ASSESSMENT AND PLAN:  63 y.o. female status post Rick's procedure with postop bleeding, and  s/p evacuation of large hemoperitoneum.   No further sx of active abdominal bleeding    - cont diet as tolerated   - TARSHA - stabilizing, Nephro holding IVFs to try and reduce fluid overload, possible trial of diuretics. Could still need dialysis to better manage the fluid shifts   - Wound infection - cont wound care (packing) and IV abx   - Anemia, need for anticoagulation - again, I think her H/H has essentially been constant/stable for past week (albeit lower than normal), and as such I think she can resume her [facility] medications tomorrow.    - cont PT/OT    Dispo - unclear when exactly she will be ready for d/c, either to home or ECF        Electronically signed by Ashlee Mace MD

## 2023-02-24 NOTE — CARE COORDINATION
Chart reviewed day 15. Care managed per surgery, nephrology and IM. New ostomy, has output. Renal function staying 3.4. WBC's improved at 9.8, Cipro, Flagyl IVPB. Therapy with recs for [facility] as well. Lafonda Reasons following.  Benedicto Faith RN

## 2023-02-24 NOTE — PROGRESS NOTES
Occupational Therapy    Chart reviewed, pt Hgb 6.7 and 7.1 this AM, has not received transfusion. Will hold until pt is medically appropriate.      Pepito Mcwilliams, OTR/L

## 2023-02-24 NOTE — PROGRESS NOTES
Awake on bed, alert and oriented. On telemetry. IVF Normal Saline at 75ml/hr, PICC line at right arm. Noted midline incision and dressing on lower abdomen, clean dry and intact. With colostomy on left lower quadrant. CMU informed writer of an episode of Sinus Tachycardia 's. HR went back to 96 BPM. Pt stated that she was trying to lift her leg as it was almost on the floor. Pt noted to have headache as well, 8/10. PRN medication given as ordered. Placed bed on lowest position. Kept side rails up. Instructed to call for assitance.  Will continue to monitor

## 2023-02-25 LAB
A/G RATIO: 0.7 (ref 1.1–2.2)
ALBUMIN SERPL-MCNC: 2.1 G/DL (ref 3.4–5)
ALP BLD-CCNC: 132 U/L (ref 40–129)
ALT SERPL-CCNC: 11 U/L (ref 10–40)
ANION GAP SERPL CALCULATED.3IONS-SCNC: 11 MMOL/L (ref 3–16)
ANISOCYTOSIS: ABNORMAL
AST SERPL-CCNC: 17 U/L (ref 15–37)
BANDED NEUTROPHILS RELATIVE PERCENT: 10 % (ref 0–7)
BASOPHILS ABSOLUTE: 0 K/UL (ref 0–0.2)
BASOPHILS RELATIVE PERCENT: 0 %
BILIRUB SERPL-MCNC: 0.4 MG/DL (ref 0–1)
BUN BLDV-MCNC: 35 MG/DL (ref 7–20)
CALCIUM SERPL-MCNC: 7.4 MG/DL (ref 8.3–10.6)
CHLORIDE BLD-SCNC: 106 MMOL/L (ref 99–110)
CO2: 18 MMOL/L (ref 21–32)
CREAT SERPL-MCNC: 3.5 MG/DL (ref 0.6–1.2)
EOSINOPHILS ABSOLUTE: 0.7 K/UL (ref 0–0.6)
EOSINOPHILS RELATIVE PERCENT: 7 %
GFR SERPL CREATININE-BSD FRML MDRD: 14 ML/MIN/{1.73_M2}
GLUCOSE BLD-MCNC: 106 MG/DL (ref 70–99)
HCT VFR BLD CALC: 21.2 % (ref 36–48)
HCT VFR BLD CALC: 21.9 % (ref 36–48)
HEMATOLOGY PATH CONSULT: YES
HEMOGLOBIN: 6.6 G/DL (ref 12–16)
HEMOGLOBIN: 7 G/DL (ref 12–16)
HYPOCHROMIA: ABNORMAL
LYMPHOCYTES ABSOLUTE: 1.2 K/UL (ref 1–5.1)
LYMPHOCYTES RELATIVE PERCENT: 11 %
MACROCYTES: ABNORMAL
MCH RBC QN AUTO: 27.5 PG (ref 26–34)
MCHC RBC AUTO-ENTMCNC: 31.8 G/DL (ref 31–36)
MCV RBC AUTO: 86.4 FL (ref 80–100)
MICROCYTES: ABNORMAL
MONOCYTES ABSOLUTE: 1.7 K/UL (ref 0–1.3)
MONOCYTES RELATIVE PERCENT: 16 %
NEUTROPHILS ABSOLUTE: 6.9 K/UL (ref 1.7–7.7)
NEUTROPHILS RELATIVE PERCENT: 56 %
PDW BLD-RTO: 16.9 % (ref 12.4–15.4)
PLATELET # BLD: 681 K/UL (ref 135–450)
PLATELET SLIDE REVIEW: ABNORMAL
PMV BLD AUTO: 6.7 FL (ref 5–10.5)
POIKILOCYTES: ABNORMAL
POLYCHROMASIA: ABNORMAL
POTASSIUM REFLEX MAGNESIUM: 3.9 MMOL/L (ref 3.5–5.1)
RBC # BLD: 2.53 M/UL (ref 4–5.2)
SLIDE REVIEW: ABNORMAL
SODIUM BLD-SCNC: 135 MMOL/L (ref 136–145)
TOTAL PROTEIN: 5.1 G/DL (ref 6.4–8.2)
WBC # BLD: 10.5 K/UL (ref 4–11)

## 2023-02-25 PROCEDURE — 2580000003 HC RX 258: Performed by: SURGERY

## 2023-02-25 PROCEDURE — 97530 THERAPEUTIC ACTIVITIES: CPT

## 2023-02-25 PROCEDURE — 2500000003 HC RX 250 WO HCPCS: Performed by: SURGERY

## 2023-02-25 PROCEDURE — 6370000000 HC RX 637 (ALT 250 FOR IP): Performed by: SURGERY

## 2023-02-25 PROCEDURE — 85018 HEMOGLOBIN: CPT

## 2023-02-25 PROCEDURE — 6360000002 HC RX W HCPCS: Performed by: INTERNAL MEDICINE

## 2023-02-25 PROCEDURE — 85025 COMPLETE CBC W/AUTO DIFF WBC: CPT

## 2023-02-25 PROCEDURE — 86870 RBC ANTIBODY IDENTIFICATION: CPT

## 2023-02-25 PROCEDURE — 86901 BLOOD TYPING SEROLOGIC RH(D): CPT

## 2023-02-25 PROCEDURE — 2500000003 HC RX 250 WO HCPCS: Performed by: INTERNAL MEDICINE

## 2023-02-25 PROCEDURE — 86850 RBC ANTIBODY SCREEN: CPT

## 2023-02-25 PROCEDURE — P9016 RBC LEUKOCYTES REDUCED: HCPCS

## 2023-02-25 PROCEDURE — 86900 BLOOD TYPING SEROLOGIC ABO: CPT

## 2023-02-25 PROCEDURE — 86922 COMPATIBILITY TEST ANTIGLOB: CPT

## 2023-02-25 PROCEDURE — 6360000002 HC RX W HCPCS: Performed by: SURGERY

## 2023-02-25 PROCEDURE — 97535 SELF CARE MNGMENT TRAINING: CPT

## 2023-02-25 PROCEDURE — 2580000003 HC RX 258: Performed by: INTERNAL MEDICINE

## 2023-02-25 PROCEDURE — 6370000000 HC RX 637 (ALT 250 FOR IP): Performed by: INTERNAL MEDICINE

## 2023-02-25 PROCEDURE — 86923 COMPATIBILITY TEST ELECTRIC: CPT

## 2023-02-25 PROCEDURE — 1200000000 HC SEMI PRIVATE

## 2023-02-25 PROCEDURE — 80053 COMPREHEN METABOLIC PANEL: CPT

## 2023-02-25 PROCEDURE — 85014 HEMATOCRIT: CPT

## 2023-02-25 PROCEDURE — 99024 POSTOP FOLLOW-UP VISIT: CPT | Performed by: SURGERY

## 2023-02-25 RX ORDER — SODIUM CHLORIDE 9 MG/ML
INJECTION, SOLUTION INTRAVENOUS PRN
Status: DISCONTINUED | OUTPATIENT
Start: 2023-02-25 | End: 2023-03-01

## 2023-02-25 RX ORDER — FUROSEMIDE 10 MG/ML
80 INJECTION INTRAMUSCULAR; INTRAVENOUS ONCE
Status: COMPLETED | OUTPATIENT
Start: 2023-02-25 | End: 2023-02-25

## 2023-02-25 RX ADMIN — OXYCODONE HYDROCHLORIDE 10 MG: 5 TABLET ORAL at 21:19

## 2023-02-25 RX ADMIN — METRONIDAZOLE 500 MG: 500 INJECTION, SOLUTION INTRAVENOUS at 00:12

## 2023-02-25 RX ADMIN — BUTALBITAL, ACETAMINOPHEN, AND CAFFEINE 1 TABLET: 50; 325; 40 TABLET ORAL at 14:23

## 2023-02-25 RX ADMIN — ATORVASTATIN CALCIUM 40 MG: 40 TABLET, FILM COATED ORAL at 09:53

## 2023-02-25 RX ADMIN — SODIUM CHLORIDE, PRESERVATIVE FREE 10 ML: 5 INJECTION INTRAVENOUS at 09:42

## 2023-02-25 RX ADMIN — FAMOTIDINE 20 MG: 10 INJECTION, SOLUTION INTRAVENOUS at 09:43

## 2023-02-25 RX ADMIN — DOCUSATE SODIUM 100 MG: 100 CAPSULE, LIQUID FILLED ORAL at 09:53

## 2023-02-25 RX ADMIN — ALLOPURINOL 100 MG: 100 TABLET ORAL at 09:53

## 2023-02-25 RX ADMIN — ALTEPLASE 1 MG: 2.2 INJECTION, POWDER, LYOPHILIZED, FOR SOLUTION INTRAVENOUS at 06:34

## 2023-02-25 RX ADMIN — Medication 5 MG: at 21:19

## 2023-02-25 RX ADMIN — FUROSEMIDE 80 MG: 10 INJECTION, SOLUTION INTRAMUSCULAR; INTRAVENOUS at 18:47

## 2023-02-25 RX ADMIN — METRONIDAZOLE 500 MG: 500 INJECTION, SOLUTION INTRAVENOUS at 16:25

## 2023-02-25 RX ADMIN — METRONIDAZOLE 500 MG: 500 INJECTION, SOLUTION INTRAVENOUS at 09:56

## 2023-02-25 RX ADMIN — APIXABAN 2.5 MG: 2.5 TABLET, FILM COATED ORAL at 21:20

## 2023-02-25 RX ADMIN — LEVOTHYROXINE SODIUM 200 MCG: 0.1 TABLET ORAL at 05:24

## 2023-02-25 RX ADMIN — CIPROFLOXACIN 400 MG: 2 INJECTION, SOLUTION INTRAVENOUS at 12:09

## 2023-02-25 RX ADMIN — HYDROMORPHONE HYDROCHLORIDE 0.5 MG: 0.5 INJECTION, SOLUTION INTRAMUSCULAR; INTRAVENOUS; SUBCUTANEOUS at 06:38

## 2023-02-25 RX ADMIN — ALTEPLASE 1 MG: 2.2 INJECTION, POWDER, LYOPHILIZED, FOR SOLUTION INTRAVENOUS at 06:35

## 2023-02-25 RX ADMIN — PROPRANOLOL HYDROCHLORIDE 40 MG: 40 TABLET ORAL at 21:20

## 2023-02-25 RX ADMIN — SODIUM CHLORIDE, PRESERVATIVE FREE 10 ML: 5 INJECTION INTRAVENOUS at 21:20

## 2023-02-25 RX ADMIN — FAMOTIDINE 20 MG: 10 INJECTION, SOLUTION INTRAVENOUS at 21:20

## 2023-02-25 ASSESSMENT — PAIN SCALES - GENERAL
PAINLEVEL_OUTOF10: 8
PAINLEVEL_OUTOF10: 3
PAINLEVEL_OUTOF10: 7

## 2023-02-25 ASSESSMENT — PAIN DESCRIPTION - LOCATION: LOCATION: BACK

## 2023-02-25 NOTE — PROGRESS NOTES
Occupational Therapy  Facility/Department: [facility] C3 TELE/MED SURG/ONC  Daily Treatment Note  NAME: Farheen Arias  : 1960  MRN: [id number]    Date of Service: 2023    Discharge Recommendations:  24 hour supervision or assist, Home with Home health OT  OT Equipment Recommendations  Equipment Needed: No      Patient Diagnosis(es): The encounter diagnosis was Sigmoid stricture ([address] Assessment    Assessment: Pt tolerated treatment fairly. Engaged pt in ambulatory toilet transfer and mobility required for household distance. RN reporting HR up to 150 bpm after mobility.  bpm after 1 min seated rest break and 100-110's bpm after ~3 mins. Engaged pt in EOB sponge bath with pt requiring CGA for UB bathing and mod A for LB bathing. Pt reporting family able to provide assist for LB ADLs at discharge and declining assistive equipement trials. Pt most limited by fatigue however motivated to participate in therapy session. OT continues to recommend home with 24H supv/assist +HHOT once medically stable. Activity Tolerance: Patient tolerated evaluation without incident;Patient tolerated treatment well;Patient limited by pain; Patient limited by fatigue  Discharge Recommendations: 24 hour supervision or assist;Home with Home health OT  Equipment Needed: No      Plan   Occupational Therapy Plan  Times Per Week: 3-5x/wk  Current Treatment Recommendations: Strengthening;ROM;Balance training;Functional mobility training; Endurance training;Gait training; Safety education & training;Patient/Caregiver education & training;Equipment evaluation, education, & procurement;Positioning;Self-Care / ADL; Home management training     Restrictions  Restrictions/Precautions  Restrictions/Precautions: General Precautions; Fall Risk  Position Activity Restriction  Other position/activity restrictions: IV, tele, abdominal incision, colostomy    Subjective   Subjective  Subjective: Pt presented in bed.  Vitals: /70, HR 90, O2 975 on RA. \"I didn't get much sleep last night\"  Pain: Pt endorsing abdominal pain at surgical site, does not formally rate  Orientation  Overall Orientation Status: Within Functional Limits  Orientation Level: Oriented X4  Cognition  Overall Cognitive Status: WFL        Objective    Bed Mobility Training  Overall Level of Assistance: Minimum assistance;Contact-guard assistance (HOB elevated, use of BR)  Supine to Sit: Contact-guard assistance; Additional time (HOB elevated, use of BR)  Sit to Supine: Minimum assistance; Additional time (assist for RLE; HOB elevated, use of BR)  Scooting: Stand-by assistance (at EOB)  Balance  Sitting: Intact  Standing: With support (w/ RW min A)  Transfer Training  Transfer Training: Yes (RN reporting  bpm after ambulatory toilet transfers and ambulation to doorway. after 1 min seated rest break 120 bpm, after 3 mins 105 bpm)  Overall Level of Assistance: Minimum assistance; Adaptive equipment (w/ RW)  Interventions: Weight shifting training/pressure relief; Safety awareness training;Verbal cues  Sit to Stand: Minimum assistance;Assist X1;Adaptive equipment; Additional time (w/ RW)  Stand to Sit: Minimum assistance;Assist X1;Adaptive equipment; Additional time (w/ RW)  Toilet Transfer: Minimum assistance; Adaptive equipment;Assist X1 (w/ RW and R grab bar)     ADL  Feeding: Setup  UE Bathing: Contact guard assistance  UE Bathing Skilled Clinical Factors: sponge bath at EOB, assist provided for back only  LE Bathing:  Moderate assistance  LE Bathing Skilled Clinical Factors: Pt able to cleanse buttocks and B thighs, assist provided for B calves and feet  UE Dressing: Minimal assistance  UE Dressing Skilled Clinical Factors: gown exchange  LE Dressing: Stand by assistance  LE Dressing Skilled Clinical Factors: shoes  Toileting: Contact guard assistance  Toileting Skilled Clinical Factors: no clothing management, pt able to perform all parts after urination at toilet        Safety Devices  Type of Devices: Left in bed;Patient at risk for falls;Gait belt;Bed alarm in place;Call light within reach  Restraints  Restraints Initially in Place: No     Patient Education  Education Given To: Patient; Family  Education Provided: Role of Therapy;Transfer Training;Plan of Care;Energy Conservation;Equipment; Fall Prevention Strategies; ADL Adaptive Strategies  Education Method: Demonstration;Verbal  Barriers to Learning: None  Education Outcome: Verbalized understanding;Demonstrated understanding;Continued education needed    AM-PAC score  AM-PAC Inpatient Daily Activity Raw Score: 17 (02/25/23 1612)  AM-PAC Inpatient ADL T-Scale Score : 37.26 (02/25/23 1612)  ADL Inpatient CMS 0-100% Score: 50.11 (02/25/23 1612)  ADL Inpatient CMS G-Code Modifier : CK (02/25/23 1612)    Goals  Short Term Goals  Time Frame for Short Term Goals: 1 wk 3/01/2023 unless otherwise noted- goals progressing 2/25  Short Term Goal 1: pt will complete stance sink side ADLs SBA 2/28/2023  Short Term Goal 2: pt will complete toileting SPV  Short Term Goal 3: pt will complete LB dressing SBA       Therapy Time   Individual Concurrent Group Co-treatment   Time In 1453         Time Out 1531         Minutes 38         Timed Code Treatment Minutes: 38 Minutes     If pt is unable to be seen after this session, please let this note serve as discharge summary. Please see case management note for discharge disposition. Thank you.       Livier Coleman, OT

## 2023-02-25 NOTE — PROGRESS NOTES
[facility] Progress Note           Subjective/   63y.o. year old female who we are seeing in consultation for TARSHA. HPI:  Renal function is about the same  She is very fatigued and weak  C/o LE swelling and hard to ambulate   Intake slow to improve. ROS:  +weak, denies any shortness of breath, c/o abdominal pain.     Objective/   GEN:  Chronically ill, /66   Pulse 92   Temp 99.2 °F (37.3 °C) (Oral)   Resp 16   Ht 5' 5\" (1.651 m)   Wt 243 lb 13.3 oz (110.6 kg)   LMP  (LMP Unknown) Comment: HYSTERECTOMY  SpO2 94%   BMI 40.58 kg/m²   HEENT: non-icteric, no JVD  CV: S1, S2 without m/r/g; ++ LE edema  RESP: CTA B without w/r/r; breathing wnl  ABD: +bs, soft, tender with palpation, no hsm  SKIN: warm, no rashes    Data/  Recent Labs     02/23/23  1015 02/24/23  0558 02/24/23  1017 02/25/23  0621   WBC 10.8 9.8  --  10.5   HGB 7.0* 6.7* 7.1* 7.0*   HCT 20.8* 21.0* 21.4* 21.9*   MCV 85.8 85.6  --  86.4   * 613*  --  681*       Recent Labs     02/23/23  0550 02/24/23  0558 02/25/23  0621   * 135* 135*   K 3.8 4.3 3.9    107 106   CO2 17* 17* 18*   GLUCOSE 110* 104* 106*   BUN 31* 34* 35*   CREATININE 3.4* 3.4* 3.5*   LABGLOM 15* 15* 14*     UA 1+ protein, trace blood, s.g. 1.015  Urine sodium <20  Lactic acid 0.8    Assessment/     -TARSHA, pre-renal to early ATN in the setting of hypotension & vancomycin/zosyn exposure, antibiotics now changed   SCr stabilizing in mid 3 range with IVF's, non-oliguric    -Anemia, acute blood loss   s/p 2 PRBCs 2/15-2/16    -Hemorrhagic shock , resolved    -Metabolic acidosis - in setting of TARSHA, Lactate wnl    -Right leg hematoma    -Large bowel obstruction s/p pati's  & colostomy on 2/14/23     -Right above knee to below knee popliteal artery bypass on 1/16/23      Plan/     - Avoid IV fluids  - IV lasix 80 mg x 1  - Monitoring for dialysis needs, discussed possibility with patient though hopeful to avoid  - Trend bp's & urine output    ____________________________________  Flynn Trimble MD  [facility]  [email or web address]  Office: [phone]

## 2023-02-25 NOTE — PROGRESS NOTES
Hospitalist Progress Note      PCP: Corrie Carpenter    Date of Admission: 2/9/2023    Chief Complaint:   Abd pain    Hospital Course: Ms. Sultana Mcintosh is a 63year old female who presented to [facility] from [facility] with abdominal pain and constipation on 2/9. She had a lower extremity vascular bypass surgery on her right leg a few weeks ago. She reports constipation and abdominal distension following her surgery with ng tube placement as well as a rectal tube. Patient reports her follow-up colonoscopy revealed sigmoid edema with diverticula and an ulcer, she then improved and was discharged. She again developed abdominal pain and distension and has not had a BM since 2/5. She denies trying any stool softeners or motility agents at home. 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 She also has had surgeries for adhesions and ventral hernia repair. She underwent a Haylie's procedure with GS on 2/14/23 with colostomy placement. Rapid response called on 2/16/23 for hypotension (as below). Found to have abdominal/pelvic hemorrhage on CT along with R thigh hematoma noted over previous bypass incision site on US. Patient status post surgery with resection of colon and diverting colostomy on 2/15/2023. Patient developed hypotension on 2/15/2023. Patient had further hypotension on 2/16/2023 and it was noted patient had intra-abdominal bleed with right thigh hematoma. Patient's hemoglobin had dropped to 5. Patient was transfused with 2 units. She appeared to stabilize but then her blood pressure decreased and her heart rate increased. Patient's hemoglobin dropped back down to 5 after being up to 6.9. It was decided to take patient back to surgery. During surgery patient was noted to have old blood in her abdomen no active bleed was noted but 3 L of old blood removed. Subjective: Patient seen and examined  . Denies any New C/o . D/w Dr. Jess Banegas regarding timing of [facility] . Her AM Hb down to 6.7 , 7.1 on repeat. Stable over the past few days . No active bleeding per patient . Will follow Abdominal wound cultures . Currently on IV cipro/Flagyl . Nephrology following for renal functions . Creatinine still 3.3 with good UOP . Continues with significant pedal edema B/L .      Medications:  Reviewed    Infusion Medications    sodium chloride      dextrose      sodium chloride Stopped (02/12/23 1011)     Scheduled Medications    ciprofloxacin  400 mg IntraVENous Q24H    metroNIDAZOLE  500 mg IntraVENous Q8H    melatonin  5 mg Oral Nightly    famotidine (PEPCID) injection  20 mg IntraVENous BID    docusate sodium  100 mg Oral Daily    sodium chloride flush  5-40 mL IntraVENous 2 times per day    allopurinol  100 mg Oral Daily    atorvastatin  40 mg Oral Daily    [Held by provider] aspirin  81 mg Oral Daily    [Held by provider] gabapentin  300 mg Oral TID    levothyroxine  200 mcg Oral Daily    propranolol  40 mg Oral BID    [Held by provider] enoxaparin  1 mg/kg SubCUTAneous BID     PRN Meds: butalbital-acetaminophen-caffeine, oxyCODONE **OR** oxyCODONE, HYDROmorphone, sodium chloride, glucose, dextrose bolus **OR** dextrose bolus, glucagon (rDNA), dextrose, benzocaine, sodium chloride flush, sodium chloride, ondansetron **OR** ondansetron, acetaminophen **OR** acetaminophen, potassium chloride **OR** potassium alternative oral replacement **OR** [DISCONTINUED] potassium chloride, magnesium sulfate, sodium phosphate IVPB **OR** sodium phosphate IVPB **OR** sodium phosphate IVPB      Intake/Output Summary (Last 24 hours) at 2/25/2023 1036  Last data filed at 2/25/2023 0555  Gross per 24 hour   Intake 1578.86 ml   Output 1230 ml   Net 348.86 ml       Physical Exam Performed:  /76   Pulse 94   Temp 98.3 °F (36.8 °C) (Oral)   Resp 16   Ht 5' 5\" (1.651 m)   Wt 243 lb 13.3 oz (110.6 kg)   LMP  (LMP Unknown) Comment: HYSTERECTOMY  SpO2 98%   BMI 40.58 kg/m²     General appearance: No apparent distress, appears stated age and cooperative. HEENT: Pupils equal, round, and reactive to light. Conjunctivae/corneas clear. Neck: Supple, with full range of motion. No jugular venous distention. Trachea midline. Respiratory:  Normal respiratory effort. Clear to auscultation, bilaterally without Rales/Wheezes/Rhonchi. Cardiovascular: Regular rate and rhythm with normal S1/S2 without murmurs, rubs or gallops. Abdomen: Tender. Midline incision dressed. Positive bowel sounds noted. Ostomy left lower quadrant  Musculoskeletal: Hematoma on right medial thigh resolved . No skin bruise /ecchymosis noted ; 2 + pedal edema   Skin: Skin color, texture, turgor normal.  No rashes or lesions. Neurologic:  Neurovascularly intact without any focal sensory/motor deficits.  Cranial nerves: II-XII intact, grossly non-focal.  Psychiatric: Alert and oriented, thought content appropriate, normal insight  Capillary Refill: Brisk, 3 seconds, normal   Peripheral Pulses: +2 palpable, equal bilaterally     Labs:   Recent Labs     02/23/23  1015 02/24/23  0558 02/24/23  1017 02/25/23  0621   WBC 10.8 9.8  --  10.5   HGB 7.0* 6.7* 7.1* 7.0*   HCT 20.8* 21.0* 21.4* 21.9*   * 613*  --  681*       Recent Labs     02/22/23  1222 02/23/23  0550 02/24/23  0558 02/25/23  0621   * 133* 135* 135*   K 3.8 3.8 4.3 3.9    105 107 106   CO2 18* 17* 17* 18*   BUN 29* 31* 34* 35*   CREATININE 3.3* 3.4* 3.4* 3.5*   CALCIUM 7.7* 7.4* 7.2* 7.4*   PHOS 4.9  --   --   --        Recent Labs     02/23/23  0550 02/24/23  0558 02/25/23  0621   AST 24 18 17   ALT 14 12 11   BILITOT 0.6 0.4 0.4   ALKPHOS 158* 158* 132*       Urinalysis:    Lab Results   Component Value Date/Time    NITRU Negative 02/22/2023 03:55 AM    WBCUA None seen 02/22/2023 03:55 AM    RBCUA 3-4 02/22/2023 03:55 AM    BLOODU TRACE-INTACT 02/22/2023 03:55 AM    SPECGRAV 1.015 02/22/2023 03:55 AM    GLUCOSEU Negative 02/22/2023 03:55 AM       Radiology:  VL DUP LOWER EXTREMITY ARTERIES RIGHT   Final Result      CT ABDOMEN PELVIS WO CONTRAST Additional Contrast? None   Final Result   Large amount of complex fluid within the abdomen and pelvis, most notably   within the pelvis where hematocrit levels are seen, compatible with   hemorrhage. Smaller 4.9 cm collection within subcutaneous fat adjacent to the left lower   quadrant stoma, compatible with hematoma. Amorphous inflammation or   hemorrhage is seen within the soft tissues immediately adjacent. Mural thickening of the ascending colon and transverse colon, which can   reflect colitis in the appropriate clinical setting. Mild left pelviectasis. Pneumoperitoneum, in keeping with the recent postoperative state. Bibasilar atelectasis. 1 cm right lower lobe pulmonary nodule, for which follow-up recommendations   are as below. RECOMMENDATIONS:   Fleischner Society guidelines for follow-up and management of incidentally   detected pulmonary nodules:      Nodule size greater than 8 mm         In a low-risk patient, consider CT at 3 months, PET/CT, or tissue sampling. In a high-risk patient, consider CT at 3 months, PET/CT, or tissue sampling.      - Low risk patients include individuals with minimal or absent history of   smoking and other known risk factors. - High risk patients include individuals with a history or smoking or known   risk factors. Radiology 2017 http://pubs. rsna.org/doi/full/10.1148/radiol. 5068894744         IR PICC WO SQ PORT/PUMP > 5 YEARS   Final Result      CT ABDOMEN PELVIS WO CONTRAST Additional Contrast? None   Final Result   1. Barium contrast from prior small-bowel follow-through is seen throughout   the colon and limits evaluation due to streak artifact. No definite abscess   is identified. 2.  Infrarenal abdominal aortic aneurysm measures up to 3.0 cm. See   recommendations for follow-up below. 3.  There is a 1.1 cm right lower lobe pulmonary nodule and a 0.3 cm left   lower lobe pulmonary nodule. Recommend dedicated CT chest for further   evaluation. RECOMMENDATIONS:      Multiple. Worst: 11 mm solid      Pathology: Multiple pulmonary nodules. Most severe: 11 mm solid pulmonary   nodule detected on incomplete chest CT. Recommend prompt non-contrast Chest CT for further evaluation. These guidelines do not apply to immunocompromised patients and patients with   cancer. Follow up in patients with significant comorbidities as clinically   warranted. For lung cancer screening, adhere to Lung-RADS guidelines. Reference: Radiology. 2017; 284(1):228-43         3 cm AAA      Pathology: 3 cm infrarenal abdominal aortic aneurysm. Recommend follow-up every 3 years. Reference: J Am [place] Radiol 0557;62:451-024. XR ABDOMEN (KUB) (SINGLE AP VIEW)   Final Result   Cancellation of scheduled hypaque enema due to presence of barium throughout   the colon including the rectosigmoid region as described above. FL SMALL BOWEL FOLLOW THROUGH ONLY   Final Result   Mildly dilated small bowel, though small-bowel transit time is upper limits   of normal.         CT ABDOMEN PELVIS W IV CONTRAST Additional Contrast? None   Final Result   Diverticulitis of the descending sigmoid junction. Mild wall thickening but   negative for obstruction.              IP CONSULT TO GENERAL SURGERY  IP CONSULT TO GI  IP CONSULT TO PHARMACY  IP CONSULT TO NEPHROLOGY  IP CONSULT TO DIETITIAN  PHARMACY TO DOSE VANCOMYCIN    Assessment/Plan:  Active Hospital Problems    Diagnosis     Sigmoid stricture ([address] [K56.699]      Priority: Medium    Hemorrhagic shock ([address] [R57.8]      Priority: Medium    Acute blood loss anemia [D62]      Priority: Medium    Acute renal failure (ARF) ([address] [N17.9]      Priority: Medium    Diverticulitis [K57.92]      Priority: Medium    Colonic obstruction ([address] [N30.219] Priority: Medium    Bowel obstruction ([facility] [address] [K56.609]      Priority: Medium     Diverticulitis with obstruction due to sigmoid stricture . Patient on IV Vanco/Zosyn through 2/21/23 and dc'd Vanco  .  Status post resection with diverting colostomy (Rick's procedure)  2/14/22 . General surgery is following. Good ostomy output. Noted general surgery expressed pus out of her wound site on 2/21/2023 and sent for cultures. Antibiotics transition to IV Cipro/Flagyl per general surgery on 2/22/2023. surgical wound and blood cultures - NGTD   Peripheral artery disease. Patient status post bypass of right lower extremity. Patient was on therapeutic Lovenox. Patient developed right leg hematoma and intra-abdominal bleed. Lovenox and aspirin held. Patient with synthetic graft and may require long-term anticoagulation. Vascular  suggested AC  once it safe to anticoagulate. Not yet started . Right leg hematoma improved. TARSHA. This is secondary to hypotension and hypovolemia (from high ostomy output) . Nephrology consulted - following and asssited with Mx. Improved and started worsening since 2/21/2023 . continue to monitor closely. Avoid nephrotoxic agents including Toradol. Creatinine normalized but again started worsening up to 3.3 this morning. Monitor urine output. On IVF   Hypokalemia. Supplemented . Calss III Obesity -  With Body mass index is 40.58 kg/m². Complicating assessment and treatment. Placing patient at risk for multiple co-morbidities as well as early death and contributing to the patient's presentation. Counseled on weight loss. Post-Operative surgical site wound infection - General surgery following and assisting     DVT Prophylaxis: On hold due to bleed/Hematoma  ; Will start back on  Eliquis  2.5 mg BID if ok with gen surg   Diet: ADULT DIET;  Regular  ADULT ORAL NUTRITION SUPPLEMENT; Breakfast, Dinner; Standard High Calorie/High Protein Oral Supplement  Code Status: Full Code    PT/OT Eval Status: Ambulatory     Dispo -Home versus SNF when stable    Appropriate for A1 Discharge Unit: Wendy Zendejas MD

## 2023-02-25 NOTE — PROGRESS NOTES
[facility] GENERAL SURGERY    Surgery Progress Note           POD #  9/11    PATIENT NAME: Neelam Soler     TODAY'S DATE: 2/25/2023    INTERVAL HISTORY:    Pt  feels well. Tolerating PO. Ostomy function. OBJECTIVE:   VITALS:  /66   Pulse 92   Temp 99.2 °F (37.3 °C) (Oral)   Resp 16   Ht 5' 5\" (1.651 m)   Wt 243 lb 13.3 oz (110.6 kg)   LMP  (LMP Unknown) Comment: HYSTERECTOMY  SpO2 94%   BMI 40.58 kg/m²   9  INTAKE/OUTPUT:    I/O last 3 completed shifts: In: 4555.8 [P.O.:1680; I.V.:2085; IV Piggyback:790.8]  Out: 7051 [Urine:1250; GPHQW:4843]  No intake/output data recorded. CONSTITUTIONAL:  awake and alert  LUNGS:  clear to auscultation  ABDOMEN:   normal bowel sounds, soft, non-distended, ostomy function   INCISION: lower incision with less drainage    Data:  CBC:   Recent Labs     02/23/23  1015 02/24/23  0558 02/24/23  1017 02/25/23  0621   WBC 10.8 9.8  --  10.5   HGB 7.0* 6.7* 7.1* 7.0*   HCT 20.8* 21.0* 21.4* 21.9*   * 613*  --  681*     BMP:    Recent Labs     02/23/23  0550 02/24/23  0558 02/25/23  0621   * 135* 135*   K 3.8 4.3 3.9    107 106   CO2 17* 17* 18*   BUN 31* 34* 35*   CREATININE 3.4* 3.4* 3.5*   GLUCOSE 110* 104* 106*     Hepatic:   Recent Labs     02/23/23  0550 02/24/23  0558 02/25/23  0621   AST 24 18 17   ALT 14 12 11   BILITOT 0.6 0.4 0.4   ALKPHOS 158* 158* 132*     Mag:    No results for input(s): MG in the last 72 hours. Phos:   No results for input(s): PHOS in the last 72 hours. INR: No results for input(s): INR in the last 72 hours. Radiology Review:  NA    ASSESSMENT AND PLAN:  63 y.o. female status post Rick's procedure with postop bleeding, and  s/p evacuation of large hemoperitoneum. No further sx of active abdominal bleeding, so restart anticoagulation. Continue antibiotics and local wound care.  D/C planning in progress         Electronically signed by Sarai Katz MD

## 2023-02-25 NOTE — PLAN OF CARE
Problem: Pain  Goal: Verbalizes/displays adequate comfort level or baseline comfort level  Outcome: Progressing     Problem: ABCDS Injury Assessment  Goal: Absence of physical injury  Outcome: Progressing  Flowsheets (Taken 2/11/2023 2239 by Reyes Puma)  Absence of Physical Injury: Implement safety measures based on patient assessment     Problem: Safety - Adult  Goal: Free from fall injury  Outcome: Progressing  Flowsheets (Taken 2/23/2023 0343 by Marlon Arnold RN)  Free From Fall Injury: Instruct family/caregiver on patient safety     Problem: Skin/Tissue Integrity  Goal: Absence of new skin breakdown  Description: 1. Monitor for areas of redness and/or skin breakdown  2. Assess vascular access sites hourly  3. Every 4-6 hours minimum:  Change oxygen saturation probe site  4. Every 4-6 hours:  If on nasal continuous positive airway pressure, respiratory therapy assess nares and determine need for appliance change or resting period.   Outcome: Progressing     Problem: Nutrition Deficit:  Goal: Optimize nutritional status  Outcome: Progressing  Flowsheets (Taken 2/24/2023 1301 by Sobeida Christian, RD, LD)  Nutrient intake appropriate for improving, restoring, or maintaining nutritional needs: Monitor oral intake, labs, and treatment plans

## 2023-02-25 NOTE — PROGRESS NOTES
A&Ox4. No complaints of /SOB, chest pain or heaviness. No cyanosis or pallor. With intact colostomy on RUQ, no leaking or bleeding. With intact incisions, no undue bleeding or discharges. No nausea or vomiting. Clustered care. Emptied colostomy bag. Will continue to monitor.

## 2023-02-25 NOTE — PLAN OF CARE
Problem: Safety - Adult  Goal: Free from fall injury  2/25/2023 1324 by Vonzell Baumgarten, RN  Outcome: Progressing  Flowsheets (Taken 2/23/2023 0343 by Escobar Du RN)  Free From Fall Injury: Instruct family/caregiver on patient safety

## 2023-02-26 LAB
A/G RATIO: 0.6 (ref 1.1–2.2)
ABO/RH: NORMAL
ALBUMIN SERPL-MCNC: 1.8 G/DL (ref 3.4–5)
ALP BLD-CCNC: 110 U/L (ref 40–129)
ALT SERPL-CCNC: 9 U/L (ref 10–40)
ANION GAP SERPL CALCULATED.3IONS-SCNC: 12 MMOL/L (ref 3–16)
ANTIBODY IDENTIFICATION: NORMAL
ANTIBODY SCREEN: NORMAL
ANTIBODY SCREEN: NORMAL
AST SERPL-CCNC: 17 U/L (ref 15–37)
BILIRUB SERPL-MCNC: 0.4 MG/DL (ref 0–1)
BLOOD BANK DISPENSE STATUS: NORMAL
BLOOD BANK PRODUCT CODE: NORMAL
BPU ID: NORMAL
BUN BLDV-MCNC: 36 MG/DL (ref 7–20)
CALCIUM SERPL-MCNC: 7.1 MG/DL (ref 8.3–10.6)
CHLORIDE BLD-SCNC: 107 MMOL/L (ref 99–110)
CO2: 16 MMOL/L (ref 21–32)
CREAT SERPL-MCNC: 3.4 MG/DL (ref 0.6–1.2)
DESCRIPTION BLOOD BANK: NORMAL
GFR SERPL CREATININE-BSD FRML MDRD: 15 ML/MIN/{1.73_M2}
GLUCOSE BLD-MCNC: 94 MG/DL (ref 70–99)
HCT VFR BLD CALC: 19.2 % (ref 36–48)
HCT VFR BLD CALC: 21.8 % (ref 36–48)
HCT VFR BLD CALC: 26.2 % (ref 36–48)
HEMOGLOBIN: 6.3 G/DL (ref 12–16)
HEMOGLOBIN: 7.2 G/DL (ref 12–16)
HEMOGLOBIN: 8.4 G/DL (ref 12–16)
PHOSPHORUS: 4.9 MG/DL (ref 2.5–4.9)
POTASSIUM REFLEX MAGNESIUM: 4.2 MMOL/L (ref 3.5–5.1)
POTASSIUM SERPL-SCNC: 4.2 MMOL/L (ref 3.5–5.1)
SODIUM BLD-SCNC: 135 MMOL/L (ref 136–145)
TOTAL PROTEIN: 4.7 G/DL (ref 6.4–8.2)

## 2023-02-26 PROCEDURE — 99024 POSTOP FOLLOW-UP VISIT: CPT | Performed by: SURGERY

## 2023-02-26 PROCEDURE — 6360000002 HC RX W HCPCS: Performed by: INTERNAL MEDICINE

## 2023-02-26 PROCEDURE — 6360000002 HC RX W HCPCS: Performed by: SURGERY

## 2023-02-26 PROCEDURE — 6370000000 HC RX 637 (ALT 250 FOR IP): Performed by: SURGERY

## 2023-02-26 PROCEDURE — 6370000000 HC RX 637 (ALT 250 FOR IP): Performed by: INTERNAL MEDICINE

## 2023-02-26 PROCEDURE — 2500000003 HC RX 250 WO HCPCS: Performed by: INTERNAL MEDICINE

## 2023-02-26 PROCEDURE — 85018 HEMOGLOBIN: CPT

## 2023-02-26 PROCEDURE — 80053 COMPREHEN METABOLIC PANEL: CPT

## 2023-02-26 PROCEDURE — 1200000000 HC SEMI PRIVATE

## 2023-02-26 PROCEDURE — 36430 TRANSFUSION BLD/BLD COMPNT: CPT

## 2023-02-26 PROCEDURE — 85014 HEMATOCRIT: CPT

## 2023-02-26 PROCEDURE — 2580000003 HC RX 258: Performed by: SURGERY

## 2023-02-26 PROCEDURE — 2500000003 HC RX 250 WO HCPCS: Performed by: SURGERY

## 2023-02-26 RX ORDER — SODIUM CHLORIDE 9 MG/ML
INJECTION, SOLUTION INTRAVENOUS PRN
Status: DISCONTINUED | OUTPATIENT
Start: 2023-02-26 | End: 2023-03-01

## 2023-02-26 RX ORDER — FUROSEMIDE 10 MG/ML
80 INJECTION INTRAMUSCULAR; INTRAVENOUS ONCE
Status: COMPLETED | OUTPATIENT
Start: 2023-02-26 | End: 2023-02-26

## 2023-02-26 RX ADMIN — FUROSEMIDE 80 MG: 10 INJECTION, SOLUTION INTRAMUSCULAR; INTRAVENOUS at 16:28

## 2023-02-26 RX ADMIN — SODIUM CHLORIDE, PRESERVATIVE FREE 10 ML: 5 INJECTION INTRAVENOUS at 08:40

## 2023-02-26 RX ADMIN — FAMOTIDINE 20 MG: 10 INJECTION, SOLUTION INTRAVENOUS at 20:24

## 2023-02-26 RX ADMIN — Medication 5 MG: at 20:25

## 2023-02-26 RX ADMIN — HYDROMORPHONE HYDROCHLORIDE 0.5 MG: 0.5 INJECTION, SOLUTION INTRAMUSCULAR; INTRAVENOUS; SUBCUTANEOUS at 00:14

## 2023-02-26 RX ADMIN — DOCUSATE SODIUM 100 MG: 100 CAPSULE, LIQUID FILLED ORAL at 08:45

## 2023-02-26 RX ADMIN — OXYCODONE 5 MG: 5 TABLET ORAL at 20:27

## 2023-02-26 RX ADMIN — METRONIDAZOLE 500 MG: 500 INJECTION, SOLUTION INTRAVENOUS at 16:28

## 2023-02-26 RX ADMIN — METRONIDAZOLE 500 MG: 500 INJECTION, SOLUTION INTRAVENOUS at 08:39

## 2023-02-26 RX ADMIN — FAMOTIDINE 20 MG: 10 INJECTION, SOLUTION INTRAVENOUS at 08:39

## 2023-02-26 RX ADMIN — APIXABAN 2.5 MG: 2.5 TABLET, FILM COATED ORAL at 08:45

## 2023-02-26 RX ADMIN — ATORVASTATIN CALCIUM 40 MG: 40 TABLET, FILM COATED ORAL at 08:45

## 2023-02-26 RX ADMIN — LEVOTHYROXINE SODIUM 200 MCG: 0.1 TABLET ORAL at 08:48

## 2023-02-26 RX ADMIN — METRONIDAZOLE 500 MG: 500 INJECTION, SOLUTION INTRAVENOUS at 00:17

## 2023-02-26 RX ADMIN — CIPROFLOXACIN 400 MG: 2 INJECTION, SOLUTION INTRAVENOUS at 10:03

## 2023-02-26 RX ADMIN — BUTALBITAL, ACETAMINOPHEN, AND CAFFEINE 1 TABLET: 50; 325; 40 TABLET ORAL at 14:33

## 2023-02-26 RX ADMIN — PROPRANOLOL HYDROCHLORIDE 40 MG: 40 TABLET ORAL at 20:25

## 2023-02-26 RX ADMIN — SODIUM CHLORIDE, PRESERVATIVE FREE 10 ML: 5 INJECTION INTRAVENOUS at 20:30

## 2023-02-26 RX ADMIN — PROPRANOLOL HYDROCHLORIDE 40 MG: 40 TABLET ORAL at 08:45

## 2023-02-26 RX ADMIN — ALLOPURINOL 100 MG: 100 TABLET ORAL at 08:45

## 2023-02-26 ASSESSMENT — PAIN DESCRIPTION - LOCATION
LOCATION: ABDOMEN;BACK
LOCATION: HEAD

## 2023-02-26 ASSESSMENT — PAIN - FUNCTIONAL ASSESSMENT: PAIN_FUNCTIONAL_ASSESSMENT: PREVENTS OR INTERFERES SOME ACTIVE ACTIVITIES AND ADLS

## 2023-02-26 ASSESSMENT — PAIN SCALES - GENERAL
PAINLEVEL_OUTOF10: 8
PAINLEVEL_OUTOF10: 0
PAINLEVEL_OUTOF10: 6
PAINLEVEL_OUTOF10: 0
PAINLEVEL_OUTOF10: 7

## 2023-02-26 ASSESSMENT — PAIN DESCRIPTION - DESCRIPTORS: DESCRIPTORS: ACHING

## 2023-02-26 ASSESSMENT — PAIN DESCRIPTION - ORIENTATION: ORIENTATION: MID

## 2023-02-26 NOTE — PROGRESS NOTES
Hospitalist Progress Note      PCP: Mirlande Paredes    Date of Admission: 2/9/2023    Chief Complaint:   Abd pain    Hospital Course: Ms. Marva Rascon is a 63year old female who presented to [facility] from [facility] with abdominal pain and constipation on 2/9. She had a lower extremity vascular bypass surgery on her right leg a few weeks ago. She reports constipation and abdominal distension following her surgery with ng tube placement as well as a rectal tube. Patient reports her follow-up colonoscopy revealed sigmoid edema with diverticula and an ulcer, she then improved and was discharged. She again developed abdominal pain and distension and has not had a BM since 2/5. She denies trying any stool softeners or motility agents at home. 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 She also has had surgeries for adhesions and ventral hernia repair. She underwent a Haylie's procedure with GS on 2/14/23 with colostomy placement. Rapid response called on 2/16/23 for hypotension (as below). Found to have abdominal/pelvic hemorrhage on CT along with R thigh hematoma noted over previous bypass incision site on US. Patient status post surgery with resection of colon and diverting colostomy on 2/15/2023. Patient developed hypotension on 2/15/2023. Patient had further hypotension on 2/16/2023 and it was noted patient had intra-abdominal bleed with right thigh hematoma. Patient's hemoglobin had dropped to 5. Patient was transfused with 2 units. She appeared to stabilize but then her blood pressure decreased and her heart rate increased. Patient's hemoglobin dropped back down to 5 after being up to 6.9. It was decided to take patient back to surgery. During surgery patient was noted to have old blood in her abdomen no active bleed was noted but 3 L of old blood removed.     Subjective: Patient seen and examined with daughter at bedside .  Denies any New C/o . D/w Dr. Molina Pardo regarding timing of [facility] [place] operated by [facility] on 2/24/23  . Ok to resume Eliquis 2.5 mg today if Hb stable . Resumed AC . Stable over the past few days . No active bleeding per patient . Reviewed  Abdominal wound cultures (Enterococcus raffinosus; Bacteroides ; clostridium sps) . Currently on IV cipro/Flagyl . Nephrology following for renal functions . Creatinine still 3.5 with good UOP . Continues with significant pedal edema B/L .      Medications:  Reviewed    Infusion Medications    sodium chloride      dextrose      sodium chloride Stopped (02/12/23 1547)     Scheduled Medications    apixaban  2.5 mg Oral BID    ciprofloxacin  400 mg IntraVENous Q24H    metroNIDAZOLE  500 mg IntraVENous Q8H    melatonin  5 mg Oral Nightly    famotidine (PEPCID) injection  20 mg IntraVENous BID    docusate sodium  100 mg Oral Daily    sodium chloride flush  5-40 mL IntraVENous 2 times per day    allopurinol  100 mg Oral Daily    atorvastatin  40 mg Oral Daily    [Held by provider] aspirin  81 mg Oral Daily    [Held by provider] gabapentin  300 mg Oral TID    levothyroxine  200 mcg Oral Daily    propranolol  40 mg Oral BID    [Held by provider] enoxaparin  1 mg/kg SubCUTAneous BID     PRN Meds: butalbital-acetaminophen-caffeine, oxyCODONE **OR** oxyCODONE, HYDROmorphone, sodium chloride, glucose, dextrose bolus **OR** dextrose bolus, glucagon (rDNA), dextrose, benzocaine, sodium chloride flush, sodium chloride, ondansetron **OR** ondansetron, acetaminophen **OR** acetaminophen, potassium chloride **OR** potassium alternative oral replacement **OR** [DISCONTINUED] potassium chloride, magnesium sulfate, sodium phosphate IVPB **OR** sodium phosphate IVPB **OR** sodium phosphate IVPB      Intake/Output Summary (Last 24 hours) at 2/25/2023 2159  Last data filed at 2/25/2023 2141  Gross per 24 hour   Intake --   Output 2050 ml   Net -2050 ml       Physical Exam Performed:  /73   Pulse 97   Temp 99.3 °F (37.4 °C) (Oral)   Resp 18   Ht 5' 5\" (1.651 m)   Wt 243 lb 13.3 oz (110.6 kg)   LMP  (LMP Unknown) Comment: HYSTERECTOMY  SpO2 96%   BMI 40.58 kg/m²     General appearance: No apparent distress, appears stated age and cooperative. HEENT: Pupils equal, round, and reactive to light. Conjunctivae/corneas clear. Neck: Supple, with full range of motion. No jugular venous distention. Trachea midline. Respiratory:  Normal respiratory effort. Clear to auscultation, bilaterally without Rales/Wheezes/Rhonchi. Cardiovascular: Regular rate and rhythm with normal S1/S2 without murmurs, rubs or gallops. Abdomen: Tender. Midline incision dressed. Positive bowel sounds noted. Ostomy left lower quadrant  Musculoskeletal: Hematoma on right medial thigh resolved . No skin bruise /ecchymosis noted ; 2 + pedal edema   Skin: Skin color, texture, turgor normal.  No rashes or lesions. Neurologic:  Neurovascularly intact without any focal sensory/motor deficits.  Cranial nerves: II-XII intact, grossly non-focal.  Psychiatric: Alert and oriented, thought content appropriate, normal insight  Capillary Refill: Brisk, 3 seconds, normal   Peripheral Pulses: +2 palpable, equal bilaterally     Labs:   Recent Labs     02/23/23  1015 02/24/23  0558 02/24/23  1017 02/25/23  0621 02/25/23  2100   WBC 10.8 9.8  --  10.5  --    HGB 7.0* 6.7* 7.1* 7.0* 6.6*   HCT 20.8* 21.0* 21.4* 21.9* 21.2*   * 613*  --  681*  --        Recent Labs     02/23/23  0550 02/24/23  0558 02/25/23  0621   * 135* 135*   K 3.8 4.3 3.9    107 106   CO2 17* 17* 18*   BUN 31* 34* 35*   CREATININE 3.4* 3.4* 3.5*   CALCIUM 7.4* 7.2* 7.4*       Recent Labs     02/23/23  0550 02/24/23  0558 02/25/23  0621   AST 24 18 17   ALT 14 12 11   BILITOT 0.6 0.4 0.4   ALKPHOS 158* 158* 132*       Urinalysis:    Lab Results   Component Value Date/Time    NITRU Negative 02/22/2023 03:55 AM    WBCUA None seen 02/22/2023 03:55 AM    RBCUA 3-4 02/22/2023 03:55 AM    BLOODU TRACE-INTACT 02/22/2023 03:55 AM    SPECGRAV 1.015 02/22/2023 03:55 AM    GLUCOSEU Negative 02/22/2023 03:55 AM       Radiology:  VL DUP LOWER EXTREMITY ARTERIES RIGHT   Final Result      CT ABDOMEN PELVIS WO CONTRAST Additional Contrast? None   Final Result   Large amount of complex fluid within the abdomen and pelvis, most notably   within the pelvis where hematocrit levels are seen, compatible with   hemorrhage. Smaller 4.9 cm collection within subcutaneous fat adjacent to the left lower   quadrant stoma, compatible with hematoma. Amorphous inflammation or   hemorrhage is seen within the soft tissues immediately adjacent. Mural thickening of the ascending colon and transverse colon, which can   reflect colitis in the appropriate clinical setting. Mild left pelviectasis. Pneumoperitoneum, in keeping with the recent postoperative state. Bibasilar atelectasis. 1 cm right lower lobe pulmonary nodule, for which follow-up recommendations   are as below. RECOMMENDATIONS:   Fleischner Society guidelines for follow-up and management of incidentally   detected pulmonary nodules:      Nodule size greater than 8 mm         In a low-risk patient, consider CT at 3 months, PET/CT, or tissue sampling. In a high-risk patient, consider CT at 3 months, PET/CT, or tissue sampling.      - Low risk patients include individuals with minimal or absent history of   smoking and other known risk factors. - High risk patients include individuals with a history or smoking or known   risk factors. Radiology 2017 http://pubs. rsna.org/doi/full/10.1148/radiol. 5852813710         IR PICC WO SQ PORT/PUMP > 5 YEARS   Final Result      CT ABDOMEN PELVIS WO CONTRAST Additional Contrast? None   Final Result   1. Barium contrast from prior small-bowel follow-through is seen throughout   the colon and limits evaluation due to streak artifact. No definite abscess   is identified.       2.  Infrarenal abdominal aortic aneurysm measures up to 3.0 cm. See   recommendations for follow-up below. 3.  There is a 1.1 cm right lower lobe pulmonary nodule and a 0.3 cm left   lower lobe pulmonary nodule. Recommend dedicated CT chest for further   evaluation. RECOMMENDATIONS:      Multiple. Worst: 11 mm solid      Pathology: Multiple pulmonary nodules. Most severe: 11 mm solid pulmonary   nodule detected on incomplete chest CT. Recommend prompt non-contrast Chest CT for further evaluation. These guidelines do not apply to immunocompromised patients and patients with   cancer. Follow up in patients with significant comorbidities as clinically   warranted. For lung cancer screening, adhere to Lung-RADS guidelines. Reference: Radiology. 2017; 284(1):228-43         3 cm AAA      Pathology: 3 cm infrarenal abdominal aortic aneurysm. Recommend follow-up every 3 years. Reference: J Am [place] Radiol 2022;86:099-570. XR ABDOMEN (KUB) (SINGLE AP VIEW)   Final Result   Cancellation of scheduled hypaque enema due to presence of barium throughout   the colon including the rectosigmoid region as described above. FL SMALL BOWEL FOLLOW THROUGH ONLY   Final Result   Mildly dilated small bowel, though small-bowel transit time is upper limits   of normal.         CT ABDOMEN PELVIS W IV CONTRAST Additional Contrast? None   Final Result   Diverticulitis of the descending sigmoid junction. Mild wall thickening but   negative for obstruction.              IP CONSULT TO GENERAL SURGERY  IP CONSULT TO GI  IP CONSULT TO PHARMACY  IP CONSULT TO NEPHROLOGY  IP CONSULT TO DIETITIAN  PHARMACY TO DOSE VANCOMYCIN    Assessment/Plan:  Active Hospital Problems    Diagnosis     Sigmoid stricture ([facility] [address] [K56.699]      Priority: Medium    Hemorrhagic shock ([address] [R57.8]      Priority: Medium    Acute blood loss anemia [D62]      Priority: Medium    Acute renal failure (ARF) ([address] [N17.9]      Priority: Medium Diverticulitis [K57.92]      Priority: Medium    Colonic obstruction ([facility] [address] [K77.391]      Priority: Medium    Bowel obstruction ([facility] [address] [V83.570]      Priority: Medium     Diverticulitis with obstruction due to sigmoid stricture . Patient on IV Vanco/Zosyn through 2/21/23 and dc'd Vanco  .  Status post resection with diverting colostomy (Rick's procedure)  2/14/22 . General surgery is following. Good ostomy output. Noted general surgery expressed pus out of her wound site on 2/21/2023 and sent for cultures. Cultures grew Bacteroides, clostridium and enterococcus sps. Antibiotics transitioned to IV Cipro/Flagyl per general surgery on 2/22/2023. surgical wound and blood cultures - NGTD   Peripheral artery disease. Patient status post bypass of right lower extremity. Patient was on therapeutic Lovenox. Patient developed right leg hematoma and intra-abdominal bleed. Lovenox and aspirin held. Patient with synthetic graft and may require long-term anticoagulation. Vascular  suggested AC  once it safe to anticoagulate. Not yet started . Right leg hematoma improved. TARSHA. This is secondary to hypotension and hypovolemia (from high ostomy output) . Nephrology consulted - following and asssiting with Mx. Improved and started worsening since 2/21/2023 . continue to monitor closely. Avoid nephrotoxic agents including Toradol. Creatinine normalized but again started worsening up to 3.5 this morning. Monitor urine output. Off IVF since 2/24/23  Hypokalemia. Supplemented . Calss III Obesity -  With Body mass index is 40.58 kg/m². Complicating assessment and treatment. Placing patient at risk for multiple co-morbidities as well as early death and contributing to the patient's presentation. Counseled on weight loss. Post-Operative surgical site wound infection - General surgery following and assisting     DVT Prophylaxis: held initially due to bleed/Hematoma  ;  Resumed Eliquis  2.5 mg BID on 2/25/23 after Oked by surgery   Diet: ADULT DIET;  Regular  ADULT ORAL NUTRITION SUPPLEMENT; Breakfast, Dinner; Standard High Calorie/High Protein Oral Supplement  Code Status: Full Code    PT/OT Eval Status: Ambulatory     Dispo -Home versus SNF when stable    Appropriate for A1 Discharge Unit: Wendy Chapa MD

## 2023-02-26 NOTE — PROGRESS NOTES
Critical Results: Hbg 6.3/ Hct 19.2. Dr. Scarlett Oleary notified face to face. New blood transfusion orders being placed at this time. No other new orders at this time.

## 2023-02-26 NOTE — PROGRESS NOTES
[facility] Progress Note           Subjective/   63y.o. year old female who we are seeing in consultation for TARSHA. HPI:  Renal function is about the same  She is very fatigued and weak  C/o LE swelling and hard to ambulate   Got IV lasix, appears to have increased urine volume     ROS:  +weak, denies any shortness of breath, c/o abdominal pain. Social:  Discussed with Daughter at the bedside     Objective/   GEN:  Chronically ill, /83   Pulse 81   Temp 98.6 °F (37 °C) (Oral)   Resp 16   Ht 5' 5\" (1.651 m)   Wt 243 lb 13.3 oz (110.6 kg)   LMP  (LMP Unknown) Comment: HYSTERECTOMY  SpO2 96%   BMI 40.58 kg/m²   HEENT: non-icteric, no JVD  CV: S1, S2 without m/r/g; ++ LE edema  RESP: CTA B without w/r/r; breathing wnl  ABD: +bs, soft, tender with palpation, no hsm  SKIN: warm, no rashes    Data/  Recent Labs     02/24/23  0558 02/24/23  1017 02/25/23  0621 02/25/23  2100 02/26/23  0512 02/26/23  1000 02/26/23  1800   WBC 9.8  --  10.5  --   --   --   --    HGB 6.7*   < > 7.0*   < > 7.2* 6.3* 8.4*   HCT 21.0*   < > 21.9*   < > 21.8* 19.2* 26.2*   MCV 85.6  --  86.4  --   --   --   --    *  --  681*  --   --   --   --     < > = values in this interval not displayed.        Recent Labs     02/24/23  0558 02/25/23  0621 02/26/23  0512   * 135* 135*   K 4.3 3.9 4.2  4.2    106 107   CO2 17* 18* 16*   GLUCOSE 104* 106* 94   PHOS  --   --  4.9   BUN 34* 35* 36*   CREATININE 3.4* 3.5* 3.4*   LABGLOM 15* 14* 15*     UA 1+ protein, trace blood, s.g. 1.015  Urine sodium <20  Lactic acid 0.8    Assessment/     -TARSHA, pre-renal to early ATN in the setting of hypotension & vancomycin/zosyn exposure, antibiotics now changed   SCr stabilizing in mid 3 range with IVF's, non-oliguric    -Anemia, acute blood loss   s/p 2 PRBCs 2/15-2/16    -Hemorrhagic shock , resolved    -Metabolic acidosis - in setting of TARSHA, Lactate wnl    -Right leg hematoma    -Large bowel obstruction s/p pati's  & colostomy on 2/14/23     -Right above knee to below knee popliteal artery bypass on 1/16/23      Plan/     - Avoid IV fluids  - IV lasix 80 mg  - Monitoring for dialysis needs, discussed possibility with patient though hopeful to avoid. No indication as she is on room air and non-oliguric. She is fluid overloaded and family concerned she will go into heart failure  - Trend bp's & urine output    ____________________________________  Iman Moseley MD  [facility]  [email or web address]  Office: [phone]

## 2023-02-26 NOTE — PROGRESS NOTES
Walked into pt's room to notify her of receiving another unit of blood at this time, and pt's daughter became very upset that  another abd scan is not being done on pt. This writer paged Dr. Tiffanie Pineda, and daughter spoke with him over the phone. Daughter is now insistent on speaking with someone else. Clinical notified and will be coming to speak with her.

## 2023-02-26 NOTE — PLAN OF CARE
Problem: Safety - Adult  Goal: Free from fall injury  Outcome: Progressing  Flowsheets (Taken 2/23/2023 0343 by Nalini Rodriguez RN)  Free From Fall Injury: Instruct family/caregiver on patient safety

## 2023-02-26 NOTE — PROGRESS NOTES
[facility] GENERAL SURGERY    Surgery Progress Note           POD #  10/12    PATIENT NAME: Tosin Peguero     TODAY'S DATE: 2/26/2023    INTERVAL HISTORY:    Pt feels well. She is tolerating a diet. OBJECTIVE:   VITALS:  /72   Pulse 86   Temp 99.2 °F (37.3 °C) (Oral)   Resp 16   Ht 5' 5\" (1.651 m)   Wt 243 lb 13.3 oz (110.6 kg)   LMP  (LMP Unknown) Comment: HYSTERECTOMY  SpO2 95%   BMI 40.58 kg/m²     INTAKE/OUTPUT:    I/O last 3 completed shifts: In: 287.1 [Blood:287.1]  Out: 4269 [Urine:1800; Stool:630]  No intake/output data recorded. CONSTITUTIONAL:  awake and alert  LUNGS:  clear to auscultation  ABDOMEN:   normal bowel sounds, soft, non-distended, ostomy functional  INCISION: Lower incision dehiscence clean with less drainage    Data:  CBC:   Recent Labs     02/24/23 0558 02/24/23  1017 02/25/23 0621 02/25/23  2100 02/26/23 0512 02/26/23  1000   WBC 9.8  --  10.5  --   --   --    HGB 6.7*   < > 7.0* 6.6* 7.2* 6.3*   HCT 21.0*   < > 21.9* 21.2* 21.8* 19.2*   *  --  681*  --   --   --     < > = values in this interval not displayed. BMP:    Recent Labs     02/24/23 0558 02/25/23 0621 02/26/23 0512   * 135* 135*   K 4.3 3.9 4.2  4.2    106 107   CO2 17* 18* 16*   BUN 34* 35* 36*   CREATININE 3.4* 3.5* 3.4*   GLUCOSE 104* 106* 94     Hepatic:   Recent Labs     02/24/23 0558 02/25/23 0621 02/26/23 0512   AST 18 17 17   ALT 12 11 9*   BILITOT 0.4 0.4 0.4   ALKPHOS 158* 132* 110     Mag:    No results for input(s): MG in the last 72 hours. Phos:     Recent Labs     02/26/23 0512   PHOS 4.9      INR: No results for input(s): INR in the last 72 hours. Radiology Review: N/A    ASSESSMENT AND PLAN:  63 y.o. female status post Rick's procedure with postop bleeding requiring evacuation of hemoperitoneum. Her anticoagulation was restarted, and her hemoglobin is not drifting down. We will stop this again and transfuse as needed.   Discharge planning in progress      Addendum: I had a long discussion with the daughter regarding current condition and rationale for not doing a CT angiogram to assess for bleeding. The contrast bolus could be significantly detrimental to her already tenuous kidney function. In addition, at 10 and 12 days out from previous surgery reoperation would have a very high risk of complication due to extensive acute postoperative inflammatory change and friability of the bowel.   We will continue to check her hemoglobin regularly and watch her vitals closely      Electronically signed by Agustín Monroe MD

## 2023-02-26 NOTE — PROGRESS NOTES
Hospitalist Progress Note      PCP: Tello Molina    Date of Admission: 2/9/2023    Chief Complaint:   Abd pain    Hospital Course: Ms. Umesh Rand is a 63year old female who presented to [facility] from [facility] with abdominal pain and constipation on 2/9. She had a lower extremity vascular bypass surgery on her right leg a few weeks ago. She reports constipation and abdominal distension following her surgery with ng tube placement as well as a rectal tube. Patient reports her follow-up colonoscopy revealed sigmoid edema with diverticula and an ulcer, she then improved and was discharged. She again developed abdominal pain and distension and has not had a BM since 2/5. She denies trying any stool softeners or motility agents at home. 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 She also has had surgeries for adhesions and ventral hernia repair. She underwent a Haylie's procedure with GS on 2/14/23 with colostomy placement. Rapid response called on 2/16/23 for hypotension (as below). Found to have abdominal/pelvic hemorrhage on CT along with R thigh hematoma noted over previous bypass incision site on US. Patient status post surgery with resection of colon and diverting colostomy on 2/15/2023. Patient developed hypotension on 2/15/2023. Patient had further hypotension on 2/16/2023 and it was noted patient had intra-abdominal bleed with right thigh hematoma. Patient's hemoglobin had dropped to 5. Patient was transfused with 2 units. She appeared to stabilize but then her blood pressure decreased and her heart rate increased. Patient's hemoglobin dropped back down to 5 after being up to 6.9. It was decided to take patient back to surgery. During surgery patient was noted to have old blood in her abdomen no active bleed was noted but 3 L of old blood removed.     Subjective: Patient seen and examined this AM with daughter at bedside  . Hb dropped to 6.6 and receiving 1 unit PRBC transfusion . Monitor H/H since started on Eliquis 2.5 mg BID on 2/25/23 for Right leg endo vascular surgery . Received 80 mg IV lasix x 1 on 2/25/23 per Nephrology . Cr 3.4 (3.5) today . Denies any New C/o . No active bleeding per patient . Reviewed  Abdominal wound cultures (Enterococcus raffinosus; Bacteroides ; clostridium sps) . Currently on IV cipro/Flagyl since 2/22/23. Nephrology following for renal functions . Continues with significant pedal edema B/L .      Medications:  Reviewed    Infusion Medications    sodium chloride      sodium chloride      dextrose      sodium chloride Stopped (02/12/23 2557)     Scheduled Medications    apixaban  2.5 mg Oral BID    ciprofloxacin  400 mg IntraVENous Q24H    metroNIDAZOLE  500 mg IntraVENous Q8H    melatonin  5 mg Oral Nightly    famotidine (PEPCID) injection  20 mg IntraVENous BID    docusate sodium  100 mg Oral Daily    sodium chloride flush  5-40 mL IntraVENous 2 times per day    allopurinol  100 mg Oral Daily    atorvastatin  40 mg Oral Daily    [Held by provider] aspirin  81 mg Oral Daily    [Held by provider] gabapentin  300 mg Oral TID    levothyroxine  200 mcg Oral Daily    propranolol  40 mg Oral BID    [Held by provider] enoxaparin  1 mg/kg SubCUTAneous BID     PRN Meds: sodium chloride, butalbital-acetaminophen-caffeine, oxyCODONE **OR** oxyCODONE, HYDROmorphone, sodium chloride, glucose, dextrose bolus **OR** dextrose bolus, glucagon (rDNA), dextrose, benzocaine, sodium chloride flush, sodium chloride, ondansetron **OR** ondansetron, acetaminophen **OR** acetaminophen, potassium chloride **OR** potassium alternative oral replacement **OR** [DISCONTINUED] potassium chloride, magnesium sulfate, sodium phosphate IVPB **OR** sodium phosphate IVPB **OR** sodium phosphate IVPB      Intake/Output Summary (Last 24 hours) at 2/26/2023 7995  Last data filed at 2/26/2023 0310  Gross per 24 hour   Intake 287.08 ml   Output 1200 ml   Net -912.92 ml       Physical Exam Performed:  /64   Pulse 81   Temp 99.2 °F (37.3 °C) (Oral)   Resp 12   Ht 5' 5\" (1.651 m)   Wt 243 lb 13.3 oz (110.6 kg)   LMP  (LMP Unknown) Comment: HYSTERECTOMY  SpO2 93%   BMI 40.58 kg/m²     General appearance: No apparent distress, appears stated age and cooperative. HEENT: Pupils equal, round, and reactive to light. Conjunctivae/corneas clear. Neck: Supple, with full range of motion. No jugular venous distention. Trachea midline. Respiratory:  Normal respiratory effort. Clear to auscultation, bilaterally without Rales/Wheezes/Rhonchi. Cardiovascular: Regular rate and rhythm with normal S1/S2 without murmurs, rubs or gallops. Abdomen: Tender. Midline incision dressed. Positive bowel sounds noted. Ostomy left lower quadrant  Musculoskeletal: Hematoma on right medial thigh resolved . No skin bruise /ecchymosis noted ; 2 + pedal edema   Skin: Skin color, texture, turgor normal.  No rashes or lesions. Neurologic:  Neurovascularly intact without any focal sensory/motor deficits. Cranial nerves: II-XII intact, grossly non-focal.  Psychiatric: Alert and oriented, thought content appropriate, normal insight  Capillary Refill: Brisk, 3 seconds, normal   Peripheral Pulses: +2 palpable, equal bilaterally     Labs:   Recent Labs     02/23/23  1015 02/24/23  0558 02/24/23  1017 02/25/23  0621 02/25/23  2100 02/26/23  0512   WBC 10.8 9.8  --  10.5  --   --    HGB 7.0* 6.7*   < > 7.0* 6.6* 7.2*   HCT 20.8* 21.0*   < > 21.9* 21.2* 21.8*   * 613*  --  681*  --   --     < > = values in this interval not displayed.        Recent Labs     02/24/23  0558 02/25/23  0621 02/26/23  0512   * 135* 135*   K 4.3 3.9 4.2  4.2    106 107   CO2 17* 18* 16*   BUN 34* 35* 36*   CREATININE 3.4* 3.5* 3.4*   CALCIUM 7.2* 7.4* 7.1*   PHOS  --   --  4.9       Recent Labs     02/24/23  0558 02/25/23  0621 02/26/23  0512   AST 18 17 17   ALT 12 11 9*   BILITOT 0.4 0.4 0.4   ALKPHOS 158* 132* 110       Urinalysis:    Lab Results   Component Value Date/Time    NITRU Negative 02/22/2023 03:55 AM    WBCUA None seen 02/22/2023 03:55 AM    RBCUA 3-4 02/22/2023 03:55 AM    BLOODU TRACE-INTACT 02/22/2023 03:55 AM    SPECGRAV 1.015 02/22/2023 03:55 AM    GLUCOSEU Negative 02/22/2023 03:55 AM       Radiology:  VL DUP LOWER EXTREMITY ARTERIES RIGHT   Final Result      CT ABDOMEN PELVIS WO CONTRAST Additional Contrast? None   Final Result   Large amount of complex fluid within the abdomen and pelvis, most notably   within the pelvis where hematocrit levels are seen, compatible with   hemorrhage. Smaller 4.9 cm collection within subcutaneous fat adjacent to the left lower   quadrant stoma, compatible with hematoma. Amorphous inflammation or   hemorrhage is seen within the soft tissues immediately adjacent. Mural thickening of the ascending colon and transverse colon, which can   reflect colitis in the appropriate clinical setting. Mild left pelviectasis. Pneumoperitoneum, in keeping with the recent postoperative state. Bibasilar atelectasis. 1 cm right lower lobe pulmonary nodule, for which follow-up recommendations   are as below. RECOMMENDATIONS:   Fleischner Society guidelines for follow-up and management of incidentally   detected pulmonary nodules:      Nodule size greater than 8 mm         In a low-risk patient, consider CT at 3 months, PET/CT, or tissue sampling. In a high-risk patient, consider CT at 3 months, PET/CT, or tissue sampling.      - Low risk patients include individuals with minimal or absent history of   smoking and other known risk factors. - High risk patients include individuals with a history or smoking or known   risk factors. Radiology 2017 http://pubs. rsna.org/doi/full/10.1148/radiol. 8147137896         IR PICC WO SQ PORT/PUMP > 5 YEARS   Final Result      CT ABDOMEN PELVIS WO CONTRAST Additional Contrast? None   Final Result   1. Barium contrast from prior small-bowel follow-through is seen throughout   the colon and limits evaluation due to streak artifact. No definite abscess   is identified. 2.  Infrarenal abdominal aortic aneurysm measures up to 3.0 cm. See   recommendations for follow-up below. 3.  There is a 1.1 cm right lower lobe pulmonary nodule and a 0.3 cm left   lower lobe pulmonary nodule. Recommend dedicated CT chest for further   evaluation. RECOMMENDATIONS:      Multiple. Worst: 11 mm solid      Pathology: Multiple pulmonary nodules. Most severe: 11 mm solid pulmonary   nodule detected on incomplete chest CT. Recommend prompt non-contrast Chest CT for further evaluation. These guidelines do not apply to immunocompromised patients and patients with   cancer. Follow up in patients with significant comorbidities as clinically   warranted. For lung cancer screening, adhere to Lung-RADS guidelines. Reference: Radiology. 2017; 284(1):228-43         3 cm AAA      Pathology: 3 cm infrarenal abdominal aortic aneurysm. Recommend follow-up every 3 years. Reference: J Am [place] Radiol 4201;04:681-806. XR ABDOMEN (KUB) (SINGLE AP VIEW)   Final Result   Cancellation of scheduled hypaque enema due to presence of barium throughout   the colon including the rectosigmoid region as described above. FL SMALL BOWEL FOLLOW THROUGH ONLY   Final Result   Mildly dilated small bowel, though small-bowel transit time is upper limits   of normal.         CT ABDOMEN PELVIS W IV CONTRAST Additional Contrast? None   Final Result   Diverticulitis of the descending sigmoid junction. Mild wall thickening but   negative for obstruction.              IP CONSULT TO GENERAL SURGERY  IP CONSULT TO GI  IP CONSULT TO PHARMACY  IP CONSULT TO NEPHROLOGY  IP CONSULT TO DIETITIAN  PHARMACY TO DOSE VANCOMYCIN    Assessment/Plan:  Active Hospital Problems    Diagnosis Sigmoid stricture ([address] [K56.699]      Priority: Medium    Hemorrhagic shock ([address] [R57.8]      Priority: Medium    Acute blood loss anemia [D62]      Priority: Medium    Acute renal failure (ARF) (HCC) [N17.9]      Priority: Medium    Diverticulitis [K57.92]      Priority: Medium    Colonic obstruction ([address] [K56.609]      Priority: Medium    Bowel obstruction ([address] [K56.609]      Priority: Medium     Diverticulitis with obstruction due to sigmoid stricture . Patient on IV Vanco/Zosyn through 2/21/23 and dc'd Vanco  .  Status post resection with diverting colostomy (Rick's procedure)  2/14/22 . General surgery is following. Good ostomy output. Noted general surgery expressed pus out of her wound site on 2/21/2023 and sent for cultures. Cultures grew Bacteroides, clostridium and enterococcus sps. Antibiotics transitioned to IV Cipro/Flagyl per general surgery on 2/22/2023. blood cultures - NGTD   Peripheral artery disease. Patient status post bypass of right lower extremity. Patient was on therapeutic Lovenox. Patient developed right leg hematoma and intra-abdominal bleed. Lovenox and aspirin held. Patient with synthetic graft and may require long-term anticoagulation. Vascular  suggested AC  once it safe to anticoagulate . Right leg hematoma improved. Restarted on 2/25/23 after obtaining surgical clearance . Monitor H/H   TARSHA. This is secondary to hypotension and hypovolemia (from high ostomy output) . ongoing ;   Nephrology consulted - following and asssiting with Mx; Monitoring for dialysis needs . Improved initially and started worsening since 2/21/2023 . continue to monitor closely. Avoid nephrotoxic agents including Toradol. Creatinine peaked at 3.5 . Monitor urine output. Off IVF since 2/24/23. Received IV lasix 80 mgx 1 on 2/25  Hypokalemia. Supplemented . Calss III Obesity -  With Body mass index is 40.58 kg/m². Complicating assessment and treatment.  Placing patient at risk for multiple co-morbidities as well as early death and contributing to the patient's presentation. Counseled on weight loss. Post-Operative surgical site wound infection - General surgery following and assisting ; general surgery expressed pus out of her wound site on 2/21/2023 and sent for cultures. Cultures grew Bacteroides, clostridium and enterococcus sps. Antibiotics transitioned to IV Cipro/Flagyl per general surgery on 2/22/2023. On IV Cipro/Flagyl  7. Anemia - anticipated post-op acute blood loss anemia w/out evidence of active bleeding/hemolysis. Noted Hb slowly trended down to 6.6 this AM needing 1 unit PRBC transfusion . Follow serial labs. Reviewed and documented as above. DVT Prophylaxis: held initially due to bleed/Hematoma  ; Resumed Eliquis  2.5 mg BID on 2/25/23 after Oked by surgery   Diet: ADULT DIET;  Regular  ADULT ORAL NUTRITION SUPPLEMENT; Breakfast, Dinner; Standard High Calorie/High Protein Oral Supplement  Code Status: Full Code    PT/OT Eval Status: Ambulatory     Dispo -Home versus SNF when stable    Appropriate for A1 Discharge Unit: No Ruthanna Moritz, MD

## 2023-02-27 LAB
A/G RATIO: 0.7 (ref 1.1–2.2)
ALBUMIN SERPL-MCNC: 2 G/DL (ref 3.4–5)
ALP BLD-CCNC: 101 U/L (ref 40–129)
ALT SERPL-CCNC: 10 U/L (ref 10–40)
ANAEROBIC CULTURE: ABNORMAL
ANION GAP SERPL CALCULATED.3IONS-SCNC: 13 MMOL/L (ref 3–16)
AST SERPL-CCNC: 19 U/L (ref 15–37)
BILIRUB SERPL-MCNC: 0.4 MG/DL (ref 0–1)
BUN BLDV-MCNC: 36 MG/DL (ref 7–20)
CALCIUM SERPL-MCNC: 7 MG/DL (ref 8.3–10.6)
CHLORIDE BLD-SCNC: 103 MMOL/L (ref 99–110)
CO2: 18 MMOL/L (ref 21–32)
CREAT SERPL-MCNC: 3.5 MG/DL (ref 0.6–1.2)
GFR SERPL CREATININE-BSD FRML MDRD: 14 ML/MIN/{1.73_M2}
GLUCOSE BLD-MCNC: 109 MG/DL (ref 70–99)
GRAM STAIN RESULT: ABNORMAL
HCT VFR BLD CALC: 23.8 % (ref 36–48)
HCT VFR BLD CALC: 24.4 % (ref 36–48)
HCT VFR BLD CALC: 24.4 % (ref 36–48)
HCT VFR BLD CALC: 25.4 % (ref 36–48)
HEMATOLOGY PATH CONSULT: NORMAL
HEMOGLOBIN: 7.7 G/DL (ref 12–16)
HEMOGLOBIN: 7.9 G/DL (ref 12–16)
HEMOGLOBIN: 8 G/DL (ref 12–16)
HEMOGLOBIN: 8.7 G/DL (ref 12–16)
ORGANISM: ABNORMAL
PHOSPHORUS: 5.1 MG/DL (ref 2.5–4.9)
POTASSIUM REFLEX MAGNESIUM: 3.7 MMOL/L (ref 3.5–5.1)
POTASSIUM SERPL-SCNC: 3.7 MMOL/L (ref 3.5–5.1)
SODIUM BLD-SCNC: 134 MMOL/L (ref 136–145)
TOTAL PROTEIN: 4.9 G/DL (ref 6.4–8.2)
WOUND/ABSCESS: ABNORMAL

## 2023-02-27 PROCEDURE — 6370000000 HC RX 637 (ALT 250 FOR IP): Performed by: INTERNAL MEDICINE

## 2023-02-27 PROCEDURE — 85014 HEMATOCRIT: CPT

## 2023-02-27 PROCEDURE — 6370000000 HC RX 637 (ALT 250 FOR IP): Performed by: SURGERY

## 2023-02-27 PROCEDURE — 85018 HEMOGLOBIN: CPT

## 2023-02-27 PROCEDURE — 2500000003 HC RX 250 WO HCPCS: Performed by: INTERNAL MEDICINE

## 2023-02-27 PROCEDURE — 2580000003 HC RX 258: Performed by: SURGERY

## 2023-02-27 PROCEDURE — 97110 THERAPEUTIC EXERCISES: CPT

## 2023-02-27 PROCEDURE — 99024 POSTOP FOLLOW-UP VISIT: CPT | Performed by: SURGERY

## 2023-02-27 PROCEDURE — 97535 SELF CARE MNGMENT TRAINING: CPT

## 2023-02-27 PROCEDURE — 1200000000 HC SEMI PRIVATE

## 2023-02-27 PROCEDURE — 6360000002 HC RX W HCPCS: Performed by: INTERNAL MEDICINE

## 2023-02-27 PROCEDURE — 6360000002 HC RX W HCPCS: Performed by: SURGERY

## 2023-02-27 PROCEDURE — 2500000003 HC RX 250 WO HCPCS: Performed by: SURGERY

## 2023-02-27 PROCEDURE — 80053 COMPREHEN METABOLIC PANEL: CPT

## 2023-02-27 RX ORDER — FUROSEMIDE 10 MG/ML
80 INJECTION INTRAMUSCULAR; INTRAVENOUS ONCE
Status: COMPLETED | OUTPATIENT
Start: 2023-02-27 | End: 2023-02-27

## 2023-02-27 RX ADMIN — SODIUM CHLORIDE, PRESERVATIVE FREE 10 ML: 5 INJECTION INTRAVENOUS at 21:51

## 2023-02-27 RX ADMIN — FAMOTIDINE 20 MG: 10 INJECTION, SOLUTION INTRAVENOUS at 21:49

## 2023-02-27 RX ADMIN — BUTALBITAL, ACETAMINOPHEN, AND CAFFEINE 1 TABLET: 50; 325; 40 TABLET ORAL at 12:15

## 2023-02-27 RX ADMIN — DOCUSATE SODIUM 100 MG: 100 CAPSULE, LIQUID FILLED ORAL at 09:14

## 2023-02-27 RX ADMIN — METRONIDAZOLE 500 MG: 500 INJECTION, SOLUTION INTRAVENOUS at 02:22

## 2023-02-27 RX ADMIN — OXYCODONE 5 MG: 5 TABLET ORAL at 09:14

## 2023-02-27 RX ADMIN — METRONIDAZOLE 500 MG: 500 INJECTION, SOLUTION INTRAVENOUS at 17:55

## 2023-02-27 RX ADMIN — FAMOTIDINE 20 MG: 10 INJECTION, SOLUTION INTRAVENOUS at 09:06

## 2023-02-27 RX ADMIN — PROPRANOLOL HYDROCHLORIDE 40 MG: 40 TABLET ORAL at 21:49

## 2023-02-27 RX ADMIN — ALLOPURINOL 100 MG: 100 TABLET ORAL at 09:14

## 2023-02-27 RX ADMIN — OXYCODONE HYDROCHLORIDE 10 MG: 5 TABLET ORAL at 17:58

## 2023-02-27 RX ADMIN — LEVOTHYROXINE SODIUM 200 MCG: 0.1 TABLET ORAL at 09:14

## 2023-02-27 RX ADMIN — SODIUM CHLORIDE, PRESERVATIVE FREE 10 ML: 5 INJECTION INTRAVENOUS at 09:06

## 2023-02-27 RX ADMIN — Medication 5 MG: at 21:49

## 2023-02-27 RX ADMIN — METRONIDAZOLE 500 MG: 500 INJECTION, SOLUTION INTRAVENOUS at 10:15

## 2023-02-27 RX ADMIN — ATORVASTATIN CALCIUM 40 MG: 40 TABLET, FILM COATED ORAL at 09:14

## 2023-02-27 RX ADMIN — PROPRANOLOL HYDROCHLORIDE 40 MG: 40 TABLET ORAL at 09:14

## 2023-02-27 RX ADMIN — ACETAMINOPHEN 650 MG: 325 TABLET ORAL at 19:27

## 2023-02-27 RX ADMIN — FUROSEMIDE 80 MG: 10 INJECTION, SOLUTION INTRAMUSCULAR; INTRAVENOUS at 10:08

## 2023-02-27 RX ADMIN — CIPROFLOXACIN 400 MG: 2 INJECTION, SOLUTION INTRAVENOUS at 09:11

## 2023-02-27 RX ADMIN — HYDROMORPHONE HYDROCHLORIDE 0.5 MG: 0.5 INJECTION, SOLUTION INTRAMUSCULAR; INTRAVENOUS; SUBCUTANEOUS at 21:50

## 2023-02-27 ASSESSMENT — PAIN DESCRIPTION - DESCRIPTORS
DESCRIPTORS: ACHING
DESCRIPTORS: ACHING
DESCRIPTORS: ACHING;CRAMPING
DESCRIPTORS: ACHING;CRAMPING
DESCRIPTORS: ACHING

## 2023-02-27 ASSESSMENT — PAIN SCALES - GENERAL
PAINLEVEL_OUTOF10: 6
PAINLEVEL_OUTOF10: 6
PAINLEVEL_OUTOF10: 7
PAINLEVEL_OUTOF10: 0
PAINLEVEL_OUTOF10: 7
PAINLEVEL_OUTOF10: 0
PAINLEVEL_OUTOF10: 7
PAINLEVEL_OUTOF10: 7
PAINLEVEL_OUTOF10: 0
PAINLEVEL_OUTOF10: 7

## 2023-02-27 ASSESSMENT — PAIN DESCRIPTION - ORIENTATION
ORIENTATION: MID

## 2023-02-27 ASSESSMENT — PAIN DESCRIPTION - LOCATION
LOCATION: ABDOMEN
LOCATION: ABDOMEN
LOCATION: BACK;ABDOMEN
LOCATION: ABDOMEN;BACK
LOCATION: ABDOMEN;BACK

## 2023-02-27 ASSESSMENT — PAIN - FUNCTIONAL ASSESSMENT
PAIN_FUNCTIONAL_ASSESSMENT: PREVENTS OR INTERFERES SOME ACTIVE ACTIVITIES AND ADLS

## 2023-02-27 NOTE — PROGRESS NOTES
Occupational Therapy  Facility/Department: [facility] C3 TELE/MED SURG/ONC  Daily Treatment Note  NAME: Sakshi Bateman  : 1960  MRN: [id number]    Date of Service: 2023    Discharge Recommendations:  24 hour supervision or assist, Home with Home health OT         Patient Diagnosis(es): The encounter diagnosis was Sigmoid stricture ([address] Assessment    Assessment: Pt pleasant and agreeable to tx. Pt ambulated to/from toilet with CGA needing HHA. Pt complete toileting with SPV and good skill. Pt progressing towards goals and is improving. Pt demo good rekha of BUE therex. Cont per POC  Activity Tolerance: Patient tolerated treatment well  Discharge Recommendations: 24 hour supervision or assist;Home with Home health OT      Plan   Occupational Therapy Plan  Times Per Week: 3-5x/wk  Current Treatment Recommendations: Strengthening;ROM;Balance training;Functional mobility training; Endurance training;Gait training; Safety education & training;Patient/Caregiver education & training;Equipment evaluation, education, & procurement;Positioning;Self-Care / ADL; Home management training     Restrictions  Restrictions/Precautions  Restrictions/Precautions: General Precautions; Fall Risk  Position Activity Restriction  Other position/activity restrictions: IV, tele, abdominal incision, colostomy    Subjective   Subjective  Subjective: Pt presented in bed.  Vitals: BP: 126/72, HR: 75  O2: 97%  Pain: Pt endorsing abdominal pain at surgical site, does not formally rate  Orientation  Overall Orientation Status: Within Functional Limits  Orientation Level: Oriented X4  Cognition  Overall Cognitive Status: WFL        Objective    Vitals     Bed Mobility Training  Bed Mobility Training: Yes  Overall Level of Assistance: Stand-by assistance  Supine to Sit: Additional time;Stand-by assistance (HOB elevated and use of bed rails)  Sit to Supine: Other (comment) (pt left up in chair)  Balance  Sitting: Intact  Standing: With support [facility])  Transfer Training  Transfer Training: Yes (no AD)  Overall Level of Assistance: Contact-guard assistance (HHA)  Interventions: Weight shifting training/pressure relief; Safety awareness training;Verbal cues  Sit to Stand: Additional time;Contact-guard assistance  Stand to Sit: Contact-guard assistance  Bed to Chair: Contact-guard assistance  Toilet Transfer: Contact-guard assistance (no AD, HHA)     ADL  Grooming: Contact guard assistance  Grooming Skilled Clinical Factors: to wash hands at sink  LE Dressing: Supervision  LE Dressing Skilled Clinical Factors: shoes and underwear mgmt after toileting  Toileting: Supervision  Toileting Skilled Clinical Factors: no clothing management, pt able to perform all parts after urination at toilet  OT Exercises  Exercise Treatment: BUE therex seated in chair with no weight (wrist, elbow and shoulder flexion/extension, shoulder ab/adduction, chest press, shoulder press, scapular retraction)     Safety Devices  Type of Devices: Patient at risk for falls;Gait belt;Call light within reach; Left in chair;Chair alarm in place;Nurse notified     Patient Education  Education Given To: Patient; Family  Education Provided: Role of Therapy;Transfer Training;Plan of Care;Energy Conservation;Equipment; Fall Prevention Strategies; ADL Adaptive Strategies; Home Exercise Program  Education Method: Demonstration;Verbal  Barriers to Learning: None  Education Outcome: Verbalized understanding;Demonstrated understanding;Continued education needed    Goals  Short Term Goals  Time Frame for Short Term Goals: 1 wk 3/01/2023 unless otherwise noted  Short Term Goal 1: pt will complete stance sink side ADLs SBA 2/28/2023  Short Term Goal 2: pt will complete toileting SPV-- GOAL MET 2/27  Short Term Goal 3: pt will complete LB dressing SBA       Therapy Time   Individual Concurrent Group Co-treatment   Time In 1420         Time Out 1444         Minutes 24         Timed Code Treatment Minutes: 24 Minutes       [facility], OT  If pt is unable to be seen after this session, please let this note serve as discharge summary. Please see case management note for discharge disposition. Thank you.

## 2023-02-27 NOTE — PROGRESS NOTES
PS sent to MURALI Joshi:    This patient has been having q6 H/Hs. I do believe they have ended since the last result. She went from 8.4 to 7.9. Can you please add additional q6 H/Hs or at least one for in the a.m. to go with her CMP and Renal panel.     Thank you.

## 2023-02-27 NOTE — PROGRESS NOTES
[facility] GENERAL SURGERY    Surgery Progress Note           POD #  10/12    PATIENT NAME: Courtney St     TODAY'S DATE: 2/27/2023    INTERVAL HISTORY:    Pt feels well. She is tolerating a diet but some mild nausea     OBJECTIVE:   VITALS:  /75   Pulse 76   Temp 98.8 °F (37.1 °C) (Oral)   Resp 16   Ht 5' 5\" (1.651 m)   Wt 243 lb 13.3 oz (110.6 kg)   LMP  (LMP Unknown) Comment: HYSTERECTOMY  SpO2 97%   BMI 40.58 kg/m²     INTAKE/OUTPUT:    I/O last 3 completed shifts: In: 581.7 [Blood:581.7]  Out: 2300 [Urine:1400; Stool:900]  I/O this shift:  In: 20 [I.V.:20]  Out: 1125 [Urine:700; Stool:425]              CONSTITUTIONAL:  awake and alert  LUNGS:  clear to auscultation  ABDOMEN:   normal bowel sounds, soft, non-distended, ostomy functional  INCISION: Lower incision dehiscence clean with small amount drainage and no erythema    Data:  CBC:   Recent Labs     02/25/23  0621 02/25/23  2100 02/26/23  1800 02/27/23  0230 02/27/23  0710   WBC 10.5  --   --   --   --    HGB 7.0*   < > 8.4* 7.9* 8.0*   HCT 21.9*   < > 26.2* 24.4* 23.8*   *  --   --   --   --     < > = values in this interval not displayed. BMP:    Recent Labs     02/25/23  0621 02/26/23  0512 02/27/23  0710   * 135* 134*   K 3.9 4.2  4.2 3.7  3.7    107 103   CO2 18* 16* 18*   BUN 35* 36* 36*   CREATININE 3.5* 3.4* 3.5*   GLUCOSE 106* 94 109*       Hepatic:   Recent Labs     02/25/23  0621 02/26/23  0512 02/27/23  0710   AST 17 17 19   ALT 11 9* 10   BILITOT 0.4 0.4 0.4   ALKPHOS 132* 110 101       Mag:    No results for input(s): MG in the last 72 hours. Phos:     Recent Labs     02/26/23  0512 02/27/23  0710   PHOS 4.9 5.1*        INR: No results for input(s): INR in the last 72 hours. Radiology Review: N/A    ASSESSMENT AND PLAN:  63 y.o. female status post Rick's procedure with postop bleeding requiring evacuation of hemoperitoneum.    Continue to monitor labs - hgb relatively stable today and starting to diurese  Regular diet  Local wound care continued    Electronically signed by Brian Johns MD

## 2023-02-27 NOTE — PROGRESS NOTES
Pt a/o. Pt stated no nausea; no emesis. Reduction in appetite. Encouraging pt to eat. Pt stated pain controlled with PO pain medication for pain in the abdomen. Pt also medicated x1 with Fioricet  per [facility] for headache. Wound cleansed with Saline and dressing changed per orders. Moderated amount of drainage when changed. H&HQ6hr was d/c and daughter very concerned. Writer discussed with Dr. Micheline Harrison. See new orders. Pt up to chair multiple times today. Call light within reach of pt.

## 2023-02-27 NOTE — PROGRESS NOTES
Hospitalist Progress Note      PCP: Danii Adamson    Date of Admission: 2/9/2023    Chief Complaint:   Abd pain    Hospital Course: Ms. Nicolas Lafleur is a 63year old female who presented to [facility] with abdominal pain and constipation on 2/9. She had a lower extremity vascular bypass surgery on her right leg a few weeks ago. She reports constipation and abdominal distension following her surgery with ng tube placement as well as a rectal tube. Patient reports her follow-up colonoscopy revealed sigmoid edema with diverticula and an ulcer, she then improved and was discharged. She again developed abdominal pain and distension and has not had a BM since 2/5. She denies trying any stool softeners or motility agents at home. 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 She also has had surgeries for adhesions and ventral hernia repair. She underwent a Haylie's procedure with GS on 2/14/23 with colostomy placement. Rapid response called on 2/16/23 for hypotension (as below). Found to have abdominal/pelvic hemorrhage on CT along with R thigh hematoma noted over previous bypass incision site on US. Patient status post surgery with resection of colon and diverting colostomy on 2/15/2023. Patient developed hypotension on 2/15/2023. Patient had further hypotension on 2/16/2023 and it was noted patient had intra-abdominal bleed with right thigh hematoma. Patient's hemoglobin had dropped to 5. Patient was transfused with 2 units. She appeared to stabilize but then her blood pressure decreased and her heart rate increased. Patient's hemoglobin dropped back down to 5 after being up to 6.9. It was decided to take patient back to surgery. During surgery patient was noted to have old blood in her abdomen no active bleed was noted but 3 L of old blood removed.     Subjective: Patient seen and examined this AM with daughter at bedside  . Hb better this AM 8.4 after 1 unit PRBC transfusion on 2/26  . H/H stable  since . Eliquis re- started  on 2/25/23 for Right leg endo vascular surgery . Held again by surgery since 2/26/23 . Received 80 mg IV lasix x 1 on 2/25/23 & 2/26 per Nephrology . Cr 3.5 (3.4) today . Denies any New C/o . No active bleeding per patient . Reviewed  Abdominal wound cultures (Enterococcus raffinosus; Bacteroides ; clostridium sps) . Currently on IV cipro/Flagyl since 2/22/23. Nephrology following for renal functions . Continues with significant pedal edema B/L .      Medications:  Reviewed    Infusion Medications    sodium chloride      sodium chloride      sodium chloride      dextrose      sodium chloride Stopped (02/12/23 8917)     Scheduled Medications    ciprofloxacin  400 mg IntraVENous Q24H    metroNIDAZOLE  500 mg IntraVENous Q8H    melatonin  5 mg Oral Nightly    famotidine (PEPCID) injection  20 mg IntraVENous BID    docusate sodium  100 mg Oral Daily    sodium chloride flush  5-40 mL IntraVENous 2 times per day    allopurinol  100 mg Oral Daily    atorvastatin  40 mg Oral Daily    [Held by provider] aspirin  81 mg Oral Daily    [Held by provider] gabapentin  300 mg Oral TID    levothyroxine  200 mcg Oral Daily    propranolol  40 mg Oral BID     PRN Meds: sodium chloride, sodium chloride, butalbital-acetaminophen-caffeine, oxyCODONE **OR** oxyCODONE, HYDROmorphone, sodium chloride, glucose, dextrose bolus **OR** dextrose bolus, glucagon (rDNA), dextrose, benzocaine, sodium chloride flush, sodium chloride, ondansetron **OR** ondansetron, acetaminophen **OR** acetaminophen, potassium chloride **OR** potassium alternative oral replacement **OR** [DISCONTINUED] potassium chloride, magnesium sulfate, sodium phosphate IVPB **OR** sodium phosphate IVPB **OR** sodium phosphate IVPB      Intake/Output Summary (Last 24 hours) at 2/27/2023 1357  Last data filed at 2/27/2023 1243  Gross per 24 hour   Intake 554.58 ml Output 2400 ml   Net -1845.42 ml       Physical Exam Performed:  /75   Pulse 76   Temp 98.8 °F (37.1 °C) (Oral)   Resp 16   Ht 5' 5\" (1.651 m)   Wt 243 lb 13.3 oz (110.6 kg)   LMP  (LMP Unknown) Comment: HYSTERECTOMY  SpO2 97%   BMI 40.58 kg/m²     General appearance: No apparent distress, appears stated age and cooperative. HEENT: Pupils equal, round, and reactive to light. Conjunctivae/corneas clear. Neck: Supple, with full range of motion. No jugular venous distention. Trachea midline. Respiratory:  Normal respiratory effort. Clear to auscultation, bilaterally without Rales/Wheezes/Rhonchi. Cardiovascular: Regular rate and rhythm with normal S1/S2 without murmurs, rubs or gallops. Abdomen: Tender. Midline incision dressed. Positive bowel sounds noted. Ostomy left lower quadrant  Musculoskeletal: Hematoma on right medial thigh resolved . No skin bruise /ecchymosis noted ; 2 + pedal edema   Skin: Skin color, texture, turgor normal.  No rashes or lesions. Neurologic:  Neurovascularly intact without any focal sensory/motor deficits. Cranial nerves: II-XII intact, grossly non-focal.  Psychiatric: Alert and oriented, thought content appropriate, normal insight  Capillary Refill: Brisk, 3 seconds, normal   Peripheral Pulses: +2 palpable, equal bilaterally     Labs:   Recent Labs     02/25/23  0621 02/25/23  2100 02/27/23  0230 02/27/23  0710 02/27/23  1228   WBC 10.5  --   --   --   --    HGB 7.0*   < > 7.9* 8.0* 8.7*   HCT 21.9*   < > 24.4* 23.8* 25.4*   *  --   --   --   --     < > = values in this interval not displayed.        Recent Labs     02/25/23  0621 02/26/23  0512 02/27/23  0710   * 135* 134*   K 3.9 4.2  4.2 3.7  3.7    107 103   CO2 18* 16* 18*   BUN 35* 36* 36*   CREATININE 3.5* 3.4* 3.5*   CALCIUM 7.4* 7.1* 7.0*   PHOS  --  4.9 5.1*       Recent Labs     02/25/23  0621 02/26/23  0512 02/27/23  0710   AST 17 17 19   ALT 11 9* 10   BILITOT 0.4 0.4 0.4 ALKPHOS 132* 110 101       Urinalysis:    Lab Results   Component Value Date/Time    NITRU Negative 02/22/2023 03:55 AM    WBCUA None seen 02/22/2023 03:55 AM    RBCUA 3-4 02/22/2023 03:55 AM    BLOODU TRACE-INTACT 02/22/2023 03:55 AM    SPECGRAV 1.015 02/22/2023 03:55 AM    GLUCOSEU Negative 02/22/2023 03:55 AM       Radiology:  VL DUP LOWER EXTREMITY ARTERIES RIGHT   Final Result      CT ABDOMEN PELVIS WO CONTRAST Additional Contrast? None   Final Result   Large amount of complex fluid within the abdomen and pelvis, most notably   within the pelvis where hematocrit levels are seen, compatible with   hemorrhage. Smaller 4.9 cm collection within subcutaneous fat adjacent to the left lower   quadrant stoma, compatible with hematoma. Amorphous inflammation or   hemorrhage is seen within the soft tissues immediately adjacent. Mural thickening of the ascending colon and transverse colon, which can   reflect colitis in the appropriate clinical setting. Mild left pelviectasis. Pneumoperitoneum, in keeping with the recent postoperative state. Bibasilar atelectasis. 1 cm right lower lobe pulmonary nodule, for which follow-up recommendations   are as below. RECOMMENDATIONS:   Fleischner Society guidelines for follow-up and management of incidentally   detected pulmonary nodules:      Nodule size greater than 8 mm         In a low-risk patient, consider CT at 3 months, PET/CT, or tissue sampling. In a high-risk patient, consider CT at 3 months, PET/CT, or tissue sampling.      - Low risk patients include individuals with minimal or absent history of   smoking and other known risk factors. - High risk patients include individuals with a history or smoking or known   risk factors. Radiology 2017 http://pubs. rsna.org/doi/full/10.1148/radiol. 2737967621         IR PICC WO SQ PORT/PUMP > 5 YEARS   Final Result      CT ABDOMEN PELVIS WO CONTRAST Additional Contrast? None   Final Result   1. Barium contrast from prior small-bowel follow-through is seen throughout   the colon and limits evaluation due to streak artifact. No definite abscess   is identified. 2.  Infrarenal abdominal aortic aneurysm measures up to 3.0 cm. See   recommendations for follow-up below. 3.  There is a 1.1 cm right lower lobe pulmonary nodule and a 0.3 cm left   lower lobe pulmonary nodule. Recommend dedicated CT chest for further   evaluation. RECOMMENDATIONS:      Multiple. Worst: 11 mm solid      Pathology: Multiple pulmonary nodules. Most severe: 11 mm solid pulmonary   nodule detected on incomplete chest CT. Recommend prompt non-contrast Chest CT for further evaluation. These guidelines do not apply to immunocompromised patients and patients with   cancer. Follow up in patients with significant comorbidities as clinically   warranted. For lung cancer screening, adhere to Lung-RADS guidelines. Reference: Radiology. 2017; 284(1):228-43         3 cm AAA      Pathology: 3 cm infrarenal abdominal aortic aneurysm. Recommend follow-up every 3 years. Reference: J Am [place] Radiol 9961;98:738-846. XR ABDOMEN (KUB) (SINGLE AP VIEW)   Final Result   Cancellation of scheduled hypaque enema due to presence of barium throughout   the colon including the rectosigmoid region as described above. FL SMALL BOWEL FOLLOW THROUGH ONLY   Final Result   Mildly dilated small bowel, though small-bowel transit time is upper limits   of normal.         CT ABDOMEN PELVIS W IV CONTRAST Additional Contrast? None   Final Result   Diverticulitis of the descending sigmoid junction. Mild wall thickening but   negative for obstruction.              IP CONSULT TO GENERAL SURGERY  IP CONSULT TO GI  IP CONSULT TO PHARMACY  IP CONSULT TO NEPHROLOGY  IP CONSULT TO DIETITIAN  PHARMACY TO DOSE VANCOMYCIN    Assessment/Plan:  Active Hospital Problems    Diagnosis     Sigmoid stricture ([facility] [address] [V62.423] Priority: Medium    Hemorrhagic shock ([address] [R57.8]      Priority: Medium    Acute blood loss anemia [D62]      Priority: Medium    Acute renal failure (ARF) (HCC) [N17.9]      Priority: Medium    Diverticulitis [K57.92]      Priority: Medium    Colonic obstruction ([address] [K56.609]      Priority: Medium    Bowel obstruction ([address] [K56.609]      Priority: Medium     Diverticulitis with obstruction due to sigmoid stricture complicated by Post-Op Hemoperitoneum needing evacuation . Patient on IV Vanco/Zosyn through 2/21/23 and dc'd Vanco  .  Status post resection with diverting colostomy (Rick's procedure)  2/14/22 . General surgery is following. Good ostomy output. Noted general surgery expressed pus out of her wound site on 2/21/2023 and sent for cultures. Cultures grew Bacteroides, clostridium and enterococcus sps. Antibiotics transitioned to IV Cipro/Flagyl per general surgery on 2/22/2023. blood cultures - NGTD   Peripheral artery disease. Patient status post bypass of right lower extremity. Patient was on therapeutic Lovenox. Patient developed right leg hematoma and intra-abdominal bleed. Lovenox and aspirin held. Patient with synthetic graft and may require long-term anticoagulation. Vascular  suggested AC  once it safe to anticoagulate . Right leg hematoma improved. Restarted on 2/25/23 after obtaining surgical clearance . Monitor H/H   TARSHA. This is secondary to hypotension and hypovolemia (from high ostomy output) . ongoing ;   Nephrology consulted - following and asssiting with Mx; Monitoring for dialysis needs . Improved initially and started worsening since 2/21/2023 . continue to monitor closely. Avoid nephrotoxic agents including Toradol. Creatinine peaked at 3.5 . Monitor urine output. Off IVF since 2/24/23. Received IV lasix 80 mgx 1 on 2/25  Hypokalemia. Supplemented . Calss III Obesity -  With Body mass index is 40.58 kg/m². Complicating assessment and treatment. Placing patient at risk for multiple co-morbidities as well as early death and contributing to the patient's presentation. Counseled on weight loss. Post-Operative surgical site wound infection - General surgery following and assisting ; general surgery expressed pus out of her wound site on 2/21/2023 and sent for cultures. Cultures grew Bacteroides, clostridium and enterococcus sps. Antibiotics transitioned to IV Cipro/Flagyl per general surgery on 2/22/2023. On IV Cipro/Flagyl  7. Anemia - anticipated post-op acute blood loss anemia w/out evidence of active bleeding/hemolysis. Noted Hb slowly trended down to 6.6 on 2/25 needing 1 unit PRBC transfusion    Follow serial labs. Reviewed and documented as above. DVT Prophylaxis: held initially due to bleed/Hematoma  ; Resumed Eliquis  2.5 mg BID on 2/25/23 after Oked by surgery   Diet: ADULT DIET;  Regular  ADULT ORAL NUTRITION SUPPLEMENT; Breakfast, Dinner; Standard High Calorie/High Protein Oral Supplement  Code Status: Full Code    PT/OT Eval Status: Ambulatory     Dispo -Home versus SNF when stable    Appropriate for A1 Discharge Unit: No Ruthanna Moritz, MD

## 2023-02-27 NOTE — PLAN OF CARE
Discussed pain and pain evaluation as to what is tolerable and what is not. Pain medication due to the patient showing outward signs of pain with grimacing and guarding.      Problem: Pain  Goal: Verbalizes/displays adequate comfort level or baseline comfort level  Outcome: Progressing

## 2023-02-27 NOTE — PROGRESS NOTES
[facility] Progress Note           Subjective/   63y.o. year old female past medical history of hypothyroidism, hyperlipidemia, abdominal hernia, abdominal adhesions, lower extremity vascular bypass surgery presented with small bowel obstruction on 2/9/2023 is s/p colon resection, diverting colostomy on 2/15/2023, intra-abdominal bleed on 2/16/2023 who we are seeing in consultation for TARSHA. No acute events in the last 24 hrs    Patient comfortable in chair. Mild abdominal pain  . Denies any issues with urination or appetite  Daughter who is a rn at bedside    Objective/     GEN:  Chronically ill, /76   Pulse 86   Temp 98.8 °F (37.1 °C) (Oral)   Resp 16   Ht 5' 5\" (1.651 m)   Wt 243 lb 13.3 oz (110.6 kg)   LMP  (LMP Unknown) Comment: HYSTERECTOMY  SpO2 96%   BMI 40.58 kg/m²   HEENT: EOMI,  Neck: supple, no JVD  CV: S1, S2 ,rrr,  ++ edema  RESP: CTA B   breathing wnl  ABD:  soft, nt, but staples noted. SKIN: warm, no rashes  Neuro:   No asterixis     Data/  Recent Labs     02/25/23  0621 02/25/23  2100 02/26/23  1800 02/27/23  0230 02/27/23  0710   WBC 10.5  --   --   --   --    HGB 7.0*   < > 8.4* 7.9* 8.0*   HCT 21.9*   < > 26.2* 24.4* 23.8*   MCV 86.4  --   --   --   --    *  --   --   --   --     < > = values in this interval not displayed. Recent Labs     02/25/23  0621 02/26/23  0512 02/27/23  0710   * 135* 134*   K 3.9 4.2  4.2 3.7  3.7    107 103   CO2 18* 16* 18*   GLUCOSE 106* 94 109*   PHOS  --  4.9 5.1*   BUN 35* 36* 36*   CREATININE 3.5* 3.4* 3.5*   LABGLOM 14* 15* 14*         Assessment:  #  Non-Oliguric Acute Kidney Injury KDIGO stage III      - Most Likely Due to acute tubular injury in setting of hypotension from blood loss, intraoperative surgical site infection, NSAIDs given on 2/19/2023.   On vancomycin till 2/19/2020 , less likely IRGN or abdominal hypertension      - Baseline Cr 0.8-0.9, worsening to 3.5       -Creatinine started uptrending on 2/20/2023      -Has remained -3 0.4-3.5 range for the last 5 days with      - U/A on 2/22/2023 shows WBC 0, RBC 3-4, Pr 30,        - Ur sodium less than 20, Ur Creatinine 90      - Renal CT   Left pelviectasis. 7   mm fat density lesion within the lateral right kidney, compatible with   angiomyolipoma. # Electrolytes :   -Hyponatremia    # Volume status :  -Euvolemic    # Acid Base :  -Non-anion gap metabolic acidosis    # CKD-MBD :  -Hyperphosphatemia        # Anemia :   -Normocytic anemia in setting of possible blood loss  -Stabilizing now. # Hypertension :  -BP at goal    Other issues  Large bowel obstruction s/p pati's  & colostomy on 2/14/23   Sigmoid stricture  Intra-abdominal hematoma  Peripheral vascular disease  Postop surgical wound site infection  Right above knee to below knee popliteal artery bypass on 1/16/23    Plan:   -Likely ATN plateauing.  -Urine output 700 last shift  -Continue to maintain maps greater than 70.  -Renal diet  -IV Lasix 80 once again today   - if cr worsens , will check ua again  , complement levels and bladder pressure        Thank you for the consultation. Please do not hesitate to call with questions. ______________________________  Finesse Cueto MD  [facility]  [email or web address]  Office: [phone]

## 2023-02-27 NOTE — PROGRESS NOTES
2 lumens occluded to PICC line. Purple /red lumen is the only line accessible. They all will flush however the one above is the only one that will draw back. Thank you.

## 2023-02-27 NOTE — PROGRESS NOTES
Patient has been very pleasant along with daughter April. Has been up in chair half the night and to the bed half the night. Up to bedside commode slowly however she is making attempts. Very pleasant. Will continue to monitor. Daughter remains at bedside. Call light remains in reach.

## 2023-02-27 NOTE — CARE COORDINATION
Chart reviewed day 18. S/p Hartmans procedure with follow up surgery. POD 11/13. Required transfusion yesterday. H&H currently 8/23. 8. following renal function for possible dialysis need. BUN/Creat, 36/3.5. Jaki Peters following for [facility] AT [facility] needs.  Chester Garcia RN

## 2023-02-27 NOTE — PROGRESS NOTES
Patient sitting up in chair and transitioning to the bsc as needed. Daughter April is at bedside and very helpful with assistance. Has been pleasant to me since meeting her at the start of the shift. We did discuss her next H/H and when it was ordered. The daughter requested to know when it was resulted. Comfort measures were given and also pain medication previously. Currently, the patient is resting peacefully with her eyes closed in the chair. Colostomy does have green stool coming out and drainage. Will continue to monitor. Call light in reach.

## 2023-02-27 NOTE — PROGRESS NOTES
Patient transitioned to the bed. Ambulate x1 assist. Daughter April in the room. Helpful in assisting patient to bedside commode. Once  back to bed a purewick has been applied. ATB therapy has been continued. No additional needs noted at this time. Will monitor. Call light in reach.

## 2023-02-28 ENCOUNTER — TELEPHONE (OUTPATIENT)
Dept: CASE MANAGEMENT | Age: 63
End: 2023-02-28

## 2023-02-28 ENCOUNTER — APPOINTMENT (OUTPATIENT)
Dept: CT IMAGING | Age: 63
DRG: 329 | End: 2023-02-28
Attending: INTERNAL MEDICINE
Payer: MEDICAID

## 2023-02-28 LAB
A/G RATIO: 1 (ref 1.1–2.2)
ALBUMIN SERPL-MCNC: 2.1 G/DL (ref 3.4–5)
ALP BLD-CCNC: 99 U/L (ref 40–129)
ALT SERPL-CCNC: 10 U/L (ref 10–40)
ANION GAP SERPL CALCULATED.3IONS-SCNC: 15 MMOL/L (ref 3–16)
AST SERPL-CCNC: 23 U/L (ref 15–37)
BILIRUB SERPL-MCNC: 0.3 MG/DL (ref 0–1)
BUN BLDV-MCNC: 35 MG/DL (ref 7–20)
CALCIUM SERPL-MCNC: 6.7 MG/DL (ref 8.3–10.6)
CHLORIDE BLD-SCNC: 103 MMOL/L (ref 99–110)
CO2: 17 MMOL/L (ref 21–32)
CREAT SERPL-MCNC: 3.3 MG/DL (ref 0.6–1.2)
GFR SERPL CREATININE-BSD FRML MDRD: 15 ML/MIN/{1.73_M2}
GLUCOSE BLD-MCNC: 109 MG/DL (ref 70–99)
HCT VFR BLD CALC: 23.2 % (ref 36–48)
HCT VFR BLD CALC: 23.8 % (ref 36–48)
HEMOGLOBIN: 7.6 G/DL (ref 12–16)
HEMOGLOBIN: 8 G/DL (ref 12–16)
MCH RBC QN AUTO: 28.9 PG (ref 26–34)
MCHC RBC AUTO-ENTMCNC: 33.6 G/DL (ref 31–36)
MCV RBC AUTO: 85.9 FL (ref 80–100)
PDW BLD-RTO: 16.3 % (ref 12.4–15.4)
PLATELET # BLD: 712 K/UL (ref 135–450)
PMV BLD AUTO: 6.1 FL (ref 5–10.5)
POTASSIUM REFLEX MAGNESIUM: 3.7 MMOL/L (ref 3.5–5.1)
RBC # BLD: 2.78 M/UL (ref 4–5.2)
SODIUM BLD-SCNC: 135 MMOL/L (ref 136–145)
TOTAL PROTEIN: 4.2 G/DL (ref 6.4–8.2)
WBC # BLD: 11.7 K/UL (ref 4–11)

## 2023-02-28 PROCEDURE — 85014 HEMATOCRIT: CPT

## 2023-02-28 PROCEDURE — 74176 CT ABD & PELVIS W/O CONTRAST: CPT

## 2023-02-28 PROCEDURE — 36415 COLL VENOUS BLD VENIPUNCTURE: CPT

## 2023-02-28 PROCEDURE — 6370000000 HC RX 637 (ALT 250 FOR IP): Performed by: INTERNAL MEDICINE

## 2023-02-28 PROCEDURE — 1200000000 HC SEMI PRIVATE

## 2023-02-28 PROCEDURE — 6360000002 HC RX W HCPCS: Performed by: SURGERY

## 2023-02-28 PROCEDURE — 6360000004 HC RX CONTRAST MEDICATION: Performed by: CLINICAL NURSE SPECIALIST

## 2023-02-28 PROCEDURE — 2580000003 HC RX 258: Performed by: SURGERY

## 2023-02-28 PROCEDURE — 6370000000 HC RX 637 (ALT 250 FOR IP): Performed by: SURGERY

## 2023-02-28 PROCEDURE — APPSS45 APP SPLIT SHARED TIME 31-45 MINUTES: Performed by: CLINICAL NURSE SPECIALIST

## 2023-02-28 PROCEDURE — 80053 COMPREHEN METABOLIC PANEL: CPT

## 2023-02-28 PROCEDURE — 99024 POSTOP FOLLOW-UP VISIT: CPT | Performed by: SURGERY

## 2023-02-28 PROCEDURE — 6360000002 HC RX W HCPCS: Performed by: INTERNAL MEDICINE

## 2023-02-28 PROCEDURE — 2500000003 HC RX 250 WO HCPCS: Performed by: SURGERY

## 2023-02-28 PROCEDURE — 2500000003 HC RX 250 WO HCPCS: Performed by: INTERNAL MEDICINE

## 2023-02-28 PROCEDURE — 85027 COMPLETE CBC AUTOMATED: CPT

## 2023-02-28 PROCEDURE — 85018 HEMOGLOBIN: CPT

## 2023-02-28 RX ORDER — FUROSEMIDE 10 MG/ML
60 INJECTION INTRAMUSCULAR; INTRAVENOUS ONCE
Status: COMPLETED | OUTPATIENT
Start: 2023-02-28 | End: 2023-02-28

## 2023-02-28 RX ORDER — SODIUM BICARBONATE 650 MG/1
650 TABLET ORAL 2 TIMES DAILY
Status: DISCONTINUED | OUTPATIENT
Start: 2023-02-28 | End: 2023-03-02

## 2023-02-28 RX ADMIN — FUROSEMIDE 60 MG: 10 INJECTION, SOLUTION INTRAMUSCULAR; INTRAVENOUS at 11:23

## 2023-02-28 RX ADMIN — SODIUM CHLORIDE, PRESERVATIVE FREE 10 ML: 5 INJECTION INTRAVENOUS at 21:39

## 2023-02-28 RX ADMIN — PROPRANOLOL HYDROCHLORIDE 40 MG: 40 TABLET ORAL at 08:04

## 2023-02-28 RX ADMIN — OXYCODONE HYDROCHLORIDE 10 MG: 5 TABLET ORAL at 16:23

## 2023-02-28 RX ADMIN — FAMOTIDINE 20 MG: 10 INJECTION, SOLUTION INTRAVENOUS at 08:07

## 2023-02-28 RX ADMIN — Medication 5 MG: at 21:23

## 2023-02-28 RX ADMIN — BUTALBITAL, ACETAMINOPHEN, AND CAFFEINE 1 TABLET: 50; 325; 40 TABLET ORAL at 08:07

## 2023-02-28 RX ADMIN — OXYCODONE HYDROCHLORIDE 10 MG: 5 TABLET ORAL at 21:22

## 2023-02-28 RX ADMIN — ATORVASTATIN CALCIUM 40 MG: 40 TABLET, FILM COATED ORAL at 08:04

## 2023-02-28 RX ADMIN — ALLOPURINOL 100 MG: 100 TABLET ORAL at 08:04

## 2023-02-28 RX ADMIN — LEVOTHYROXINE SODIUM 200 MCG: 0.1 TABLET ORAL at 08:04

## 2023-02-28 RX ADMIN — PROPRANOLOL HYDROCHLORIDE 40 MG: 40 TABLET ORAL at 21:23

## 2023-02-28 RX ADMIN — METRONIDAZOLE 500 MG: 500 INJECTION, SOLUTION INTRAVENOUS at 04:44

## 2023-02-28 RX ADMIN — METRONIDAZOLE 500 MG: 500 INJECTION, SOLUTION INTRAVENOUS at 21:26

## 2023-02-28 RX ADMIN — DOCUSATE SODIUM 100 MG: 100 CAPSULE, LIQUID FILLED ORAL at 08:04

## 2023-02-28 RX ADMIN — SODIUM CHLORIDE, PRESERVATIVE FREE 10 ML: 5 INJECTION INTRAVENOUS at 08:08

## 2023-02-28 RX ADMIN — CIPROFLOXACIN 400 MG: 2 INJECTION, SOLUTION INTRAVENOUS at 08:14

## 2023-02-28 RX ADMIN — ONDANSETRON 4 MG: 2 INJECTION INTRAMUSCULAR; INTRAVENOUS at 21:21

## 2023-02-28 RX ADMIN — SODIUM BICARBONATE 650 MG: 650 TABLET ORAL at 11:26

## 2023-02-28 RX ADMIN — HYDROMORPHONE HYDROCHLORIDE 0.5 MG: 0.5 INJECTION, SOLUTION INTRAMUSCULAR; INTRAVENOUS; SUBCUTANEOUS at 18:57

## 2023-02-28 RX ADMIN — APIXABAN 2.5 MG: 2.5 TABLET, FILM COATED ORAL at 10:10

## 2023-02-28 RX ADMIN — ALTEPLASE 1 MG: 2.2 INJECTION, POWDER, LYOPHILIZED, FOR SOLUTION INTRAVENOUS at 11:32

## 2023-02-28 RX ADMIN — METRONIDAZOLE 500 MG: 500 INJECTION, SOLUTION INTRAVENOUS at 11:35

## 2023-02-28 RX ADMIN — DIATRIZOATE MEGLUMINE AND DIATRIZOATE SODIUM 30 ML: 660; 100 LIQUID ORAL; RECTAL at 12:23

## 2023-02-28 RX ADMIN — OXYCODONE HYDROCHLORIDE 10 MG: 5 TABLET ORAL at 12:22

## 2023-02-28 RX ADMIN — FAMOTIDINE 20 MG: 10 INJECTION, SOLUTION INTRAVENOUS at 21:23

## 2023-02-28 RX ADMIN — ONDANSETRON 4 MG: 2 INJECTION INTRAMUSCULAR; INTRAVENOUS at 10:21

## 2023-02-28 RX ADMIN — SODIUM BICARBONATE 650 MG: 650 TABLET ORAL at 21:23

## 2023-02-28 ASSESSMENT — PAIN DESCRIPTION - DESCRIPTORS: DESCRIPTORS: ACHING;JABBING

## 2023-02-28 ASSESSMENT — PAIN SCALES - GENERAL
PAINLEVEL_OUTOF10: 7
PAINLEVEL_OUTOF10: 0
PAINLEVEL_OUTOF10: 9
PAINLEVEL_OUTOF10: 9
PAINLEVEL_OUTOF10: 7
PAINLEVEL_OUTOF10: 2
PAINLEVEL_OUTOF10: 0
PAINLEVEL_OUTOF10: 8

## 2023-02-28 ASSESSMENT — PAIN DESCRIPTION - LOCATION
LOCATION: ABDOMEN
LOCATION: ABDOMEN;BACK

## 2023-02-28 ASSESSMENT — PAIN DESCRIPTION - ORIENTATION: ORIENTATION: MID;LEFT

## 2023-02-28 NOTE — CARE COORDINATION
Chart reviewed day 19. POD 11/13, [place]. Care managed per  nephrology, surgery and IM. Renal function improved slightly today. H&H stable at 8/23. 8. therapy with recs for [address]. Ever [facility] following for needs.  Ann Kebede RN

## 2023-02-28 NOTE — PROGRESS NOTES
Message sent to the pharmacy for Flagyl order:    Can you please adjust this timing to 4am. I have been in another room and I still need to work on her PICC line to make sure she has access. Thank you.

## 2023-02-28 NOTE — PROGRESS NOTES
Pt a/o. Pt stated she feels like she is in more pain today. Pt medicated per MAR with PO pain medication x2. Pt sated nausea this AM and medicated per MAR. Pt stated nausea has decreased a little bit and stated no need for nausea medication at this time. Pt has been ambulating to and from restroom today with a steady gait. Wound care completed per order. Serous drainage noted. Writer notified Dr. Sari Borja via perfect serve that the CT has resulted. Call light and bedside table within reach of pt.

## 2023-02-28 NOTE — PROGRESS NOTES
Patient was sitting up in bed at the start of the shift. Assisted to the bedside commode. Colostomy bag emptied. Patient expressed being hungry. Ham sandwich, pretzels, milk, pepsi, and she requested a bowel of cereal from her snack bag that her grandchildren sent her. Has recently been up to the bedside commode with the PCA. No additional needs noted at this time. Will monitor. Call light in reach.

## 2023-02-28 NOTE — TELEPHONE ENCOUNTER
Imaging report CT abd pelvis 2/14/23 with f/u imaging recommendations sent to Darlene Xavier MD     May consider pulmonology referral for nodules greater than or equal to 6 mm    [address] [facility] RONALDO(EDIE)  [email or web address]

## 2023-02-28 NOTE — PROGRESS NOTES
PICC line dressing was changed and new tips applied. Able to flush the purple/red line and the blue/clear however the grey with clear tubing is not accessible. Line is positional to draw blood. Comfort measures given this a.m. Able to get up to the [facility]. Edema in BLE extremities does look somewhat better. Patient did eat last night at bedtime a ham sandwhich, half a bag of pretzels, she drank a can of pepsi, some water, and a half carton of white milk. This she tolerated well. I did make the patient aware of her Hgb and BUN and Crea levels this morning. Otherwise, denies any additional needs. Will continue to monitor. Call light in reach.

## 2023-02-28 NOTE — PLAN OF CARE
While family is not in the room. Bed alarm is activated.      Problem: Safety - Adult  Goal: Free from fall injury  Outcome: Progressing

## 2023-02-28 NOTE — PROGRESS NOTES
Hospitalist Progress Note      PCP: Yasmin Mott    Date of Admission: 2/9/2023    Chief Complaint:   Abd pain    Hospital Course: Ms. Elza Romo is a 63year old female who presented to [facility] from [facility] with abdominal pain and constipation on 2/9. She had a lower extremity vascular bypass surgery on her right leg a few weeks ago. She reports constipation and abdominal distension following her surgery with ng tube placement as well as a rectal tube. Patient reports her follow-up colonoscopy revealed sigmoid edema with diverticula and an ulcer, she then improved and was discharged. She again developed abdominal pain and distension and has not had a BM since 2/5. She denies trying any stool softeners or motility agents at home. 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 She also has had surgeries for adhesions and ventral hernia repair. She underwent a Haylie's procedure with GS on 2/14/23 with colostomy placement. Rapid response called on 2/16/23 for hypotension (as below). Found to have abdominal/pelvic hemorrhage on CT along with R thigh hematoma noted over previous bypass incision site on US. Patient status post surgery with resection of colon and diverting colostomy on 2/15/2023. Patient developed hypotension on 2/15/2023. Patient had further hypotension on 2/16/2023 and it was noted patient had intra-abdominal bleed with right thigh hematoma. Patient's hemoglobin had dropped to 5. Patient was transfused with 2 units. She appeared to stabilize but then her blood pressure decreased and her heart rate increased. Patient's hemoglobin dropped back down to 5 after being up to 6.9. It was decided to take patient back to surgery. During surgery patient was noted to have old blood in her abdomen no active bleed was noted but 3 L of old blood removed.     Subjective: Patient seen and examined this AM . No family at bedside  . Hb better this AM 8 and stable. No signs of bleeding . S/p 1 unit PRBC transfusion on 2/26  . Eliquis re- started this AM based on my d/w Dr. Collette Baton yesterday  for Right leg endo vascular surgery . Received 80 mg IV lasix x 1 on 2/25/23 ;  2/26 & 2/27 per Nephrology . Cr 3.3 (3.5) today . Denies any New C/o . Reviewed  Abdominal wound cultures (Enterococcus raffinosus; Bacteroides ; clostridium sps) . Currently on IV cipro/Flagyl since 2/22/23. Nephrology following for renal functions . Continues with significant pedal edema B/L . Will follow general surgery rounds     Medications:  Reviewed    Infusion Medications    sodium chloride      sodium chloride      sodium chloride      dextrose      sodium chloride Stopped (02/12/23 0977)     Scheduled Medications    apixaban  2.5 mg Oral BID    sodium bicarbonate  650 mg Oral BID    ciprofloxacin  400 mg IntraVENous Q24H    metroNIDAZOLE  500 mg IntraVENous Q8H    melatonin  5 mg Oral Nightly    famotidine (PEPCID) injection  20 mg IntraVENous BID    docusate sodium  100 mg Oral Daily    sodium chloride flush  5-40 mL IntraVENous 2 times per day    allopurinol  100 mg Oral Daily    atorvastatin  40 mg Oral Daily    [Held by provider] aspirin  81 mg Oral Daily    [Held by provider] gabapentin  300 mg Oral TID    levothyroxine  200 mcg Oral Daily    propranolol  40 mg Oral BID     PRN Meds: diatrizoate meglumine-sodium, sodium chloride, sodium chloride, butalbital-acetaminophen-caffeine, oxyCODONE **OR** oxyCODONE, HYDROmorphone, sodium chloride, glucose, dextrose bolus **OR** dextrose bolus, glucagon (rDNA), dextrose, benzocaine, sodium chloride flush, sodium chloride, ondansetron **OR** ondansetron, acetaminophen **OR** acetaminophen, potassium chloride **OR** potassium alternative oral replacement **OR** [DISCONTINUED] potassium chloride, magnesium sulfate, sodium phosphate IVPB **OR** sodium phosphate IVPB **OR** sodium phosphate IVPB      Intake/Output Summary (Last 24 hours) at 2/28/2023 1258  Last data filed at 2/28/2023 1219  Gross per 24 hour   Intake 690 ml   Output 2875 ml   Net -2185 ml       Physical Exam Performed:  /70   Pulse 73   Temp 99 °F (37.2 °C) (Oral)   Resp 20   Ht 5' 5\" (1.651 m)   Wt 243 lb 13.3 oz (110.6 kg)   LMP  (LMP Unknown) Comment: HYSTERECTOMY  SpO2 96%   BMI 40.58 kg/m²     General appearance: No apparent distress, appears stated age and cooperative. HEENT: Pupils equal, round, and reactive to light. Conjunctivae/corneas clear. Neck: Supple, with full range of motion. No jugular venous distention. Trachea midline. Respiratory:  Normal respiratory effort. Clear to auscultation, bilaterally without Rales/Wheezes/Rhonchi. Cardiovascular: Regular rate and rhythm with normal S1/S2 without murmurs, rubs or gallops. Abdomen: Tender. Midline incision dressed. Positive bowel sounds noted. Ostomy left lower quadrant  Musculoskeletal: Hematoma on right medial thigh resolved . No skin bruise /ecchymosis noted ; 2 + pedal edema   Skin: Skin color, texture, turgor normal.  No rashes or lesions. Neurologic:  Neurovascularly intact without any focal sensory/motor deficits.  Cranial nerves: II-XII intact, grossly non-focal.  Psychiatric: Alert and oriented, thought content appropriate, normal insight  Capillary Refill: Brisk, 3 seconds, normal   Peripheral Pulses: +2 palpable, equal bilaterally     Labs:   Recent Labs     02/27/23  1228 02/27/23  2040 02/28/23  0310   WBC  --   --  11.7*   HGB 8.7* 7.7* 8.0*   HCT 25.4* 24.4* 23.8*   PLT  --   --  712*       Recent Labs     02/26/23  0512 02/27/23  0710 02/28/23  0310   * 134* 135*   K 4.2  4.2 3.7  3.7 3.7    103 103   CO2 16* 18* 17*   BUN 36* 36* 35*   CREATININE 3.4* 3.5* 3.3*   CALCIUM 7.1* 7.0* 6.7*   PHOS 4.9 5.1*  --        Recent Labs     02/26/23  0512 02/27/23  0710 02/28/23  0310   AST 17 19 23   ALT 9* 10 10   BILITOT 0.4 0.4 0. 3   ALKPHOS 110 101 99       Urinalysis:    Lab Results   Component Value Date/Time    NITRU Negative 02/22/2023 03:55 AM    WBCUA None seen 02/22/2023 03:55 AM    RBCUA 3-4 02/22/2023 03:55 AM    BLOODU TRACE-INTACT 02/22/2023 03:55 AM    SPECGRAV 1.015 02/22/2023 03:55 AM    GLUCOSEU Negative 02/22/2023 03:55 AM       Radiology:  VL DUP LOWER EXTREMITY ARTERIES RIGHT   Final Result      CT ABDOMEN PELVIS WO CONTRAST Additional Contrast? None   Final Result   Large amount of complex fluid within the abdomen and pelvis, most notably   within the pelvis where hematocrit levels are seen, compatible with   hemorrhage. Smaller 4.9 cm collection within subcutaneous fat adjacent to the left lower   quadrant stoma, compatible with hematoma. Amorphous inflammation or   hemorrhage is seen within the soft tissues immediately adjacent. Mural thickening of the ascending colon and transverse colon, which can   reflect colitis in the appropriate clinical setting. Mild left pelviectasis. Pneumoperitoneum, in keeping with the recent postoperative state. Bibasilar atelectasis. 1 cm right lower lobe pulmonary nodule, for which follow-up recommendations   are as below. RECOMMENDATIONS:   Fleischner Society guidelines for follow-up and management of incidentally   detected pulmonary nodules:      Nodule size greater than 8 mm         In a low-risk patient, consider CT at 3 months, PET/CT, or tissue sampling. In a high-risk patient, consider CT at 3 months, PET/CT, or tissue sampling.      - Low risk patients include individuals with minimal or absent history of   smoking and other known risk factors. - High risk patients include individuals with a history or smoking or known   risk factors. Radiology 2017 http://pubs. rsna.org/doi/full/10.1148/radiol. 3851595033         IR PICC WO SQ PORT/PUMP > 5 YEARS   Final Result      CT ABDOMEN PELVIS WO CONTRAST Additional Contrast? None   Final Result   1. Barium contrast from prior small-bowel follow-through is seen throughout   the colon and limits evaluation due to streak artifact. No definite abscess   is identified. 2.  Infrarenal abdominal aortic aneurysm measures up to 3.0 cm. See   recommendations for follow-up below. 3.  There is a 1.1 cm right lower lobe pulmonary nodule and a 0.3 cm left   lower lobe pulmonary nodule. Recommend dedicated CT chest for further   evaluation. RECOMMENDATIONS:      Multiple. Worst: 11 mm solid      Pathology: Multiple pulmonary nodules. Most severe: 11 mm solid pulmonary   nodule detected on incomplete chest CT. Recommend prompt non-contrast Chest CT for further evaluation. These guidelines do not apply to immunocompromised patients and patients with   cancer. Follow up in patients with significant comorbidities as clinically   warranted. For lung cancer screening, adhere to Lung-RADS guidelines. Reference: Radiology. 2017; 284(1):228-43         3 cm AAA      Pathology: 3 cm infrarenal abdominal aortic aneurysm. Recommend follow-up every 3 years. Reference: J Am [place] Radiol 3261;93:139-966. XR ABDOMEN (KUB) (SINGLE AP VIEW)   Final Result   Cancellation of scheduled hypaque enema due to presence of barium throughout   the colon including the rectosigmoid region as described above. FL SMALL BOWEL FOLLOW THROUGH ONLY   Final Result   Mildly dilated small bowel, though small-bowel transit time is upper limits   of normal.         CT ABDOMEN PELVIS W IV CONTRAST Additional Contrast? None   Final Result   Diverticulitis of the descending sigmoid junction. Mild wall thickening but   negative for obstruction.          CT ABDOMEN PELVIS WO CONTRAST Additional Contrast? Oral    (Results Pending)       IP CONSULT TO GENERAL SURGERY  IP CONSULT TO GI  IP CONSULT TO PHARMACY  IP CONSULT TO NEPHROLOGY  IP CONSULT TO DIETITIAN  PHARMACY TO DOSE VANCOMYCIN    Assessment/Plan:  Active Hospital Problems    Diagnosis     Sigmoid stricture ([facility] [address] [K56.699]      Priority: Medium    Hemorrhagic shock ([facility] [address] [R57.8]      Priority: Medium    Acute blood loss anemia [D62]      Priority: Medium    Acute renal failure (ARF) (HCC) [N17.9]      Priority: Medium    Diverticulitis [K57.92]      Priority: Medium    Colonic obstruction ([facility] [address] [K56.609]      Priority: Medium    Bowel obstruction ([facility] [address] [K56.609]      Priority: Medium     Diverticulitis with obstruction due to sigmoid stricture complicated by Post-Op Hemoperitoneum needing evacuation . Patient on IV Vanco/Zosyn through 2/21/23 and dc'd Vanco  .  Status post resection with diverting colostomy (Rick's procedure)  2/14/22 . General surgery is following. Good ostomy output. Noted general surgery expressed pus out of her wound site on 2/21/2023 and sent for cultures. Cultures grew Bacteroides, clostridium and enterococcus sps. Antibiotics transitioned to IV Cipro/Flagyl per general surgery on 2/22/2023. blood cultures - NGTD   Peripheral artery disease. Patient status post bypass of right lower extremity (Fem-pop bypass graft on 1/16/23 @ [facility]) . Patient was on therapeutic Lovenox. Patient developed right leg hematoma and intra-abdominal bleed. Lovenox and aspirin held. Patient with synthetic graft and may require long-term anticoagulation. Vascular  suggested AC  once it safe to anticoagulate . Right leg hematoma improved. Restarted on 2/25/23 after obtaining surgical clearance . Monitor H/H   TARSHA. This is secondary to hypotension and hypovolemia (from high ostomy output) . ongoing ;   Nephrology consulted - following and asssiting with Mx; Monitoring for dialysis needs . Improved initially and started worsening since 2/21/2023 . continue to monitor closely. Avoid nephrotoxic agents including Toradol. Creatinine peaked at 3.5 . Monitor urine output.   Off IVF since 2/24/23. Received IV lasix 80 mgx 1 on 2/25  Hypokalemia. Supplemented . Calss III Obesity -  With Body mass index is 40.58 kg/m². Complicating assessment and treatment. Placing patient at risk for multiple co-morbidities as well as early death and contributing to the patient's presentation. Counseled on weight loss. Post-Operative surgical site wound infection - General surgery following and assisting ; general surgery expressed pus out of her wound site on 2/21/2023 and sent for cultures. Cultures grew Bacteroides, clostridium and enterococcus sps. Antibiotics transitioned to IV Cipro/Flagyl per general surgery on 2/22/2023. On IV Cipro/Flagyl  7. Anemia - anticipated post-op acute blood loss anemia w/out evidence of active bleeding/hemolysis. Noted Hb slowly trended down to 6.6 on 2/25 needing 1 unit PRBC transfusion    Follow serial labs. Reviewed and documented as above. DVT Prophylaxis: held initially due to bleed/Hematoma  ; Resumed Eliquis  2.5 mg BID on 2/25/23 after Oked by surgery   Diet: ADULT DIET;  Regular  ADULT ORAL NUTRITION SUPPLEMENT; Breakfast, Dinner; Standard High Calorie/High Protein Oral Supplement  Code Status: Full Code    PT/OT Eval Status: Ambulatory     Dispo -Home versus SNF when stable    Appropriate for A1 Discharge Unit: No    Galina Bryant MD

## 2023-02-28 NOTE — PROGRESS NOTES
[facility] Progress Note           Subjective/   63y.o. year old female past medical history of hypothyroidism, hyperlipidemia, abdominal hernia, abdominal adhesions, lower extremity vascular bypass surgery presented with small bowel obstruction on 2/9/2023 is s/p colon resection, diverting colostomy on 2/15/2023, intra-abdominal bleed on 2/16/2023 who we are seeing in consultation for TARSHA. No acute events in the last 24 hrs    Reports feeling fatigued today. Feels that swelling is going down but still significant leg  edema       Objective/     GEN:  Chronically ill, BP (!) 137/58   Pulse 77   Temp 99.1 °F (37.3 °C) (Oral)   Resp 20   Ht 5' 5\" (1.651 m)   Wt 243 lb 13.3 oz (110.6 kg)   LMP  (LMP Unknown) Comment: HYSTERECTOMY  SpO2 96%   BMI 40.58 kg/m²   HEENT: EOMI,  Neck: supple, no JVD  CV: S1, S2 ,rrr,  ++ edema  RESP: CTA B   breathing wnl  ABD:  soft, nt, but staples noted. SKIN: warm, no rashes  Neuro:   No asterixis     Data/  Recent Labs     02/27/23  1228 02/27/23  2040 02/28/23  0310   WBC  --   --  11.7*   HGB 8.7* 7.7* 8.0*   HCT 25.4* 24.4* 23.8*   MCV  --   --  85.9   PLT  --   --  712*       Recent Labs     02/26/23  0512 02/27/23  0710 02/28/23  0310   * 134* 135*   K 4.2  4.2 3.7  3.7 3.7    103 103   CO2 16* 18* 17*   GLUCOSE 94 109* 109*   PHOS 4.9 5.1*  --    BUN 36* 36* 35*   CREATININE 3.4* 3.5* 3.3*   LABGLOM 15* 14* 15*           Assessment:  #  Non-Oliguric Acute Kidney Injury KDIGO stage III      - Most Likely Due to acute tubular injury in setting of hypotension from blood loss, intraoperative surgical site infection, NSAIDs given on 2/19/2023.   On vancomycin till 2/19/2020 , less likely IRGN or abdominal hypertension      - Baseline Cr 0.8-0.9, worsening to 3.5       -Creatinine started uptrending on 2/20/2023      -Has remained -3 0.4-3.5 range for the last 5 days with      - U/A on 2/22/2023 shows WBC 0, RBC 3-4, Pr 30,        - Ur sodium less than 20, Ur Creatinine 90      - Renal CT   Left pelviectasis. 7   mm fat density lesion within the lateral right kidney, compatible with   angiomyolipoma. # Electrolytes :   -Hyponatremia    # Volume status :  -Euvolemic    # Acid Base :  -Non-anion gap metabolic acidosis    # CKD-MBD :  -Hyperphosphatemia        # Anemia :   -Normocytic anemia in setting of possible blood loss  -Stabilizing now. # Hypertension :  -BP at goal    Other issues  Large bowel obstruction s/p pati's  & colostomy on 2/14/23   Sigmoid stricture  Intra-abdominal hematoma  Peripheral vascular disease  Postop surgical wound site infection  Right above knee to below knee popliteal artery bypass on 1/16/23    Plan:   -Creatinine downtrending  -Urine output 1400 yesterday  -Continue to maintain maps greater than 70.  -Renal diet  -IV Lasix 60 once again today   -Sodium bicarbonate tablets 650 mg twice daily today  - if cr worsens , will check ua again  , complement levels and bladder pressure        Thank you for the consultation. Please do not hesitate to call with questions. ______________________________  Paula Hooper MD  [facility]  [email or web address]  Office: [phone]

## 2023-02-28 NOTE — PROGRESS NOTES
Pt has a temp of 100.4. Tylenol give per [facility] Secure message sent to surgery crosscover Dr. Lanie Yeung \"FYI: fever of 100.4. Tylenol given per [facility] thanks ! \"    Writer notified LUCIO Vigil of temp and tylenol as well.

## 2023-02-28 NOTE — PROGRESS NOTES
[facility] GENERAL SURGERY    Surgery Progress Note           POD # 11 & 13    PATIENT NAME: Alfie Gaona     TODAY'S DATE: 2/28/2023    INTERVAL HISTORY:    Pt reports she is having nausea more today and can really only drink milk. She is having more pain medial to her ostomy today. Still with serous drainage from wound. Tmax 100.4 last 24 hours. OBJECTIVE:   VITALS:  BP (!) 137/58   Pulse 77   Temp 99.1 °F (37.3 °C) (Oral)   Resp 20   Ht 5' 5\" (1.651 m)   Wt 243 lb 13.3 oz (110.6 kg)   LMP  (LMP Unknown) Comment: HYSTERECTOMY  SpO2 96%   BMI 40.58 kg/m²     INTAKE/OUTPUT:    I/O last 3 completed shifts: In: 510 [P.O.:490; I.V.:20]  Out: 3500 [Urine:1800; DNGPX:8981]  I/O this shift: In: 440 [P.O.:420; I.V.:20]  Out: 600 [Urine:550; Stool:50]              CONSTITUTIONAL:  awake and alert  LUNGS: no crackles or wheezes  ABDOMEN:   normal bowel sounds, soft, non-distended, ostomy with stool - tender medially  INCISION: inferior portion with serous drainage, no erythema and fairly clean overall. EXT: with juan f pedal edema    Data:  CBC:   Recent Labs     02/27/23  1228 02/27/23  2040 02/28/23  0310   WBC  --   --  11.7*   HGB 8.7* 7.7* 8.0*   HCT 25.4* 24.4* 23.8*   PLT  --   --  712*       BMP:    Recent Labs     02/26/23  0512 02/27/23  0710 02/28/23  0310   * 134* 135*   K 4.2  4.2 3.7  3.7 3.7    103 103   CO2 16* 18* 17*   BUN 36* 36* 35*   CREATININE 3.4* 3.5* 3.3*   GLUCOSE 94 109* 109*       Hepatic:   Recent Labs     02/26/23  0512 02/27/23  0710 02/28/23  0310   AST 17 19 23   ALT 9* 10 10   BILITOT 0.4 0.4 0.3   ALKPHOS 110 101 99       Mag:    No results for input(s): MG in the last 72 hours. Phos:     Recent Labs     02/26/23  0512 02/27/23  0710   PHOS 4.9 5.1*        INR: No results for input(s): INR in the last 72 hours.       Radiology Review:     CT scan ordered and pending    ASSESSMENT AND PLAN:  63 y.o. female status post Rick's procedure with postop bleeding requiring evacuation of hemoperitoneum. GI: Will obtain CT scan today to further evaluate given her symptoms of nausea and increased pain  ID: Continue with IV antibiotics    Acute blood loss anemia: stable this AM, vee was given this AM - monitor labs with restarting    TARSHA: nephrology managing - Cr stable, diuresing    Activity: PT/OT    Electronically signed by JARETT Wilson - CNP     Surgery Staff    I have examined this patient, and read and agree with the note by Stuart Segura CNP from today; more than half of the total time was spent by me on the encounter. Not sure why she should still be so uncomfortable with nausea. Will check CT to rule out intraabdominal abscess. Further recommendations to follow.     Magan Stevenson MD

## 2023-03-01 LAB
A/G RATIO: 0.8 (ref 1.1–2.2)
ALBUMIN SERPL-MCNC: 2.1 G/DL (ref 3.4–5)
ALP BLD-CCNC: 94 U/L (ref 40–129)
ALT SERPL-CCNC: 9 U/L (ref 10–40)
ANION GAP SERPL CALCULATED.3IONS-SCNC: 14 MMOL/L (ref 3–16)
AST SERPL-CCNC: 25 U/L (ref 15–37)
BILIRUB SERPL-MCNC: 0.3 MG/DL (ref 0–1)
BLOOD BANK DISPENSE STATUS: NORMAL
BLOOD BANK DISPENSE STATUS: NORMAL
BLOOD BANK PRODUCT CODE: NORMAL
BLOOD BANK PRODUCT CODE: NORMAL
BPU ID: NORMAL
BPU ID: NORMAL
BUN BLDV-MCNC: 28 MG/DL (ref 7–20)
CALCIUM SERPL-MCNC: 6.4 MG/DL (ref 8.3–10.6)
CHLORIDE BLD-SCNC: 100 MMOL/L (ref 99–110)
CO2: 19 MMOL/L (ref 21–32)
CREAT SERPL-MCNC: 3.1 MG/DL (ref 0.6–1.2)
DESCRIPTION BLOOD BANK: NORMAL
DESCRIPTION BLOOD BANK: NORMAL
GFR SERPL CREATININE-BSD FRML MDRD: 16 ML/MIN/{1.73_M2}
GLUCOSE BLD-MCNC: 138 MG/DL (ref 70–99)
HCT VFR BLD CALC: 22.6 % (ref 36–48)
HCT VFR BLD CALC: 23.6 % (ref 36–48)
HEMOGLOBIN: 7.7 G/DL (ref 12–16)
HEMOGLOBIN: 7.9 G/DL (ref 12–16)
MAGNESIUM: 0.9 MG/DL (ref 1.8–2.4)
MCH RBC QN AUTO: 28.7 PG (ref 26–34)
MCHC RBC AUTO-ENTMCNC: 33.6 G/DL (ref 31–36)
MCV RBC AUTO: 85.5 FL (ref 80–100)
PDW BLD-RTO: 16.5 % (ref 12.4–15.4)
PLATELET # BLD: 583 K/UL (ref 135–450)
PMV BLD AUTO: 6.3 FL (ref 5–10.5)
POTASSIUM REFLEX MAGNESIUM: 3.2 MMOL/L (ref 3.5–5.1)
RBC # BLD: 2.76 M/UL (ref 4–5.2)
SODIUM BLD-SCNC: 133 MMOL/L (ref 136–145)
TOTAL PROTEIN: 4.9 G/DL (ref 6.4–8.2)
WBC # BLD: 9.7 K/UL (ref 4–11)

## 2023-03-01 PROCEDURE — 1200000000 HC SEMI PRIVATE

## 2023-03-01 PROCEDURE — 6360000002 HC RX W HCPCS: Performed by: SURGERY

## 2023-03-01 PROCEDURE — 6360000002 HC RX W HCPCS: Performed by: INTERNAL MEDICINE

## 2023-03-01 PROCEDURE — APPNB30 APP NON BILLABLE TIME 0-30 MINS: Performed by: CLINICAL NURSE SPECIALIST

## 2023-03-01 PROCEDURE — 6370000000 HC RX 637 (ALT 250 FOR IP): Performed by: SURGERY

## 2023-03-01 PROCEDURE — 6360000002 HC RX W HCPCS: Performed by: NURSE PRACTITIONER

## 2023-03-01 PROCEDURE — 6370000000 HC RX 637 (ALT 250 FOR IP): Performed by: INTERNAL MEDICINE

## 2023-03-01 PROCEDURE — 2580000003 HC RX 258: Performed by: SURGERY

## 2023-03-01 PROCEDURE — 85027 COMPLETE CBC AUTOMATED: CPT

## 2023-03-01 PROCEDURE — 85018 HEMOGLOBIN: CPT

## 2023-03-01 PROCEDURE — 2500000003 HC RX 250 WO HCPCS: Performed by: SURGERY

## 2023-03-01 PROCEDURE — 2580000003 HC RX 258: Performed by: NURSE PRACTITIONER

## 2023-03-01 PROCEDURE — APPSS30 APP SPLIT SHARED TIME 16-30 MINUTES: Performed by: CLINICAL NURSE SPECIALIST

## 2023-03-01 PROCEDURE — 85014 HEMATOCRIT: CPT

## 2023-03-01 PROCEDURE — 2500000003 HC RX 250 WO HCPCS: Performed by: INTERNAL MEDICINE

## 2023-03-01 PROCEDURE — 80053 COMPREHEN METABOLIC PANEL: CPT

## 2023-03-01 PROCEDURE — 83735 ASSAY OF MAGNESIUM: CPT

## 2023-03-01 RX ORDER — MAGNESIUM SULFATE IN WATER 40 MG/ML
4000 INJECTION, SOLUTION INTRAVENOUS ONCE
Status: COMPLETED | OUTPATIENT
Start: 2023-03-01 | End: 2023-03-01

## 2023-03-01 RX ORDER — CALCIUM CARBONATE-CHOLECALCIFEROL TAB 250 MG-125 UNIT 250-125 MG-UNIT
1 TAB ORAL DAILY
Status: DISCONTINUED | OUTPATIENT
Start: 2023-03-01 | End: 2023-03-03 | Stop reason: HOSPADM

## 2023-03-01 RX ORDER — PROCHLORPERAZINE EDISYLATE 5 MG/ML
10 INJECTION INTRAMUSCULAR; INTRAVENOUS EVERY 6 HOURS PRN
Status: DISCONTINUED | OUTPATIENT
Start: 2023-03-01 | End: 2023-03-03 | Stop reason: HOSPADM

## 2023-03-01 RX ORDER — FUROSEMIDE 10 MG/ML
60 INJECTION INTRAMUSCULAR; INTRAVENOUS ONCE
Status: COMPLETED | OUTPATIENT
Start: 2023-03-01 | End: 2023-03-01

## 2023-03-01 RX ORDER — POTASSIUM CHLORIDE 7.45 MG/ML
10 INJECTION INTRAVENOUS ONCE
Status: COMPLETED | OUTPATIENT
Start: 2023-03-01 | End: 2023-03-01

## 2023-03-01 RX ORDER — POTASSIUM CHLORIDE 20 MEQ/1
40 TABLET, EXTENDED RELEASE ORAL ONCE
Status: COMPLETED | OUTPATIENT
Start: 2023-03-01 | End: 2023-03-01

## 2023-03-01 RX ADMIN — APIXABAN 2.5 MG: 2.5 TABLET, FILM COATED ORAL at 13:12

## 2023-03-01 RX ADMIN — ASPIRIN 81 MG 81 MG: 81 TABLET ORAL at 13:12

## 2023-03-01 RX ADMIN — FAMOTIDINE 20 MG: 10 INJECTION, SOLUTION INTRAVENOUS at 09:24

## 2023-03-01 RX ADMIN — POTASSIUM CHLORIDE 40 MEQ: 1500 TABLET, EXTENDED RELEASE ORAL at 10:22

## 2023-03-01 RX ADMIN — SODIUM BICARBONATE 650 MG: 650 TABLET ORAL at 20:53

## 2023-03-01 RX ADMIN — ATORVASTATIN CALCIUM 40 MG: 40 TABLET, FILM COATED ORAL at 09:22

## 2023-03-01 RX ADMIN — OXYCODONE HYDROCHLORIDE 10 MG: 5 TABLET ORAL at 15:33

## 2023-03-01 RX ADMIN — FUROSEMIDE 60 MG: 10 INJECTION, SOLUTION INTRAMUSCULAR; INTRAVENOUS at 10:24

## 2023-03-01 RX ADMIN — ALTEPLASE 1 MG: 2.2 INJECTION, POWDER, LYOPHILIZED, FOR SOLUTION INTRAVENOUS at 00:19

## 2023-03-01 RX ADMIN — FAMOTIDINE 20 MG: 10 INJECTION, SOLUTION INTRAVENOUS at 20:53

## 2023-03-01 RX ADMIN — PROCHLORPERAZINE EDISYLATE 10 MG: 5 INJECTION INTRAMUSCULAR; INTRAVENOUS at 19:26

## 2023-03-01 RX ADMIN — PROPRANOLOL HYDROCHLORIDE 40 MG: 40 TABLET ORAL at 09:22

## 2023-03-01 RX ADMIN — PROPRANOLOL HYDROCHLORIDE 40 MG: 40 TABLET ORAL at 20:52

## 2023-03-01 RX ADMIN — ONDANSETRON 4 MG: 2 INJECTION INTRAMUSCULAR; INTRAVENOUS at 15:34

## 2023-03-01 RX ADMIN — POTASSIUM CHLORIDE 10 MEQ: 7.46 INJECTION, SOLUTION INTRAVENOUS at 10:34

## 2023-03-01 RX ADMIN — APIXABAN 2.5 MG: 2.5 TABLET, FILM COATED ORAL at 20:52

## 2023-03-01 RX ADMIN — SODIUM BICARBONATE 650 MG: 650 TABLET ORAL at 09:22

## 2023-03-01 RX ADMIN — METRONIDAZOLE 500 MG: 500 INJECTION, SOLUTION INTRAVENOUS at 12:16

## 2023-03-01 RX ADMIN — ALTEPLASE 1 MG: 2.2 INJECTION, POWDER, LYOPHILIZED, FOR SOLUTION INTRAVENOUS at 00:20

## 2023-03-01 RX ADMIN — ONDANSETRON 4 MG: 2 INJECTION INTRAMUSCULAR; INTRAVENOUS at 05:53

## 2023-03-01 RX ADMIN — METRONIDAZOLE 500 MG: 500 INJECTION, SOLUTION INTRAVENOUS at 20:39

## 2023-03-01 RX ADMIN — PROCHLORPERAZINE EDISYLATE 10 MG: 5 INJECTION INTRAMUSCULAR; INTRAVENOUS at 10:54

## 2023-03-01 RX ADMIN — MAGNESIUM SULFATE HEPTAHYDRATE 4000 MG: 40 INJECTION, SOLUTION INTRAVENOUS at 10:49

## 2023-03-01 RX ADMIN — Medication 5 MG: at 20:52

## 2023-03-01 RX ADMIN — DOCUSATE SODIUM 100 MG: 100 CAPSULE, LIQUID FILLED ORAL at 09:22

## 2023-03-01 RX ADMIN — HYDROMORPHONE HYDROCHLORIDE 0.5 MG: 0.5 INJECTION, SOLUTION INTRAMUSCULAR; INTRAVENOUS; SUBCUTANEOUS at 16:51

## 2023-03-01 RX ADMIN — CIPROFLOXACIN 400 MG: 2 INJECTION, SOLUTION INTRAVENOUS at 09:26

## 2023-03-01 RX ADMIN — METRONIDAZOLE 500 MG: 500 INJECTION, SOLUTION INTRAVENOUS at 05:45

## 2023-03-01 RX ADMIN — ALLOPURINOL 100 MG: 100 TABLET ORAL at 09:22

## 2023-03-01 RX ADMIN — Medication 1 TABLET: at 10:30

## 2023-03-01 RX ADMIN — SODIUM CHLORIDE, PRESERVATIVE FREE 10 ML: 5 INJECTION INTRAVENOUS at 20:54

## 2023-03-01 RX ADMIN — OXYCODONE HYDROCHLORIDE 10 MG: 5 TABLET ORAL at 09:21

## 2023-03-01 RX ADMIN — BUTALBITAL, ACETAMINOPHEN, AND CAFFEINE 1 TABLET: 50; 325; 40 TABLET ORAL at 16:47

## 2023-03-01 RX ADMIN — OXYCODONE HYDROCHLORIDE 10 MG: 5 TABLET ORAL at 19:26

## 2023-03-01 RX ADMIN — LEVOTHYROXINE SODIUM 200 MCG: 0.1 TABLET ORAL at 05:50

## 2023-03-01 ASSESSMENT — PAIN DESCRIPTION - DESCRIPTORS
DESCRIPTORS: ACHING
DESCRIPTORS: ACHING
DESCRIPTORS: TENDER
DESCRIPTORS: OTHER (COMMENT)
DESCRIPTORS: STABBING

## 2023-03-01 ASSESSMENT — PAIN DESCRIPTION - ORIENTATION
ORIENTATION: LEFT;UPPER
ORIENTATION: LEFT
ORIENTATION: LEFT
ORIENTATION: MID
ORIENTATION: LEFT

## 2023-03-01 ASSESSMENT — PAIN SCALES - GENERAL
PAINLEVEL_OUTOF10: 6
PAINLEVEL_OUTOF10: 5
PAINLEVEL_OUTOF10: 0
PAINLEVEL_OUTOF10: 6
PAINLEVEL_OUTOF10: 8

## 2023-03-01 ASSESSMENT — PAIN DESCRIPTION - LOCATION
LOCATION: ABDOMEN
LOCATION: ABDOMEN
LOCATION: HEAD
LOCATION: ABDOMEN

## 2023-03-01 ASSESSMENT — PAIN DESCRIPTION - ONSET: ONSET: ON-GOING

## 2023-03-01 ASSESSMENT — PAIN DESCRIPTION - PAIN TYPE: TYPE: SURGICAL PAIN

## 2023-03-01 ASSESSMENT — PAIN DESCRIPTION - FREQUENCY: FREQUENCY: CONTINUOUS

## 2023-03-01 NOTE — PLAN OF CARE
Problem: Pain  Goal: Verbalizes/displays adequate comfort level or baseline comfort level  3/1/2023 1259 by Mamadou Chapman RN  Outcome: Progressing  3/1/2023 0107 by Indra Nuñez RN  Outcome: Progressing     Problem: ABCDS Injury Assessment  Goal: Absence of physical injury  3/1/2023 1259 by Mamadou Chapman RN  Outcome: Progressing  3/1/2023 0107 by Indra Nuñez RN  Outcome: Progressing     Problem: Safety - Adult  Goal: Free from fall injury  3/1/2023 1259 by Mamadou Chapman RN  Outcome: Progressing  3/1/2023 0107 by Indra Nuñez RN  Outcome: Progressing

## 2023-03-01 NOTE — PROGRESS NOTES
Physical Therapy    PT session attempted this afternoon, although wound care currently working with pt to change ostomy. Will re-attempt PT at a later time/date -- as schedule permits. Thank you.     Ruben Hart  PT, DPT #[id number] Xray revealed: "Distal radial comminuted intra-articular fracture and ulnar styloid fracture "    -pain control  -Ortho consult

## 2023-03-01 NOTE — PLAN OF CARE
Attempting to manage pain pharmacologic and non-pharmacologic.      Problem: Pain  Goal: Verbalizes/displays adequate comfort level or baseline comfort level  Outcome: Progressing

## 2023-03-01 NOTE — CARE COORDINATION
Chart reviewed day 20. Care per surgery, nephrology and IM. POD 12/15, new ostomy. T max 99.9. Renal function, creat trending down. Lasix 60 IVP today, oral bicarb. True Eduardo following for DCP needs. Updated today.  Zander Zurita RN

## 2023-03-01 NOTE — PROGRESS NOTES
[facility] GENERAL SURGERY    Surgery Progress Note           POD # 12 & 14    PATIENT NAME: Chano Taylor     TODAY'S DATE: 3/1/2023    INTERVAL HISTORY:    Pt reports she is still having nausea. Tmax 99.9     OBJECTIVE:   VITALS:  /67   Pulse 75   Temp 99.2 °F (37.3 °C) (Oral)   Resp 18   Ht 5' 5\" (1.651 m)   Wt 243 lb 13.3 oz (110.6 kg)   LMP  (LMP Unknown) Comment: HYSTERECTOMY  SpO2 97%   BMI 40.58 kg/m²     INTAKE/OUTPUT:    I/O last 3 completed shifts: In: 690 [P.O.:670; I.V.:20]  Out: 6625 [Urine:5050; WRDBH:9979]  I/O this shift:  In: -   Out: 325 [Stool:325]              CONSTITUTIONAL:  awake and alert  LUNGS: no crackles or wheezes  ABDOMEN:   normal bowel sounds, soft, non-distended, ostomy with stool - tender medially  INCISION: inferior portion with serous drainage, no erythema    EXT: with juan f pedal edema - slightly improved    Data:  CBC:   Recent Labs     02/28/23  0310 02/28/23  2200 03/01/23  0235 03/01/23  0905   WBC 11.7*  --   --  9.7   HGB 8.0* 7.6* 7.7* 7.9*   HCT 23.8* 23.2* 22.6* 23.6*   *  --   --  583*       BMP:    Recent Labs     02/27/23  0710 02/28/23  0310 03/01/23  0905   * 135* 133*   K 3.7  3.7 3.7 3.2*    103 100   CO2 18* 17* 19*   BUN 36* 35* 28*   CREATININE 3.5* 3.3* 3.1*   GLUCOSE 109* 109* 138*       Hepatic:   Recent Labs     02/27/23  0710 02/28/23  0310 03/01/23  0905   AST 19 23 25   ALT 10 10 9*   BILITOT 0.4 0.3 0.3   ALKPHOS 101 99 94       Mag:      Recent Labs     03/01/23  0905   MG 0.90*        Phos:     Recent Labs     02/27/23  0710   PHOS 5.1*        INR: No results for input(s): INR in the last 72 hours. Radiology Review:     EXAMINATION:   CT OF THE ABDOMEN AND PELVIS WITHOUT CONTRAST 2/28/2023 2:45 pm     TECHNIQUE:   CT of the abdomen and pelvis was performed without the administration of   intravenous contrast. Multiplanar reformatted images are provided for review.    Automated exposure control, iterative reconstruction, and/or weight based   adjustment of the mA/kV was utilized to reduce the radiation dose to as low   as reasonably achievable. COMPARISON:   02/16/2023     HISTORY:   ORDERING SYSTEM PROVIDED HISTORY: PO nausea. TECHNOLOGIST PROVIDED HISTORY:   Reason for exam:->PO nausea. Additional Contrast?->Oral   Reason for Exam: post op nausea     FINDINGS:   : Nostones in the kidneys. No hydronephrosis. No renal sinus stranding. No   stones along either ureter. No stones in the bladder. Probable   angiomyolipoma on the right kidney without change. Organs: Liver, spleen, and adrenal glands are unremarkable. No peripancreatic   stranding. GI/Bowel: Previous gastric banding. Stomach is unremarkable in appearance. Ostomy in the left mid abdomen. The colon is diffusely thickened. No small   bowel dilation. Pelvis: Small amount of free fluid in probable hemorrhage within the pelvis. The uterus is not well seen. Multiple phleboliths within the pelvis. Diffuse edema in the subcutaneous tissues. This is slightly more prominent   on the left and there is likely a intramuscular hematoma on the left. This   is along the transversalis musculature. Peritoneum/Retroperitoneum: Probable fluid and hemorrhage within the abdomen. This forms a collection in the left upper quadrant laterally which is more   defined than previously seen. However, is not rounded is elliptical in   appearance. It extends to the diverted colostomy in the left mid abdomen. The subcutaneous hematomas adjacent to the diverting: Lobe are still present. No free gas is appreciated in the abdomen or pelvis of there are bubbles of   gas in the subcutaneous tissues along and anterior abdominal wound. Skin   staples are still in place. .  Aorta has atherosclerotic changes but is normal   size. Lungs The lung bases atelectasis in the left lower lobe.      Bones/Soft Tissues: Advanced facet arthropathy lower lumbar spine. Laminectomy. Osteoarthritic change of the SI joints. Deformity of the left   ilium related to a donor site. No subcutaneous mass. Impression:     1. Probablee hematoma in the left upper quadrant. This is more formed than   previously seen. 2. Colitis but negative for obstruction. 3. Hemorrhage in free fluid in the abdomen and pelvis. No free gas. 4. Diffuse edema subcutaneous tissues as well as subcutaneous hematomas. ASSESSMENT AND PLAN:  63 y.o. female status post Rick's procedure with postop bleeding requiring evacuation of hemoperitoneum. GI: CT as above - findings not unexpected. - nothing to drain - continue with diet as rekha  ID: Continue with IV antibiotics    Acute blood loss anemia: stable     TARSHA: nephrology managing - Cr stable    Activity: PT/OT    Electronically signed by JARETT Tierney - CNP     Patient seen and agree with above and more than half of the total time was spent by me on the encounter. CT consistent with residual blood products from prior bleeding and return to OR. As such no signs of infection and placement of drain unlikely to yield much fluid due to higher density. Continue abx. Possible repeat CT down then line. [id number] Sarah Dunlap for continuing antiplatelet meds. Home once improved nausea and pain.     Norman Wilson MD

## 2023-03-01 NOTE — PROGRESS NOTES
[facility] Nurse  Follow-up Progress Note       NAME:  Jaylin Carey RECORD NUMBER:  [id number]  AGE:  63 y.o. GENDER:  female  :  1960  TODAY'S DATE:  3/1/2023    Subjective:  I am felling better. I need to pee before you change my bag. I can emptpy by my selft OK. I am a little nervous @ changing to day as I am still a little shaky. Wound Identification:  Wound Type: mid line surgical abd staple line with distal edged open 1 x 0.5 x 6 cm. Contributing Factors:  Chronic pressure, decreased mobility, shear force, obesity    CT scan results from 23:    Impression   1. Probablee hematoma in the left upper quadrant. This is more formed than   previously seen. 2. Colitis but negative for obstruction. 3. Hemorrhage in free fluid in the abdomen and pelvis. No free gas. 4. Diffuse edema subcutaneous tissues as well as subcutaneous hematomas. New Colostomy: Went to OR 23 for bowel resection, sigmoid colectomy and creation of colostomy by Dr Kathia Dukes. Returned to OR on 23 with Dr Estella Rudd for Hemoperitoneum. Exploratory lap with evacuation of hemoperitoneum (3 Liters). Stoma size:  2 inch = 50 mm round protrudes. Appliance size:  Sensura Dylan YELLOW convex flange # [id number] with Drainable bag # [id number]. Add ostomy belt to assist with securing of ostomy seal.                                              Patient Goal of Care:  [x] Wound Healing  [] Odor Control   [] Palliative Care  [] Pain Control   [x] Other: New colostomy     Objective:  Up and about in room  Daughter (April) and other family members here. /76   Pulse 77   Temp 99.1 °F (37.3 °C) (Oral)   Resp 16   Ht 5' 5\" (1.651 m)   Wt 243 lb 13.3 oz (110.6 kg)   LMP  (LMP Unknown) Comment: HYSTERECTOMY  SpO2 97%   BMI 40.58 kg/m²   Brendan Risk Score: Brendan Scale Score: 19  Assessment: Over all abdomin less indurated and red left lower quadrant. Still some inuration under the stoma. Patient reports still painful. Stoma moist red, oracio stoma slightly red. Stool loose light brown. Distal abd incision with purulent drainage. No odor. Staple line slightly oozy. See photo's   Measurements:     Incision 02/16/23 Abdomen Anterior;Medial (Active)   Wound Image     03/01/23 1655   Dressing Status New dressing applied 03/01/23 1655   Dressing Change Due 03/02/23 03/01/23 1655   Incision Cleansed Cleansed with saline 03/01/23 1655   Dressing/Treatment Alginate with Ag;Dry dressing;Tape/Soft cloth adhesive tape 03/01/23 1655   Incision Length (cm) 19.5 03/01/23 1655   Incision Width (cm) 0.5 cm 03/01/23 1655   Incision Depth (cm) 6 cm 03/01/23 1655   Closure Marvin 03/01/23 1655   Margins Other (Comment) 03/01/23 1655   Incision Assessment Other (Comment) 03/01/23 1655   Drainage Amount Moderate 03/01/23 1655   Drainage Description Purulent;Serous; Serosanguinous 03/01/23 1655   Odor None 03/01/23 1655   Oracio-incision Assessment Blanchable erythema 03/01/23 1655   Number of days: 12     Mid line staple line with open distal 6 cm deep:            Colostomy LLQ (Active)   Stomal Appliance 2 piece;Clean, dry & intact 03/01/23 1658   Flange Size (inches) 2.75 Inches 03/01/23 1658   Stoma  Assessment Pink;Protrudes; Moist;Swelling 03/01/23 1658   Peristomal Assessment Blanchable erythema 03/01/23 1658   Treatment Bag change;Site care;Stoma powder;Stoma paste; Heat applied 03/01/23 1658   Stool Appearance Watery 03/01/23 1658   Stool Color Brown 03/01/23 1658   Stool Amount Small 03/01/23 1658   Output (mL) 50 ml 03/01/23 1658   Number of days: 15     Colostomy:        Response to treatment:  Well tolerated by patient. Pain Assessment:  Severity:  1 / 10  Quality of pain: aching  Wound Pain Timing/Severity: none  Premedicated: No  Plan:   Plan of Care:    Recommend:  Clean mid line abd staple line and distal open edge with normal saline.   Lightly pack alginateAg into distal edge (6.0 cm deep), cover with dry dressing and Medipore tape daily. Wound care to continue to follow. Monitor for drainage, order, edema or signs and symptoms of infection. Call wound care for deterioration [phone] or call [phone] and leave message. Plan for Ostomy Care:  Current appliance intact. Has been on x 6 days and no leakage. Patient willing to listen @ teaching today. Up to bathroom to empty void. Daughter (April) changed colostomy appliance today. Current pouch removed. Site cleansed with warm water. Stoma measured, flange cut, paste applied along staple line and around stoma flange. . Convex flange placed around stoma today. Bag attached pointing toward the center of her body so patient can continue practise emptying in toilet. Bag closed. Stoma Care - New colostomy - Patient to empty appliance when 1/3 to 1/2 full with assistance of the staff. Cleanse inside and outside of the drain spout prior to rolling closed. Change appliance every 3-5 days or 1-2 times a week. Call for problems with seal [phone]     Specialty Bed Required : Yes   [] Low Air Loss   [x] Pressure Redistribution  [] Fluid Immersion  [] Bariatric  [] Total Pressure Relief  [] Other:     Current Diet: ADULT DIET; Regular  ADULT ORAL NUTRITION SUPPLEMENT; Breakfast, Dinner; Standard High Calorie/High Protein Oral Supplement  Dietician consult:  Yes    Discharge Plan:  Placement for patient upon discharge: skilled nursing   Patient appropriate for Outpatient [address] [facility] [address]: Yes    Referrals:  []  following  [] [facility] [employer] [facility]  [] Supplies  [] Other    Patient/Caregiver Teaching: updated on abd incision measurement. Dtr changed appliance today.   Written Instructions given to patient/family  Teaching provided:  [] Reviewed GI and A&P        [] Supplies  [x] Pouch emptying      [x] Manipulate closure  [x] Routine Care         [] Comment  [] Pouch maintenance           Level of patient/caregiver understanding able to:   [] Indicates understanding       [] Needs reinforcement  [] Unsuccessful      [x] Verbal Understanding  [x] Demonstrated understanding       [] No evidence of learning  [] Refused teaching         [] N/A       Electronically signed by Luan Will RN, MSN, CWOCN on 3/1/2023 at 4:59 PM

## 2023-03-01 NOTE — PROGRESS NOTES
[facility] Progress Note           Subjective/   63y.o. year old female past medical history of hypothyroidism, hyperlipidemia, abdominal hernia, abdominal adhesions, lower extremity vascular bypass surgery presented with small bowel obstruction on 2/9/2023 is s/p colon resection, diverting colostomy on 2/15/2023, intra-abdominal bleed on 2/16/2023 who we are seeing in consultation for TARSHA. No acute events in the last 24 hrs    Noted some abdominal pain. Urine output of 4 L over the last 24 hours. .    Objective/     GEN:  Chronically ill, /72   Pulse 80   Temp 98.9 °F (37.2 °C) (Oral)   Resp 18   Ht 5' 5\" (1.651 m)   Wt 243 lb 13.3 oz (110.6 kg)   LMP  (LMP Unknown) Comment: HYSTERECTOMY  SpO2 95%   BMI 40.58 kg/m²   HEENT: EOMI,  Neck: supple, no JVD  CV: S1, S2 ,rrr,  ++ edema  RESP: CTA B   breathing wnl  ABD:  soft, nt, but staples noted. SKIN: warm, no rashes  Neuro:   No asterixis     Data/  Recent Labs     02/28/23  0310 02/28/23  2200 03/01/23  0235   WBC 11.7*  --   --    HGB 8.0* 7.6* 7.7*   HCT 23.8* 23.2* 22.6*   MCV 85.9  --   --    *  --   --        Recent Labs     02/27/23  0710 02/28/23  0310   * 135*   K 3.7  3.7 3.7    103   CO2 18* 17*   GLUCOSE 109* 109*   PHOS 5.1*  --    BUN 36* 35*   CREATININE 3.5* 3.3*   LABGLOM 14* 15*           Assessment:  #  Non-Oliguric Acute Kidney Injury KDIGO stage III      - Most Likely Due to acute tubular injury in setting of hypotension from blood loss, intraoperative surgical site infection, NSAIDs given on 2/19/2023. On vancomycin till 2/19/2020 , less likely IRGN or abdominal hypertension      - Baseline Cr 0.8-0.9, worsening to 3.5       -Creatinine started uptrending on 2/20/2023      -Has remained -3 0.4-3.5 range for the last 5 days with      - U/A on 2/22/2023 shows WBC 0, RBC 3-4, Pr 30,        - Ur sodium less than 20, Ur Creatinine 90      - Renal CT   Left pelviectasis.   7   mm fat density lesion within the lateral right kidney, compatible with   angiomyolipoma. # Electrolytes :   -Hyponatremia  -Hypokalemia    # Volume status :  -Euvolemic    # Acid Base :  -Non-anion gap metabolic acidosis-improving    # CKD-MBD :  -Hyperphosphatemia  -Hypocalcemia  -Hypomagnesemia        # Anemia :   -Normocytic anemia in setting of possible blood loss  -Stabilizing now. # Hypertension :  -BP at goal    Other issues  Large bowel obstruction s/p pati's  & colostomy on 2/14/23   Sigmoid stricture  Intra-abdominal hematoma  Peripheral vascular disease  Postop surgical wound site infection  Right above knee to below knee popliteal artery bypass on 1/16/23    Plan:   -Creatinine downtrending  -Urine output greater than 4 L yesterday  -Continue to maintain maps greater than 70.  -Renal diet  -IV Lasix 60 once again today   -Replace potassium, calcium, magnesium  -Vitamin D level check AM  -Sodium bicarbonate tablets 650 mg twice daily today          Thank you for the consultation. Please do not hesitate to call with questions. ______________________________  Fernanda Hull MD  [facility]  [email or web address]  Office: [phone]

## 2023-03-01 NOTE — PROGRESS NOTES
Patient seemed to have rested better throughout the night, at least more then the previous night. Has been up to the [facility]. Adequate urine output. Colostomy bag intact with good amount of stool output. Post using the cath ketty all 3 lumens to her left arm PICC is drawing blood and pushing fluids. Other then nausea no complaints of pain this morning only nausea.

## 2023-03-01 NOTE — PROGRESS NOTES
PS sent to [facility]:    Good evening,   This patient has a PICC line. There has been different times in her 3 weeks stay that her PICC line has needed activase. I am not sure if the dwell time has been sufficient as I have issues with it again tonight that I have been having the last 3 nights. I would like an order for the cathflo for all 3 lumens so I can try to gain access back to her PICC this evening to give her atb. Can I please have orders for the cath ketty x3. Thank you. Please advise. Sakshi DELA CRUZ, RN.

## 2023-03-01 NOTE — PROGRESS NOTES
Patient has been assisted up to the bedside commode. Bed alarm activated for safety. Colostomy has been emptied. Complaints that lower abdominal dressing felt like it was leaking. Wound dressing changed. Noted wick. Dressing now dry and intact. Cathflo was placed in all 3 lumens allowing to dwell. No other issues noted at this time. Will continue to monitor. Call light remains in reach.

## 2023-03-01 NOTE — PROGRESS NOTES
Assessment completed and documented. VSS. A/ox4. C/o abd pain, given prn pain medication per mar. C/o nausea, compazine ordered and given. Patient with little appetite. Electrolytes replaced. PICC line patent, dressing intact and dry. Incision dressing changes completed by surgery. Strict I&O. Ostomy C/D/I, draining yellow liquid stool. Family in room at this time. Bed locked and in lowest position. Bedside table and call light within reach. Denies further needs at this time. Called surgery and spoke to Cece Leroy, left message for Dr. Juan Sims and/or Dr. Sg Coates to come and see patients family. April (dtr) has questions.

## 2023-03-02 PROBLEM — T14.8XXA HEMATOMA: Status: ACTIVE | Noted: 2023-03-02

## 2023-03-02 LAB
A/G RATIO: 1.1 (ref 1.1–2.2)
ALBUMIN SERPL-MCNC: 2.1 G/DL (ref 3.4–5)
ALP BLD-CCNC: 86 U/L (ref 40–129)
ALT SERPL-CCNC: 9 U/L (ref 10–40)
ANION GAP SERPL CALCULATED.3IONS-SCNC: 11 MMOL/L (ref 3–16)
AST SERPL-CCNC: 25 U/L (ref 15–37)
BASOPHILS ABSOLUTE: 0 K/UL (ref 0–0.2)
BASOPHILS RELATIVE PERCENT: 0.6 %
BILIRUB SERPL-MCNC: 0.3 MG/DL (ref 0–1)
BUN BLDV-MCNC: 28 MG/DL (ref 7–20)
CALCIUM SERPL-MCNC: 6.2 MG/DL (ref 8.3–10.6)
CHLORIDE BLD-SCNC: 102 MMOL/L (ref 99–110)
CO2: 20 MMOL/L (ref 21–32)
CREAT SERPL-MCNC: 2.9 MG/DL (ref 0.6–1.2)
EOSINOPHILS ABSOLUTE: 0.2 K/UL (ref 0–0.6)
EOSINOPHILS RELATIVE PERCENT: 2.8 %
GFR SERPL CREATININE-BSD FRML MDRD: 18 ML/MIN/{1.73_M2}
GLUCOSE BLD-MCNC: 115 MG/DL (ref 70–99)
HCT VFR BLD CALC: 21.7 % (ref 36–48)
HEMOGLOBIN: 7.4 G/DL (ref 12–16)
LYMPHOCYTES ABSOLUTE: 0.8 K/UL (ref 1–5.1)
LYMPHOCYTES RELATIVE PERCENT: 10.4 %
MAGNESIUM: 1.6 MG/DL (ref 1.8–2.4)
MCH RBC QN AUTO: 28.8 PG (ref 26–34)
MCHC RBC AUTO-ENTMCNC: 34.2 G/DL (ref 31–36)
MCV RBC AUTO: 84.2 FL (ref 80–100)
MONOCYTES ABSOLUTE: 1.1 K/UL (ref 0–1.3)
MONOCYTES RELATIVE PERCENT: 13.6 %
NEUTROPHILS ABSOLUTE: 5.9 K/UL (ref 1.7–7.7)
NEUTROPHILS RELATIVE PERCENT: 72.6 %
PDW BLD-RTO: 16.1 % (ref 12.4–15.4)
PLATELET # BLD: 527 K/UL (ref 135–450)
PMV BLD AUTO: 6.1 FL (ref 5–10.5)
POTASSIUM REFLEX MAGNESIUM: 3.4 MMOL/L (ref 3.5–5.1)
RBC # BLD: 2.58 M/UL (ref 4–5.2)
SODIUM BLD-SCNC: 133 MMOL/L (ref 136–145)
TOTAL PROTEIN: 4.1 G/DL (ref 6.4–8.2)
VITAMIN D 25-HYDROXY: 22.8 NG/ML
WBC # BLD: 8.1 K/UL (ref 4–11)

## 2023-03-02 PROCEDURE — 6370000000 HC RX 637 (ALT 250 FOR IP): Performed by: INTERNAL MEDICINE

## 2023-03-02 PROCEDURE — 83735 ASSAY OF MAGNESIUM: CPT

## 2023-03-02 PROCEDURE — 82306 VITAMIN D 25 HYDROXY: CPT

## 2023-03-02 PROCEDURE — 2580000003 HC RX 258: Performed by: SURGERY

## 2023-03-02 PROCEDURE — 6370000000 HC RX 637 (ALT 250 FOR IP): Performed by: SURGERY

## 2023-03-02 PROCEDURE — 6360000002 HC RX W HCPCS: Performed by: SURGERY

## 2023-03-02 PROCEDURE — 2500000003 HC RX 250 WO HCPCS: Performed by: SURGERY

## 2023-03-02 PROCEDURE — 2500000003 HC RX 250 WO HCPCS: Performed by: INTERNAL MEDICINE

## 2023-03-02 PROCEDURE — APPSS45 APP SPLIT SHARED TIME 31-45 MINUTES: Performed by: CLINICAL NURSE SPECIALIST

## 2023-03-02 PROCEDURE — 80053 COMPREHEN METABOLIC PANEL: CPT

## 2023-03-02 PROCEDURE — 85025 COMPLETE CBC W/AUTO DIFF WBC: CPT

## 2023-03-02 PROCEDURE — 1200000000 HC SEMI PRIVATE

## 2023-03-02 RX ORDER — CIPROFLOXACIN 500 MG/1
500 TABLET, FILM COATED ORAL EVERY 12 HOURS SCHEDULED
Status: DISCONTINUED | OUTPATIENT
Start: 2023-03-02 | End: 2023-03-02 | Stop reason: DRUGHIGH

## 2023-03-02 RX ORDER — METRONIDAZOLE 250 MG/1
500 TABLET ORAL EVERY 8 HOURS SCHEDULED
Status: DISCONTINUED | OUTPATIENT
Start: 2023-03-02 | End: 2023-03-03 | Stop reason: HOSPADM

## 2023-03-02 RX ORDER — CIPROFLOXACIN 500 MG/1
500 TABLET, FILM COATED ORAL
Status: DISCONTINUED | OUTPATIENT
Start: 2023-03-02 | End: 2023-03-03 | Stop reason: HOSPADM

## 2023-03-02 RX ORDER — MAGNESIUM SULFATE IN WATER 40 MG/ML
2000 INJECTION, SOLUTION INTRAVENOUS ONCE
Status: DISCONTINUED | OUTPATIENT
Start: 2023-03-02 | End: 2023-03-03 | Stop reason: HOSPADM

## 2023-03-02 RX ORDER — POTASSIUM CHLORIDE 20 MEQ/1
40 TABLET, EXTENDED RELEASE ORAL ONCE
Status: DISCONTINUED | OUTPATIENT
Start: 2023-03-02 | End: 2023-03-03 | Stop reason: HOSPADM

## 2023-03-02 RX ORDER — SODIUM BICARBONATE 650 MG/1
650 TABLET ORAL 3 TIMES DAILY
Status: DISCONTINUED | OUTPATIENT
Start: 2023-03-02 | End: 2023-03-03 | Stop reason: HOSPADM

## 2023-03-02 RX ADMIN — SODIUM BICARBONATE 650 MG: 650 TABLET ORAL at 08:21

## 2023-03-02 RX ADMIN — Medication 1 TABLET: at 08:21

## 2023-03-02 RX ADMIN — POTASSIUM CHLORIDE 40 MEQ: 1500 TABLET, EXTENDED RELEASE ORAL at 08:21

## 2023-03-02 RX ADMIN — LEVOTHYROXINE SODIUM 200 MCG: 0.1 TABLET ORAL at 05:34

## 2023-03-02 RX ADMIN — SODIUM BICARBONATE 650 MG: 650 TABLET ORAL at 21:03

## 2023-03-02 RX ADMIN — OXYCODONE HYDROCHLORIDE 10 MG: 5 TABLET ORAL at 04:15

## 2023-03-02 RX ADMIN — PROPRANOLOL HYDROCHLORIDE 40 MG: 40 TABLET ORAL at 21:03

## 2023-03-02 RX ADMIN — FAMOTIDINE 20 MG: 10 INJECTION, SOLUTION INTRAVENOUS at 21:03

## 2023-03-02 RX ADMIN — PROPRANOLOL HYDROCHLORIDE 40 MG: 40 TABLET ORAL at 08:21

## 2023-03-02 RX ADMIN — Medication 5 MG: at 21:03

## 2023-03-02 RX ADMIN — METRONIDAZOLE 500 MG: 500 INJECTION, SOLUTION INTRAVENOUS at 04:15

## 2023-03-02 RX ADMIN — MAGNESIUM SULFATE HEPTAHYDRATE 2000 MG: 40 INJECTION, SOLUTION INTRAVENOUS at 08:28

## 2023-03-02 RX ADMIN — ATORVASTATIN CALCIUM 40 MG: 40 TABLET, FILM COATED ORAL at 08:21

## 2023-03-02 RX ADMIN — SODIUM BICARBONATE 650 MG: 650 TABLET ORAL at 13:39

## 2023-03-02 RX ADMIN — APIXABAN 2.5 MG: 2.5 TABLET, FILM COATED ORAL at 21:03

## 2023-03-02 RX ADMIN — CIPROFLOXACIN 400 MG: 2 INJECTION, SOLUTION INTRAVENOUS at 08:25

## 2023-03-02 RX ADMIN — SODIUM CHLORIDE, PRESERVATIVE FREE 10 ML: 5 INJECTION INTRAVENOUS at 21:04

## 2023-03-02 RX ADMIN — APIXABAN 2.5 MG: 2.5 TABLET, FILM COATED ORAL at 08:21

## 2023-03-02 RX ADMIN — ALLOPURINOL 100 MG: 100 TABLET ORAL at 08:21

## 2023-03-02 RX ADMIN — SODIUM CHLORIDE, PRESERVATIVE FREE 10 ML: 5 INJECTION INTRAVENOUS at 08:22

## 2023-03-02 RX ADMIN — FAMOTIDINE 20 MG: 10 INJECTION, SOLUTION INTRAVENOUS at 08:22

## 2023-03-02 RX ADMIN — ASPIRIN 81 MG 81 MG: 81 TABLET ORAL at 08:22

## 2023-03-02 RX ADMIN — DOCUSATE SODIUM 100 MG: 100 CAPSULE, LIQUID FILLED ORAL at 08:22

## 2023-03-02 RX ADMIN — OXYCODONE HYDROCHLORIDE 10 MG: 5 TABLET ORAL at 17:57

## 2023-03-02 RX ADMIN — METRONIDAZOLE 500 MG: 250 TABLET ORAL at 21:03

## 2023-03-02 RX ADMIN — METRONIDAZOLE 500 MG: 250 TABLET ORAL at 13:39

## 2023-03-02 RX ADMIN — BUTALBITAL, ACETAMINOPHEN, AND CAFFEINE 1 TABLET: 50; 325; 40 TABLET ORAL at 12:56

## 2023-03-02 RX ADMIN — ACETAMINOPHEN 650 MG: 325 TABLET ORAL at 17:57

## 2023-03-02 ASSESSMENT — PAIN SCALES - WONG BAKER: WONGBAKER_NUMERICALRESPONSE: 0

## 2023-03-02 ASSESSMENT — PAIN SCALES - GENERAL
PAINLEVEL_OUTOF10: 7
PAINLEVEL_OUTOF10: 4
PAINLEVEL_OUTOF10: 7
PAINLEVEL_OUTOF10: 5
PAINLEVEL_OUTOF10: 7
PAINLEVEL_OUTOF10: 0
PAINLEVEL_OUTOF10: 0

## 2023-03-02 ASSESSMENT — PAIN DESCRIPTION - LOCATION
LOCATION: HEAD
LOCATION: BACK
LOCATION: ABDOMEN
LOCATION: ABDOMEN

## 2023-03-02 ASSESSMENT — PAIN DESCRIPTION - ONSET: ONSET: ON-GOING

## 2023-03-02 ASSESSMENT — PAIN DESCRIPTION - DESCRIPTORS
DESCRIPTORS: SORE;OTHER (COMMENT)
DESCRIPTORS: ACHING
DESCRIPTORS: ACHING;DISCOMFORT

## 2023-03-02 ASSESSMENT — PAIN DESCRIPTION - FREQUENCY: FREQUENCY: CONTINUOUS

## 2023-03-02 ASSESSMENT — PAIN DESCRIPTION - ORIENTATION: ORIENTATION: ANTERIOR;MID

## 2023-03-02 ASSESSMENT — PAIN - FUNCTIONAL ASSESSMENT: PAIN_FUNCTIONAL_ASSESSMENT: ACTIVITIES ARE NOT PREVENTED

## 2023-03-02 ASSESSMENT — PAIN DESCRIPTION - PAIN TYPE: TYPE: SURGICAL PAIN

## 2023-03-02 NOTE — CONSULTS
Consult placed    Who:EMILIANO  Date:3/2/2023,  Time:11:32 AM        Electronically signed by Vita Pérez on 3/2/2023 at 11:32 AM

## 2023-03-02 NOTE — CARE COORDINATION
Chart reviewed day 21. Care managed per nephrology, surgery and IM. T max 100.3, WBC's 8.1. continuing IVATBX. Patient and family working with ostomy. Plan for home with [facility] when medically ready.  Benedicto Faith RN

## 2023-03-02 NOTE — CONSULTS
Consult placed    Who:EMILIANO  Date:3/2/2023,  Time:11:33 AM        Electronically signed by Nika Levine on 3/2/2023 at 11:33 AM

## 2023-03-02 NOTE — PROGRESS NOTES
[facility] Progress Note           Subjective/   63y.o. year old female past medical history of hypothyroidism, hyperlipidemia, abdominal hernia, abdominal adhesions, lower extremity vascular bypass surgery presented with small bowel obstruction on 2/9/2023 is s/p colon resection, diverting colostomy on 2/15/2023, intra-abdominal bleed on 2/16/2023 who we are seeing in consultation for TARSHA. No acute events in the last 24 hrs  Tmax 100.3 overnight. Urine output 3.4 L. No new complaints    Objective/     GEN:  Chronically ill, /80   Pulse 79   Temp 99.6 °F (37.6 °C) (Oral)   Resp 18   Ht 5' 5\" (1.651 m)   Wt 243 lb 13.3 oz (110.6 kg)   LMP  (LMP Unknown) Comment: HYSTERECTOMY  SpO2 98%   BMI 40.58 kg/m²   HEENT: EOMI,  Neck: supple, no JVD  CV: S1, S2 ,rrr,  ++ edema  RESP: CTA B   breathing wnl  ABD:  soft, nt, but staples noted. SKIN: warm, no rashes  Neuro:   No asterixis     Data/  Recent Labs     02/28/23  0310 02/28/23  2200 03/01/23  0235 03/01/23  0905 03/02/23  0520   WBC 11.7*  --   --  9.7 8.1   HGB 8.0*   < > 7.7* 7.9* 7.4*   HCT 23.8*   < > 22.6* 23.6* 21.7*   MCV 85.9  --   --  85.5 84.2   *  --   --  583* 527*    < > = values in this interval not displayed. Recent Labs     02/28/23  0310 03/01/23  0905 03/02/23  0520   * 133* 133*   K 3.7 3.2* 3.4*    100 102   CO2 17* 19* 20*   GLUCOSE 109* 138* 115*   MG  --  0.90* 1.60*   BUN 35* 28* 28*   CREATININE 3.3* 3.1* 2.9*   LABGLOM 15* 16* 18*           Assessment:  #  Non-Oliguric Acute Kidney Injury KDIGO stage III      - Most Likely Due to acute tubular injury in setting of hypotension from blood loss, intraoperative surgical site infection, NSAIDs given on 2/19/2023. On vancomycin till 2/19/2020 , less likely IRGN or abdominal hypertension      - Baseline Cr 0.8-0.9, worsening to 3.5       -Creatinine started uptrending on 2/20/2023      -Creatinine trending down.   Decreasing to 2.9 today. - U/A on 2/22/2023 shows WBC 0, RBC 3-4, Pr 30,        - Ur sodium less than 20, Ur Creatinine 90      - Renal CT   Left pelviectasis. 7   mm fat density lesion within the lateral right kidney, compatible with   angiomyolipoma. # Electrolytes :   -Hyponatremia  -Hypokalemia    # Volume status :  -Hypokalemia-improving    # Acid Base :  -Non-anion gap metabolic acidosis-improving    # CKD-MBD :  -Hyperphosphatemia  -Hypocalcemia  -Hypomagnesemia        # Anemia :   -Normocytic anemia in setting of possible blood loss  -Stabilizing now. # Hypertension :  -BP at goal    Other issues  Large bowel obstruction s/p pati's  & colostomy on 2/14/23   Sigmoid stricture  Intra-abdominal hematoma  Peripheral vascular disease  Postop surgical wound site infection  Right above knee to below knee popliteal artery bypass on 1/16/23    Plan:   -Creatinine downtrending, possibly Polyuric phase of ATN. -Monitor off diuretics today.  -Continue to maintain maps greater than 70.  -Renal diet  -Replace potassium, calcium, magnesium  -Vitamin D level check AM  -Sodium bicarbonate tablets 650 mg increased to 3 times daily. Thank you for the consultation. Please do not hesitate to call with questions. ______________________________  Lisa Montanez MD  [facility]  [email or web address]  Office: [phone]

## 2023-03-02 NOTE — PROGRESS NOTES
[facility] GENERAL SURGERY    Surgery Progress Note           POD # 13 & 15    PATIENT NAME: Sakshi Bateman     TODAY'S DATE: 3/2/2023    INTERVAL HISTORY:    Pt sitting up in chair - eating biscuits and gravy - reports nausea is improved. She is reporting some dizziness. Also reports that incision right upper thigh is swollen and firm again. OBJECTIVE:   VITALS:  /74   Pulse 80   Temp 99.2 °F (37.3 °C) (Oral)   Resp 16   Ht 5' 5\" (1.651 m)   Wt 243 lb 13.3 oz (110.6 kg)   LMP  (LMP Unknown) Comment: HYSTERECTOMY  SpO2 99%   BMI 40.58 kg/m²     INTAKE/OUTPUT:    I/O last 3 completed shifts: In: 3465.2 [P.O.:600; IV Piggyback:2865.2]  Out: 7205 [Urine:4600; GQEYT:4295]  I/O this shift:  In: 240 [P.O.:240]  Out: -               CONSTITUTIONAL:  awake and alert  LUNGS: no crackles or wheezes  ABDOMEN:   normal bowel sounds, soft, non-distended, ostomy with stool - tender medially  INCISION: inferior portion with serous drainage, no erythema    EXT: swollen and firm rt upper thigh incision    Data:  CBC:   Recent Labs     02/28/23 0310 02/28/23 2200 03/01/23  0235 03/01/23  0905 03/02/23  0520   WBC 11.7*  --   --  9.7 8.1   HGB 8.0*   < > 7.7* 7.9* 7.4*   HCT 23.8*   < > 22.6* 23.6* 21.7*   *  --   --  583* 527*    < > = values in this interval not displayed. BMP:    Recent Labs     02/28/23 0310 03/01/23  0905 03/02/23  0520   * 133* 133*   K 3.7 3.2* 3.4*    100 102   CO2 17* 19* 20*   BUN 35* 28* 28*   CREATININE 3.3* 3.1* 2.9*   GLUCOSE 109* 138* 115*       Hepatic:   Recent Labs     02/28/23 0310 03/01/23  0905 03/02/23  0520   AST 23 25 25   ALT 10 9* 9*   BILITOT 0.3 0.3 0.3   ALKPHOS 99 94 86       Mag:      Recent Labs     03/01/23  0905 03/02/23  0520   MG 0.90* 1.60*              ASSESSMENT AND PLAN:  63 y.o. female status post Rick's procedure with postop bleeding requiring evacuation of hemoperitoneum.    GI: diet as rekha  ID: Continue with IV antibiotics    Acute blood loss anemia: stable     TARSHA: nephrology managing - Cr stable    EXT: will ask vascular for their input - last US was 2/16    Activity: PT/OT    Electronically signed by JARETT Malik - CNP     Surgery Staff    I have examined this patient, and read and agree with the note by Sophie Arthur CNP from today; more than half of the total time was spent by me on the encounter. Continues to look better overall. I think she is ready for d/c home from a surgical perspective, presumably with home PT/OT    Medical issues will determine timing of d/c at this point (Nephrology/TARSHA plan). Will follow along for now. Continue diet as tolerated, colostomy care.     Ileana Escobedo MD

## 2023-03-02 NOTE — PROGRESS NOTES
Hospitalist Progress Note      PCP: Iza Whitfield    Date of Admission: 2/9/2023    Chief Complaint:   Abd pain    Hospital Course: Ms. Florecita Galvan is a 63year old female who presented to [facility] from [facility] with abdominal pain and constipation on 2/9. She had a lower extremity vascular bypass surgery on her right leg a few weeks ago. She reports constipation and abdominal distension following her surgery with ng tube placement as well as a rectal tube. Patient reports her follow-up colonoscopy revealed sigmoid edema with diverticula and an ulcer, she then improved and was discharged. She again developed abdominal pain and distension and has not had a BM since 2/5. She denies trying any stool softeners or motility agents at home. 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 She also has had surgeries for adhesions and ventral hernia repair. She underwent a Haylie's procedure with GS on 2/14/23 with colostomy placement. Rapid response called on 2/16/23 for hypotension (as below). Found to have abdominal/pelvic hemorrhage on CT along with R thigh hematoma noted over previous bypass incision site on US. Patient status post surgery with resection of colon and diverting colostomy on 2/15/2023. Patient developed hypotension on 2/15/2023. Patient had further hypotension on 2/16/2023 and it was noted patient had intra-abdominal bleed with right thigh hematoma. Patient's hemoglobin had dropped to 5. Patient was transfused with 2 units. She appeared to stabilize but then her blood pressure decreased and her heart rate increased. Patient's hemoglobin dropped back down to 5 after being up to 6.9. It was decided to take patient back to surgery. During surgery patient was noted to have old blood in her abdomen no active bleed was noted but 3 L of old blood removed. Patient's hemoglobin is closely being monitored since.   Once hemoglobin was stable her Eliquis was restarted on 2/25/2023. Patient required 1 unit of PRBC transfusion on 2/26. Hemoglobin has been stable around 7-8. Follow-up Post-op CT abdomen pelvis was obtained by general surgery on 2/28/2023 showed probable LUQ hematoma at surgical site. D/w Dr. Aftab Waldrop face to face - no plan for surgical intervention at this time . Ok to continue aspirin and Eliquis  for Right leg endo vascular surgery . Subjective: Patient seen and examined this AM . No family at bedside  . Hb slightly trending down but overall remained stable at 7.4. No signs of bleeding . S/p 1 unit PRBC transfusion on 2/26 . On aspirin, Eliquis for right leg endovascular graft. On intermittent doses of IV Lasix per nephrology-creatinine slowly trending down 2.9 this morning. Net negative balance of 5.3 L thus far. - Reviewed  Abdominal wound cultures (Enterococcus raffinosus; Bacteroides ; clostridium sps) . Currently on IV cipro/Flagyl since 2/22/23. Nephrology following for renal functions . pedal edema B/L getting better  .     Medications:  Reviewed    Infusion Medications    dextrose      sodium chloride Stopped (02/12/23 0217)     Scheduled Medications    magnesium sulfate  2,000 mg IntraVENous Once    potassium chloride  40 mEq Oral Once    sodium bicarbonate  650 mg Oral TID    calcium carb-cholecalciferol  1 tablet Oral Daily    apixaban  2.5 mg Oral BID    ciprofloxacin  400 mg IntraVENous Q24H    metroNIDAZOLE  500 mg IntraVENous Q8H    melatonin  5 mg Oral Nightly    famotidine (PEPCID) injection  20 mg IntraVENous BID    docusate sodium  100 mg Oral Daily    sodium chloride flush  5-40 mL IntraVENous 2 times per day    allopurinol  100 mg Oral Daily    atorvastatin  40 mg Oral Daily    aspirin  81 mg Oral Daily    [Held by provider] gabapentin  300 mg Oral TID    levothyroxine  200 mcg Oral Daily    propranolol  40 mg Oral BID     PRN Meds: prochlorperazine, diatrizoate meglumine-sodium, butalbital-acetaminophen-caffeine, oxyCODONE **OR** oxyCODONE, HYDROmorphone, glucose, dextrose bolus **OR** dextrose bolus, glucagon (rDNA), dextrose, benzocaine, sodium chloride flush, sodium chloride, ondansetron **OR** ondansetron, acetaminophen **OR** acetaminophen, magnesium sulfate, sodium phosphate IVPB **OR** sodium phosphate IVPB **OR** sodium phosphate IVPB      Intake/Output Summary (Last 24 hours) at 3/2/2023 0943  Last data filed at 3/2/2023 0405  Gross per 24 hour   Intake 3465.24 ml   Output 4275 ml   Net -809.76 ml       Physical Exam Performed:  /80   Pulse 79   Temp 99.6 °F (37.6 °C) (Oral)   Resp 18   Ht 5' 5\" (1.651 m)   Wt 243 lb 13.3 oz (110.6 kg)   LMP  (LMP Unknown) Comment: HYSTERECTOMY  SpO2 98%   BMI 40.58 kg/m²     General appearance: No apparent distress, appears stated age and cooperative. HEENT: Pupils equal, round, and reactive to light. Conjunctivae/corneas clear. Neck: Supple, with full range of motion. No jugular venous distention. Trachea midline. Respiratory:  Normal respiratory effort. Clear to auscultation, bilaterally without Rales/Wheezes/Rhonchi. Cardiovascular: Regular rate and rhythm with normal S1/S2 without murmurs, rubs or gallops. Abdomen: Tender. Midline incision dressed. Positive bowel sounds noted. Ostomy left lower quadrant  Musculoskeletal: Hematoma on right medial thigh resolved . No skin bruise /ecchymosis noted ; 2 + pedal edema   Skin: Skin color, texture, turgor normal.  No rashes or lesions. Neurologic:  Neurovascularly intact without any focal sensory/motor deficits.  Cranial nerves: II-XII intact, grossly non-focal.  Psychiatric: Alert and oriented, thought content appropriate, normal insight  Capillary Refill: Brisk, 3 seconds, normal   Peripheral Pulses: +2 palpable, equal bilaterally     Labs:   Recent Labs     02/28/23  0310 02/28/23  2200 03/01/23  0235 03/01/23  0905 03/02/23  0520   WBC 11.7*  --   --  9.7 8.1   HGB 8.0* < > 7.7* 7.9* 7.4*   HCT 23.8*   < > 22.6* 23.6* 21.7*   *  --   --  583* 527*    < > = values in this interval not displayed. Recent Labs     02/28/23 0310 03/01/23  0905 03/02/23  0520   * 133* 133*   K 3.7 3.2* 3.4*    100 102   CO2 17* 19* 20*   BUN 35* 28* 28*   CREATININE 3.3* 3.1* 2.9*   CALCIUM 6.7* 6.4* 6.2*       Recent Labs     02/28/23 0310 03/01/23  0905 03/02/23  0520   AST 23 25 25   ALT 10 9* 9*   BILITOT 0.3 0.3 0.3   ALKPHOS 99 94 86       Urinalysis:    Lab Results   Component Value Date/Time    NITRU Negative 02/22/2023 03:55 AM    WBCUA None seen 02/22/2023 03:55 AM    RBCUA 3-4 02/22/2023 03:55 AM    BLOODU TRACE-INTACT 02/22/2023 03:55 AM    SPECGRAV 1.015 02/22/2023 03:55 AM    GLUCOSEU Negative 02/22/2023 03:55 AM       Radiology:  CT ABDOMEN PELVIS WO CONTRAST Additional Contrast? Oral   Final Result   1. Probablee hematoma in the left upper quadrant. This is more formed than   previously seen. 2. Colitis but negative for obstruction. 3. Hemorrhage in free fluid in the abdomen and pelvis. No free gas. 4. Diffuse edema subcutaneous tissues as well as subcutaneous hematomas. VL DUP LOWER EXTREMITY ARTERIES RIGHT   Final Result      CT ABDOMEN PELVIS WO CONTRAST Additional Contrast? None   Final Result   Large amount of complex fluid within the abdomen and pelvis, most notably   within the pelvis where hematocrit levels are seen, compatible with   hemorrhage. Smaller 4.9 cm collection within subcutaneous fat adjacent to the left lower   quadrant stoma, compatible with hematoma. Amorphous inflammation or   hemorrhage is seen within the soft tissues immediately adjacent. Mural thickening of the ascending colon and transverse colon, which can   reflect colitis in the appropriate clinical setting. Mild left pelviectasis. Pneumoperitoneum, in keeping with the recent postoperative state. Bibasilar atelectasis.       1 cm right lower lobe pulmonary nodule, for which follow-up recommendations   are as below. RECOMMENDATIONS:   Fleischner Society guidelines for follow-up and management of incidentally   detected pulmonary nodules:      Nodule size greater than 8 mm         In a low-risk patient, consider CT at 3 months, PET/CT, or tissue sampling. In a high-risk patient, consider CT at 3 months, PET/CT, or tissue sampling.      - Low risk patients include individuals with minimal or absent history of   smoking and other known risk factors. - High risk patients include individuals with a history or smoking or known   risk factors. Radiology 2017 http://pubs. rsna.org/doi/full/10.1148/radiol. 3133756593         IR PICC WO SQ PORT/PUMP > 5 YEARS   Final Result      CT ABDOMEN PELVIS WO CONTRAST Additional Contrast? None   Final Result   1. Barium contrast from prior small-bowel follow-through is seen throughout   the colon and limits evaluation due to streak artifact. No definite abscess   is identified. 2.  Infrarenal abdominal aortic aneurysm measures up to 3.0 cm. See   recommendations for follow-up below. 3.  There is a 1.1 cm right lower lobe pulmonary nodule and a 0.3 cm left   lower lobe pulmonary nodule. Recommend dedicated CT chest for further   evaluation. RECOMMENDATIONS:      Multiple. Worst: 11 mm solid      Pathology: Multiple pulmonary nodules. Most severe: 11 mm solid pulmonary   nodule detected on incomplete chest CT. Recommend prompt non-contrast Chest CT for further evaluation. These guidelines do not apply to immunocompromised patients and patients with   cancer. Follow up in patients with significant comorbidities as clinically   warranted. For lung cancer screening, adhere to Lung-RADS guidelines. Reference: Radiology. 2017; 284(1):228-43         3 cm AAA      Pathology: 3 cm infrarenal abdominal aortic aneurysm. Recommend follow-up every 3 years.       Reference: J Am [place] Radiol 9199;42:576-547. XR ABDOMEN (KUB) (SINGLE AP VIEW)   Final Result   Cancellation of scheduled hypaque enema due to presence of barium throughout   the colon including the rectosigmoid region as described above. FL SMALL BOWEL FOLLOW THROUGH ONLY   Final Result   Mildly dilated small bowel, though small-bowel transit time is upper limits   of normal.         CT ABDOMEN PELVIS W IV CONTRAST Additional Contrast? None   Final Result   Diverticulitis of the descending sigmoid junction. Mild wall thickening but   negative for obstruction. IP CONSULT TO GENERAL SURGERY  IP CONSULT TO GI  IP CONSULT TO PHARMACY  IP CONSULT TO NEPHROLOGY  IP CONSULT TO DIETITIAN  PHARMACY TO DOSE VANCOMYCIN    Assessment/Plan:  Active Hospital Problems    Diagnosis     Sigmoid stricture ([address] [K56.699]      Priority: Medium    Hemorrhagic shock ([address] [R57.8]      Priority: Medium    Acute blood loss anemia [D62]      Priority: Medium    Acute renal failure (ARF) (HCC) [N17.9]      Priority: Medium    Diverticulitis [K57.92]      Priority: Medium    Colonic obstruction ([address] [K56.609]      Priority: Medium    Bowel obstruction ([address] [K56.609]      Priority: Medium     Diverticulitis with obstruction due to sigmoid stricture complicated by Post-Op Hemoperitoneum needing evacuation . Patient on IV Vanco/Zosyn through 2/21/23 and dc'd Vanco  .  Status post resection with diverting colostomy (Rick's procedure)  2/14/22 . General surgery is following. Good ostomy output. Noted general surgery expressed pus out of her wound site on 2/21/2023 and sent for cultures. Cultures grew Bacteroides, clostridium and enterococcus sps. Antibiotics transitioned to IV Cipro/Flagyl per general surgery on 2/22/2023. Plan for 14-day course through 3/8/2023. Blood cultures - NGTD   Peripheral artery disease.   Patient status post bypass of right lower extremity (Fem-pop bypass graft on 1/16/23 @ [facility]) . Patient was on therapeutic Lovenox. Patient developed right leg hematoma and intra-abdominal bleed. Lovenox and aspirin held. Patient with synthetic graft and may require long-term anticoagulation. Vascular  suggested AC  once it safe to anticoagulate . Right leg hematoma improved. Restarted on 2/25/23 after obtaining surgical clearance . Monitor H/H   TARSHA. This is secondary to hypotension and hypovolemia (from high ostomy output) . ongoing ;   Nephrology consulted - following and asssiting with Mx; Monitoring for dialysis needs . Improved initially and started worsening since 2/21/2023 . continue to monitor closely. Avoid nephrotoxic agents including Toradol. Creatinine peaked at 3.5 . Monitor urine output. Off IVF since 2/24/23. Receiving intermittent doses of IV Lasix per nephrology. Net negative balance of 5.3 L for this admission. Creatinine slowly improving down to 2.9 today. Hypokalemia /Hypomagnesemia . Due to diuretics ; Supplemented . Post-Operative surgical site wound infection - General surgery following and assisting ; general surgery expressed pus out of her wound site on 2/21/2023 and sent for cultures. Cultures grew Bacteroides, clostridium and enterococcus sps. Antibiotics transitioned to IV Cipro/Flagyl per general surgery on 2/22/2023. On IV Cipro/Flagyl  - F/up CT A/P on 2/28/23 as above . No surgical intervention per General surgery and Ok to continue aspirin and Eliquis   6. Anemia - anticipated post-op acute blood loss anemia w/out evidence of active bleeding/hemolysis. Noted Hb slowly trended down to 6.6 on 2/25 needing 1 unit PRBC transfusion    Follow serial labs. Reviewed and documented as above. 7. Class III Obesity -  With Body mass index is 40.58 kg/m². Complicating assessment and treatment. Placing patient at risk for multiple co-morbidities as well as early death and contributing to the patient's presentation. Counseled on weight loss.      DVT Prophylaxis: held initially due to bleed/Hematoma  ; Resumed Eliquis  2.5 mg BID on 2/25/23 after Oked by surgery   Diet: ADULT DIET;  Regular  ADULT ORAL NUTRITION SUPPLEMENT; Breakfast, Dinner; Standard High Calorie/High Protein Oral Supplement  Code Status: Full Code    PT/OT Eval Status: Ambulatory     Dispo -Home  when stable    Appropriate for A1 Discharge Unit: No Ruthanna Moritz, MD

## 2023-03-02 NOTE — CONSULTS
Vascular Surgery Consultation    Date of Admission:  2/9/2023  9:19 AM  Date of Consultation:  3/2/2023    PCP:  Jack Quesada       Reason for Consult: Leg swelling    History of Present Illness: We are asked to see this patient in consultation by Dr. Tonja Arreaga regarding leg swelling. Angel Walden is a 63 y.o. female who underwent RLE bypass in [address] [place] [address]. She then developed bowel obstruction. She was receiving high dose Lovenox during this hospitalization and developed hemoperitoneum and large right thigh hematoma. Duplex was performed showing patent graft and no evidence of pseudoaneurysm. Past Medical History:  History reviewed. No pertinent past medical history. Past Surgical History:  Past Surgical History:   Procedure Laterality Date    LAPAROTOMY N/A 2/16/2023    LAPAROTOMY EXPLORATORY WITH EVACUATION OF HEMOPERITONEUM performed by Stephanie Murray MD at [address] [place] [address] 2/14/2023    BOWEL RESECTION SIGMOID COLECTOMY WITH COLOSTOMY CREATION performed by Raj Cunha MD at [address] Medications:   Prior to Admission medications    Medication Sig Start Date End Date Taking? Authorizing Provider   apixaban (ELIQUIS) 5 MG TABS tablet Take 5 mg by mouth 2 times daily Patient unsure of dose. \"Thinks\" it is 5mg   Yes Historical Provider, MD   Levothyroxine Sodium 200 MCG CAPS Take by mouth daily Unsure of dose   Yes Historical Provider, MD   propranolol (INDERAL) 40 MG tablet Take 40 mg by mouth 2 times daily   Yes Historical Provider, MD   gabapentin (NEURONTIN) 300 MG capsule Take 300 mg by mouth 3 times daily. Yes Historical Provider, MD   oxyCODONE-acetaminophen (PERCOCET)  MG per tablet Take 1 tablet by mouth every 6 hours as needed for Pain.    Yes Historical Provider, MD   atorvastatin (LIPITOR) 40 MG tablet Take 40 mg by mouth daily   Yes Historical Provider, MD   cyclobenzaprine (FLEXERIL) 10 MG tablet Take 10 mg by mouth 3 times daily   Yes Historical Provider, MD   Plecanatide (TRULANCE) 3 MG TABS Take 3 mg by mouth daily   Yes Historical Provider, MD   allopurinol (ZYLOPRIM) 100 MG tablet Take 100 mg by mouth daily   Yes Historical Provider, MD   liothyronine (CYTOMEL) 5 MCG tablet Take 5 mcg by mouth daily   Yes Historical Provider, MD   aspirin 81 MG chewable tablet Take 81 mg by mouth daily   Yes Historical Provider, MD        Facility Administered Medications:    magnesium sulfate  2,000 mg IntraVENous Once    potassium chloride  40 mEq Oral Once    sodium bicarbonate  650 mg Oral TID    metroNIDAZOLE  500 mg Oral 3 times per day    ciprofloxacin  500 mg Oral Daily    calcium carb-cholecalciferol  1 tablet Oral Daily    apixaban  2.5 mg Oral BID    melatonin  5 mg Oral Nightly    famotidine (PEPCID) injection  20 mg IntraVENous BID    docusate sodium  100 mg Oral Daily    sodium chloride flush  5-40 mL IntraVENous 2 times per day    allopurinol  100 mg Oral Daily    atorvastatin  40 mg Oral Daily    aspirin  81 mg Oral Daily    [Held by provider] gabapentin  300 mg Oral TID    levothyroxine  200 mcg Oral Daily    propranolol  40 mg Oral BID       Allergies:  Patient has no known allergies. Social History:      Social History     Socioeconomic History    Marital status:       Spouse name: Not on file    Number of children: Not on file    Years of education: Not on file    Highest education level: Not on file   Occupational History    Not on file   Tobacco Use    Smoking status: Former     Packs/day: 1.00     Years: 34.00     Pack years: 34.00     Types: Cigarettes     Start date: 1977     Quit date: 2021     Years since quittin.2    Smokeless tobacco: Current   Substance and Sexual Activity    Alcohol use: Never    Drug use: Never    Sexual activity: Not on file   Other Topics Concern    Not on file   Social History Narrative    Not on file     Social Determinants of Health     Financial Resource Strain: Not on file   Food Insecurity: Not on file   Transportation Needs: Not on file   Physical Activity: Not on file   Stress: Not on file   Social Connections: Not on file   Intimate Partner Violence: Not on file   Housing Stability: Not on file       Family History:    History reviewed. No pertinent family history. Review of Systems:  A 14 point review of systems was completed. Pertinent positives identified in the HPI, all other review of systems negative. Physical Examination:    /74   Pulse 80   Temp 99.2 °F (37.3 °C) (Oral)   Resp 16   Ht 5' 5\" (1.651 m)   Wt 243 lb 13.3 oz (110.6 kg)   LMP  (LMP Unknown) Comment: HYSTERECTOMY  SpO2 99%   BMI 40.58 kg/m²        Admission Weight: 220 lb 12.8 oz (100.2 kg)       General appearance: NAD  Eyes: PERRLA  Neck: no JVD, no lymphadenopathy. Respiratory: effort is unlabored, no crackles, wheezes or rubs. Cardiovascular: regular, no murmur. Pulses:    DP   RIGHT 2   LEFT 2   GI: abdomen soft, nondistended, no organomegaly. Musculoskeletal: strength and tone normal.  Extremities: warm and pink. RLE incisions intact  Fullness under proximal thigh incision, consistent with resolving hematoma. Neuro/psychiatric: grossly intact. Labs:   CBC:   Recent Labs     02/28/23  0310 02/28/23 2200 03/01/23  0235 03/01/23  0905 03/02/23  0520   WBC 11.7*  --   --  9.7 8.1   HGB 8.0*   < > 7.7* 7.9* 7.4*   HCT 23.8*   < > 22.6* 23.6* 21.7*   MCV 85.9  --   --  85.5 84.2   *  --   --  583* 527*    < > = values in this interval not displayed. BMP:   Recent Labs     02/28/23  0310 03/01/23  0905 03/02/23  0520   * 133* 133*   K 3.7 3.2* 3.4*    100 102   CO2 17* 19* 20*   BUN 35* 28* 28*   CREATININE 3.3* 3.1* 2.9*   CALCIUM 6.7* 6.4* 6.2*   MG  --  0.90* 1.60*     Cardiac Enzymes: No results for input(s): CKTOTAL, CKMB, CKMBINDEX, TROPONINI in the last 72 hours.   PT/INR: No results for input(s): PROTIME, INR in the last 72 hours.  APTT: No results for input(s): APTT in the last 72 hours. Liver Profile:  Lab Results   Component Value Date/Time    AST 25 03/02/2023 05:20 AM    ALT 9 03/02/2023 05:20 AM    BILITOT 0.3 03/02/2023 05:20 AM    ALKPHOS 86 03/02/2023 05:20 AM     Lab Results   Component Value Date/Time    TRIG 119 02/16/2023 11:02 AM     TSH:  No results found for: TSH  UA:   Lab Results   Component Value Date/Time    COLORU Yellow 02/22/2023 03:55 AM    PHUR 5.5 02/22/2023 03:55 AM    WBCUA None seen 02/22/2023 03:55 AM    RBCUA 3-4 02/22/2023 03:55 AM    CLARITYU SL CLOUDY 02/22/2023 03:55 AM    SPECGRAV 1.015 02/22/2023 03:55 AM    LEUKOCYTESUR Negative 02/22/2023 03:55 AM    UROBILINOGEN 0.2 02/22/2023 03:55 AM    BILIRUBINUR Negative 02/22/2023 03:55 AM    BLOODU TRACE-INTACT 02/22/2023 03:55 AM    GLUCOSEU Negative 02/22/2023 03:55 AM    AMORPHOUS 4+ 02/22/2023 03:55 AM           Assessment:  Resolving hematoma Right thigh s/p fem-pop bypass. Recommendations:  Lege elevation, compression stockings as needed. No surgical treatment or additional vascular testing needed.

## 2023-03-02 NOTE — PROGRESS NOTES
Comprehensive Nutrition Assessment    Type and Reason for Visit:  Reassess    Nutrition Recommendations/Plan:   Continue regular diet  Modify ONS to Boost pudding BID  Obtain updated wt  Encourage PO intake  Monitor nutrition adequacy, pertinent labs, bowel habits, wt changes, and clinical progress     Malnutrition Assessment:  Malnutrition Status: At risk for malnutrition (Comment) (03/02/23 1350)    Context:  Acute Illness     Findings of the 6 clinical characteristics of malnutrition:  Energy Intake:  75% or less of estimated energy requirements for 7 or more days    Nutrition Assessment:    Follow up: Pt continues on regular diet since 2/20. PO intakes 1-100%. Pt reports poor appetite, but able to eat some comfort foods. General surgery noted S/p CT scan on 2/28 showed nothing unexpected, no need for drain continue with diet as tolerated. Pt reports not drinking ensure. Offered other ONS, pt willing to try boost pudding. Updated wt ordered. No c/o of nausea. Encouraged PO intake as tolerated. Pt did not have any questions at this time. Will continue to monitor. Nutrition Related Findings:    Na 133, K+ 3.4, BUN 28, Cr 2.9, Mg 1.6. BUE trace edema. BLE +2 non-pitting edema. Colostomy output 1025 ml on 3/1. Wound Type: Surgical Incision       Current Nutrition Intake & Therapies:    Average Meal Intake: 1-25%, 26-50%, %  Average Supplements Intake: Unable to assess  ADULT DIET; Regular  ADULT ORAL NUTRITION SUPPLEMENT; Breakfast, Dinner; Standard High Calorie/High Protein Oral Supplement    Anthropometric Measures:  Height: 5' 5\" (165.1 cm)  Ideal Body Weight (IBW): 125 lbs (57 kg)       Current Body Weight: 243 lb (110.2 kg), 176.6 % IBW.  Weight Source: Bed Scale  Current BMI (kg/m2): 40.4        Weight Adjustment For: No Adjustment                 BMI Categories: Obese Class 3 (BMI 40.0 or greater)    Estimated Daily Nutrient Needs:  Energy Requirements Based On: Kcal/kg  Weight Used for Energy Requirements: Ideal  Energy (kcal/day): 3226-9385  Weight Used for Protein Requirements: Ideal  Protein (g/day): 68-80g  Method Used for Fluid Requirements: 1 ml/kcal  Fluid (ml/day):      Nutrition Diagnosis:   Inadequate oral intake related to inadequate protein-energy intake as evidenced by intake 0-25%, intake 26-50%    Nutrition Interventions:   Food and/or Nutrient Delivery: Continue Current Diet, Modify Oral Nutrition Supplement  Nutrition Education/Counseling: No recommendation at this time  Coordination of Nutrition Care: Continue to monitor while inpatient       Goals:  Previous Goal Met: No Progress toward Goal(s)  Goals: PO intake 50% or greater, prior to discharge       Nutrition Monitoring and Evaluation:   Behavioral-Environmental Outcomes: None Identified  Food/Nutrient Intake Outcomes: Supplement Intake, Food and Nutrient Intake  Physical Signs/Symptoms Outcomes: Biochemical Data, Nutrition Focused Physical Findings, Weight    Discharge Planning:    Continue current diet     [address]: Office: [phone]; [address]: [phone]

## 2023-03-02 NOTE — PLAN OF CARE
Problem: Pain  Goal: Verbalizes/displays adequate comfort level or baseline comfort level  3/1/2023 2246 by Danie Oppenheim de Jacky Boers, RN  Outcome: Progressing  Flowsheets (Taken 3/1/2023 2246)  Verbalizes/displays adequate comfort level or baseline comfort level:   Encourage patient to monitor pain and request assistance   Assess pain using appropriate pain scale   Administer analgesics based on type and severity of pain and evaluate response   Implement non-pharmacological measures as appropriate and evaluate response   Consider cultural and social influences on pain and pain management

## 2023-03-02 NOTE — PLAN OF CARE
Problem: Pain  Goal: Verbalizes/displays adequate comfort level or baseline comfort level  3/2/2023 0953 by Selma Billy RN  Outcome: Progressing  Flowsheets (Taken 3/1/2023 2246 by Karla Tripathi RN)  Verbalizes/displays adequate comfort level or baseline comfort level:   Encourage patient to monitor pain and request assistance   Assess pain using appropriate pain scale   Administer analgesics based on type and severity of pain and evaluate response   Implement non-pharmacological measures as appropriate and evaluate response   Consider cultural and social influences on pain and pain management     Problem: ABCDS Injury Assessment  Goal: Absence of physical injury  Outcome: Progressing     Problem: Safety - Adult  Goal: Free from fall injury  Outcome: Progressing  Flowsheets (Taken 3/2/2023 0953)  Free From Fall Injury: Based on caregiver fall risk screen, instruct family/caregiver to ask for assistance with transferring infant if caregiver noted to have fall risk factors     Problem: Skin/Tissue Integrity  Goal: Absence of new skin breakdown  Description: 1. Monitor for areas of redness and/or skin breakdown  2. Assess vascular access sites hourly  3. Every 4-6 hours minimum:  Change oxygen saturation probe site  4. Every 4-6 hours:  If on nasal continuous positive airway pressure, respiratory therapy assess nares and determine need for appliance change or resting period.   3/2/2023 0953 by Selma Billy RN  Outcome: Progressing

## 2023-03-02 NOTE — PROGRESS NOTES
Paged MD to see if he wants PICC line to in place or switched to PIV to reduce infection risk. Awaiting to hear. If remaining will change dressing.

## 2023-03-02 NOTE — PROGRESS NOTES
Awake on bed, alert and oriented. On telemetry. With Picc line on left brachial. Midline suture, clean dry and intact. Colostomy on left abdominal quadrant. Incision sites on both lower legs. Noted redness and mild pain on incision site on right inner thigh, warm to touch. Still with pain on the abdomen 5/10. Temperature 100.3F, notified Maddy Blanca NP via [employer]. Placed call light within reach, Instructed to call for assistance. Will continue to monitor.

## 2023-03-02 NOTE — PROGRESS NOTES
Pt assessment completed and charted. VSS. Pt a/ox4. Pt reported some back pain this evening. Pt had low grade fever 100.5, tylenol given per mar. Low abd dressing changed per wound care orders. Pt ambulating on own w/o difficulty now that she is unhooked from IV pole. PICC removed today. Pt denies any other needs at this time. Pt calls out appropriately. Pt is a fall risk;  -Bed in lowest position and wheels locked. -Call light within reach.   -Bedside table within reach.   -Non-skid footwear in place.

## 2023-03-02 NOTE — PROGRESS NOTES
Hospitalist Progress Note      PCP: Jesika Willard    Date of Admission: 2/9/2023    Chief Complaint:   Abd pain    Hospital Course: Ms. Gisela Quan is a 63year old female who presented to [facility] with abdominal pain and constipation on 2/9. She had a lower extremity vascular bypass surgery on her right leg a few weeks ago. She reports constipation and abdominal distension following her surgery with ng tube placement as well as a rectal tube. Patient reports her follow-up colonoscopy revealed sigmoid edema with diverticula and an ulcer, she then improved and was discharged. She again developed abdominal pain and distension and has not had a BM since 2/5. She denies trying any stool softeners or motility agents at home. 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 She also has had surgeries for adhesions and ventral hernia repair. She underwent a Haylie's procedure with GS on 2/14/23 with colostomy placement. Rapid response called on 2/16/23 for hypotension (as below). Found to have abdominal/pelvic hemorrhage on CT along with R thigh hematoma noted over previous bypass incision site on US. Patient status post surgery with resection of colon and diverting colostomy on 2/15/2023. Patient developed hypotension on 2/15/2023. Patient had further hypotension on 2/16/2023 and it was noted patient had intra-abdominal bleed with right thigh hematoma. Patient's hemoglobin had dropped to 5. Patient was transfused with 2 units. She appeared to stabilize but then her blood pressure decreased and her heart rate increased. Patient's hemoglobin dropped back down to 5 after being up to 6.9. It was decided to take patient back to surgery. During surgery patient was noted to have old blood in her abdomen no active bleed was noted but 3 L of old blood removed.     Subjective: Patient seen and examined this AM . No family at bedside  . Hb stable at 7.7 . No signs of bleeding . S/p 1 unit PRBC transfusion on 2/26  . Reviewed CT A/P findings with patient showed LUQ probable hematoma at surgical site . D/w Dr. Maged Solano face to face - no plan for surgical intervention at this time . Ok to continue aspirin and Eliquis  for Right leg endo vascular surgery . Received 80 mg IV lasix x 1 on 2/25/23 ;  2/26 & 2/27 and 60 mg x 1 on 2/28 per Nephrology . Cr 3.1 (3.3) today . - Reviewed  Abdominal wound cultures (Enterococcus raffinosus; Bacteroides ; clostridium sps) . Currently on IV cipro/Flagyl since 2/22/23. Nephrology following for renal functions . pedal edema B/L getting better  .     Medications:  Reviewed    Infusion Medications    dextrose      sodium chloride Stopped (02/12/23 7007)     Scheduled Medications    calcium carb-cholecalciferol  1 tablet Oral Daily    apixaban  2.5 mg Oral BID    sodium bicarbonate  650 mg Oral BID    ciprofloxacin  400 mg IntraVENous Q24H    metroNIDAZOLE  500 mg IntraVENous Q8H    melatonin  5 mg Oral Nightly    famotidine (PEPCID) injection  20 mg IntraVENous BID    docusate sodium  100 mg Oral Daily    sodium chloride flush  5-40 mL IntraVENous 2 times per day    allopurinol  100 mg Oral Daily    atorvastatin  40 mg Oral Daily    aspirin  81 mg Oral Daily    [Held by provider] gabapentin  300 mg Oral TID    levothyroxine  200 mcg Oral Daily    propranolol  40 mg Oral BID     PRN Meds: prochlorperazine, diatrizoate meglumine-sodium, butalbital-acetaminophen-caffeine, oxyCODONE **OR** oxyCODONE, HYDROmorphone, glucose, dextrose bolus **OR** dextrose bolus, glucagon (rDNA), dextrose, benzocaine, sodium chloride flush, sodium chloride, ondansetron **OR** ondansetron, acetaminophen **OR** acetaminophen, potassium chloride **OR** potassium alternative oral replacement **OR** [DISCONTINUED] potassium chloride, magnesium sulfate, sodium phosphate IVPB **OR** sodium phosphate IVPB **OR** sodium phosphate IVPB      Intake/Output Summary (Last 24 hours) at 3/1/2023 2106  Last data filed at 3/1/2023 2052  Gross per 24 hour   Intake 3465.24 ml   Output 5525 ml   Net -2059.76 ml       Physical Exam Performed:  BP (!) 151/89   Pulse 99   Temp 100.3 °F (37.9 °C) (Oral)   Resp 16   Ht 5' 5\" (1.651 m)   Wt 243 lb 13.3 oz (110.6 kg)   LMP  (LMP Unknown) Comment: HYSTERECTOMY  SpO2 97%   BMI 40.58 kg/m²     General appearance: No apparent distress, appears stated age and cooperative. HEENT: Pupils equal, round, and reactive to light. Conjunctivae/corneas clear. Neck: Supple, with full range of motion. No jugular venous distention. Trachea midline. Respiratory:  Normal respiratory effort. Clear to auscultation, bilaterally without Rales/Wheezes/Rhonchi. Cardiovascular: Regular rate and rhythm with normal S1/S2 without murmurs, rubs or gallops. Abdomen: Tender. Midline incision dressed. Positive bowel sounds noted. Ostomy left lower quadrant  Musculoskeletal: Hematoma on right medial thigh resolved . No skin bruise /ecchymosis noted ; 2 + pedal edema   Skin: Skin color, texture, turgor normal.  No rashes or lesions. Neurologic:  Neurovascularly intact without any focal sensory/motor deficits.  Cranial nerves: II-XII intact, grossly non-focal.  Psychiatric: Alert and oriented, thought content appropriate, normal insight  Capillary Refill: Brisk, 3 seconds, normal   Peripheral Pulses: +2 palpable, equal bilaterally     Labs:   Recent Labs     02/28/23  0310 02/28/23  2200 03/01/23  0235 03/01/23  0905   WBC 11.7*  --   --  9.7   HGB 8.0* 7.6* 7.7* 7.9*   HCT 23.8* 23.2* 22.6* 23.6*   *  --   --  583*       Recent Labs     02/27/23  0710 02/28/23  0310 03/01/23  0905   * 135* 133*   K 3.7  3.7 3.7 3.2*    103 100   CO2 18* 17* 19*   BUN 36* 35* 28*   CREATININE 3.5* 3.3* 3.1*   CALCIUM 7.0* 6.7* 6.4*   PHOS 5.1*  --   --        Recent Labs     02/27/23  0710 02/28/23 0310 03/01/23  0905   AST 19 23 25   ALT 10 10 9*   BILITOT 0.4 0.3 0.3   ALKPHOS 101 99 94       Urinalysis:    Lab Results   Component Value Date/Time    NITRU Negative 02/22/2023 03:55 AM    WBCUA None seen 02/22/2023 03:55 AM    RBCUA 3-4 02/22/2023 03:55 AM    BLOODU TRACE-INTACT 02/22/2023 03:55 AM    SPECGRAV 1.015 02/22/2023 03:55 AM    GLUCOSEU Negative 02/22/2023 03:55 AM       Radiology:  CT ABDOMEN PELVIS WO CONTRAST Additional Contrast? Oral   Final Result   1. Probablee hematoma in the left upper quadrant. This is more formed than   previously seen. 2. Colitis but negative for obstruction. 3. Hemorrhage in free fluid in the abdomen and pelvis. No free gas. 4. Diffuse edema subcutaneous tissues as well as subcutaneous hematomas. VL DUP LOWER EXTREMITY ARTERIES RIGHT   Final Result      CT ABDOMEN PELVIS WO CONTRAST Additional Contrast? None   Final Result   Large amount of complex fluid within the abdomen and pelvis, most notably   within the pelvis where hematocrit levels are seen, compatible with   hemorrhage. Smaller 4.9 cm collection within subcutaneous fat adjacent to the left lower   quadrant stoma, compatible with hematoma. Amorphous inflammation or   hemorrhage is seen within the soft tissues immediately adjacent. Mural thickening of the ascending colon and transverse colon, which can   reflect colitis in the appropriate clinical setting. Mild left pelviectasis. Pneumoperitoneum, in keeping with the recent postoperative state. Bibasilar atelectasis. 1 cm right lower lobe pulmonary nodule, for which follow-up recommendations   are as below. RECOMMENDATIONS:   Fleischner Society guidelines for follow-up and management of incidentally   detected pulmonary nodules:      Nodule size greater than 8 mm         In a low-risk patient, consider CT at 3 months, PET/CT, or tissue sampling.       In a high-risk patient, consider CT at 3 months, PET/CT, or tissue sampling.      - Low risk patients include individuals with minimal or absent history of   smoking and other known risk factors.      - High risk patients include individuals with a history or smoking or known   risk factors.      Radiology 2017 http://pubs.rsna.org/doi/full/10.1148/radiol.6351608764         IR PICC WO SQ PORT/PUMP > 5 YEARS   Final Result      CT ABDOMEN PELVIS WO CONTRAST Additional Contrast? None   Final Result   1.  Barium contrast from prior small-bowel follow-through is seen throughout   the colon and limits evaluation due to streak artifact.  No definite abscess   is identified.      2.  Infrarenal abdominal aortic aneurysm measures up to 3.0 cm.  See   recommendations for follow-up below.      3.  There is a 1.1 cm right lower lobe pulmonary nodule and a 0.3 cm left   lower lobe pulmonary nodule.  Recommend dedicated CT chest for further   evaluation.      RECOMMENDATIONS:      Multiple. Worst: 11 mm solid      Pathology: Multiple pulmonary nodules. Most severe: 11 mm solid pulmonary   nodule detected on incomplete chest CT.      Recommend prompt non-contrast Chest CT for further evaluation.      These guidelines do not apply to immunocompromised patients and patients with   cancer. Follow up in patients with significant comorbidities as clinically   warranted. For lung cancer screening, adhere to Lung-RADS guidelines.   Reference: Radiology. 2017; 284(1):228-43         3 cm AAA      Pathology: 3 cm infrarenal abdominal aortic aneurysm.      Recommend follow-up every 3 years.      Reference: J Am [place] Radiol 2013;10:789-794.         XR ABDOMEN (KUB) (SINGLE AP VIEW)   Final Result   Cancellation of scheduled hypaque enema due to presence of barium throughout   the colon including the rectosigmoid region as described above.         FL SMALL BOWEL FOLLOW THROUGH ONLY   Final Result   Mildly dilated small bowel, though small-bowel transit time is upper limits   of normal.         CT ABDOMEN PELVIS W IV CONTRAST  Additional Contrast? None   Final Result   Diverticulitis of the descending sigmoid junction. Mild wall thickening but   negative for obstruction. IP CONSULT TO GENERAL SURGERY  IP CONSULT TO GI  IP CONSULT TO PHARMACY  IP CONSULT TO NEPHROLOGY  IP CONSULT TO DIETITIAN  PHARMACY TO DOSE VANCOMYCIN    Assessment/Plan:  Active Hospital Problems    Diagnosis     Sigmoid stricture ([facility] [address] [K56.699]      Priority: Medium    Hemorrhagic shock ([address] [R57.8]      Priority: Medium    Acute blood loss anemia [D62]      Priority: Medium    Acute renal failure (ARF) (HCC) [N17.9]      Priority: Medium    Diverticulitis [K57.92]      Priority: Medium    Colonic obstruction ([address] [K56.609]      Priority: Medium    Bowel obstruction ([address] [K56.609]      Priority: Medium     Diverticulitis with obstruction due to sigmoid stricture complicated by Post-Op Hemoperitoneum needing evacuation . Patient on IV Vanco/Zosyn through 2/21/23 and dc'd Vanco  .  Status post resection with diverting colostomy (Rick's procedure)  2/14/22 . General surgery is following. Good ostomy output. Noted general surgery expressed pus out of her wound site on 2/21/2023 and sent for cultures. Cultures grew Bacteroides, clostridium and enterococcus sps. Antibiotics transitioned to IV Cipro/Flagyl per general surgery on 2/22/2023. blood cultures - NGTD   Peripheral artery disease. Patient status post bypass of right lower extremity (Fem-pop bypass graft on 1/16/23 @ [facility]) . Patient was on therapeutic Lovenox. Patient developed right leg hematoma and intra-abdominal bleed. Lovenox and aspirin held. Patient with synthetic graft and may require long-term anticoagulation. Vascular  suggested AC  once it safe to anticoagulate . Right leg hematoma improved. Restarted on 2/25/23 after obtaining surgical clearance . Monitor H/H   TARSHA.   This is secondary to hypotension and hypovolemia (from high ostomy output) .ongoing ;   Nephrology consulted - following and asssiting with Mx; Monitoring for dialysis needs . Improved initially and started worsening since 2/21/2023 . continue to monitor closely. Avoid nephrotoxic agents including Toradol. Creatinine peaked at 3.5 . Monitor urine output. Off IVF since 2/24/23. Received IV lasix 80 mgx 1 on 2/25  Hypokalemia. Supplemented . Calss III Obesity -  With Body mass index is 40.58 kg/m². Complicating assessment and treatment. Placing patient at risk for multiple co-morbidities as well as early death and contributing to the patient's presentation. Counseled on weight loss. Post-Operative surgical site wound infection - General surgery following and assisting ; general surgery expressed pus out of her wound site on 2/21/2023 and sent for cultures. Cultures grew Bacteroides, clostridium and enterococcus sps. Antibiotics transitioned to IV Cipro/Flagyl per general surgery on 2/22/2023. On IV Cipro/Flagyl  - F/up CT A/P on 2/28/23 as above . No surgical intervention per General surgery and Ok to continue aspirin and Eliquis   7. Anemia - anticipated post-op acute blood loss anemia w/out evidence of active bleeding/hemolysis. Noted Hb slowly trended down to 6.6 on 2/25 needing 1 unit PRBC transfusion    Follow serial labs. Reviewed and documented as above. DVT Prophylaxis: held initially due to bleed/Hematoma  ; Resumed Eliquis  2.5 mg BID on 2/25/23 after Oked by surgery   Diet: ADULT DIET;  Regular  ADULT ORAL NUTRITION SUPPLEMENT; Breakfast, Dinner; Standard High Calorie/High Protein Oral Supplement  Code Status: Full Code    PT/OT Eval Status: Ambulatory     Dispo -Home versus SNF when stable    Appropriate for A1 Discharge Unit: Wendy Isidro MD

## 2023-03-02 NOTE — PROGRESS NOTES
[facility] notified [facility] Paroxysmal Atrial Tachycardia noted on monitor, HR went high as 169BPM for 6 seconds then went down to 100BPM. Pt denies chest pain and shortness of breath, Will continue to monitor

## 2023-03-02 NOTE — PROGRESS NOTES
Paged MD asking if its okay to removed PICC line to reduce risk of infection since pt no longer has fluids or IV abx. MD okay with PICC removal and to place PIV.

## 2023-03-02 NOTE — PROGRESS NOTES
Dr. Jesus Mittal saw pt and reported that the hardness in the R thigh where sx incision is is r/t large hematoma that is was there and there is nothing further they need to do.

## 2023-03-03 VITALS
SYSTOLIC BLOOD PRESSURE: 121 MMHG | BODY MASS INDEX: 39.45 KG/M2 | DIASTOLIC BLOOD PRESSURE: 75 MMHG | HEART RATE: 76 BPM | RESPIRATION RATE: 18 BRPM | OXYGEN SATURATION: 97 % | HEIGHT: 65 IN | WEIGHT: 236.8 LBS | TEMPERATURE: 98.6 F

## 2023-03-03 LAB
ANION GAP SERPL CALCULATED.3IONS-SCNC: 12 MMOL/L (ref 3–16)
BUN BLDV-MCNC: 22 MG/DL (ref 7–20)
CALCIUM SERPL-MCNC: 7 MG/DL (ref 8.3–10.6)
CHLORIDE BLD-SCNC: 100 MMOL/L (ref 99–110)
CO2: 21 MMOL/L (ref 21–32)
CREAT SERPL-MCNC: 2.5 MG/DL (ref 0.6–1.2)
GFR SERPL CREATININE-BSD FRML MDRD: 21 ML/MIN/{1.73_M2}
GLUCOSE BLD-MCNC: 117 MG/DL (ref 70–99)
HCT VFR BLD CALC: 25.7 % (ref 36–48)
HEMOGLOBIN: 8 G/DL (ref 12–16)
MAGNESIUM: 1.7 MG/DL (ref 1.8–2.4)
MCH RBC QN AUTO: 26.7 PG (ref 26–34)
MCHC RBC AUTO-ENTMCNC: 31.2 G/DL (ref 31–36)
MCV RBC AUTO: 85.7 FL (ref 80–100)
PDW BLD-RTO: 16.4 % (ref 12.4–15.4)
PLATELET # BLD: 661 K/UL (ref 135–450)
PMV BLD AUTO: 6.3 FL (ref 5–10.5)
POTASSIUM REFLEX MAGNESIUM: 3.4 MMOL/L (ref 3.5–5.1)
RBC # BLD: 3 M/UL (ref 4–5.2)
SODIUM BLD-SCNC: 133 MMOL/L (ref 136–145)
WBC # BLD: 8.5 K/UL (ref 4–11)

## 2023-03-03 PROCEDURE — 6370000000 HC RX 637 (ALT 250 FOR IP): Performed by: SURGERY

## 2023-03-03 PROCEDURE — 85027 COMPLETE CBC AUTOMATED: CPT

## 2023-03-03 PROCEDURE — 2580000003 HC RX 258: Performed by: SURGERY

## 2023-03-03 PROCEDURE — 80048 BASIC METABOLIC PNL TOTAL CA: CPT

## 2023-03-03 PROCEDURE — 6360000002 HC RX W HCPCS: Performed by: INTERNAL MEDICINE

## 2023-03-03 PROCEDURE — 6370000000 HC RX 637 (ALT 250 FOR IP): Performed by: INTERNAL MEDICINE

## 2023-03-03 PROCEDURE — 36415 COLL VENOUS BLD VENIPUNCTURE: CPT

## 2023-03-03 PROCEDURE — 2500000003 HC RX 250 WO HCPCS: Performed by: INTERNAL MEDICINE

## 2023-03-03 PROCEDURE — 83735 ASSAY OF MAGNESIUM: CPT

## 2023-03-03 RX ORDER — METRONIDAZOLE 500 MG/1
500 TABLET ORAL EVERY 8 HOURS SCHEDULED
Qty: 12 TABLET | Refills: 0 | Status: SHIPPED | OUTPATIENT
Start: 2023-03-03 | End: 2023-03-07

## 2023-03-03 RX ORDER — FUROSEMIDE 10 MG/ML
60 INJECTION INTRAMUSCULAR; INTRAVENOUS ONCE
Status: COMPLETED | OUTPATIENT
Start: 2023-03-03 | End: 2023-03-03

## 2023-03-03 RX ORDER — SODIUM BICARBONATE 650 MG/1
650 TABLET ORAL 3 TIMES DAILY
Qty: 60 TABLET | Refills: 0 | Status: SHIPPED | OUTPATIENT
Start: 2023-03-03

## 2023-03-03 RX ORDER — CIPROFLOXACIN 500 MG/1
500 TABLET, FILM COATED ORAL
Qty: 3 TABLET | Refills: 0 | Status: SHIPPED | OUTPATIENT
Start: 2023-03-04 | End: 2023-03-07

## 2023-03-03 RX ORDER — ATORVASTATIN CALCIUM 40 MG/1
40 TABLET, FILM COATED ORAL NIGHTLY
Qty: 30 TABLET | Refills: 3 | Status: SHIPPED | OUTPATIENT
Start: 2023-03-03

## 2023-03-03 RX ORDER — POTASSIUM CHLORIDE 750 MG/1
10 TABLET, EXTENDED RELEASE ORAL DAILY
Qty: 10 TABLET | Refills: 0 | Status: SHIPPED | OUTPATIENT
Start: 2023-03-03

## 2023-03-03 RX ADMIN — SODIUM CHLORIDE, PRESERVATIVE FREE 10 ML: 5 INJECTION INTRAVENOUS at 09:00

## 2023-03-03 RX ADMIN — ACETAMINOPHEN 650 MG: 325 TABLET ORAL at 05:54

## 2023-03-03 RX ADMIN — METRONIDAZOLE 500 MG: 250 TABLET ORAL at 14:02

## 2023-03-03 RX ADMIN — CIPROFLOXACIN 500 MG: 500 TABLET, FILM COATED ORAL at 08:59

## 2023-03-03 RX ADMIN — FAMOTIDINE 20 MG: 10 INJECTION, SOLUTION INTRAVENOUS at 09:00

## 2023-03-03 RX ADMIN — BUTALBITAL, ACETAMINOPHEN, AND CAFFEINE 1 TABLET: 50; 325; 40 TABLET ORAL at 15:25

## 2023-03-03 RX ADMIN — DOCUSATE SODIUM 100 MG: 100 CAPSULE, LIQUID FILLED ORAL at 08:59

## 2023-03-03 RX ADMIN — FUROSEMIDE 60 MG: 10 INJECTION, SOLUTION INTRAMUSCULAR; INTRAVENOUS at 10:11

## 2023-03-03 RX ADMIN — ASPIRIN 81 MG 81 MG: 81 TABLET ORAL at 08:59

## 2023-03-03 RX ADMIN — ATORVASTATIN CALCIUM 40 MG: 40 TABLET, FILM COATED ORAL at 08:59

## 2023-03-03 RX ADMIN — APIXABAN 2.5 MG: 2.5 TABLET, FILM COATED ORAL at 08:59

## 2023-03-03 RX ADMIN — ACETAMINOPHEN 650 MG: 325 TABLET ORAL at 00:11

## 2023-03-03 RX ADMIN — Medication 1 TABLET: at 08:59

## 2023-03-03 RX ADMIN — ALLOPURINOL 100 MG: 100 TABLET ORAL at 08:59

## 2023-03-03 RX ADMIN — LEVOTHYROXINE SODIUM 200 MCG: 0.1 TABLET ORAL at 05:55

## 2023-03-03 RX ADMIN — SODIUM BICARBONATE 650 MG: 650 TABLET ORAL at 08:59

## 2023-03-03 RX ADMIN — PROPRANOLOL HYDROCHLORIDE 40 MG: 40 TABLET ORAL at 08:59

## 2023-03-03 RX ADMIN — SODIUM BICARBONATE 650 MG: 650 TABLET ORAL at 14:02

## 2023-03-03 RX ADMIN — METRONIDAZOLE 500 MG: 250 TABLET ORAL at 05:55

## 2023-03-03 ASSESSMENT — PAIN - FUNCTIONAL ASSESSMENT: PAIN_FUNCTIONAL_ASSESSMENT: ACTIVITIES ARE NOT PREVENTED

## 2023-03-03 ASSESSMENT — PAIN DESCRIPTION - LOCATION
LOCATION: ABDOMEN
LOCATION: HEAD

## 2023-03-03 ASSESSMENT — PAIN DESCRIPTION - DESCRIPTORS
DESCRIPTORS: ACHING
DESCRIPTORS: ACHING

## 2023-03-03 ASSESSMENT — PAIN SCALES - GENERAL
PAINLEVEL_OUTOF10: 3
PAINLEVEL_OUTOF10: 6

## 2023-03-03 ASSESSMENT — PAIN DESCRIPTION - ORIENTATION: ORIENTATION: MID;LOWER

## 2023-03-03 NOTE — PLAN OF CARE
Problem: Pain  Goal: Verbalizes/displays adequate comfort level or baseline comfort level  Outcome: Progressing  Flowsheets (Taken 3/1/2023 2246 by Sherry Martin RN)  Verbalizes/displays adequate comfort level or baseline comfort level:   Encourage patient to monitor pain and request assistance   Assess pain using appropriate pain scale   Administer analgesics based on type and severity of pain and evaluate response   Implement non-pharmacological measures as appropriate and evaluate response   Consider cultural and social influences on pain and pain management     Problem: Safety - Adult  Goal: Free from fall injury  Outcome: Progressing  Flowsheets (Taken 3/2/2023 0953)  Free From Fall Injury: Based on caregiver fall risk screen, instruct family/caregiver to ask for assistance with transferring infant if caregiver noted to have fall risk factors     Problem: Discharge Planning  Goal: Discharge to home or other facility with appropriate resources  Outcome: Progressing  Flowsheets (Taken 3/2/2023 2108 by Stephani Rogers RN)  Discharge to home or other facility with appropriate resources: Identify barriers to discharge with patient and caregiver

## 2023-03-03 NOTE — PROGRESS NOTES
[facility] Progress Note           Subjective/   63y.o. year old female past medical history of hypothyroidism, hyperlipidemia, abdominal hernia, abdominal adhesions, lower extremity vascular bypass surgery presented with small bowel obstruction on 2/9/2023 is s/p colon resection, diverting colostomy on 2/15/2023, intra-abdominal bleed on 2/16/2023 who we are seeing in consultation for TARSHA. No acute events in the last 24 hrs  Tmax 100.3 overnight. Urine output 0.6L. No new complaints    Objective/     GEN:  Chronically ill, /74   Pulse 75   Temp 98.2 °F (36.8 °C) (Oral)   Resp 16   Ht 5' 5\" (1.651 m)   Wt 236 lb 12.8 oz (107.4 kg)   LMP  (LMP Unknown) Comment: HYSTERECTOMY  SpO2 97%   BMI 39.41 kg/m²   HEENT: EOMI,  Neck: supple, no JVD  CV: S1, S2 ,rrr,  ++ edema  RESP: CTA B   breathing wnl  ABD:  soft, nt, but staples noted. SKIN: warm, no rashes  Neuro:   No asterixis     Data/  Recent Labs     03/01/23  0235 03/01/23  0905 03/02/23  0520   WBC  --  9.7 8.1   HGB 7.7* 7.9* 7.4*   HCT 22.6* 23.6* 21.7*   MCV  --  85.5 84.2   PLT  --  583* 527*       Recent Labs     03/01/23  0905 03/02/23  0520   * 133*   K 3.2* 3.4*    102   CO2 19* 20*   GLUCOSE 138* 115*   MG 0.90* 1.60*   BUN 28* 28*   CREATININE 3.1* 2.9*   LABGLOM 16* 18*           Assessment:  #  Non-Oliguric Acute Kidney Injury KDIGO stage III      - Most Likely Due to acute tubular injury in setting of hypotension from blood loss, intraoperative surgical site infection, NSAIDs given on 2/19/2023. On vancomycin till 2/19/2020 , less likely IRGN or abdominal hypertension      - Baseline Cr 0.8-0.9, worsening to 3.5       -Creatinine started uptrending on 2/20/2023      -Creatinine trending down. Decreasing to 2.9 today. - U/A on 2/22/2023 shows WBC 0, RBC 3-4, Pr 30,        - Ur sodium less than 20, Ur Creatinine 90      - Renal CT   Left pelviectasis.   7   mm fat density lesion within the lateral right kidney, compatible with   angiomyolipoma. # Electrolytes :   -Hyponatremia  -Hypokalemia    # Volume status :  -Hypokalemia-improving    # Acid Base :  -Non-anion gap metabolic acidosis-improving    # CKD-MBD :  -Hyperphosphatemia  -Hypocalcemia  -Hypomagnesemia        # Anemia :   -Normocytic anemia in setting of possible blood loss  -Stabilizing now. # Hypertension :  -BP at goal    #Vitamin D deficiency  Level 22.8    Other issues  Large bowel obstruction s/p pati's  & colostomy on 2/14/23   Sigmoid stricture  Intra-abdominal hematoma  Peripheral vascular disease  Postop surgical wound site infection  Right above knee to below knee popliteal artery bypass on 1/16/23    Plan:   -Creatinine downtrending,   -Lasix IV 60 today. - if being dc can, dc on po lasix 40 mg bid for a week followed by lasix 40 mg po daily till sees nephrology in clinic  -Potassium chloride 40 mg/day for 1 week and then 20 mg daily while on Lasix. Magnesium oxide 400 mg with twice daily for 1 week followed by daily for 1 month  -Needs BMP repeated within 1 week. Needs to see PCP or nephrology within 1 month  -Discussed all of this with patient and her daughter who is a RN. .  -Renal diet  -Replace potassium, calcium, magnesium  -Sodium bicarbonate tablets 650 mg increased to 3 times daily. Thank you for the consultation. Please do not hesitate to call with questions. ______________________________  Lisa Montanez MD  [facility]  [email or web address]  Office: [phone]

## 2023-03-03 NOTE — PROGRESS NOTES
Physical Therapy/Occupational Therapy  Attempted to see pt for therapy. Pt states she is going home today. Denies therapy needs. States she has been ambulating to/from bathroom indep and has no concerns regarding mobility or ADLs. PT/OT will sign off. Thank you.   Lesley Burger, PT, DPT  Kirill Currie, OTR/L

## 2023-03-03 NOTE — PROGRESS NOTES
Paged nephro for hospitalist concerning meds for d/c. Forward what nephro suggested on d/c to MD. MD read message and is aware of what nephron advises.

## 2023-03-03 NOTE — DISCHARGE INSTRUCTIONS
FOLLOW UP APPOINTMENT WITH DR. Karen Tyson FROM GENERAL SURGERY ON Monday [facility] 20TH  AT 3:30pm.   OFFICE PHONE # [phone]. PLEASE CALL IF YOU NEED TO RESCHEDULE THIS APPOINTMENT. OFFICE IS LOCATED AT [facility] AT [place] [address] building is on the same side of the road/driveway as the Emergency Department              (it is NOT the building across the street with the red windows.)    [address] ONEIL Rader M.D. [address] [facility] [address] [facility]                [address] Kirk Smith M.D. [address] [facility], [address] [place] [address]         [place] [address] [facility] Sudha Wisdom M.D                         [phone]        [facility] Zechariah Post M.D. [facility]                                                          POST-OPERATIVE INSTRUCTIONS FOLLOWING A COLON RESECTION    You will have pain medicine ordered. Take as directed. You may shower. Wash incisions gently, and pat them dry. Do not rub your incisions. General guidelines for activity:   Avoid strenuous activity or lifting anything heavier than 15 pounds. It is OK to be up walking around, and up and down stairs. Do what is comfortable: stop and rest when you feel tired. Drink plenty of fluids      Do NOT drive until after your first post-operative appointment and while taking your narcotic pain medicine     Watch for signs of infection:  Excessive warmth or bright redness around your incisions  Leakage of bloody or cloudy fluid from you incisions  Fever over 100.5      Please take note: IF you do not take all of your narcotic pain medication, we ask that you dispose of these responsibly.    Drug drop off boxes are located in the [facility] and [facility] emergency departments. These Med Safe return cabinets are available 24/7 in the emergency department lobbies. Hospital of office staff may NOT accept any medications to drop off in the cabinet.

## 2023-03-03 NOTE — PROGRESS NOTES
Made MD aware that the pt is reporting feeling dizzy off and on this morning. Also having chills and low grade temps highest off 100.5 over the evening. VSS. Pt a/ox4. Pt advised to call out when needing assistance to BR since feeling dizzy.  Pt agreeable

## 2023-03-03 NOTE — CARE COORDINATION
CASE MANAGEMENT DISCHARGE SUMMARY      Discharge to: home with [address] ordered/agency: none    Transportation: private       Confirmed discharge plan with:     Patient: yes     Family:  yes daughter bedside     Facility/Agency, name:  [facility] faxed [phone]        RN, name: Altagracia Jansen RN

## 2023-03-03 NOTE — DISCHARGE SUMMARY
Hospital Medicine Discharge Summary    Patient ID: Tracy Martinez      Patient's PCP: Chidi Jameson    Admit Date: 2/9/2023     Discharge Date: 03/03/23    Admitting Provider: No admitting provider for patient encounter. Discharge Provider: John Lorwy MD     Discharge Diagnoses: Active Hospital Problems    Diagnosis     Hematoma [T14. 8XXA]      Priority: Medium    Sigmoid stricture ([address] [K56.699]      Priority: Medium    Hemorrhagic shock (HCC) [R57.8]      Priority: Medium    Acute blood loss anemia [D62]      Priority: Medium    Acute renal failure (ARF) (HCC) [N17.9]      Priority: Medium    Diverticulitis [K57.92]      Priority: Medium    Colonic obstruction ([address] [K56.609]      Priority: Medium    Bowel obstruction ([address] [K56.609]      Priority: Medium       The patient was seen and examined on day of discharge and this discharge summary is in conjunction with any daily progress note from day of discharge. Hospital Course: Ms. Huong Orona is a 63year old female who presented to [facility] from [facility] with abdominal pain and constipation on 2/9. She had a lower extremity vascular bypass surgery on her right leg a few weeks ago. She reports constipation and abdominal distension following her surgery with ng tube placement as well as a rectal tube. Patient reports her follow-up colonoscopy revealed sigmoid edema with diverticula and an ulcer, she then improved and was discharged. She again developed abdominal pain and distension and has not had a BM since 2/5. She denies trying any stool softeners or motility agents at home. 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 She also has had surgeries for adhesions and ventral hernia repair. She underwent a Haylie's procedure with GS on 2/14/23 with colostomy placement. Rapid response called on 2/16/23 for hypotension (as below).  Found to have abdominal/pelvic hemorrhage on CT along with R thigh hematoma noted over previous bypass incision site on US. Patient status post surgery with resection of colon and diverting colostomy on 2/15/2023. Patient developed hypotension on 2/15/2023. Patient had further hypotension on 2/16/2023 and it was noted patient had intra-abdominal bleed with right thigh hematoma. Patient's hemoglobin had dropped to 5. Patient was transfused with 2 units. She appeared to stabilize but then her blood pressure decreased and her heart rate increased. Patient's hemoglobin dropped back down to 5 after being up to 6.9. It was decided to take patient back to surgery. During surgery patient was noted to have old blood in her abdomen no active bleed was noted but 3 L of old blood removed. Patient's hemoglobin is closely being monitored since. Once hemoglobin was stable her Eliquis was restarted on 2/25/2023. Patient required 1 unit of PRBC transfusion on 2/26. Hemoglobin has been stable around 7-8. Follow-up Post-op CT abdomen pelvis was obtained by general surgery on 2/28/2023 showed probable LUQ hematoma at surgical site. D/w Dr. Hank Pittman face to face - no plan for surgical intervention at this time . Ok to continue aspirin and Eliquis  for Right leg endo vascular surgery . By Problem List           Physical Exam Performed:   /75   Pulse 76   Temp 98.6 °F (37 °C) (Oral)   Resp 18   Ht 5' 5\" (1.651 m)   Wt 236 lb 12.8 oz (107.4 kg)   LMP  (LMP Unknown) Comment: HYSTERECTOMY  SpO2 97%   BMI 39.41 kg/m²       General appearance:  No apparent distress, appears stated age and cooperative. HEENT:  Normal cephalic, atraumatic without obvious deformity. Pupils equal, round, and reactive to light. Extra ocular muscles intact. Conjunctivae/corneas clear. Neck: Supple, with full range of motion. No jugular venous distention. Trachea midline. Respiratory:  Normal respiratory effort.  Clear to auscultation, bilaterally without Rales/Wheezes/Rhonchi. Cardiovascular:  Regular rate and rhythm with normal S1/S2 without murmurs, rubs or gallops. Abdomen: Soft, non-tender, non-distended with normal bowel sounds. Musculoskeletal:  No clubbing, cyanosis or edema bilaterally. Full range of motion without deformity. Skin: Skin color, texture, turgor normal.  No rashes or lesions. Neurologic:  Neurovascularly intact without any focal sensory/motor deficits. Cranial nerves: II-XII intact, grossly non-focal.  Psychiatric:  Alert and oriented, thought content appropriate, normal insight  Capillary Refill: Brisk,< 3 seconds   Peripheral Pulses: +2 palpable, equal bilaterally       Labs: For convenience and continuity at follow-up the following most recent labs are provided:      CBC:    Lab Results   Component Value Date/Time    WBC 8.5 03/03/2023 09:28 AM    HGB 8.0 03/03/2023 09:28 AM    HCT 25.7 03/03/2023 09:28 AM     03/03/2023 09:28 AM       Renal:    Lab Results   Component Value Date/Time     03/03/2023 09:28 AM    K 3.4 03/03/2023 09:28 AM     03/03/2023 09:28 AM    CO2 21 03/03/2023 09:28 AM    BUN 22 03/03/2023 09:28 AM    CREATININE 2.5 03/03/2023 09:28 AM    CALCIUM 7.0 03/03/2023 09:28 AM    PHOS 5.1 02/27/2023 07:10 AM     Significant Diagnostic Studies    Radiology:   CT ABDOMEN PELVIS WO CONTRAST Additional Contrast? Oral   Final Result   1. Probablee hematoma in the left upper quadrant. This is more formed than   previously seen. 2. Colitis but negative for obstruction. 3. Hemorrhage in free fluid in the abdomen and pelvis. No free gas. 4. Diffuse edema subcutaneous tissues as well as subcutaneous hematomas. VL DUP LOWER EXTREMITY ARTERIES RIGHT   Final Result      CT ABDOMEN PELVIS WO CONTRAST Additional Contrast? None   Final Result   Large amount of complex fluid within the abdomen and pelvis, most notably   within the pelvis where hematocrit levels are seen, compatible with   hemorrhage. Smaller 4.9 cm collection within subcutaneous fat adjacent to the left lower   quadrant stoma, compatible with hematoma. Amorphous inflammation or   hemorrhage is seen within the soft tissues immediately adjacent. Mural thickening of the ascending colon and transverse colon, which can   reflect colitis in the appropriate clinical setting. Mild left pelviectasis. Pneumoperitoneum, in keeping with the recent postoperative state. Bibasilar atelectasis. 1 cm right lower lobe pulmonary nodule, for which follow-up recommendations   are as below. RECOMMENDATIONS:   Fleischner Society guidelines for follow-up and management of incidentally   detected pulmonary nodules:      Nodule size greater than 8 mm         In a low-risk patient, consider CT at 3 months, PET/CT, or tissue sampling. In a high-risk patient, consider CT at 3 months, PET/CT, or tissue sampling.      - Low risk patients include individuals with minimal or absent history of   smoking and other known risk factors. - High risk patients include individuals with a history or smoking or known   risk factors. Radiology 2017 http://pubs. rsna.org/doi/full/10.1148/radiol. 3184124735         IR PICC WO SQ PORT/PUMP > 5 YEARS   Final Result      CT ABDOMEN PELVIS WO CONTRAST Additional Contrast? None   Final Result   1. Barium contrast from prior small-bowel follow-through is seen throughout   the colon and limits evaluation due to streak artifact. No definite abscess   is identified. 2.  Infrarenal abdominal aortic aneurysm measures up to 3.0 cm. See   recommendations for follow-up below. 3.  There is a 1.1 cm right lower lobe pulmonary nodule and a 0.3 cm left   lower lobe pulmonary nodule. Recommend dedicated CT chest for further   evaluation. RECOMMENDATIONS:      Multiple. Worst: 11 mm solid      Pathology: Multiple pulmonary nodules.  Most severe: 11 mm solid pulmonary   nodule detected on incomplete chest CT. Recommend prompt non-contrast Chest CT for further evaluation. These guidelines do not apply to immunocompromised patients and patients with   cancer. Follow up in patients with significant comorbidities as clinically   warranted. For lung cancer screening, adhere to Lung-RADS guidelines. Reference: Radiology. 2017; 284(1):228-43         3 cm AAA      Pathology: 3 cm infrarenal abdominal aortic aneurysm. Recommend follow-up every 3 years. Reference: J Am [place] Radiol 3377;87:503-284. XR ABDOMEN (KUB) (SINGLE AP VIEW)   Final Result   Cancellation of scheduled hypaque enema due to presence of barium throughout   the colon including the rectosigmoid region as described above. FL SMALL BOWEL FOLLOW THROUGH ONLY   Final Result   Mildly dilated small bowel, though small-bowel transit time is upper limits   of normal.         CT ABDOMEN PELVIS W IV CONTRAST Additional Contrast? None   Final Result   Diverticulitis of the descending sigmoid junction. Mild wall thickening but   negative for obstruction.               Consults:   IP CONSULT TO GENERAL SURGERY  IP CONSULT TO GI  IP CONSULT TO PHARMACY  IP CONSULT TO NEPHROLOGY  IP CONSULT TO DIETITIAN  PHARMACY TO DOSE VANCOMYCIN  IP CONSULT TO VASCULAR SURGERY    Disposition: Home      Condition at Discharge: Stable    Discharge Instructions/Follow-up:   Gilbert Villegas MD         [facility] - [address] [facility] [phone]  [address] [place] [address], [address] [place] [address] [address]      Next Steps: Schedule an appointment as soon as possible for a visit in 1 week(s)  Appointment:   Instructions: Hospital discharge follow-up   Xiomara Nair MD             Nephrology   [phone] [facility] [address] [place] [address] [employer] [id number]     Next Steps: Schedule an appointment as soon as possible for a visit in 1 week(s)  Appointment:   Instructions: Hospital discharge follow-up for your kidney functions Raymond Burch MD             General Surgery   [phone] [address], [address] [facility]     Next Steps: Schedule an appointment as soon as possible for a visit in 3 week(s)  Appointment:   Instructions: For wound re-check     Code Status:  Full Code     Activity: activity as tolerated    Diet: regular diet    Discharge Medications:   Current Discharge Medication List             Details   ciprofloxacin (CIPRO) 500 MG tablet Take 1 tablet by mouth every 24 hours for 3 days  Qty: 3 tablet, Refills: 0      metroNIDAZOLE (FLAGYL) 500 MG tablet Take 1 tablet by mouth every 8 hours for 4 days  Qty: 12 tablet, Refills: 0      sodium bicarbonate 650 MG tablet Take 1 tablet by mouth 3 times daily  Qty: 60 tablet, Refills: 0      potassium chloride (KLOR-CON M) 10 MEQ extended release tablet Take 1 tablet by mouth daily  Qty: 10 tablet, Refills: 0                Details   apixaban (ELIQUIS) 2.5 MG TABS tablet Take 1 tablet by mouth 2 times daily Patient unsure of dose. \"Thinks\" it is 5mg  Qty: 60 tablet, Refills: 1      atorvastatin (LIPITOR) 40 MG tablet Take 1 tablet by mouth at bedtime  Qty: 30 tablet, Refills: 3                Details   Levothyroxine Sodium 200 MCG CAPS Take by mouth daily Unsure of dose      oxyCODONE-acetaminophen (PERCOCET)  MG per tablet Take 1 tablet by mouth every 6 hours as needed for Pain.      liothyronine (CYTOMEL) 5 MCG tablet Take 5 mcg by mouth daily      aspirin 81 MG chewable tablet Take 81 mg by mouth daily             Time Spent on discharge: 33 mins  in the examination, evaluation, counseling and review of medications and discharge plan. Signed:  Ethan Connell MD   3/3/2023      Thank you Dnaii Adamson for the opportunity to be involved in this patient's care. If you have any questions or concerns, please feel free to contact me at [phone].

## 2023-03-03 NOTE — PROGRESS NOTES
Pt assessment completed and charted. VSS. Pt a/ox4. Pt denies pain. Pt denies nausea, but hasn't had much of appetite. Pt reports that her dizziness has improved, see additional notes. Pt has midline w/ staples and L ostomy. Pt denies any other needs at this time. Pt calls out appropriately. Pt is a fall risk;  -Bed in lowest position and wheels locked. -Call light within reach.   -Bedside table within reach.   -Non-skid footwear in place.

## 2023-03-03 NOTE — PROGRESS NOTES
Wound Care supplied patient with enough colostomy supplies to give time for ordered supplies to arrive at patient's home. Stoma size:  2 inch = 50 mm round protrudes. Appliance size:  Sensura Cutler YELLOW convex flange # [id number]    Drainable bag # [id number]. ostomy belt to assist with securing of ostomy seal  Barrier flange seals  Heat packs  Barrier powder  Barrier film  Barrier paste  Barrier rings  Box gloves  Package of hanh    Patient eager to be discharged home.

## 2023-03-07 NOTE — ADT AUTH CERT
Utilization Reviews       Clinical Review 02/17, 02/19, 02/21, 02/23, 02/25, 02/27, 03/01, 03/02 by Alpa Lees RN       Review Status Review Entered   In Primary 3/7/2023 0853       Created By   Alpa Lees RN      Criteria Review   DATE: 02/17/2023 Care Day 9     PERTINENT UPDATES:  - Patient underwent emergent laparotomy for hemoperitoneum evacuation, 3L old clotted blood removed  - Patient received total of 4 units of PRBC and 2 FFP's, 1.5L of normal saline during surgery  - Patient on ventilator but alert this AM.   - Patient requesting to have ET tube removed. - Patient is complaining of abdominal pain. Abdominal pain medicines have been held so patient will be awake for extubation attempt. - Patient on IV Zosyn   - Patient on Sublimaze Titrate Drip, Levophed Titrate Drip, and Propofol Titrate Drip     VITALS:  T: 99, oral RR: 29 20 23 LA: 98 95 102 BP: 85/63 97/62 96/62 SPO2: 91% on RA     ABNL/PERTINENT LABS/RADIOLOGY/DIAGNOSTIC STUDIES:  BUN,BUNPL: 23 (H)  Creatinine: 1.1  Anion Gap: 10  Est, Glom Filt Rate: 57 !   Glucose, Random: 149 (H)  POC Glucose: 139 (H) 128 (H) 124 (H) 112 (H)  CALCIUM, SERUM, [id number]: 6.9 (L)  Total Protein: 4.0 (L)  Albumin: 1.9 (L)  Albumin/Globulin Ratio: 0.9 (L)     Hemoglobin Quant: 7.3 (L) 7.8 (L) 8.3 (L)  Hematocrit: 22.5 (L) 23.5 (L) 25.4 (L)     PHYSICAL EXAM:  Neck: NGT in situ  Respiratory: clear anteriorly; ET tube in situ  Abdomen: soft, distended, colostomy in situ, tenderness noted at incision  Incision: clean, dry, no drainage, colostomy - stool in pouch  Extremities: no lower extremity edema; right thigh hematoma around the surgical site of the bypass  Neuro/Psy: Follows commands     MD CONSULTS/ASSESSMENT AND PLAN:  PULMONOLOGY:  Assessment:  Principal Colonic obstruction  Bowel obstruction  Diverticulitis  Hemorrhagic shock  Acute blood loss anemia  Acute renal failure     Plan:   Neurology  Off all sedation  Continue with  Neurontin 300 mg 3 times daily  Pain control     Cardiovascular  Continue with  Allopurinol 100 mg daily  Lipitor 40 mg daily  Aspirin 81 mg daily-on hold  Lovenox 100 mg twice daily subcu-on hold  Inderal 40 mg twice daily-on hold     Pulmonary  Acute respiratory insufficiency  Ventilator management  VC, tidal line 450, rate of 18, PEEP of 5, FiO2 50%  On SBT  patient extubated     Gastrointestinal   Surgery on 2/14/2023 for sigmoid stricture and Rick's procedure  Surgery exploratory laparotomy on 2/16/2023 for hemoperitoneum     Continue with  Colace 100 mg daily-placed on hold  Zosyn 3.375 IV every 8 hours     Endo  Diabetes  Continue with  Sliding scale insulin     Hypothyroidism  Continue with  Synthroid 200 mcg daily     Renal  Creatinine was as high as 2.2, now down to 1.1     Prophylaxis  Off of anticoagulation secondary to bleed  Pepcid     Lines  ETT  [place]  PICC      Hematology  Acute GI bleed, hemoperitoneum  H&H as low as 5.2     GENERAL SURGERY OP NOTE:  PREOPERATIVE DIAGNOSES: Hemoperitoneum, rule out active bleeding. POSTOPERATIVE DIAGNOSIS: Hemoperitoneum. PROCEDURE PERFORMED: Exploratory laparotomy with the evacuation of hemoperitoneum. FINDINGS: Large volume of old or clotted blood, approximately 3L, but with no sign of any active bleeding occurring. ASSESSMENT AND PLAN:  63 y.o. female status post Rick's procedure with postop bleeding, now s/p evacuation of large hemoperitoneum. No further sx of active abdominal bleeding  - wean and extubate this AM per ICU team  - cont NPO today to ensure no further bleeding issues in abdomen. If remains stable, can begin PO diet tomorrow  - routine ostomy care  - would hold anticoag rx for today, likely resume tomorrow but perhaps at a lower dose than prior     INTERNAL MEDICINE:  Assessment/Plan:  Diverticulitis with obstruction. Monitor ostomy output. Peripheral artery disease. Discussed with vascular surgery.  Patient with synthetic graft and may require long-term anticoagulation. He suggested Xarelto 2.5 mg twice daily once it safe to anticoagulate. TARSHA. Creatinine improved. Monitor urine output closely. Anticipate renal recovery. Severe protein calorie malnutrition. Patient may need TPN after being evaluated in AM. If not started on diet. MEDICATIONS:  Zyloprim 100mg PO daily  Synthroid 200mcg PO daily  Inderal 40mg PO BID x 1  Pepcid 20mg IV BID  Dilaudid 0.5mg IV x 1  Dilaudid 0.5mg IV q6h PRN x 3  Zosyn 3375mg IV q8h  Sublimaze Titrate Drip  Levophed Titrate Drip  Propofol Titrate Drip  0.9% Sodium Chloride Infusion 75ml/hr IV  Bactroban 2% NA BID x 1  Hurricaine 20% oral spray MT QID PRN x 1     ORDERS:  POCT Glucose        DATE: 02/19/2023 Care Day 10     PERTINENT UPDATES:  - Urinary catheter has been removed  - Patient tolerating liquid diet with plans to advance   - Having ostomy output      VITALS:  T: 102.8, bladder RR: 23 29 24 MT: 102 95 101 BP: 89/56 89/57 93/62 SPO2: 94% on 2L NC     ABNL/PERTINENT LABS/RADIOLOGY/DIAGNOSTIC STUDIES:  Sodium: 136  Potassium: 3.4 (L)  Glucose, Random: 106 (H)  POC Glucose: 114 (H) 101 (H) 109 (H) 125 (H) 128 (H)  CALCIUM, SERUM, [id number]: 7.0 (L)  Phosphorus: 1.9 (L)  Total Protein: 4.1 (L)  Albumin: 2.0 (L)  Albumin/Globulin Ratio: 1.0 (L)  Hemoglobin Quant: 7.2 (L)  Hematocrit: 20.9 (LL)     Blood, Urine: SMALL ! Protein, UA: TRACE ! Leukocyte Esterase, Urine: TRACE ! Epithelial Cells, UA: 6-10 ! Culture, Blood 1: No Growth after 4 days of incubation. Culture, Blood 2: No Growth after 4 days of incubation. PHYSICAL EXAM:  Abdomen: Tender. Midline incision dressed. Positive bowel sounds noted. Ostomy left lower quadrant  Incision: Clean, dry, no drainage, colostomy - stool in pouch  Musculoskeletal: Hematoma noted on right medial thigh improved. Psychiatric: Alert and oriented, thought content appropriate, normal insight     MD CONSULTS/ASSESSMENT AND PLAN:  GENERAL SURGERY:  ASSESSMENT AND PLAN:  63 y.o. female status post Rick's procedure with postop bleeding, now s/p evacuation of large hemoperitoneum. No further sx of active abdominal bleeding  - full liquids  - po pain control     NEPHROLOGY:  Assessment  -TARSHA, pre-renal to early ATN in the setting of hypotension  -Anemia, acute blood loss   s/p 2 PRBCs 2/15-2/16  -large bowel obstruction s/p pati's  w colostomy on 2/14/23      Plan  -DC IVF after current bag  -Potassium replaced- 40 me po and kphos 30 mmol  -ok to discontinue lakhani     INTERNAL MEDICINE:  Assessment/Plan:  1. Diverticulitis with obstruction. Good ostomy output. 2. TARSHA. Creatinine normal. Monitor urine output. 3. Severe protein calorie malnutrition. Patient went to be started on full liquids. 4. Hypokalemia. We will supplement. MEDICATIONS:  Zyloprim 100mg PO daily  Lipitor 40mg PO daily  Colace 100mg PO daily  Neurontin 300mg PO TID  Synthroid 200mcg PO daily  Melatonin 5mg PO nightly  Inderal 40mg PO BID  Tylenol 650mg PO q6h PRN x 3  Roxicodone 10mg PO q4h PRN x 4  Klor-Con M 40mEq PO PRN x 1  Pepcid 20mg IV BID  Toradol 30mg IV x 1  Zosyn 3375mg IV q8h  Dilaudid 0.5mg IV q6h PRN x 2  Zofran 4mg IV q6h PRN x 1  Potassium Phosphate 30mmol in Sodium Chloride 0.9% 250ml IVPB x 1  Vancomycin 1500mg in Sodium Chloride 0.9% 300ml IVPB x 1  Sodium Phosphate 15mmol in Sodium Chloride 0.9 % 250ml IVPB x 1  Bactroban 2% ointment NA BID     ORDERS:  POCT Glucose  ADULT DIET; Full Liquid        DATE: 02/21/2023 Care Day 12     PERTINENT UPDATES:  - Fever 100.7 this am  - Patient is having episodes of low blood pressure, BP: 90/56 90/58 92/59.  Patient is also drowsy but easily arousable  - Patient was 88% on RA at rest, placed back on 2.5L NC and is up to 92%  - Ostomy functional with liquid brown 250ml output   - Expressed a lot of purulent material from her surgical wound site and sent for cultures  - Continues with IV Zosyn  - Incentive spirometry  - Patient tolerating clear liquids VITALS:  T: 100.7, oral RR: 20 22 20 MA: 95 BP: 90/56 90/58 92/59 SPO2: 88% on RA placed on 2.5L NC saturating at 92%     ABNL/PERTINENT LABS/RADIOLOGY/DIAGNOSTIC STUDIES:  Sodium: 133 (L)  Potassium: 3.7  Chloride: 102  CO2: 20 (L)  BUN,BUNPL: 15  Creatinine: 1.9 (H)  Anion Gap: 11  Est, Glom Filt Rate: 30 ! Glucose, Random: 129 (H)  POC Glucose: 119 (H) 134 (H) 123 (H) 109 (H) 115 (H)  CALCIUM, SERUM, [id number]: 6.6 (L)  Total Protein: 3.9 (L)  Albumin: 1.9 (L)  Albumin/Globulin Ratio: 1.0 (L)  Vancomycin Rm: 20.1 (HH)     Gram Stain Result: 2+ WBC's  1+ Gram negative rods  1+ Gram positive cocci     Culture, Anaerobic and Aerobic:  Organism: Enterococcus raffinosus ! WOUND/ABSCESS: Light growth   Organism: Bacteroides thetaiotaomicron ! Anaerobic Culture: Rare growth   Beta Lactamase POSITIVE. Organism: Eggerthella Abnormal !  Anaerobic Culture: Light growth   No further workup   Organism: Clostridium clostridioforme ! Anaerobic Culture: Light growth   No further workup      PHYSICAL EXAM:  CONSTITUTIONAL: awake and alert  ABDOMEN: normal bowel sounds, soft, non-distended, ostomy functional, tender lower abd, eccymosis left flank   INCISION: copious amount (~100ml) of hemo-purulent drainage from inferior wound expressed after dressing was removed - wound depth 5.5cm with 5cm tunneling 12 o'clock, wound packed with aquacel silver     MD CONSULTS/ASSESSMENT AND PLAN:  GENERAL SURGERY:  ASSESSMENT AND PLAN:  Status post Rick's procedure with postop bleeding, and  s/p evacuation of large hemoperitoneum. No further sx of active abdominal bleeding  GI: continue with reg diet  TARSHA: worsening today - possibly from vancomycin - nephrology already following  Wound infection: cultures sent - hopefully this is the source of the fever and leukocytosis  Acute blood loss anemia: slight trending down h/h - no active bleeding - transfuse if h/h drops below 7     INTERNAL MEDICINE:  1.  Diverticulitis with obstruction due to sigmoid stricture. Noted general surgery expressed pus out of her wound site on 2/21/2023 and sent for cultures. Continue current antibiotics will monitor. 2. TARSHA. Creatinine normalized but again started worsening up to 1.9 this morning. DC vancomycin. Monitor urine output. MEDICATIONS:  0.9% Sodium Chloride Bolus 500ml IV x 1  Pepcid 20mg IV BID   Zosyn 3375mg IV q8h  Dilaudid 0.5mg IV q6h PRN x 1  Zofran 4mg IV q6h PRN x 1  Zyloprim 100mg PO daily  Lipitor 40mg PO daily  Neurontin 300mg PO TID x 2  Synthroid 200mcg PO daily  Inderal 40mg PO BID x 1  Tylenol 650mg PO q6h PRN  Roxicodone 10mg PO q4h PRN x 4     ORDERS:  ADULT DIET; Regular  ADULT ORAL NUTRITION SUPPLEMENT; Breakfast, Dinner; Standard High Calorie/High Protein Oral Supplement  POCT Glucose     PT/OT/SLP/CM ASSESSMENT OR NOTES:  CM NOTES:  Plan for home with [facility]  when medically ready. Would benefit from therapy when appropriate. DATE: 02/23/2023 Care Day 14     PERTINENT UPDATES:  - Patient on telemetry  - IVF 0.9% Sodium Chloride 75ml/hr  - PICC line at right arm  - Noted distal part of midline abdominal incision draining large amts of serous drainage. Ag dressing packing with green purulence removed, wound cleaned with NS, No redness to incision edges, with colostomy on left lower quadrant  - Patient noted to have right-sided headache this morning, PS: 8/10 and reports history of migraine headaches in the past.   - Started Fioricet as needed  - Renal functions slowly plateauing Creatinine 3.4 this morning  - Patient has bilateral pedal edema. - Abdominal pain has improved - patient tolerating regular diet      VITALS:  T: 99.4, oral RR: 20 AR: 99 BP: 151/74 SPO2: 91% on RA     ABNL/PERTINENT LABS/RADIOLOGY/DIAGNOSTIC STUDIES:  Sodium: 133 (L)  Potassium: 3.8  Chloride: 105  CO2: 17 (L)  BUN,BUNPL: 31 (H)  Creatinine: 3.4 (H)  Anion Gap: 11  Est, Glom Filt Rate: 15 !   Glucose, Random: 110 (H)  CALCIUM, SERUM, [id number]: 7.4 (L)  Total Protein: 5.0 (L)  Albumin: 2.1 (L)  Albumin/Globulin Ratio: 0.7 (L)  Alk Phos: 158 (H)     WBC: 10.8  RBC: 2.43 (L)  Hemoglobin Quant: 7.0 (L)  Hematocrit: 20.8 (LL)  RDW: 17.0 (H)  Platelet Count: 422 (H)  Neutrophils Absolute: 8.2 (H)  Lymphocytes Absolute: 0.7 (L)     PHYSICAL EXAM:  CONSTITUTIONAL:  awake and alert  ABDOMEN: normal bowel sounds, soft, non-distended, ostomy functional, tender lower abd, eccymosis left flank stable  INCISION: serous drainage  EXT: right LE with some edema - incisional bulge suspected hematoma improved     MD CONSULTS/ASSESSMENT AND PLAN:  GENERAL SURGERY:  ASSESSMENT AND PLAN:  GI: continue with reg diet  ATRSHA: stabilizing - nephrology managing  Wound infection: await final cultures  Acute blood loss anemia: stable, slight trend down - transfuse if needed  Vasc: will need to restart anticoag at some point - will discuss with dr. Sandy Hennessy, timing - has been 1 week post bleeding     NEPHROLOGY:  Assessment:  - Metabolic acidosis - in setting of TARSHA, Lactate wnl     Plan:  - Continue IVF's with NS - reduce to 75 ml/hour to prevent fluid overload  - Monitoring for dialysis needs, discussed possibility with patient though hopeful to avoid     INTERNAL MEDICINE:  1. Diverticulitis with obstruction due to sigmoid stricture. Surgical wound and blood cultures - NGTD   2. Peripheral artery disease. Vascular  suggested Xarelto 2.5 mg twice daily once it safe to anticoagulate. Not yet started. Right leg hematoma improved. 3. TARSHA. On IVF   4. Class III Obesity - With Body mass index is 40.58 kg/m²     MEDICATIONS:  Zyloprim 100mg PO daily  Lipitor 40mg PO daily  Synthroid 200mcg PO daily  Melatonin 5mg PO nightly  Fioricet, Esgic 1 tablet PO q4h PRN x 1  Roxicodone 10mg PO q4h PRN x 1  0.9% Sodium Chloride 75ml/hr IV  Cipro 400mg IV q24h  Pepcid 20mg IV BID  Flagyl 500mg IV q8h  Dilaudid 0.5mg IV q6h PRN x 2     ORDERS:  ADULT DIET;  Regular  ADULT ORAL NUTRITION SUPPLEMENT; Breakfast, Dinner; Standard High Calorie/High Protein Oral Supplement        Bowel Surgery: Colectomy, Partial, with or without Ostomy - Care Day 1 (2/15/2023) by Jimena Agudelo RN       Review Status Review Entered   Completed 3/6/2023 1619       Created By   Jimena Agudelo RN      Criteria Review      Care Day: 1 Care Date: 2/15/2023 Level of Care: Inpatient Floor    Guideline Day 1    Level Of Care    (X) OR to floor    3/6/2023 4:19 PM EST by Laine Green      Medical    Clinical Status    ( ) * Clinical Indications met    3/6/2023 4:19 PM EST by Laine Green      S/P BOWEL RESECTION SIGMOID COLECTOMY WITH COLOSTOMY CREATION (Abdomen) - 02/14/2023    Routes    (X) IV fluids    3/6/2023 4:19 PM EST by Laine Green      Lactated Ringers Bolus 500ml IV x 1    (X) IV medications    3/6/2023 4:19 PM EST by Laine Green      Albumin Human 5% 500ml/hr IV x 1  Calcium Gluconate 100ml/hr IV x 1  Robaxin 500mg IV q8h x 1    ( ) Possible clear diet postoperatively, advance as tolerated    3/6/2023 4:19 PM EST by Laine Green      Diet NPO Exceptions are: Sips of Water with Meds, Ice Chips    Medications    ( ) Multimodal analgesia, possible epidural or PCA    3/6/2023 4:19 PM EST by Laine Green      Toradol 15mg IV q6h  Toradol 30mg IV x 1    (X) Perioperative antibiotics    3/6/2023 4:19 PM EST by Laine Green      Zosyn 3375mg IV q8h    (X) Possible antiemetic for postoperative nausea and vomiting    3/6/2023 4:19 PM EST by Laine Green      Zofran 4mg IV  q6h PRN x 1    * Milestone   Additional Notes   DATE: 02/15/2023 Care Day 7      PERTINENT UPDATES:   - She recently completed Haylie procedure yesterday with .   - Patient continues to endorse abdominal pain (7/10), but feels improved since procedure. She states that she feels less distended. - Endorses nausea overnight secondary to pain but resolves with Zofran.    - Colostomy site looks clean and dry after being changed by Wound Care this morning.   - Patient is febrile - T: 100.9, oral   - Patient is tachycardic - LA: 110 112 115    - Patient is hypotensive - BP: 62/42 64/46 63/46   - Calcium worsened from 8.0 to 7.7 today   - On IV Lactated Ringers Bolus 500ml   - Continues on IV Zosyn      VITALS:   T: 100.9, oral RR: 20 LA: 110 112 115 BP: 62/42 64/46 63/46 SPO2: 91% on 1L NC      ABNL/PERTINENT LABS/RADIOLOGY/DIAGNOSTIC STUDIES:   Sodium: 134 (L)   Potassium: 4.2   Chloride: 98 (L)   CO2: 23   BUN,BUNPL: 21 (H)   Creatinine: 2.2 (H)   Anion Gap: 13   Est, Glom Filt Rate: 25 ! Lactic Acid: 1.6   Glucose, Random: 158 (H)   POC Glucose: 145 (H) 166 (H) 132 (H) 129 (H) 133 (H) 132 (H)   CALCIUM, SERUM, [id number]: 7.7 (L)   Procalcitonin: 0.44 (H)      RBC: 3.71 (L)   Hemoglobin Quant: 7.4 (L)   Hematocrit: 24.2 (L)   RDW: 18.3 (H)      Total Protein: 4.6 (L)   Albumin: 2.6 (L)   Albumin/Globulin Ratio: 1.3   Alk Phos: 194 (H)   ALT: 43 (H)   AST: 50 (H)   BILIRUBIN TOTAL: 0.3      Culture, Blood 1: No Growth after 4 days of incubation. Culture, Blood 2: No Growth after 4 days of incubation. PHYSICAL EXAM:   CONSTITUTIONAL: awake and alert   LUNGS: no crackles or wheezing   ABDOMEN:  hypoactive bowel sounds, soft, non-distended, tenderness noted midline, stoma healthy with stool in bag    INCISION: clean, dry      MD CONSULTS/ASSESSMENT AND PLAN:   GENERAL SURGERY:   ASSESSMENT AND PLAN:   63 y.o. female status post Haylie's Procedure   GI: continue with ngt to suction, ice chips   : continue with lakhani today to monitor I/O   Pain: continue with PCA - add toradol and IV robaxin today   New ostomy: WOCN following       INTERNAL MEDICINE:   Cross cover asked to come to bedside to evaluate patient for hypotension. The patient is POD#1 of bowel resection sigmoid colectomy with colostomy creation. Upon entering the room, the patient appears to be pale, but alert and oriented. The patient denies any pain at this time.  She does have a PCA pump that is set to demand only. PCA currently off due to hypotension. Patient febrile 100.9, tachycardic 115, hypotensive 63/46. Patient is currently on Piperacillin.       -500 ML LR bolus   -albumin given   -stat H/H   -stat lactate   -stat procalcitonin   -stat BMP   -stat blood cultures   -continued  ml/hr       -H/H resulted at 7.4/24.2. 1U PRBC ordered for transfusion   -Lactate WNL 1.6   -Procalcitonin elevated 0.44   -vancomycin added to treatment plan       Patient seen and examined. Patient being treated for:   1. Diverticulitis with obstruction. Patient on IV Zosyn. Status post resection with diverting colostomy. General surgery is following. Patient has good output through colostomy. 2. Peripheral artery disease. Patient status post bypass of right lower extremity. Vascular surgery consulted for staple removal. Continue patient on Lovenox and aspirin. 3. Elevated alkaline phosphatase. Trending down we will continue monitor. 4. FEN. We will continue to monitor electrolytes. P.o. intake per surgery. Assessment/Plan:   1. Diverticulitis   2. History of recurrent bowel obstruction   - Hold IV hydration with NS today to assess volume status and renal function tomorrow. GFR and Cr normal today. -- Pt switched to Dilaudid 1 mg/mL PCA q8 minutes per GS. Already on Gabapentin 300 mg TID.    - GS completed successful Haylie procedure yesterday. Colostomy placed. 2L of stool output yesterday. Wound site clear/dry. No signs/symptoms of infection today. Wound Care following.    - PT and OT Consulted. - Can continue Ice Chips per GS      3. Hypocalcemia    - 7.9 today, worsened from 8.2 yesterday. - Will continue to monitor, replenish if indicated       4. Normocytic Anemia    - Relatively unchanged since yesterday despite OR, likely secondary to decreased PO intake.  No signs of active bleeding.    - Consider transfusion if hemoglobin <7.    - Normal MCV, ddx includes anemia of chronic diease. MEDICATIONS:   Zyloprim 100mg PO daily   Aspirin 81mg PO daily   Lipitor 40mg PO daily   Colace 100mg PO daily   Neurontin 300mg PO TID x 2   Synthroid 200mcg PO daily   Inderal 40mg PO BID x 1   Lovenox 100mg SC BID      ORDERS:   POCT Glucose   Stoma care   Catheter care        Diverticulitis, Acute - Care Day 5 (2/13/2023) by Len Amato RN       Review Status Review Entered   Completed 3/6/2023 1502       Created By   Len Amato RN      Criteria Review      Care Day: 5 Care Date: 2/13/2023 Level of Care: Inpatient Floor    Guideline Day 2    Level Of Care    (X) Floor    3/6/2023 3:02 PM EST by Rama Camarillo      Tele/ Med/Onc    Clinical Status    (X) * Hemodynamic stability    3/6/2023 3:02 PM EST by Aayush Antonios: 92  BP: 148/82 140/83 133/85    ( ) * Pain absent or managed    3/6/2023 3:02 PM EST by Rama Camarillo      PS: 10/10  Patient continues to have diffuse abdominal pain that is intermittent and positional. She describes it as sharp, throbbing, and stabbing.    (X) * Vomiting absent or reduced    3/6/2023 3:02 PM EST by Rama Camarillo      She noted mild nausea overnight, but no vomiting.    ( ) * Stable Hgb/Hct    3/6/2023 3:02 PM EST by Rama Camarillo      Hemoglobin Quant: 10.6 (L)  Hematocrit: 32.9 (L)    Activity    (X) * Ambulatory    3/6/2023 3:02 PM EST by Rama Camarillo      Up as tolerated    Routes    (X) * Liquid or advanced diet    3/6/2023 3:02 PM EST by Rama Camarillo      ADULT DIET;  Full Liquid    (X) IV fluids    3/6/2023 3:02 PM EST by Rama Camarillo      0.9% Sodium Chloride Infusion 75ml/hr IV    (X) IV medications    3/6/2023 3:02 PM EST by Rama Camarillo      Zofran 4mg IV q6h PRN x 1    Interventions    (X) CBC    3/6/2023 3:02 PM EST by Rama Camarillo      WBC: 5.0  RBC: 4.08  MCH: 25.9 (L)  RDW: 18.1 (H)  Platelet Count: 227    Medications    (X) Possible IV antibiotics    3/6/2023 3:02 PM EST by Gato Garcia      Zosyn 3375mg IV q8h    (X) Possible analgesics    3/6/2023 3:02 PM EST by Gato Garcia      Toradol 15mg IV q6h PRN x 3  Morphine 2mg IV q4h PRN x 2  Percocet 5-325mg 1 tablet PO q4h PRN x 1       Definitions for Care Day 5    Hemodynamic stability    (X) Hemodynamic stability, as indicated by  1 or more  of the following :       (X) Hemodynamic abnormalities at baseline or acceptable for next level of care       Tachycardia absent    (X) Tachycardia absent, as indicated by  1 or more  of the following  (1) (2):       (X) Heart rate less than or equal to 100 beats per minute in adult or child 6 years or older       Hypotension absent    (X) Hypotension absent, as indicated by  1 or more  of the following  (1) (2) (3) (4):       (X) SBP greater than or equal to 90 mm Hg and without recent decrease greater than 40 mm Hg from       baseline in adult or child 10 years or older       * Milestone   Additional Notes   DATE: 02/13/2023 Care Day 5      PERTINENT UPDATES:   - Patient reports still with lower abd pain and still has not had BM in 8 days   - Hypaque enema pending, r/o stricture   - Calcium improved from 7.8 yesterday to 8.0 today   - Continues on IV Zosyn   - Continues on IV hydration      VITALS:   T: 99.7, oral RR: 16 NH: 92 BP: 148/82 140/83 133/85 SPO2: 92% on RA PS: 10/10 abdomen, cramping, aching      ABNL/PERTINENT LABS/RADIOLOGY/DIAGNOSTIC STUDIES:   CALCIUM, SERUM, [id number]: 8.0 (L)   Total Protein: 4.7 (L)   Albumin: 2.7 (L)   Alk Phos: 240 (H)      XR ABDOMEN (KUB):   Cancellation of scheduled hypaque enema due to presence of barium throughout the colon including the rectosigmoid region as described above. PHYSICAL EXAM:   General appearance: Mild amount of distress, appears stated age and cooperative. Abdomen: Soft, diffuse abdominal tenderness, severe distention noted.   Bowel sounds slowed   Psychiatric: Alert and oriented, thought content appropriate, normal insight MD CONSULTS/ASSESSMENT AND PLAN:   GENERAL SURGERY:   ASSESSMENT AND PLAN:   Diverticulitis with mild thickening sigmoid colon - no evidence of obstruction. Will plan on hypaque enema today to further evaluate for possible stricture. Further recommendations to follow based on results. Continued lower abd pain and bloating. No fevers or chills. No bms. Given lack of improvement will get contrast enema to see if stricture is present. If so and not improving with present management then would need operation. INTERNAL MEDICINE:   Patient being treated for:   Diverticulitis. Patient to continue on IV Zosyn. We will repeat CT scan. Colonic obstruction. General surgery is following. Will repeat CT scan. Continue to collect suppositories. Patient on full liquid diet but not tolerating very well. Peripheral artery disease. Patient status post bypass of right lower extremity. Vascular surgery consulted for staple removal. Continue patient on Lovenox and aspirin. Elevated alkaline phosphatase. Trending down we will continue monitor. FEN. We will continue to monitor electrolytes. Monitor p.o. intake. Patient on drug therapy that requires intensive monitoring. Assessment/Plan:   1. Diverticulitis   2. History of recurrent bowel obstruction   - Will continue IV hydration with NS and pain management with Ketorolac, Morphine, and Percocet PRN as tolerated. Already on Gabapentin 300 mg TID. - Switched Glycolax from PRN to scheduled daily given pt has not had BM in 1 week. - Started Colace 100 mg daily today. - KUB showed presence of barium, so pt was unable to obtain barium enema today per GS.    - Will order repeat Non-contrast CT to r/o abscess today. - GI consulted by GS today. Their advice appreciated. - General surgery consulted and appreciate their recommendations   - Dulcolax suppository given yesterday without relief       3.   Peripheral artery disease   - Continue Lovenox 100 mg SC twice daily   - Continue 81 mg of aspirin daily, Propanolol 40 mg BID, and atorvastatin 40 mg daily       4. Hypocalcemia    - 8.0 today, improved from 7.8 yesterday. - Will continue to monitor, replenish if indicated        5. Hypoalbuminemia    - Relatively unchanged in the last several days   - Will continue to monitor. Renal function and LFTs normal.        6. Normocytic Anemia    - Stable, relatively unchanged since yesterday. No signs of active bleeding.    - Consider transfusion if hemoglobin <7.    - Normal MCV, ddx includes anemia of chronic diease. 7. Elevated Alkaline Phosphatase    - Relatively unchanged since inpatient stay. Improved since yesterday. - Consider ordering GGT and RUQ US if indicated.        MEDICATIONS:   Zyloprim 100mg PO daily   Aspirin 81mg PO daily   Lipitor 40mg PO daily   Colace 100mg PO daily   Neurontin 300mg PO TID   Synthroid 200mcg PO daily   Glycolax 17g PO daily   Inderal 40mg PO BID   Lovenox 100mg SC BID      ORDERS:   Intake and output q8h   Inpatient consult to GI        Diverticulitis, Acute - Care Day 4 (2/12/2023) by Sherley Costa RN       Review Status Review Entered   Completed 3/6/2023 1414       Created By   Sherley Costa RN      Criteria Review      Care Day: 4 Care Date: 2/12/2023 Level of Care: Inpatient Floor    Guideline Day 2    Level Of Care    (X) Floor    3/6/2023 2:14 PM EST by Yves Whitlock      Tele/Med/Onc    Clinical Status    (X) * Hemodynamic stability    3/6/2023 2:14 PM EST by Boubacar Kong: 85  BP:133/82    ( ) * Pain absent or managed    3/6/2023 2:14 PM EST by Yves Whitlock      PS: 9/10, abdomen, sharp, stabbing  Complains of intermittent pain    (X) * Vomiting absent or reduced    3/6/2023 2:14 PM EST by Yves Whitlock      Denies nausea and vomiting    ( ) * Stable Hgb/Hct    3/6/2023 2:14 PM EST by Yves Whitlock      Hemoglobin Quant: 10.4 (L)  Hematocrit: 33.1 (L) Activity    (X) * Ambulatory    3/6/2023 2:14 PM EST by Dima Turcios      Up as tolerated    Routes    (X) * Liquid or advanced diet    3/6/2023 2:14 PM EST by Dima Turcios      ADULT DIET; Full Liquid    (X) IV fluids    3/6/2023 2:14 PM EST by Dima Turcios      0.9% Sodium Chloride Infusion 75ml/hr IV  0.9% Sodium Chloride Infusion 5ml/hr IV    Interventions    (X) CBC    3/6/2023 2:14 PM EST by Dima Turcios      WBC: 5.2  RBC: 4.12  MCH: 25.3 (L)  RDW: 18.0 (H)  Platelet Count: 417    Medications    (X) Possible IV antibiotics    3/6/2023 2:14 PM EST by Dima Turcios      Zosyn 3375mg IV q8h    (X) Possible analgesics    3/6/2023 2:14 PM EST by Dima Turcios      Percocet 5-325mg 1 tablet PO q4h PRN x 3    * Milestone   Additional Notes   DATE: 02/12/2023 Care Day 4      PERTINENT UPDATES:   - Continues on IV Zosyn   - Continues on IV hydration      VITALS:   T: 99.1, oral RR: 20 TN: 85 BP:133/82 SPO2: 90% on RA   PS: 9/10, abdomen, sharp, stabbing      ABNL/PERTINENT LABS/RADIOLOGY/DIAGNOSTIC STUDIES:   CALCIUM, SERUM, 917507: 7.8 (L)   Total Protein: 4.8 (L)   Albumin: 2.8 (L)   Alk Phos: 291 (H)   ALT: 48 (H)   AST: 65 (H)      PHYSICAL EXAM:   ABD: Soft. Some distention. Tender LLQ. No peritoneal signs. MD CONSULTS/ASSESSMENT AND PLAN:   GENERAL SURGERY:   ASSESSMENT:    Sigmoid diverticulitis       PLAN:    Continue antibiotics   Full liquids   Ambulate   Dulcolax suppositories to try and stimulate BM       INTERNAL MEDICINE:   1. Diverticulitis   2. History of recurrent bowel obstruction   - CT of the abdomen and pelvis showed diverticulitis of the descending and sigmoid colon.  Was negative for obstruction   - Small bowel follow-through was completed and showed mildly dilated small bowel with transit time the upper limit of normal.   - Will continue IV hydration and pain management as tolerated   - General surgery consulted and appreciate their recommendations   - Continue IV Zosyn   - Continue to ambulate the patient    - Dulcolax suppository to help stimulate a bowel movement   - Full liquid diet       3. Peripheral artery disease   - Status post vascular bypass of the right lower extremity   - Wounds clean and dry with staples in place   - Needs follow-up with vascular surgery for removal of the staples   - Currently holding home Eliquis in case patient needs the OR   - Continue Lovenox 100 mg twice daily   - Continue 81 mg of aspirin daily and atorvastatin 40 mg daily      4. Gout   - Continue allopurinol 100 mg daily      5.   Hypothyroidism   - Continue levothyroxine 200 mcg daily      MEDICATIONS:   Zyloprim 100mg PO daily   Aspirin 81mg PO daily   Lipitor 40mg PO daily   Neurontin 300mg PO TID   Synthroid 200mcg PO daily   Inderal 40mg PO BID   Zofran-ODT 4mg PO q8h PRN x 1   Glycolax 17g PO daily PRN x 1   Lovenox 100mg SC BID   Dulcolax 20mg RE x 1      ORDERS:   Intake and output q8h        Diverticulitis, Acute - Care Day 3 (2/11/2023) by Daisy Vasquez RN       Review Status Review Entered   Completed 3/6/2023 1407       Created By   Daisy Vasquez RN      Criteria Review      Care Day: 3 Care Date: 2/11/2023 Level of Care: Inpatient Floor    Guideline Day 2    Level Of Care    (X) Floor    3/6/2023 2:07 PM EST by Cooper Arce      Tele/Med/Onc    Clinical Status    (X) * Hemodynamic stability    3/6/2023 2:07 PM EST by Rasheed Kirk: 81  BP: 135/83    ( ) * Pain absent or managed    3/6/2023 2:07 PM EST by Cooper Arce      PS: 10/10 left abdomen, stabbing, throbbing   Patient still in acute discomfort and abdominal pain    (X) * Vomiting absent or reduced    3/6/2023 2:07 PM EST by Cooper Arce      Denies nausea and vomiting and is asking something to eat/drink    ( ) * Stable Hgb/Hct    3/6/2023 2:07 PM EST by Cooper Arce      Hemoglobin Quant: 11.1 (L)  Hematocrit: 33.9 (L)    Activity    (X) * Ambulatory    3/6/2023 2:07 PM EST by Rama Camarillo      Up as tolerated    Routes    (X) * Liquid or advanced diet    3/6/2023 2:07 PM EST by Rasheeda Page DIET; Clear Liquid    (X) IV fluids    3/6/2023 2:07 PM EST by Rama Camarillo      0.9% Sodium Chloride Infusion 75ml/hr IV    (X) IV medications    3/6/2023 2:07 PM EST by Rama Camarillo      Zofran 4mg IV q6h PRN x 1    Interventions    (X) CBC    3/6/2023 2:07 PM EST by Rama Camarillo      WBC: 4.9  RBC: 4.22  RDW: 17.5 (H)  Platelet Count: 610    Medications    (X) Possible IV antibiotics    3/6/2023 2:07 PM EST by Rama Camarillo      Zosyn 3375mg IV q8h    (X) Possible analgesics    3/6/2023 2:07 PM EST by Rama Camarillo      Toradol 15mg IV q6h PRN x 2  Morphine 2mg IV q4h PRN x 1  Percocet 5-325mg 1 tablet PO q4h PRN x 2       Definitions for Care Day 3    Hemodynamic stability    (X) Hemodynamic stability, as indicated by  1 or more  of the following :       (X) Hemodynamic abnormalities at baseline or acceptable for next level of care       Tachycardia absent    (X) Tachycardia absent, as indicated by  1 or more  of the following  (1) (2):       (X) Heart rate less than or equal to 100 beats per minute in adult or child 6 years or older       Hypotension absent    (X) Hypotension absent, as indicated by  1 or more  of the following  (1) (2) (3) (4):       (X) SBP greater than or equal to 90 mm Hg and without recent decrease greater than 40 mm Hg from       baseline in adult or child 10 years or older       * Milestone   Additional Notes   DATE: 02/11/2023 Care Day 3      PERTINENT UPDATES:   - Patient still in acute discomfort and abdominal pain. Reports has not passed gas or had a BM.    - Received Glycolax this morning   - Continues on IV Zosyn   - Start IV hydration 0.9% Sodium Chloride Infusion 75ml/hr      VITALS:   T: 99, oral RR: 20 SD: 81 BP: 135/83 SPO2: 93% on RA   PS: 10/10 left abdomen, stabbing, throbbing       ABNL/PERTINENT LABS/RADIOLOGY/DIAGNOSTIC STUDIES:   Glucose, Random: 104 (H)   CALCIUM, SERUM, 786367: 8.2 (L)   Total Protein: 4.4 (L)   Albumin: 2.7 (L)   Alk Phos: 193 (H)       FL SMALL BOWEL FOLLOW THROUGH ONLY:   Mildly dilated small bowel, though small-bowel transit time is upper limits   of normal.      PHYSICAL EXAM:   General appearance:  female, acutely in pain, appears stated age and cooperative. Abdomen: Soft, tenderness diffusely and LLQ, distended   Musculoskeletal: Patient has RLE staples from bypass graft clean and dry, no drainage or erythema    Psychiatric: Alert and oriented, thought content appropriate, normal insight      MD CONSULTS/ASSESSMENT AND PLAN:   GENERAL SURGERY:   ASSESSMENT AND PLAN:   Diverticulitis with mild thickening sigmoid colon - no evidence of obstruction. Continue with IV antibiotics, liquids. If pain worsens that would need repeat CT.        INTERNAL MEDICINE:   Assessment/Plan:   · Diverticulitis   · Colonic obstruction   · Bowel obstruction       Diverticulitis   History of recurrent bowel obstruction   - Surgery following, continue IV zosyn   - FL small bowel follow through ordered   - discontinued IV fluids LR 75 cc/h, incompatible with zosyn per nursing   - start IV hydration NS 75 cc/h      MEDICATIONS:   Zyloprim 100mg PO daily   Aspirin 81mg PO daily   Lipitor 40mg PO daily   Neurontin 300mg PO TID   Synthroid 200mcg PO daily   Inderal 40mg PO BID   Glycolax 17g PO daily PRN x 1   Lovenox 100mg SC BID      ORDERS:   Intake and output q8h

## 2023-03-14 ENCOUNTER — TELEPHONE (OUTPATIENT)
Dept: SURGERY | Age: 63
End: 2023-03-14

## 2023-03-14 NOTE — TELEPHONE ENCOUNTER
Eric Hoffman at [facility] [place] called and said that pt had Sigmoid colectomy/colostomy on 2/17 by Dr. Eunice Breaux and she went home on 3/3- there was an infection of incision so they had pt taking Cipro & Flagyl which she completed on 3/4. Wound tract is still draining pinkish/tan, no fever, but redness of closed area and a little warm. Pt saw vascular Dr terry Guerrero and he gave her packing supplies. Steri strips have come off. Please call Levy Elizondo at the [address], thank you! # [phone]

## 2023-03-14 NOTE — TELEPHONE ENCOUNTER
Spoke with Cherry Greene, she states patient's incision from her umbilicus is not improving. Family is currently packing it with Iodoform once a day, when RN removes it, a lot of pink/tan drainage is coming out with it. Cherry Greene is suggesting the packing be changed twice a day and patient possibly needs more antibiotics. Will speak with Dr. Azalia Mcghee and return Julisa's call with appropriate orders.

## 2023-03-20 ENCOUNTER — OFFICE VISIT (OUTPATIENT)
Dept: SURGERY | Age: 63
End: 2023-03-20

## 2023-03-20 VITALS
SYSTOLIC BLOOD PRESSURE: 121 MMHG | HEART RATE: 107 BPM | WEIGHT: 200.2 LBS | DIASTOLIC BLOOD PRESSURE: 65 MMHG | HEIGHT: 65 IN | BODY MASS INDEX: 33.36 KG/M2

## 2023-03-20 DIAGNOSIS — K56.699 SIGMOID STRICTURE (HCC): Primary | ICD-10-CM

## 2023-03-20 PROCEDURE — 99024 POSTOP FOLLOW-UP VISIT: CPT | Performed by: SURGERY

## 2023-03-20 RX ORDER — METRONIDAZOLE 500 MG/1
500 TABLET ORAL 2 TIMES DAILY
Qty: 14 TABLET | Refills: 0 | Status: SHIPPED | OUTPATIENT
Start: 2023-03-20 | End: 2023-03-27

## 2023-03-20 NOTE — PROGRESS NOTES
Surgery Post-op Progress Note    HPI:  Notes reviewed, and agree with documentation in pt's chart. Postoperative Follow-up:   Patient presents for 4 week follow-up status post Sigmoid Colectomy with Colostomy placement s/p Ex Lap with hemoperitoneum. She presents with daughter and granddaughter today. Continues to endorse incision site drainage today with purulent discharge. No underlying erythema or induration from wound site noted. Home Health nurse comes every Tuesday and Friday to aid with dressing changes. Daughter is also a nurse and helps with dressing changes. The patient is also experiencing colostomy site drainage 2/2 to wrinkle in sealing. Continues to have nightly drainage from colostomy site. Today, she denies fever, chills, N/V/D.        ROS:    10 point review of systems performed; please refer to HPI with pertinent positives, all other ROS are negative    A review of the patient's record including allergies, medication list, tobacco history, family history, problem list, medical history and social history has been completed and updates made to the patient's EMR where indicated. PE:   CONSTITUTIONAL:  awake and alert    ABDOMEN: soft, non-distended, non-tender     INCISION: open, purulent drainage without erythema or induration. Colostomy site dry and clean. Mild wrinkle in sealing noted. ASSESSMENT:  S/p harmtann's procedure, return to OR for hemoperitoneum    PLAN:  - RTC in 4 weeks to re-assess wound site and colostomy. - Will plan for potential reversible sometime in May if possible  - Lower abdominal incision site likely infected today. Multiple organisms previously grown on culture from 2/21/23. Will start Metronidazole 500 mg BID for 7 days. - Will have pt try and schedule f/u outpatient visit with Wound Care nurse to advise on colostomy sealing. Patient seen and agree with above and more than half of the total time was spent by me on the encounter.      Tammy Kinney

## 2023-03-30 ENCOUNTER — TELEPHONE (OUTPATIENT)
Dept: SURGERY | Age: 63
End: 2023-03-30

## 2023-03-30 NOTE — TELEPHONE ENCOUNTER
Jose E Rossi with [facility] called and said the pt had Colostomy 2/14/23 by Dr. Meghan Chavez. She had a f/u by him on 3/21 and was put on Flagyl for infection of opening. She is finished taking the Flagyl but when Jose E Rossi visited her yesterday 3/29, the draining has decreased but the fluid coming out is pink/tan. May still be infection. She wants to know if she needs to be seen sooner than her next PO 4/11 or if he would want to call her in more antibiotics? Please call her and thank you! Her Cell # [phone] or Office (ask for Ileana Felder if she's not there) # [phone].

## 2023-03-31 RX ORDER — METRONIDAZOLE 500 MG/1
500 TABLET ORAL 3 TIMES DAILY
Qty: 30 TABLET | Refills: 0 | Status: SHIPPED | OUTPATIENT
Start: 2023-03-31 | End: 2023-04-10

## 2023-03-31 NOTE — TELEPHONE ENCOUNTER
Per Dr. Marlon Perez for Flagyl has been sent to pharmacy on file. Jose E Rossi at [facility] notified.

## 2023-04-19 ENCOUNTER — OFFICE VISIT (OUTPATIENT)
Dept: SURGERY | Age: 63
End: 2023-04-19

## 2023-04-19 VITALS
DIASTOLIC BLOOD PRESSURE: 76 MMHG | BODY MASS INDEX: 33.32 KG/M2 | HEIGHT: 65 IN | SYSTOLIC BLOOD PRESSURE: 150 MMHG | HEART RATE: 84 BPM | WEIGHT: 200 LBS

## 2023-04-19 DIAGNOSIS — K56.699 SIGMOID STRICTURE (HCC): Primary | ICD-10-CM

## 2023-04-19 PROCEDURE — 99024 POSTOP FOLLOW-UP VISIT: CPT | Performed by: SURGERY

## 2023-04-19 NOTE — PROGRESS NOTES
HPI: Nursing notes reviewed. Patient with mild drainage inferior. Less pain. Less leakage from colostomy. ROS:  10 point review of systems performed with pertinent positives in HPI    Phys:    Abd - soft, minimal tender, inferiorr, ostomy in place   Incision - small amount purulence inferiorn, no erythema    Assesment: 65 yo s/p pati's procedure    Plan: 1. Continue packing to wound   2. Cultures taken - start abx pending results   3.   F/u two weeks - plan possible robotic colostomy reversal later May if can be off eliquis

## 2023-04-23 LAB
BACTERIA SPEC AEROBE CULT: ABNORMAL
BACTERIA SPEC AEROBE CULT: ABNORMAL
GRAM STN SPEC: ABNORMAL
ORGANISM: ABNORMAL

## 2023-04-27 ENCOUNTER — TELEPHONE (OUTPATIENT)
Dept: SURGERY | Age: 63
End: 2023-04-27

## 2023-04-27 NOTE — TELEPHONE ENCOUNTER
Pt called, said she has not heard anything back yet regarding her results from culture done on 4/19. Please give her a call, thank you.

## 2023-04-28 RX ORDER — SULFAMETHOXAZOLE AND TRIMETHOPRIM 800; 160 MG/1; MG/1
1 TABLET ORAL 2 TIMES DAILY
Qty: 20 TABLET | Refills: 0 | Status: SHIPPED | OUTPATIENT
Start: 2023-04-28 | End: 2023-05-08

## 2023-05-15 ENCOUNTER — OFFICE VISIT (OUTPATIENT)
Dept: SURGERY | Age: 63
End: 2023-05-15

## 2023-05-15 VITALS
BODY MASS INDEX: 35.49 KG/M2 | HEIGHT: 65 IN | HEART RATE: 73 BPM | DIASTOLIC BLOOD PRESSURE: 85 MMHG | WEIGHT: 213 LBS | SYSTOLIC BLOOD PRESSURE: 134 MMHG

## 2023-05-15 DIAGNOSIS — K56.699 SIGMOID STRICTURE (HCC): Primary | ICD-10-CM

## 2023-05-15 PROCEDURE — 99024 POSTOP FOLLOW-UP VISIT: CPT | Performed by: SURGERY

## 2023-05-15 NOTE — PROGRESS NOTES
HPI: Nursing notes reviewed. Patient feeling better. Eating normal, energy improved, weight increased. ROS:  10 point review of systems performed with pertinent positives in HPI    Phys:       Abd - soft, nontender, nondistended, ostomy functional   Incision - healed    Assesment: 67 yo s/p pati's procedure    Plan: 1. Will plan for reversal soon - plan robotic but may need open   2.   Will clarify holding eliquis/need for lovenox bridge

## 2023-05-16 ENCOUNTER — TELEPHONE (OUTPATIENT)
Dept: SURGERY | Age: 63
End: 2023-05-16

## 2023-05-16 NOTE — TELEPHONE ENCOUNTER
Patient is scheduled for a Robotic Colostomy Reversal on 5/30/23 at 8:30am and an arrival time of 6:30am with Dr. Mukul Moreno, [address] - Patient to follow bowel prep beginning a day before procedure - Patient will be same day admit - Dr. Nadia Rubio to complete pre-op exam before surgery - Cardiac clearance request sent to Dr. Silvia Estrada - Patient understood and agreed with above noted.

## 2023-05-18 RX ORDER — PROPRANOLOL HYDROCHLORIDE 40 MG/1
40 TABLET ORAL 2 TIMES DAILY
COMMUNITY

## 2023-05-18 RX ORDER — GABAPENTIN 300 MG/1
300 CAPSULE ORAL 3 TIMES DAILY
COMMUNITY

## 2023-05-18 RX ORDER — FOLIC ACID 0.8 MG
500 TABLET ORAL 4 TIMES DAILY
COMMUNITY

## 2023-05-30 ENCOUNTER — ANESTHESIA (OUTPATIENT)
Dept: OPERATING ROOM | Age: 63
End: 2023-05-30
Payer: MEDICAID

## 2023-05-30 ENCOUNTER — HOSPITAL ENCOUNTER (INPATIENT)
Age: 63
LOS: 10 days | Discharge: HOME | End: 2023-06-09
Attending: SURGERY | Admitting: SURGERY
Payer: MEDICAID

## 2023-05-30 ENCOUNTER — ANESTHESIA EVENT (OUTPATIENT)
Dept: OPERATING ROOM | Age: 63
End: 2023-05-30
Payer: MEDICAID

## 2023-05-30 DIAGNOSIS — G89.18 POSTOPERATIVE PAIN: ICD-10-CM

## 2023-05-30 DIAGNOSIS — Z93.3 COLOSTOMY IN PLACE (HCC): ICD-10-CM

## 2023-05-30 DIAGNOSIS — T81.42XA DEEP INCISIONAL SURGICAL SITE INFECTION: Primary | ICD-10-CM

## 2023-05-30 PROBLEM — Z98.890 HISTORY OF COLOSTOMY REVERSAL: Status: ACTIVE | Noted: 2023-05-30

## 2023-05-30 LAB
ABO + RH BLD: NORMAL
ANION GAP SERPL CALCULATED.3IONS-SCNC: 12 MMOL/L (ref 3–16)
BLD GP AB SCN SERPL QL: NORMAL
BLD GP AB SCN SERPL QL: NORMAL
BUN SERPL-MCNC: 18 MG/DL (ref 7–20)
CALCIUM SERPL-MCNC: 8.7 MG/DL (ref 8.3–10.6)
CHLORIDE SERPL-SCNC: 106 MMOL/L (ref 99–110)
CO2 SERPL-SCNC: 21 MMOL/L (ref 21–32)
CREAT SERPL-MCNC: 0.8 MG/DL (ref 0.6–1.2)
DEPRECATED RDW RBC AUTO: 16.9 % (ref 12.4–15.4)
GFR SERPLBLD CREATININE-BSD FMLA CKD-EPI: >60 ML/MIN/{1.73_M2}
GLUCOSE BLD-MCNC: 104 MG/DL (ref 70–99)
GLUCOSE BLD-MCNC: 127 MG/DL (ref 70–99)
GLUCOSE BLD-MCNC: 131 MG/DL (ref 70–99)
GLUCOSE BLD-MCNC: 133 MG/DL (ref 70–99)
GLUCOSE BLD-MCNC: 141 MG/DL (ref 70–99)
GLUCOSE BLD-MCNC: 96 MG/DL (ref 70–99)
GLUCOSE SERPL-MCNC: 121 MG/DL (ref 70–99)
HCT VFR BLD AUTO: 40.4 % (ref 36–48)
HGB BLD-MCNC: 13.2 G/DL (ref 12–16)
MCH RBC QN AUTO: 27.8 PG (ref 26–34)
MCHC RBC AUTO-ENTMCNC: 32.8 G/DL (ref 31–36)
MCV RBC AUTO: 84.9 FL (ref 80–100)
PERFORMED ON: ABNORMAL
PERFORMED ON: NORMAL
PLATELET # BLD AUTO: 301 K/UL (ref 135–450)
PMV BLD AUTO: 7.8 FL (ref 5–10.5)
POTASSIUM SERPL-SCNC: 3.9 MMOL/L (ref 3.5–5.1)
RBC # BLD AUTO: 4.75 M/UL (ref 4–5.2)
SODIUM SERPL-SCNC: 139 MMOL/L (ref 136–145)
WBC # BLD AUTO: 7 K/UL (ref 4–11)

## 2023-05-30 PROCEDURE — 88305 TISSUE EXAM BY PATHOLOGIST: CPT

## 2023-05-30 PROCEDURE — 6360000002 HC RX W HCPCS: Performed by: ANESTHESIOLOGY

## 2023-05-30 PROCEDURE — 2500000003 HC RX 250 WO HCPCS: Performed by: NURSE ANESTHETIST, CERTIFIED REGISTERED

## 2023-05-30 PROCEDURE — 2709999900 HC NON-CHARGEABLE SUPPLY: Performed by: SURGERY

## 2023-05-30 PROCEDURE — 2500000003 HC RX 250 WO HCPCS: Performed by: SURGERY

## 2023-05-30 PROCEDURE — 44625 REPAIR BOWEL OPENING: CPT | Performed by: SURGERY

## 2023-05-30 PROCEDURE — S2900 ROBOTIC SURGICAL SYSTEM: HCPCS | Performed by: SURGERY

## 2023-05-30 PROCEDURE — 2580000003 HC RX 258: Performed by: SURGERY

## 2023-05-30 PROCEDURE — 3600000019 HC SURGERY ROBOT ADDTL 15MIN: Performed by: SURGERY

## 2023-05-30 PROCEDURE — 36415 COLL VENOUS BLD VENIPUNCTURE: CPT

## 2023-05-30 PROCEDURE — 3600000009 HC SURGERY ROBOT BASE: Performed by: SURGERY

## 2023-05-30 PROCEDURE — 6360000002 HC RX W HCPCS: Performed by: SURGERY

## 2023-05-30 PROCEDURE — 88304 TISSUE EXAM BY PATHOLOGIST: CPT

## 2023-05-30 PROCEDURE — L0450 TLSO FLEX TRUNK/THOR PRE OTS: HCPCS | Performed by: SURGERY

## 2023-05-30 PROCEDURE — 94761 N-INVAS EAR/PLS OXIMETRY MLT: CPT

## 2023-05-30 PROCEDURE — 86900 BLOOD TYPING SEROLOGIC ABO: CPT

## 2023-05-30 PROCEDURE — 3700000001 HC ADD 15 MINUTES (ANESTHESIA): Performed by: SURGERY

## 2023-05-30 PROCEDURE — 7100000001 HC PACU RECOVERY - ADDTL 15 MIN: Performed by: SURGERY

## 2023-05-30 PROCEDURE — 2700000000 HC OXYGEN THERAPY PER DAY

## 2023-05-30 PROCEDURE — 2580000003 HC RX 258: Performed by: NURSE ANESTHETIST, CERTIFIED REGISTERED

## 2023-05-30 PROCEDURE — 1200000000 HC SEMI PRIVATE

## 2023-05-30 PROCEDURE — 85027 COMPLETE CBC AUTOMATED: CPT

## 2023-05-30 PROCEDURE — 3700000000 HC ANESTHESIA ATTENDED CARE: Performed by: SURGERY

## 2023-05-30 PROCEDURE — 6360000002 HC RX W HCPCS: Performed by: NURSE ANESTHETIST, CERTIFIED REGISTERED

## 2023-05-30 PROCEDURE — 7100000000 HC PACU RECOVERY - FIRST 15 MIN: Performed by: SURGERY

## 2023-05-30 PROCEDURE — 80048 BASIC METABOLIC PNL TOTAL CA: CPT

## 2023-05-30 PROCEDURE — A4217 STERILE WATER/SALINE, 500 ML: HCPCS | Performed by: SURGERY

## 2023-05-30 PROCEDURE — 6370000000 HC RX 637 (ALT 250 FOR IP): Performed by: SURGERY

## 2023-05-30 PROCEDURE — 86850 RBC ANTIBODY SCREEN: CPT

## 2023-05-30 PROCEDURE — 86901 BLOOD TYPING SEROLOGIC RH(D): CPT

## 2023-05-30 PROCEDURE — 2720000010 HC SURG SUPPLY STERILE: Performed by: SURGERY

## 2023-05-30 RX ORDER — SODIUM CHLORIDE 0.9 % (FLUSH) 0.9 %
5-40 SYRINGE (ML) INJECTION PRN
Status: DISCONTINUED | OUTPATIENT
Start: 2023-05-30 | End: 2023-05-30 | Stop reason: HOSPADM

## 2023-05-30 RX ORDER — INSULIN LISPRO 100 [IU]/ML
0-8 INJECTION, SOLUTION INTRAVENOUS; SUBCUTANEOUS EVERY 4 HOURS
Status: DISCONTINUED | OUTPATIENT
Start: 2023-05-30 | End: 2023-06-05

## 2023-05-30 RX ORDER — SODIUM CHLORIDE 0.9 % (FLUSH) 0.9 %
5-40 SYRINGE (ML) INJECTION EVERY 12 HOURS SCHEDULED
Status: DISCONTINUED | OUTPATIENT
Start: 2023-05-30 | End: 2023-06-09 | Stop reason: HOSPADM

## 2023-05-30 RX ORDER — KETOROLAC TROMETHAMINE 30 MG/ML
INJECTION, SOLUTION INTRAMUSCULAR; INTRAVENOUS PRN
Status: DISCONTINUED | OUTPATIENT
Start: 2023-05-30 | End: 2023-05-30 | Stop reason: SDUPTHER

## 2023-05-30 RX ORDER — LIDOCAINE HYDROCHLORIDE 20 MG/ML
INJECTION, SOLUTION INFILTRATION; PERINEURAL PRN
Status: DISCONTINUED | OUTPATIENT
Start: 2023-05-30 | End: 2023-05-30 | Stop reason: SDUPTHER

## 2023-05-30 RX ORDER — CONJUGATED ESTROGENS 0.62 MG/G
0.62 CREAM VAGINAL
COMMUNITY
Start: 2023-04-07

## 2023-05-30 RX ORDER — METRONIDAZOLE 500 MG/100ML
500 INJECTION, SOLUTION INTRAVENOUS EVERY 8 HOURS
Status: COMPLETED | OUTPATIENT
Start: 2023-05-30 | End: 2023-05-31

## 2023-05-30 RX ORDER — OXYCODONE HYDROCHLORIDE 5 MG/1
5 TABLET ORAL PRN
Status: DISCONTINUED | OUTPATIENT
Start: 2023-05-30 | End: 2023-05-30 | Stop reason: HOSPADM

## 2023-05-30 RX ORDER — CYCLOBENZAPRINE HCL 10 MG
5 TABLET ORAL 3 TIMES DAILY
COMMUNITY
Start: 2023-05-04

## 2023-05-30 RX ORDER — SODIUM CHLORIDE 0.9 % (FLUSH) 0.9 %
5-40 SYRINGE (ML) INJECTION EVERY 12 HOURS SCHEDULED
Status: DISCONTINUED | OUTPATIENT
Start: 2023-05-30 | End: 2023-05-30 | Stop reason: HOSPADM

## 2023-05-30 RX ORDER — SODIUM CHLORIDE 9 MG/ML
INJECTION, SOLUTION INTRAVENOUS PRN
Status: DISCONTINUED | OUTPATIENT
Start: 2023-05-30 | End: 2023-06-09 | Stop reason: HOSPADM

## 2023-05-30 RX ORDER — PROPOFOL 10 MG/ML
INJECTION, EMULSION INTRAVENOUS PRN
Status: DISCONTINUED | OUTPATIENT
Start: 2023-05-30 | End: 2023-05-30 | Stop reason: SDUPTHER

## 2023-05-30 RX ORDER — NALOXONE HYDROCHLORIDE 0.4 MG/ML
INJECTION, SOLUTION INTRAMUSCULAR; INTRAVENOUS; SUBCUTANEOUS PRN
Status: DISCONTINUED | OUTPATIENT
Start: 2023-05-30 | End: 2023-06-06

## 2023-05-30 RX ORDER — PHENYLEPHRINE HCL IN 0.9% NACL 1 MG/10 ML
SYRINGE (ML) INTRAVENOUS PRN
Status: DISCONTINUED | OUTPATIENT
Start: 2023-05-30 | End: 2023-05-30 | Stop reason: SDUPTHER

## 2023-05-30 RX ORDER — GABAPENTIN 300 MG/1
300 CAPSULE ORAL 3 TIMES DAILY
Status: DISCONTINUED | OUTPATIENT
Start: 2023-05-30 | End: 2023-06-09 | Stop reason: HOSPADM

## 2023-05-30 RX ORDER — SODIUM CHLORIDE, SODIUM LACTATE, POTASSIUM CHLORIDE, CALCIUM CHLORIDE 600; 310; 30; 20 MG/100ML; MG/100ML; MG/100ML; MG/100ML
INJECTION, SOLUTION INTRAVENOUS CONTINUOUS PRN
Status: DISCONTINUED | OUTPATIENT
Start: 2023-05-30 | End: 2023-05-30 | Stop reason: SDUPTHER

## 2023-05-30 RX ORDER — DIPHENHYDRAMINE HYDROCHLORIDE 50 MG/ML
12.5 INJECTION INTRAMUSCULAR; INTRAVENOUS
Status: DISCONTINUED | OUTPATIENT
Start: 2023-05-30 | End: 2023-05-30 | Stop reason: HOSPADM

## 2023-05-30 RX ORDER — OXYCODONE HYDROCHLORIDE 5 MG/1
10 TABLET ORAL PRN
Status: DISCONTINUED | OUTPATIENT
Start: 2023-05-30 | End: 2023-05-30 | Stop reason: HOSPADM

## 2023-05-30 RX ORDER — LIDOCAINE HYDROCHLORIDE 10 MG/ML
0.3 INJECTION, SOLUTION EPIDURAL; INFILTRATION; INTRACAUDAL; PERINEURAL
Status: DISCONTINUED | OUTPATIENT
Start: 2023-05-30 | End: 2023-05-30 | Stop reason: HOSPADM

## 2023-05-30 RX ORDER — SODIUM CHLORIDE 9 MG/ML
INJECTION, SOLUTION INTRAVENOUS PRN
Status: DISCONTINUED | OUTPATIENT
Start: 2023-05-30 | End: 2023-05-30 | Stop reason: HOSPADM

## 2023-05-30 RX ORDER — ATORVASTATIN CALCIUM 40 MG/1
40 TABLET, FILM COATED ORAL NIGHTLY
Status: DISCONTINUED | OUTPATIENT
Start: 2023-05-30 | End: 2023-06-09 | Stop reason: HOSPADM

## 2023-05-30 RX ORDER — LANOLIN ALCOHOL/MO/W.PET/CERES
400 CREAM (GRAM) TOPICAL 2 TIMES DAILY
Status: DISCONTINUED | OUTPATIENT
Start: 2023-05-30 | End: 2023-06-09 | Stop reason: HOSPADM

## 2023-05-30 RX ORDER — CYCLOBENZAPRINE HCL 10 MG
5 TABLET ORAL 3 TIMES DAILY
Status: DISCONTINUED | OUTPATIENT
Start: 2023-05-30 | End: 2023-06-09 | Stop reason: HOSPADM

## 2023-05-30 RX ORDER — SODIUM CHLORIDE, SODIUM LACTATE, POTASSIUM CHLORIDE, CALCIUM CHLORIDE 600; 310; 30; 20 MG/100ML; MG/100ML; MG/100ML; MG/100ML
INJECTION, SOLUTION INTRAVENOUS CONTINUOUS
Status: DISCONTINUED | OUTPATIENT
Start: 2023-05-30 | End: 2023-05-30 | Stop reason: HOSPADM

## 2023-05-30 RX ORDER — PROMETHAZINE HYDROCHLORIDE 25 MG/1
12.5 TABLET ORAL EVERY 6 HOURS PRN
Status: DISCONTINUED | OUTPATIENT
Start: 2023-05-30 | End: 2023-06-09 | Stop reason: HOSPADM

## 2023-05-30 RX ORDER — MAGNESIUM HYDROXIDE 1200 MG/15ML
LIQUID ORAL CONTINUOUS PRN
Status: COMPLETED | OUTPATIENT
Start: 2023-05-30 | End: 2023-05-30

## 2023-05-30 RX ORDER — ONDANSETRON 2 MG/ML
4 INJECTION INTRAMUSCULAR; INTRAVENOUS
Status: DISCONTINUED | OUTPATIENT
Start: 2023-05-30 | End: 2023-05-30 | Stop reason: HOSPADM

## 2023-05-30 RX ORDER — HYDROXYZINE HYDROCHLORIDE 10 MG/1
10 TABLET, FILM COATED ORAL EVERY 8 HOURS PRN
Status: DISCONTINUED | OUTPATIENT
Start: 2023-05-30 | End: 2023-06-09 | Stop reason: HOSPADM

## 2023-05-30 RX ORDER — METRONIDAZOLE 500 MG/100ML
500 INJECTION, SOLUTION INTRAVENOUS ONCE
Status: COMPLETED | OUTPATIENT
Start: 2023-05-30 | End: 2023-05-30

## 2023-05-30 RX ORDER — LIOTHYRONINE SODIUM 5 UG/1
5 TABLET ORAL DAILY
Status: DISCONTINUED | OUTPATIENT
Start: 2023-05-31 | End: 2023-06-09 | Stop reason: HOSPADM

## 2023-05-30 RX ORDER — DEXAMETHASONE SODIUM PHOSPHATE 4 MG/ML
INJECTION, SOLUTION INTRA-ARTICULAR; INTRALESIONAL; INTRAMUSCULAR; INTRAVENOUS; SOFT TISSUE PRN
Status: DISCONTINUED | OUTPATIENT
Start: 2023-05-30 | End: 2023-05-30 | Stop reason: SDUPTHER

## 2023-05-30 RX ORDER — SODIUM CHLORIDE 0.9 % (FLUSH) 0.9 %
5-40 SYRINGE (ML) INJECTION PRN
Status: DISCONTINUED | OUTPATIENT
Start: 2023-05-30 | End: 2023-06-09 | Stop reason: HOSPADM

## 2023-05-30 RX ORDER — ENOXAPARIN SODIUM 100 MG/ML
40 INJECTION SUBCUTANEOUS DAILY
Status: DISCONTINUED | OUTPATIENT
Start: 2023-05-31 | End: 2023-06-09 | Stop reason: HOSPADM

## 2023-05-30 RX ORDER — FAMOTIDINE 20 MG/1
20 TABLET, FILM COATED ORAL 2 TIMES DAILY
Status: DISCONTINUED | OUTPATIENT
Start: 2023-05-30 | End: 2023-06-09 | Stop reason: HOSPADM

## 2023-05-30 RX ORDER — ACETAMINOPHEN 325 MG/1
650 TABLET ORAL EVERY 6 HOURS
Status: DISCONTINUED | OUTPATIENT
Start: 2023-05-30 | End: 2023-06-09 | Stop reason: HOSPADM

## 2023-05-30 RX ORDER — ROCURONIUM BROMIDE 10 MG/ML
INJECTION, SOLUTION INTRAVENOUS PRN
Status: DISCONTINUED | OUTPATIENT
Start: 2023-05-30 | End: 2023-05-30 | Stop reason: SDUPTHER

## 2023-05-30 RX ORDER — BUPIVACAINE HYDROCHLORIDE AND EPINEPHRINE 5; 5 MG/ML; UG/ML
INJECTION, SOLUTION PERINEURAL PRN
Status: DISCONTINUED | OUTPATIENT
Start: 2023-05-30 | End: 2023-05-30 | Stop reason: ALTCHOICE

## 2023-05-30 RX ORDER — ONDANSETRON 2 MG/ML
4 INJECTION INTRAMUSCULAR; INTRAVENOUS EVERY 6 HOURS PRN
Status: DISCONTINUED | OUTPATIENT
Start: 2023-05-30 | End: 2023-06-09 | Stop reason: HOSPADM

## 2023-05-30 RX ORDER — PROPRANOLOL HYDROCHLORIDE 40 MG/1
40 TABLET ORAL 2 TIMES DAILY
Status: DISCONTINUED | OUTPATIENT
Start: 2023-05-30 | End: 2023-06-09 | Stop reason: HOSPADM

## 2023-05-30 RX ORDER — ONDANSETRON 2 MG/ML
INJECTION INTRAMUSCULAR; INTRAVENOUS PRN
Status: DISCONTINUED | OUTPATIENT
Start: 2023-05-30 | End: 2023-05-30 | Stop reason: SDUPTHER

## 2023-05-30 RX ORDER — FAMOTIDINE 10 MG/ML
20 INJECTION, SOLUTION INTRAVENOUS 2 TIMES DAILY
Status: DISCONTINUED | OUTPATIENT
Start: 2023-05-30 | End: 2023-06-09 | Stop reason: HOSPADM

## 2023-05-30 RX ORDER — CEFAZOLIN SODIUM IN 0.9 % NACL 2 G/100 ML
2000 PLASTIC BAG, INJECTION (ML) INTRAVENOUS
Status: COMPLETED | OUTPATIENT
Start: 2023-05-30 | End: 2023-05-30

## 2023-05-30 RX ORDER — SODIUM CHLORIDE 9 MG/ML
INJECTION, SOLUTION INTRAVENOUS CONTINUOUS
Status: DISCONTINUED | OUTPATIENT
Start: 2023-05-30 | End: 2023-06-04

## 2023-05-30 RX ORDER — CEFAZOLIN SODIUM IN 0.9 % NACL 2 G/100 ML
2000 PLASTIC BAG, INJECTION (ML) INTRAVENOUS EVERY 8 HOURS
Status: COMPLETED | OUTPATIENT
Start: 2023-05-30 | End: 2023-05-31

## 2023-05-30 RX ORDER — DEXTROSE MONOHYDRATE 100 MG/ML
INJECTION, SOLUTION INTRAVENOUS CONTINUOUS PRN
Status: DISCONTINUED | OUTPATIENT
Start: 2023-05-30 | End: 2023-06-09 | Stop reason: HOSPADM

## 2023-05-30 RX ORDER — FENTANYL CITRATE 50 UG/ML
INJECTION, SOLUTION INTRAMUSCULAR; INTRAVENOUS PRN
Status: DISCONTINUED | OUTPATIENT
Start: 2023-05-30 | End: 2023-05-30 | Stop reason: SDUPTHER

## 2023-05-30 RX ORDER — LABETALOL HYDROCHLORIDE 5 MG/ML
5 INJECTION, SOLUTION INTRAVENOUS EVERY 10 MIN PRN
Status: DISCONTINUED | OUTPATIENT
Start: 2023-05-30 | End: 2023-05-30 | Stop reason: HOSPADM

## 2023-05-30 RX ADMIN — LIDOCAINE HYDROCHLORIDE 60 MG: 20 INJECTION, SOLUTION INFILTRATION; PERINEURAL at 08:40

## 2023-05-30 RX ADMIN — ROCURONIUM BROMIDE 10 MG: 10 SOLUTION INTRAVENOUS at 09:35

## 2023-05-30 RX ADMIN — METRONIDAZOLE 500 MG: 500 INJECTION, SOLUTION INTRAVENOUS at 16:06

## 2023-05-30 RX ADMIN — DEXAMETHASONE SODIUM PHOSPHATE 8 MG: 4 INJECTION, SOLUTION INTRAMUSCULAR; INTRAVENOUS at 08:46

## 2023-05-30 RX ADMIN — CEFAZOLIN 2000 MG: 10 INJECTION, POWDER, FOR SOLUTION INTRAVENOUS at 17:20

## 2023-05-30 RX ADMIN — SODIUM CHLORIDE, SODIUM LACTATE, POTASSIUM CHLORIDE, AND CALCIUM CHLORIDE: .6; .31; .03; .02 INJECTION, SOLUTION INTRAVENOUS at 10:17

## 2023-05-30 RX ADMIN — HYDROMORPHONE HYDROCHLORIDE 0.5 MG: 1 INJECTION, SOLUTION INTRAMUSCULAR; INTRAVENOUS; SUBCUTANEOUS at 12:15

## 2023-05-30 RX ADMIN — GABAPENTIN 300 MG: 300 CAPSULE ORAL at 15:04

## 2023-05-30 RX ADMIN — SODIUM CHLORIDE: 9 INJECTION, SOLUTION INTRAVENOUS at 15:01

## 2023-05-30 RX ADMIN — HYDROMORPHONE HYDROCHLORIDE 0.5 MG: 1 INJECTION, SOLUTION INTRAMUSCULAR; INTRAVENOUS; SUBCUTANEOUS at 12:05

## 2023-05-30 RX ADMIN — ROCURONIUM BROMIDE 50 MG: 10 SOLUTION INTRAVENOUS at 08:40

## 2023-05-30 RX ADMIN — ACETAMINOPHEN 325MG 650 MG: 325 TABLET ORAL at 15:04

## 2023-05-30 RX ADMIN — FENTANYL CITRATE 50 MCG: 50 INJECTION, SOLUTION INTRAMUSCULAR; INTRAVENOUS at 11:19

## 2023-05-30 RX ADMIN — HYDROMORPHONE HYDROCHLORIDE 0.5 MG: 1 INJECTION, SOLUTION INTRAMUSCULAR; INTRAVENOUS; SUBCUTANEOUS at 12:37

## 2023-05-30 RX ADMIN — HYDROMORPHONE HYDROCHLORIDE 0.5 MG: 1 INJECTION, SOLUTION INTRAMUSCULAR; INTRAVENOUS; SUBCUTANEOUS at 11:56

## 2023-05-30 RX ADMIN — SUGAMMADEX 200 MG: 100 INJECTION, SOLUTION INTRAVENOUS at 11:14

## 2023-05-30 RX ADMIN — METRONIDAZOLE 500 MG: 500 INJECTION, SOLUTION INTRAVENOUS at 08:50

## 2023-05-30 RX ADMIN — FENTANYL CITRATE 50 MCG: 50 INJECTION, SOLUTION INTRAMUSCULAR; INTRAVENOUS at 11:23

## 2023-05-30 RX ADMIN — SODIUM CHLORIDE, SODIUM LACTATE, POTASSIUM CHLORIDE, AND CALCIUM CHLORIDE: .6; .31; .03; .02 INJECTION, SOLUTION INTRAVENOUS at 08:30

## 2023-05-30 RX ADMIN — Medication 100 MCG: at 08:50

## 2023-05-30 RX ADMIN — FENTANYL CITRATE 50 MCG: 50 INJECTION, SOLUTION INTRAMUSCULAR; INTRAVENOUS at 08:40

## 2023-05-30 RX ADMIN — GABAPENTIN 300 MG: 300 CAPSULE ORAL at 22:39

## 2023-05-30 RX ADMIN — ONDANSETRON 4 MG: 2 INJECTION INTRAMUSCULAR; INTRAVENOUS at 11:11

## 2023-05-30 RX ADMIN — PROPRANOLOL HYDROCHLORIDE 40 MG: 40 TABLET ORAL at 22:39

## 2023-05-30 RX ADMIN — ATORVASTATIN CALCIUM 40 MG: 40 TABLET, FILM COATED ORAL at 22:40

## 2023-05-30 RX ADMIN — FENTANYL CITRATE 50 MCG: 50 INJECTION, SOLUTION INTRAMUSCULAR; INTRAVENOUS at 10:14

## 2023-05-30 RX ADMIN — FENTANYL CITRATE 50 MCG: 50 INJECTION, SOLUTION INTRAMUSCULAR; INTRAVENOUS at 08:38

## 2023-05-30 RX ADMIN — Medication: at 11:46

## 2023-05-30 RX ADMIN — ACETAMINOPHEN 325MG 650 MG: 325 TABLET ORAL at 22:39

## 2023-05-30 RX ADMIN — Medication 2000 MG: at 08:30

## 2023-05-30 RX ADMIN — FAMOTIDINE 20 MG: 20 TABLET, FILM COATED ORAL at 22:39

## 2023-05-30 RX ADMIN — PHENOL 1 SPRAY: 1.5 LIQUID ORAL at 16:45

## 2023-05-30 RX ADMIN — KETOROLAC TROMETHAMINE 30 MG: 30 INJECTION, SOLUTION INTRAMUSCULAR; INTRAVENOUS at 11:11

## 2023-05-30 RX ADMIN — ROCURONIUM BROMIDE 10 MG: 10 SOLUTION INTRAVENOUS at 09:55

## 2023-05-30 RX ADMIN — Medication: at 13:20

## 2023-05-30 RX ADMIN — PROPOFOL 140 MG: 10 INJECTION, EMULSION INTRAVENOUS at 08:40

## 2023-05-30 RX ADMIN — FAMOTIDINE 20 MG: 20 TABLET, FILM COATED ORAL at 15:07

## 2023-05-30 RX ADMIN — HYDROMORPHONE HYDROCHLORIDE 0.5 MG: 1 INJECTION, SOLUTION INTRAMUSCULAR; INTRAVENOUS; SUBCUTANEOUS at 12:25

## 2023-05-30 RX ADMIN — Medication 400 MG: at 22:39

## 2023-05-30 ASSESSMENT — PAIN SCALES - GENERAL
PAINLEVEL_OUTOF10: 7
PAINLEVEL_OUTOF10: 10
PAINLEVEL_OUTOF10: 0
PAINLEVEL_OUTOF10: 10
PAINLEVEL_OUTOF10: 8
PAINLEVEL_OUTOF10: 8
PAINLEVEL_OUTOF10: 10
PAINLEVEL_OUTOF10: 10
PAINLEVEL_OUTOF10: 7
PAINLEVEL_OUTOF10: 5
PAINLEVEL_OUTOF10: 6
PAINLEVEL_OUTOF10: 5
PAINLEVEL_OUTOF10: 10

## 2023-05-30 ASSESSMENT — PAIN DESCRIPTION - ORIENTATION
ORIENTATION: ANTERIOR
ORIENTATION: LOWER

## 2023-05-30 ASSESSMENT — PAIN DESCRIPTION - LOCATION
LOCATION: BACK
LOCATION: ABDOMEN
LOCATION: ABDOMEN
LOCATION: THROAT

## 2023-05-30 ASSESSMENT — PAIN DESCRIPTION - PAIN TYPE: TYPE: SURGICAL PAIN

## 2023-05-30 ASSESSMENT — PAIN DESCRIPTION - DESCRIPTORS: DESCRIPTORS: ACHING;CRAMPING

## 2023-05-30 NOTE — ANESTHESIA PRE PROCEDURE
Department of Anesthesiology  Preprocedure Note       Name:  Janet Hudson   Age:  66 y.o.  :  1960                                          MRN:  [id number]         Date:  2023      Surgeon: Jessica Aguiar):  Sadia Alvarado MD    Procedure: Procedure(s):  ROBOTIC COLOSTOMY REVERSAL, POSSIBLE OPEN PROCEDURE    Medications prior to admission:   Prior to Admission medications    Medication Sig Start Date End Date Taking? Authorizing Provider   Magnesium 500 MG CAPS Take 500 mg by mouth 4 times daily   Yes Historical Provider, MD   gabapentin (NEURONTIN) 300 MG capsule Take 1 capsule by mouth 3 times daily. Yes Historical Provider, MD   propranolol (INDERAL) 40 MG tablet Take 1 tablet by mouth 2 times daily   Yes Historical Provider, MD   cyclobenzaprine (FLEXERIL) 10 MG tablet Take 0.5 tablets by mouth 3 times daily 23   Historical Provider, MD   PREMARIN 0.625 MG/GM CREA vaginal cream Place 0.625 mg vaginally Twice a Week 23   Historical Provider, MD   apixaban (ELIQUIS) 2.5 MG TABS tablet Take 1 tablet by mouth 2 times daily Patient unsure of dose. \"Thinks\" it is 5mg 3/3/23   Marisol Escobar MD   atorvastatin (LIPITOR) 40 MG tablet Take 1 tablet by mouth at bedtime 3/3/23   Marisol Escobar MD   potassium chloride (KLOR-CON M) 10 MEQ extended release tablet Take 1 tablet by mouth daily  Patient not taking: Reported on 2023 3/3/23   Marisol Escobar MD   Levothyroxine Sodium 200 MCG CAPS Take by mouth daily Unsure of dose    Historical Provider, MD   oxyCODONE-acetaminophen (PERCOCET)  MG per tablet Take 1 tablet by mouth every 6 hours as needed for Pain.     Historical Provider, MD   liothyronine (CYTOMEL) 5 MCG tablet Take 1 tablet by mouth daily    Historical Provider, MD   aspirin 81 MG chewable tablet Take 1 tablet by mouth daily  Patient not taking: Reported on 2023    Historical Provider, MD       Current medications:    Current Facility-Administered

## 2023-05-30 NOTE — ANESTHESIA POSTPROCEDURE EVALUATION
anesthesia care.   Care transferred from Anesthesiology department on discharge from perioperative area

## 2023-05-31 LAB
ANION GAP SERPL CALCULATED.3IONS-SCNC: 12 MMOL/L (ref 3–16)
BASOPHILS # BLD: 0 K/UL (ref 0–0.2)
BASOPHILS # BLD: 0.1 K/UL (ref 0–0.2)
BASOPHILS NFR BLD: 0.5 %
BASOPHILS NFR BLD: 0.6 %
BUN SERPL-MCNC: 17 MG/DL (ref 7–20)
CALCIUM SERPL-MCNC: 7.9 MG/DL (ref 8.3–10.6)
CHLORIDE SERPL-SCNC: 107 MMOL/L (ref 99–110)
CO2 SERPL-SCNC: 19 MMOL/L (ref 21–32)
CREAT SERPL-MCNC: 0.9 MG/DL (ref 0.6–1.2)
DEPRECATED RDW RBC AUTO: 16.8 % (ref 12.4–15.4)
DEPRECATED RDW RBC AUTO: 17.2 % (ref 12.4–15.4)
EOSINOPHIL # BLD: 0 K/UL (ref 0–0.6)
EOSINOPHIL # BLD: 0.1 K/UL (ref 0–0.6)
EOSINOPHIL NFR BLD: 0.2 %
EOSINOPHIL NFR BLD: 0.6 %
GFR SERPLBLD CREATININE-BSD FMLA CKD-EPI: >60 ML/MIN/{1.73_M2}
GLUCOSE BLD-MCNC: 106 MG/DL (ref 70–99)
GLUCOSE BLD-MCNC: 107 MG/DL (ref 70–99)
GLUCOSE BLD-MCNC: 124 MG/DL (ref 70–99)
GLUCOSE BLD-MCNC: 125 MG/DL (ref 70–99)
GLUCOSE BLD-MCNC: 90 MG/DL (ref 70–99)
GLUCOSE SERPL-MCNC: 115 MG/DL (ref 70–99)
HCT VFR BLD AUTO: 36.6 % (ref 36–48)
HCT VFR BLD AUTO: 36.8 % (ref 36–48)
HGB BLD-MCNC: 11.7 G/DL (ref 12–16)
HGB BLD-MCNC: 12.1 G/DL (ref 12–16)
LACTATE BLDV-SCNC: 1.6 MMOL/L (ref 0.4–1.9)
LYMPHOCYTES # BLD: 1.1 K/UL (ref 1–5.1)
LYMPHOCYTES # BLD: 1.3 K/UL (ref 1–5.1)
LYMPHOCYTES NFR BLD: 12.3 %
LYMPHOCYTES NFR BLD: 12.9 %
MCH RBC QN AUTO: 27.7 PG (ref 26–34)
MCH RBC QN AUTO: 28.1 PG (ref 26–34)
MCHC RBC AUTO-ENTMCNC: 32.1 G/DL (ref 31–36)
MCHC RBC AUTO-ENTMCNC: 32.8 G/DL (ref 31–36)
MCV RBC AUTO: 85.5 FL (ref 80–100)
MCV RBC AUTO: 86.3 FL (ref 80–100)
MONOCYTES # BLD: 0.9 K/UL (ref 0–1.3)
MONOCYTES # BLD: 1.1 K/UL (ref 0–1.3)
MONOCYTES NFR BLD: 10.8 %
MONOCYTES NFR BLD: 9.8 %
NEUTROPHILS # BLD: 7 K/UL (ref 1.7–7.7)
NEUTROPHILS # BLD: 7.8 K/UL (ref 1.7–7.7)
NEUTROPHILS NFR BLD: 75.6 %
NEUTROPHILS NFR BLD: 76.7 %
PERFORMED ON: ABNORMAL
PERFORMED ON: NORMAL
PLATELET # BLD AUTO: 216 K/UL (ref 135–450)
PLATELET # BLD AUTO: 257 K/UL (ref 135–450)
PMV BLD AUTO: 8.1 FL (ref 5–10.5)
PMV BLD AUTO: 8.2 FL (ref 5–10.5)
POTASSIUM SERPL-SCNC: 3.7 MMOL/L (ref 3.5–5.1)
RBC # BLD AUTO: 4.24 M/UL (ref 4–5.2)
RBC # BLD AUTO: 4.3 M/UL (ref 4–5.2)
SODIUM SERPL-SCNC: 138 MMOL/L (ref 136–145)
WBC # BLD AUTO: 10.3 K/UL (ref 4–11)
WBC # BLD AUTO: 9.2 K/UL (ref 4–11)

## 2023-05-31 PROCEDURE — 99024 POSTOP FOLLOW-UP VISIT: CPT | Performed by: SURGERY

## 2023-05-31 PROCEDURE — 80048 BASIC METABOLIC PNL TOTAL CA: CPT

## 2023-05-31 PROCEDURE — 6370000000 HC RX 637 (ALT 250 FOR IP): Performed by: SURGERY

## 2023-05-31 PROCEDURE — 6360000002 HC RX W HCPCS: Performed by: SURGERY

## 2023-05-31 PROCEDURE — 1200000000 HC SEMI PRIVATE

## 2023-05-31 PROCEDURE — 83605 ASSAY OF LACTIC ACID: CPT

## 2023-05-31 PROCEDURE — 2500000003 HC RX 250 WO HCPCS: Performed by: SURGERY

## 2023-05-31 PROCEDURE — 94761 N-INVAS EAR/PLS OXIMETRY MLT: CPT

## 2023-05-31 PROCEDURE — 2580000003 HC RX 258: Performed by: SURGERY

## 2023-05-31 PROCEDURE — 85025 COMPLETE CBC W/AUTO DIFF WBC: CPT

## 2023-05-31 PROCEDURE — 2700000000 HC OXYGEN THERAPY PER DAY

## 2023-05-31 PROCEDURE — 87040 BLOOD CULTURE FOR BACTERIA: CPT

## 2023-05-31 PROCEDURE — 36415 COLL VENOUS BLD VENIPUNCTURE: CPT

## 2023-05-31 RX ORDER — LEVOTHYROXINE SODIUM 175 UG/1
175 CAPSULE ORAL DAILY
Status: DISCONTINUED | OUTPATIENT
Start: 2023-05-31 | End: 2023-06-09 | Stop reason: HOSPADM

## 2023-05-31 RX ORDER — LEVOTHYROXINE SODIUM 175 UG/1
175 CAPSULE ORAL DAILY
COMMUNITY

## 2023-05-31 RX ADMIN — ACETAMINOPHEN 325MG 650 MG: 325 TABLET ORAL at 15:17

## 2023-05-31 RX ADMIN — SODIUM CHLORIDE: 9 INJECTION, SOLUTION INTRAVENOUS at 01:11

## 2023-05-31 RX ADMIN — FAMOTIDINE 20 MG: 20 TABLET, FILM COATED ORAL at 08:41

## 2023-05-31 RX ADMIN — LIOTHYRONINE SODIUM 5 MCG: 5 TABLET ORAL at 08:40

## 2023-05-31 RX ADMIN — Medication 400 MG: at 21:00

## 2023-05-31 RX ADMIN — GABAPENTIN 300 MG: 300 CAPSULE ORAL at 15:18

## 2023-05-31 RX ADMIN — SODIUM CHLORIDE: 9 INJECTION, SOLUTION INTRAVENOUS at 09:29

## 2023-05-31 RX ADMIN — FAMOTIDINE 20 MG: 20 TABLET, FILM COATED ORAL at 20:59

## 2023-05-31 RX ADMIN — SODIUM CHLORIDE: 9 INJECTION, SOLUTION INTRAVENOUS at 19:23

## 2023-05-31 RX ADMIN — ACETAMINOPHEN 325MG 650 MG: 325 TABLET ORAL at 04:34

## 2023-05-31 RX ADMIN — ATORVASTATIN CALCIUM 40 MG: 40 TABLET, FILM COATED ORAL at 21:00

## 2023-05-31 RX ADMIN — PROPRANOLOL HYDROCHLORIDE 40 MG: 40 TABLET ORAL at 08:41

## 2023-05-31 RX ADMIN — GABAPENTIN 300 MG: 300 CAPSULE ORAL at 20:59

## 2023-05-31 RX ADMIN — ENOXAPARIN SODIUM 40 MG: 100 INJECTION SUBCUTANEOUS at 08:41

## 2023-05-31 RX ADMIN — GABAPENTIN 300 MG: 300 CAPSULE ORAL at 08:40

## 2023-05-31 RX ADMIN — CYCLOBENZAPRINE 5 MG: 10 TABLET, FILM COATED ORAL at 15:18

## 2023-05-31 RX ADMIN — PROPRANOLOL HYDROCHLORIDE 40 MG: 40 TABLET ORAL at 20:59

## 2023-05-31 RX ADMIN — CYCLOBENZAPRINE 5 MG: 10 TABLET, FILM COATED ORAL at 21:00

## 2023-05-31 RX ADMIN — CEFAZOLIN 2000 MG: 10 INJECTION, POWDER, FOR SOLUTION INTRAVENOUS at 02:52

## 2023-05-31 RX ADMIN — ACETAMINOPHEN 325MG 650 MG: 325 TABLET ORAL at 22:43

## 2023-05-31 RX ADMIN — METRONIDAZOLE 500 MG: 500 INJECTION, SOLUTION INTRAVENOUS at 01:14

## 2023-05-31 RX ADMIN — Medication 400 MG: at 08:40

## 2023-05-31 RX ADMIN — ACETAMINOPHEN 325MG 650 MG: 325 TABLET ORAL at 10:56

## 2023-05-31 RX ADMIN — LEVOTHYROXINE SODIUM 175 MCG: 175 CAPSULE ORAL at 10:56

## 2023-05-31 ASSESSMENT — PAIN DESCRIPTION - LOCATION
LOCATION: ABDOMEN

## 2023-05-31 ASSESSMENT — PAIN - FUNCTIONAL ASSESSMENT: PAIN_FUNCTIONAL_ASSESSMENT: ACTIVITIES ARE NOT PREVENTED

## 2023-05-31 ASSESSMENT — PAIN SCALES - GENERAL
PAINLEVEL_OUTOF10: 0
PAINLEVEL_OUTOF10: 8
PAINLEVEL_OUTOF10: 5
PAINLEVEL_OUTOF10: 6
PAINLEVEL_OUTOF10: 0

## 2023-05-31 ASSESSMENT — PAIN DESCRIPTION - ORIENTATION
ORIENTATION: ANTERIOR
ORIENTATION: MID
ORIENTATION: ANTERIOR

## 2023-05-31 ASSESSMENT — PAIN DESCRIPTION - DESCRIPTORS
DESCRIPTORS: DISCOMFORT
DESCRIPTORS: THROBBING
DESCRIPTORS: ACHING

## 2023-06-01 LAB
ANION GAP SERPL CALCULATED.3IONS-SCNC: 11 MMOL/L (ref 3–16)
BUN SERPL-MCNC: 10 MG/DL (ref 7–20)
CALCIUM SERPL-MCNC: 7.9 MG/DL (ref 8.3–10.6)
CHLORIDE SERPL-SCNC: 106 MMOL/L (ref 99–110)
CO2 SERPL-SCNC: 18 MMOL/L (ref 21–32)
CREAT SERPL-MCNC: 0.7 MG/DL (ref 0.6–1.2)
GFR SERPLBLD CREATININE-BSD FMLA CKD-EPI: >60 ML/MIN/{1.73_M2}
GLUCOSE BLD-MCNC: 90 MG/DL (ref 70–99)
GLUCOSE BLD-MCNC: 94 MG/DL (ref 70–99)
GLUCOSE BLD-MCNC: 98 MG/DL (ref 70–99)
GLUCOSE BLD-MCNC: 99 MG/DL (ref 70–99)
GLUCOSE SERPL-MCNC: 90 MG/DL (ref 70–99)
PERFORMED ON: NORMAL
POTASSIUM SERPL-SCNC: 3.4 MMOL/L (ref 3.5–5.1)
POTASSIUM SERPL-SCNC: 3.5 MMOL/L (ref 3.5–5.1)
SODIUM SERPL-SCNC: 135 MMOL/L (ref 136–145)

## 2023-06-01 PROCEDURE — 2580000003 HC RX 258: Performed by: SURGERY

## 2023-06-01 PROCEDURE — APPSS30 APP SPLIT SHARED TIME 16-30 MINUTES: Performed by: CLINICAL NURSE SPECIALIST

## 2023-06-01 PROCEDURE — 6370000000 HC RX 637 (ALT 250 FOR IP): Performed by: SURGERY

## 2023-06-01 PROCEDURE — 6360000002 HC RX W HCPCS: Performed by: CLINICAL NURSE SPECIALIST

## 2023-06-01 PROCEDURE — 36415 COLL VENOUS BLD VENIPUNCTURE: CPT

## 2023-06-01 PROCEDURE — 80048 BASIC METABOLIC PNL TOTAL CA: CPT

## 2023-06-01 PROCEDURE — 99024 POSTOP FOLLOW-UP VISIT: CPT | Performed by: SURGERY

## 2023-06-01 PROCEDURE — 6360000002 HC RX W HCPCS: Performed by: SURGERY

## 2023-06-01 PROCEDURE — 1200000000 HC SEMI PRIVATE

## 2023-06-01 PROCEDURE — 94761 N-INVAS EAR/PLS OXIMETRY MLT: CPT

## 2023-06-01 PROCEDURE — 84132 ASSAY OF SERUM POTASSIUM: CPT

## 2023-06-01 PROCEDURE — 2700000000 HC OXYGEN THERAPY PER DAY

## 2023-06-01 RX ORDER — POTASSIUM CHLORIDE 7.45 MG/ML
10 INJECTION INTRAVENOUS
Status: COMPLETED | OUTPATIENT
Start: 2023-06-01 | End: 2023-06-01

## 2023-06-01 RX ADMIN — GABAPENTIN 300 MG: 300 CAPSULE ORAL at 14:52

## 2023-06-01 RX ADMIN — SODIUM CHLORIDE: 9 INJECTION, SOLUTION INTRAVENOUS at 05:26

## 2023-06-01 RX ADMIN — PROPRANOLOL HYDROCHLORIDE 40 MG: 40 TABLET ORAL at 21:09

## 2023-06-01 RX ADMIN — POTASSIUM CHLORIDE 10 MEQ: 7.46 INJECTION, SOLUTION INTRAVENOUS at 13:40

## 2023-06-01 RX ADMIN — ACETAMINOPHEN 325MG 650 MG: 325 TABLET ORAL at 09:12

## 2023-06-01 RX ADMIN — CYCLOBENZAPRINE 5 MG: 10 TABLET, FILM COATED ORAL at 14:52

## 2023-06-01 RX ADMIN — FAMOTIDINE 20 MG: 20 TABLET, FILM COATED ORAL at 09:12

## 2023-06-01 RX ADMIN — ATORVASTATIN CALCIUM 40 MG: 40 TABLET, FILM COATED ORAL at 21:08

## 2023-06-01 RX ADMIN — GABAPENTIN 300 MG: 300 CAPSULE ORAL at 21:08

## 2023-06-01 RX ADMIN — LIOTHYRONINE SODIUM 5 MCG: 5 TABLET ORAL at 09:21

## 2023-06-01 RX ADMIN — SODIUM CHLORIDE: 9 INJECTION, SOLUTION INTRAVENOUS at 15:46

## 2023-06-01 RX ADMIN — Medication 400 MG: at 21:09

## 2023-06-01 RX ADMIN — PROPRANOLOL HYDROCHLORIDE 40 MG: 40 TABLET ORAL at 09:12

## 2023-06-01 RX ADMIN — ACETAMINOPHEN 325MG 650 MG: 325 TABLET ORAL at 14:52

## 2023-06-01 RX ADMIN — ACETAMINOPHEN 325MG 650 MG: 325 TABLET ORAL at 22:44

## 2023-06-01 RX ADMIN — LEVOTHYROXINE SODIUM 175 MCG: 175 CAPSULE ORAL at 09:13

## 2023-06-01 RX ADMIN — POTASSIUM CHLORIDE 10 MEQ: 7.46 INJECTION, SOLUTION INTRAVENOUS at 12:39

## 2023-06-01 RX ADMIN — ENOXAPARIN SODIUM 40 MG: 100 INJECTION SUBCUTANEOUS at 09:17

## 2023-06-01 RX ADMIN — Medication 400 MG: at 09:12

## 2023-06-01 RX ADMIN — CYCLOBENZAPRINE 5 MG: 10 TABLET, FILM COATED ORAL at 21:08

## 2023-06-01 RX ADMIN — CYCLOBENZAPRINE 5 MG: 10 TABLET, FILM COATED ORAL at 09:12

## 2023-06-01 RX ADMIN — ACETAMINOPHEN 325MG 650 MG: 325 TABLET ORAL at 04:10

## 2023-06-01 RX ADMIN — FAMOTIDINE 20 MG: 20 TABLET, FILM COATED ORAL at 21:08

## 2023-06-01 RX ADMIN — GABAPENTIN 300 MG: 300 CAPSULE ORAL at 09:12

## 2023-06-01 ASSESSMENT — PAIN DESCRIPTION - LOCATION
LOCATION: ABDOMEN

## 2023-06-01 ASSESSMENT — PAIN SCALES - GENERAL
PAINLEVEL_OUTOF10: 5
PAINLEVEL_OUTOF10: 6
PAINLEVEL_OUTOF10: 6

## 2023-06-01 ASSESSMENT — PAIN DESCRIPTION - DESCRIPTORS: DESCRIPTORS: ACHING;DISCOMFORT

## 2023-06-02 PROBLEM — E66.9 OBESITY: Status: ACTIVE | Noted: 2023-06-02

## 2023-06-02 PROBLEM — D64.9 ANEMIA: Status: ACTIVE | Noted: 2023-06-02

## 2023-06-02 PROBLEM — E78.5 HYPERLIPIDEMIA: Status: ACTIVE | Noted: 2023-06-02

## 2023-06-02 PROBLEM — I10 HYPERTENSION: Status: ACTIVE | Noted: 2023-06-02

## 2023-06-02 PROBLEM — E87.6 HYPOKALEMIA: Status: ACTIVE | Noted: 2023-06-02

## 2023-06-02 LAB
ANION GAP SERPL CALCULATED.3IONS-SCNC: 8 MMOL/L (ref 3–16)
BUN SERPL-MCNC: 8 MG/DL (ref 7–20)
CALCIUM SERPL-MCNC: 8.1 MG/DL (ref 8.3–10.6)
CHLORIDE SERPL-SCNC: 102 MMOL/L (ref 99–110)
CO2 SERPL-SCNC: 23 MMOL/L (ref 21–32)
CREAT SERPL-MCNC: 0.7 MG/DL (ref 0.6–1.2)
DEPRECATED RDW RBC AUTO: 17 % (ref 12.4–15.4)
GFR SERPLBLD CREATININE-BSD FMLA CKD-EPI: >60 ML/MIN/{1.73_M2}
GLUCOSE BLD-MCNC: 103 MG/DL (ref 70–99)
GLUCOSE BLD-MCNC: 82 MG/DL (ref 70–99)
GLUCOSE BLD-MCNC: 92 MG/DL (ref 70–99)
GLUCOSE SERPL-MCNC: 99 MG/DL (ref 70–99)
HCT VFR BLD AUTO: 31.1 % (ref 36–48)
HGB BLD-MCNC: 10.3 G/DL (ref 12–16)
MCH RBC QN AUTO: 28.5 PG (ref 26–34)
MCHC RBC AUTO-ENTMCNC: 33.1 G/DL (ref 31–36)
MCV RBC AUTO: 86 FL (ref 80–100)
PERFORMED ON: ABNORMAL
PERFORMED ON: NORMAL
PERFORMED ON: NORMAL
PLATELET # BLD AUTO: 248 K/UL (ref 135–450)
PMV BLD AUTO: 8 FL (ref 5–10.5)
POTASSIUM SERPL-SCNC: 3.3 MMOL/L (ref 3.5–5.1)
RBC # BLD AUTO: 3.61 M/UL (ref 4–5.2)
SODIUM SERPL-SCNC: 133 MMOL/L (ref 136–145)
WBC # BLD AUTO: 8.2 K/UL (ref 4–11)

## 2023-06-02 PROCEDURE — 94761 N-INVAS EAR/PLS OXIMETRY MLT: CPT

## 2023-06-02 PROCEDURE — 2700000000 HC OXYGEN THERAPY PER DAY

## 2023-06-02 PROCEDURE — 6370000000 HC RX 637 (ALT 250 FOR IP): Performed by: SURGERY

## 2023-06-02 PROCEDURE — 1200000000 HC SEMI PRIVATE

## 2023-06-02 PROCEDURE — 6360000002 HC RX W HCPCS: Performed by: SURGERY

## 2023-06-02 PROCEDURE — 80048 BASIC METABOLIC PNL TOTAL CA: CPT

## 2023-06-02 PROCEDURE — 2580000003 HC RX 258: Performed by: SURGERY

## 2023-06-02 PROCEDURE — 85027 COMPLETE CBC AUTOMATED: CPT

## 2023-06-02 PROCEDURE — 99024 POSTOP FOLLOW-UP VISIT: CPT | Performed by: SURGERY

## 2023-06-02 RX ORDER — POTASSIUM CHLORIDE 20 MEQ/1
20 TABLET, EXTENDED RELEASE ORAL ONCE
Status: COMPLETED | OUTPATIENT
Start: 2023-06-02 | End: 2023-06-02

## 2023-06-02 RX ORDER — POTASSIUM CHLORIDE 7.45 MG/ML
10 INJECTION INTRAVENOUS
Status: COMPLETED | OUTPATIENT
Start: 2023-06-02 | End: 2023-06-02

## 2023-06-02 RX ORDER — OXYCODONE HYDROCHLORIDE 5 MG/1
5 TABLET ORAL EVERY 4 HOURS PRN
Status: DISCONTINUED | OUTPATIENT
Start: 2023-06-02 | End: 2023-06-09 | Stop reason: HOSPADM

## 2023-06-02 RX ORDER — OXYCODONE HYDROCHLORIDE 5 MG/1
10 TABLET ORAL EVERY 4 HOURS PRN
Status: DISCONTINUED | OUTPATIENT
Start: 2023-06-02 | End: 2023-06-09 | Stop reason: HOSPADM

## 2023-06-02 RX ORDER — HYDROMORPHONE HYDROCHLORIDE 1 MG/ML
0.5 INJECTION, SOLUTION INTRAMUSCULAR; INTRAVENOUS; SUBCUTANEOUS
Status: DISCONTINUED | OUTPATIENT
Start: 2023-06-02 | End: 2023-06-09 | Stop reason: HOSPADM

## 2023-06-02 RX ADMIN — OXYCODONE HYDROCHLORIDE 10 MG: 5 TABLET ORAL at 20:51

## 2023-06-02 RX ADMIN — CYCLOBENZAPRINE 5 MG: 10 TABLET, FILM COATED ORAL at 14:45

## 2023-06-02 RX ADMIN — ACETAMINOPHEN 325MG 650 MG: 325 TABLET ORAL at 16:49

## 2023-06-02 RX ADMIN — CYCLOBENZAPRINE 5 MG: 10 TABLET, FILM COATED ORAL at 10:59

## 2023-06-02 RX ADMIN — PROPRANOLOL HYDROCHLORIDE 40 MG: 40 TABLET ORAL at 20:51

## 2023-06-02 RX ADMIN — Medication 400 MG: at 11:01

## 2023-06-02 RX ADMIN — OXYCODONE HYDROCHLORIDE 5 MG: 5 TABLET ORAL at 12:00

## 2023-06-02 RX ADMIN — ATORVASTATIN CALCIUM 40 MG: 40 TABLET, FILM COATED ORAL at 20:51

## 2023-06-02 RX ADMIN — GABAPENTIN 300 MG: 300 CAPSULE ORAL at 14:45

## 2023-06-02 RX ADMIN — CYCLOBENZAPRINE 5 MG: 10 TABLET, FILM COATED ORAL at 20:51

## 2023-06-02 RX ADMIN — ACETAMINOPHEN 325MG 650 MG: 325 TABLET ORAL at 04:45

## 2023-06-02 RX ADMIN — ENOXAPARIN SODIUM 40 MG: 100 INJECTION SUBCUTANEOUS at 11:01

## 2023-06-02 RX ADMIN — SODIUM CHLORIDE: 9 INJECTION, SOLUTION INTRAVENOUS at 19:16

## 2023-06-02 RX ADMIN — FAMOTIDINE 20 MG: 20 TABLET, FILM COATED ORAL at 20:51

## 2023-06-02 RX ADMIN — POTASSIUM CHLORIDE 10 MEQ: 7.46 INJECTION, SOLUTION INTRAVENOUS at 12:28

## 2023-06-02 RX ADMIN — FAMOTIDINE 20 MG: 20 TABLET, FILM COATED ORAL at 11:00

## 2023-06-02 RX ADMIN — GABAPENTIN 300 MG: 300 CAPSULE ORAL at 10:59

## 2023-06-02 RX ADMIN — GABAPENTIN 300 MG: 300 CAPSULE ORAL at 20:51

## 2023-06-02 RX ADMIN — POTASSIUM CHLORIDE 20 MEQ: 1500 TABLET, EXTENDED RELEASE ORAL at 11:00

## 2023-06-02 RX ADMIN — LEVOTHYROXINE SODIUM 175 MCG: 175 CAPSULE ORAL at 11:08

## 2023-06-02 RX ADMIN — Medication 400 MG: at 20:51

## 2023-06-02 RX ADMIN — ACETAMINOPHEN 325MG 650 MG: 325 TABLET ORAL at 11:00

## 2023-06-02 RX ADMIN — ACETAMINOPHEN 325MG 650 MG: 325 TABLET ORAL at 23:56

## 2023-06-02 RX ADMIN — POTASSIUM CHLORIDE 10 MEQ: 7.46 INJECTION, SOLUTION INTRAVENOUS at 11:07

## 2023-06-02 RX ADMIN — SODIUM CHLORIDE: 9 INJECTION, SOLUTION INTRAVENOUS at 03:05

## 2023-06-02 RX ADMIN — PROPRANOLOL HYDROCHLORIDE 40 MG: 40 TABLET ORAL at 11:00

## 2023-06-02 RX ADMIN — LIOTHYRONINE SODIUM 5 MCG: 5 TABLET ORAL at 09:31

## 2023-06-02 ASSESSMENT — PAIN SCALES - GENERAL
PAINLEVEL_OUTOF10: 5
PAINLEVEL_OUTOF10: 6
PAINLEVEL_OUTOF10: 7
PAINLEVEL_OUTOF10: 7
PAINLEVEL_OUTOF10: 8
PAINLEVEL_OUTOF10: 7
PAINLEVEL_OUTOF10: 7

## 2023-06-02 ASSESSMENT — PAIN DESCRIPTION - ORIENTATION
ORIENTATION: LEFT
ORIENTATION: LEFT;MID
ORIENTATION: LEFT
ORIENTATION: LEFT
ORIENTATION: LEFT;MID
ORIENTATION: LEFT
ORIENTATION: RIGHT;LEFT

## 2023-06-02 ASSESSMENT — PAIN DESCRIPTION - DESCRIPTORS
DESCRIPTORS: THROBBING
DESCRIPTORS: SHARP;THROBBING
DESCRIPTORS: SHARP;THROBBING
DESCRIPTORS: THROBBING;SHARP
DESCRIPTORS: THROBBING
DESCRIPTORS: SHARP;THROBBING
DESCRIPTORS: SHARP;THROBBING

## 2023-06-02 ASSESSMENT — PAIN DESCRIPTION - LOCATION
LOCATION: ABDOMEN
LOCATION: BACK;ABDOMEN
LOCATION: ABDOMEN
LOCATION: BACK;ABDOMEN
LOCATION: ABDOMEN

## 2023-06-02 ASSESSMENT — PAIN DESCRIPTION - PAIN TYPE
TYPE: SURGICAL PAIN
TYPE: SURGICAL PAIN

## 2023-06-03 LAB
ALBUMIN SERPL-MCNC: 2.7 G/DL (ref 3.4–5)
ANION GAP SERPL CALCULATED.3IONS-SCNC: 11 MMOL/L (ref 3–16)
BUN SERPL-MCNC: 6 MG/DL (ref 7–20)
CALCIUM SERPL-MCNC: 7.9 MG/DL (ref 8.3–10.6)
CHLORIDE SERPL-SCNC: 104 MMOL/L (ref 99–110)
CO2 SERPL-SCNC: 22 MMOL/L (ref 21–32)
CREAT SERPL-MCNC: 0.6 MG/DL (ref 0.6–1.2)
DEPRECATED RDW RBC AUTO: 16.3 % (ref 12.4–15.4)
GFR SERPLBLD CREATININE-BSD FMLA CKD-EPI: >60 ML/MIN/{1.73_M2}
GLUCOSE BLD-MCNC: 101 MG/DL (ref 70–99)
GLUCOSE BLD-MCNC: 101 MG/DL (ref 70–99)
GLUCOSE BLD-MCNC: 138 MG/DL (ref 70–99)
GLUCOSE SERPL-MCNC: 111 MG/DL (ref 70–99)
HCT VFR BLD AUTO: 30.2 % (ref 36–48)
HGB BLD-MCNC: 9.9 G/DL (ref 12–16)
MAGNESIUM SERPL-MCNC: 1.8 MG/DL (ref 1.8–2.4)
MCH RBC QN AUTO: 27.8 PG (ref 26–34)
MCHC RBC AUTO-ENTMCNC: 32.8 G/DL (ref 31–36)
MCV RBC AUTO: 84.7 FL (ref 80–100)
PERFORMED ON: ABNORMAL
PHOSPHATE SERPL-MCNC: 2.6 MG/DL (ref 2.5–4.9)
PLATELET # BLD AUTO: 273 K/UL (ref 135–450)
PMV BLD AUTO: 7.9 FL (ref 5–10.5)
POTASSIUM SERPL-SCNC: 3.3 MMOL/L (ref 3.5–5.1)
RBC # BLD AUTO: 3.57 M/UL (ref 4–5.2)
SODIUM SERPL-SCNC: 137 MMOL/L (ref 136–145)
WBC # BLD AUTO: 7.1 K/UL (ref 4–11)

## 2023-06-03 PROCEDURE — 1200000000 HC SEMI PRIVATE

## 2023-06-03 PROCEDURE — 80069 RENAL FUNCTION PANEL: CPT

## 2023-06-03 PROCEDURE — 99024 POSTOP FOLLOW-UP VISIT: CPT | Performed by: SURGERY

## 2023-06-03 PROCEDURE — 83735 ASSAY OF MAGNESIUM: CPT

## 2023-06-03 PROCEDURE — 85027 COMPLETE CBC AUTOMATED: CPT

## 2023-06-03 PROCEDURE — 6370000000 HC RX 637 (ALT 250 FOR IP): Performed by: SURGERY

## 2023-06-03 PROCEDURE — APPSS30 APP SPLIT SHARED TIME 16-30 MINUTES: Performed by: CLINICAL NURSE SPECIALIST

## 2023-06-03 PROCEDURE — 36415 COLL VENOUS BLD VENIPUNCTURE: CPT

## 2023-06-03 PROCEDURE — 2580000003 HC RX 258: Performed by: SURGERY

## 2023-06-03 PROCEDURE — 6360000002 HC RX W HCPCS: Performed by: SURGERY

## 2023-06-03 PROCEDURE — 6370000000 HC RX 637 (ALT 250 FOR IP): Performed by: CLINICAL NURSE SPECIALIST

## 2023-06-03 RX ORDER — IBUPROFEN 600 MG/1
600 TABLET ORAL EVERY 8 HOURS PRN
Status: DISCONTINUED | OUTPATIENT
Start: 2023-06-03 | End: 2023-06-09 | Stop reason: HOSPADM

## 2023-06-03 RX ORDER — POTASSIUM CHLORIDE 20 MEQ/1
40 TABLET, EXTENDED RELEASE ORAL ONCE
Status: COMPLETED | OUTPATIENT
Start: 2023-06-03 | End: 2023-06-03

## 2023-06-03 RX ADMIN — CYCLOBENZAPRINE 5 MG: 10 TABLET, FILM COATED ORAL at 21:49

## 2023-06-03 RX ADMIN — ACETAMINOPHEN 325MG 650 MG: 325 TABLET ORAL at 05:06

## 2023-06-03 RX ADMIN — POTASSIUM CHLORIDE 40 MEQ: 1500 TABLET, EXTENDED RELEASE ORAL at 12:18

## 2023-06-03 RX ADMIN — ENOXAPARIN SODIUM 40 MG: 100 INJECTION SUBCUTANEOUS at 09:05

## 2023-06-03 RX ADMIN — Medication 400 MG: at 21:49

## 2023-06-03 RX ADMIN — ACETAMINOPHEN 325MG 650 MG: 325 TABLET ORAL at 15:15

## 2023-06-03 RX ADMIN — ATORVASTATIN CALCIUM 40 MG: 40 TABLET, FILM COATED ORAL at 21:49

## 2023-06-03 RX ADMIN — GABAPENTIN 300 MG: 300 CAPSULE ORAL at 09:06

## 2023-06-03 RX ADMIN — IBUPROFEN 600 MG: 600 TABLET, FILM COATED ORAL at 18:17

## 2023-06-03 RX ADMIN — LEVOTHYROXINE SODIUM 175 MCG: 175 CAPSULE ORAL at 09:06

## 2023-06-03 RX ADMIN — OXYCODONE HYDROCHLORIDE 5 MG: 5 TABLET ORAL at 18:21

## 2023-06-03 RX ADMIN — FAMOTIDINE 20 MG: 20 TABLET, FILM COATED ORAL at 21:50

## 2023-06-03 RX ADMIN — PROPRANOLOL HYDROCHLORIDE 40 MG: 40 TABLET ORAL at 09:06

## 2023-06-03 RX ADMIN — GABAPENTIN 300 MG: 300 CAPSULE ORAL at 15:15

## 2023-06-03 RX ADMIN — PROPRANOLOL HYDROCHLORIDE 40 MG: 40 TABLET ORAL at 21:50

## 2023-06-03 RX ADMIN — LIOTHYRONINE SODIUM 5 MCG: 5 TABLET ORAL at 06:52

## 2023-06-03 RX ADMIN — CYCLOBENZAPRINE 5 MG: 10 TABLET, FILM COATED ORAL at 15:15

## 2023-06-03 RX ADMIN — Medication 400 MG: at 09:05

## 2023-06-03 RX ADMIN — SODIUM CHLORIDE: 9 INJECTION, SOLUTION INTRAVENOUS at 09:05

## 2023-06-03 RX ADMIN — GABAPENTIN 300 MG: 300 CAPSULE ORAL at 21:49

## 2023-06-03 RX ADMIN — FAMOTIDINE 20 MG: 20 TABLET, FILM COATED ORAL at 09:06

## 2023-06-03 RX ADMIN — CYCLOBENZAPRINE 5 MG: 10 TABLET, FILM COATED ORAL at 09:06

## 2023-06-03 RX ADMIN — ACETAMINOPHEN 325MG 650 MG: 325 TABLET ORAL at 21:49

## 2023-06-03 RX ADMIN — ACETAMINOPHEN 325MG 650 MG: 325 TABLET ORAL at 09:05

## 2023-06-03 ASSESSMENT — PAIN SCALES - GENERAL
PAINLEVEL_OUTOF10: 5
PAINLEVEL_OUTOF10: 6
PAINLEVEL_OUTOF10: 4
PAINLEVEL_OUTOF10: 8

## 2023-06-03 ASSESSMENT — PAIN DESCRIPTION - LOCATION
LOCATION: ABDOMEN

## 2023-06-03 ASSESSMENT — PAIN DESCRIPTION - ORIENTATION
ORIENTATION: MID
ORIENTATION: LEFT
ORIENTATION: LEFT

## 2023-06-03 ASSESSMENT — PAIN DESCRIPTION - DESCRIPTORS
DESCRIPTORS: ACHING;DISCOMFORT
DESCRIPTORS: ACHING;DISCOMFORT

## 2023-06-03 ASSESSMENT — PAIN DESCRIPTION - PAIN TYPE: TYPE: SURGICAL PAIN

## 2023-06-04 LAB
ALBUMIN SERPL-MCNC: 2.5 G/DL (ref 3.4–5)
ANION GAP SERPL CALCULATED.3IONS-SCNC: 13 MMOL/L (ref 3–16)
BACTERIA BLD CULT ORG #2: NORMAL
BACTERIA BLD CULT: NORMAL
BUN SERPL-MCNC: 5 MG/DL (ref 7–20)
CALCIUM SERPL-MCNC: 8.3 MG/DL (ref 8.3–10.6)
CHLORIDE SERPL-SCNC: 107 MMOL/L (ref 99–110)
CO2 SERPL-SCNC: 19 MMOL/L (ref 21–32)
CREAT SERPL-MCNC: 0.6 MG/DL (ref 0.6–1.2)
DEPRECATED RDW RBC AUTO: 16.3 % (ref 12.4–15.4)
GFR SERPLBLD CREATININE-BSD FMLA CKD-EPI: >60 ML/MIN/{1.73_M2}
GLUCOSE BLD-MCNC: 103 MG/DL (ref 70–99)
GLUCOSE BLD-MCNC: 115 MG/DL (ref 70–99)
GLUCOSE BLD-MCNC: 125 MG/DL (ref 70–99)
GLUCOSE BLD-MCNC: 77 MG/DL (ref 70–99)
GLUCOSE BLD-MCNC: 92 MG/DL (ref 70–99)
GLUCOSE SERPL-MCNC: 105 MG/DL (ref 70–99)
HCT VFR BLD AUTO: 32.6 % (ref 36–48)
HGB BLD-MCNC: 10.5 G/DL (ref 12–16)
MCH RBC QN AUTO: 27.6 PG (ref 26–34)
MCHC RBC AUTO-ENTMCNC: 32.4 G/DL (ref 31–36)
MCV RBC AUTO: 85.2 FL (ref 80–100)
PERFORMED ON: ABNORMAL
PERFORMED ON: NORMAL
PERFORMED ON: NORMAL
PHOSPHATE SERPL-MCNC: 3.7 MG/DL (ref 2.5–4.9)
PLATELET # BLD AUTO: 300 K/UL (ref 135–450)
PMV BLD AUTO: 8.1 FL (ref 5–10.5)
POTASSIUM SERPL-SCNC: 4 MMOL/L (ref 3.5–5.1)
RBC # BLD AUTO: 3.82 M/UL (ref 4–5.2)
SODIUM SERPL-SCNC: 139 MMOL/L (ref 136–145)
WBC # BLD AUTO: 7.4 K/UL (ref 4–11)

## 2023-06-04 PROCEDURE — 99024 POSTOP FOLLOW-UP VISIT: CPT | Performed by: SURGERY

## 2023-06-04 PROCEDURE — 1200000000 HC SEMI PRIVATE

## 2023-06-04 PROCEDURE — 2500000003 HC RX 250 WO HCPCS: Performed by: CLINICAL NURSE SPECIALIST

## 2023-06-04 PROCEDURE — 85027 COMPLETE CBC AUTOMATED: CPT

## 2023-06-04 PROCEDURE — 36415 COLL VENOUS BLD VENIPUNCTURE: CPT

## 2023-06-04 PROCEDURE — 80069 RENAL FUNCTION PANEL: CPT

## 2023-06-04 PROCEDURE — 6360000002 HC RX W HCPCS: Performed by: SURGERY

## 2023-06-04 PROCEDURE — APPSS45 APP SPLIT SHARED TIME 31-45 MINUTES: Performed by: CLINICAL NURSE SPECIALIST

## 2023-06-04 PROCEDURE — 6370000000 HC RX 637 (ALT 250 FOR IP): Performed by: SURGERY

## 2023-06-04 PROCEDURE — 6360000002 HC RX W HCPCS: Performed by: CLINICAL NURSE SPECIALIST

## 2023-06-04 PROCEDURE — APPNB45 APP NON BILLABLE 31-45 MINUTES: Performed by: CLINICAL NURSE SPECIALIST

## 2023-06-04 RX ORDER — CIPROFLOXACIN 2 MG/ML
400 INJECTION, SOLUTION INTRAVENOUS EVERY 12 HOURS
Status: DISCONTINUED | OUTPATIENT
Start: 2023-06-04 | End: 2023-06-09

## 2023-06-04 RX ORDER — METRONIDAZOLE 500 MG/100ML
500 INJECTION, SOLUTION INTRAVENOUS EVERY 8 HOURS
Status: DISCONTINUED | OUTPATIENT
Start: 2023-06-04 | End: 2023-06-09

## 2023-06-04 RX ADMIN — CYCLOBENZAPRINE 5 MG: 10 TABLET, FILM COATED ORAL at 13:54

## 2023-06-04 RX ADMIN — Medication 400 MG: at 08:17

## 2023-06-04 RX ADMIN — ACETAMINOPHEN 325MG 650 MG: 325 TABLET ORAL at 06:01

## 2023-06-04 RX ADMIN — Medication 400 MG: at 20:59

## 2023-06-04 RX ADMIN — FAMOTIDINE 20 MG: 20 TABLET, FILM COATED ORAL at 20:59

## 2023-06-04 RX ADMIN — PROPRANOLOL HYDROCHLORIDE 40 MG: 40 TABLET ORAL at 20:59

## 2023-06-04 RX ADMIN — ENOXAPARIN SODIUM 40 MG: 100 INJECTION SUBCUTANEOUS at 08:17

## 2023-06-04 RX ADMIN — ATORVASTATIN CALCIUM 40 MG: 40 TABLET, FILM COATED ORAL at 20:59

## 2023-06-04 RX ADMIN — GABAPENTIN 300 MG: 300 CAPSULE ORAL at 21:09

## 2023-06-04 RX ADMIN — CIPROFLOXACIN 400 MG: 2 INJECTION, SOLUTION INTRAVENOUS at 23:05

## 2023-06-04 RX ADMIN — HYDROMORPHONE HYDROCHLORIDE 0.5 MG: 1 INJECTION, SOLUTION INTRAMUSCULAR; INTRAVENOUS; SUBCUTANEOUS at 06:01

## 2023-06-04 RX ADMIN — GABAPENTIN 300 MG: 300 CAPSULE ORAL at 08:17

## 2023-06-04 RX ADMIN — CYCLOBENZAPRINE 5 MG: 10 TABLET, FILM COATED ORAL at 08:17

## 2023-06-04 RX ADMIN — FAMOTIDINE 20 MG: 20 TABLET, FILM COATED ORAL at 08:17

## 2023-06-04 RX ADMIN — LEVOTHYROXINE SODIUM 175 MCG: 175 CAPSULE ORAL at 08:18

## 2023-06-04 RX ADMIN — CIPROFLOXACIN 400 MG: 2 INJECTION, SOLUTION INTRAVENOUS at 11:30

## 2023-06-04 RX ADMIN — ACETAMINOPHEN 325MG 650 MG: 325 TABLET ORAL at 16:46

## 2023-06-04 RX ADMIN — LIOTHYRONINE SODIUM 5 MCG: 5 TABLET ORAL at 06:01

## 2023-06-04 RX ADMIN — GABAPENTIN 300 MG: 300 CAPSULE ORAL at 13:54

## 2023-06-04 RX ADMIN — OXYCODONE HYDROCHLORIDE 10 MG: 5 TABLET ORAL at 11:56

## 2023-06-04 RX ADMIN — PROPRANOLOL HYDROCHLORIDE 40 MG: 40 TABLET ORAL at 08:17

## 2023-06-04 RX ADMIN — ACETAMINOPHEN 325MG 650 MG: 325 TABLET ORAL at 21:00

## 2023-06-04 RX ADMIN — OXYCODONE HYDROCHLORIDE 10 MG: 5 TABLET ORAL at 16:47

## 2023-06-04 RX ADMIN — OXYCODONE HYDROCHLORIDE 5 MG: 5 TABLET ORAL at 21:09

## 2023-06-04 RX ADMIN — ACETAMINOPHEN 325MG 650 MG: 325 TABLET ORAL at 11:30

## 2023-06-04 RX ADMIN — METRONIDAZOLE 500 MG: 500 INJECTION, SOLUTION INTRAVENOUS at 17:47

## 2023-06-04 RX ADMIN — METRONIDAZOLE 500 MG: 500 INJECTION, SOLUTION INTRAVENOUS at 11:26

## 2023-06-04 RX ADMIN — CYCLOBENZAPRINE 5 MG: 10 TABLET, FILM COATED ORAL at 21:00

## 2023-06-04 ASSESSMENT — PAIN DESCRIPTION - LOCATION
LOCATION: ABDOMEN
LOCATION: ABDOMEN

## 2023-06-04 ASSESSMENT — PAIN DESCRIPTION - PAIN TYPE
TYPE: SURGICAL PAIN
TYPE: SURGICAL PAIN

## 2023-06-04 ASSESSMENT — PAIN DESCRIPTION - ORIENTATION
ORIENTATION: LEFT
ORIENTATION: LEFT

## 2023-06-04 ASSESSMENT — PAIN SCALES - GENERAL
PAINLEVEL_OUTOF10: 7
PAINLEVEL_OUTOF10: 9
PAINLEVEL_OUTOF10: 8
PAINLEVEL_OUTOF10: 6
PAINLEVEL_OUTOF10: 3

## 2023-06-04 ASSESSMENT — PAIN DESCRIPTION - DESCRIPTORS
DESCRIPTORS: THROBBING
DESCRIPTORS: SHARP

## 2023-06-04 ASSESSMENT — PAIN - FUNCTIONAL ASSESSMENT
PAIN_FUNCTIONAL_ASSESSMENT: PREVENTS OR INTERFERES SOME ACTIVE ACTIVITIES AND ADLS
PAIN_FUNCTIONAL_ASSESSMENT: PREVENTS OR INTERFERES WITH MANY ACTIVE NOT PASSIVE ACTIVITIES

## 2023-06-05 ENCOUNTER — APPOINTMENT (OUTPATIENT)
Dept: CT IMAGING | Age: 63
End: 2023-06-05
Attending: SURGERY
Payer: MEDICAID

## 2023-06-05 LAB
ALBUMIN SERPL-MCNC: 2.8 G/DL (ref 3.4–5)
ANION GAP SERPL CALCULATED.3IONS-SCNC: 13 MMOL/L (ref 3–16)
BUN SERPL-MCNC: 6 MG/DL (ref 7–20)
CALCIUM SERPL-MCNC: 8.2 MG/DL (ref 8.3–10.6)
CHLORIDE SERPL-SCNC: 101 MMOL/L (ref 99–110)
CO2 SERPL-SCNC: 21 MMOL/L (ref 21–32)
CREAT SERPL-MCNC: 0.7 MG/DL (ref 0.6–1.2)
DEPRECATED RDW RBC AUTO: 16.4 % (ref 12.4–15.4)
GFR SERPLBLD CREATININE-BSD FMLA CKD-EPI: >60 ML/MIN/{1.73_M2}
GLUCOSE BLD-MCNC: 103 MG/DL (ref 70–99)
GLUCOSE BLD-MCNC: 110 MG/DL (ref 70–99)
GLUCOSE BLD-MCNC: 125 MG/DL (ref 70–99)
GLUCOSE BLD-MCNC: 99 MG/DL (ref 70–99)
GLUCOSE SERPL-MCNC: 113 MG/DL (ref 70–99)
HCT VFR BLD AUTO: 31.4 % (ref 36–48)
HGB BLD-MCNC: 10.3 G/DL (ref 12–16)
MCH RBC QN AUTO: 27.6 PG (ref 26–34)
MCHC RBC AUTO-ENTMCNC: 32.8 G/DL (ref 31–36)
MCV RBC AUTO: 83.9 FL (ref 80–100)
PERFORMED ON: ABNORMAL
PERFORMED ON: NORMAL
PHOSPHATE SERPL-MCNC: 4.7 MG/DL (ref 2.5–4.9)
PLATELET # BLD AUTO: 323 K/UL (ref 135–450)
PMV BLD AUTO: 7.8 FL (ref 5–10.5)
POTASSIUM SERPL-SCNC: 3.5 MMOL/L (ref 3.5–5.1)
RBC # BLD AUTO: 3.74 M/UL (ref 4–5.2)
SODIUM SERPL-SCNC: 135 MMOL/L (ref 136–145)
WBC # BLD AUTO: 7.1 K/UL (ref 4–11)

## 2023-06-05 PROCEDURE — 2500000003 HC RX 250 WO HCPCS: Performed by: CLINICAL NURSE SPECIALIST

## 2023-06-05 PROCEDURE — 1200000000 HC SEMI PRIVATE

## 2023-06-05 PROCEDURE — 74177 CT ABD & PELVIS W/CONTRAST: CPT

## 2023-06-05 PROCEDURE — 36415 COLL VENOUS BLD VENIPUNCTURE: CPT

## 2023-06-05 PROCEDURE — 80069 RENAL FUNCTION PANEL: CPT

## 2023-06-05 PROCEDURE — 2580000003 HC RX 258: Performed by: SURGERY

## 2023-06-05 PROCEDURE — 6370000000 HC RX 637 (ALT 250 FOR IP): Performed by: SURGERY

## 2023-06-05 PROCEDURE — 6360000002 HC RX W HCPCS: Performed by: SURGERY

## 2023-06-05 PROCEDURE — 6360000004 HC RX CONTRAST MEDICATION: Performed by: CLINICAL NURSE SPECIALIST

## 2023-06-05 PROCEDURE — 85027 COMPLETE CBC AUTOMATED: CPT

## 2023-06-05 PROCEDURE — 6360000002 HC RX W HCPCS: Performed by: CLINICAL NURSE SPECIALIST

## 2023-06-05 PROCEDURE — 99024 POSTOP FOLLOW-UP VISIT: CPT | Performed by: SURGERY

## 2023-06-05 PROCEDURE — APPSS30 APP SPLIT SHARED TIME 16-30 MINUTES: Performed by: CLINICAL NURSE SPECIALIST

## 2023-06-05 RX ADMIN — METRONIDAZOLE 500 MG: 500 INJECTION, SOLUTION INTRAVENOUS at 03:10

## 2023-06-05 RX ADMIN — Medication 400 MG: at 09:58

## 2023-06-05 RX ADMIN — LIOTHYRONINE SODIUM 5 MCG: 5 TABLET ORAL at 06:13

## 2023-06-05 RX ADMIN — OXYCODONE HYDROCHLORIDE 10 MG: 5 TABLET ORAL at 05:10

## 2023-06-05 RX ADMIN — CYCLOBENZAPRINE 5 MG: 10 TABLET, FILM COATED ORAL at 09:59

## 2023-06-05 RX ADMIN — IOHEXOL 75 ML: 350 INJECTION, SOLUTION INTRAVENOUS at 14:51

## 2023-06-05 RX ADMIN — CIPROFLOXACIN 400 MG: 2 INJECTION, SOLUTION INTRAVENOUS at 22:15

## 2023-06-05 RX ADMIN — IBUPROFEN 600 MG: 600 TABLET, FILM COATED ORAL at 21:25

## 2023-06-05 RX ADMIN — PROPRANOLOL HYDROCHLORIDE 40 MG: 40 TABLET ORAL at 09:59

## 2023-06-05 RX ADMIN — Medication 400 MG: at 21:25

## 2023-06-05 RX ADMIN — FAMOTIDINE 20 MG: 20 TABLET, FILM COATED ORAL at 09:58

## 2023-06-05 RX ADMIN — CYCLOBENZAPRINE 5 MG: 10 TABLET, FILM COATED ORAL at 15:17

## 2023-06-05 RX ADMIN — METRONIDAZOLE 500 MG: 500 INJECTION, SOLUTION INTRAVENOUS at 18:20

## 2023-06-05 RX ADMIN — SODIUM CHLORIDE, PRESERVATIVE FREE 10 ML: 5 INJECTION INTRAVENOUS at 19:25

## 2023-06-05 RX ADMIN — PROPRANOLOL HYDROCHLORIDE 40 MG: 40 TABLET ORAL at 21:25

## 2023-06-05 RX ADMIN — FAMOTIDINE 20 MG: 20 TABLET, FILM COATED ORAL at 21:25

## 2023-06-05 RX ADMIN — GABAPENTIN 300 MG: 300 CAPSULE ORAL at 21:25

## 2023-06-05 RX ADMIN — CIPROFLOXACIN 400 MG: 2 INJECTION, SOLUTION INTRAVENOUS at 12:02

## 2023-06-05 RX ADMIN — ACETAMINOPHEN 325MG 650 MG: 325 TABLET ORAL at 09:58

## 2023-06-05 RX ADMIN — HYDROMORPHONE HYDROCHLORIDE 0.5 MG: 1 INJECTION, SOLUTION INTRAMUSCULAR; INTRAVENOUS; SUBCUTANEOUS at 00:25

## 2023-06-05 RX ADMIN — SODIUM CHLORIDE, PRESERVATIVE FREE 10 ML: 5 INJECTION INTRAVENOUS at 09:59

## 2023-06-05 RX ADMIN — OXYCODONE HYDROCHLORIDE 10 MG: 5 TABLET ORAL at 18:15

## 2023-06-05 RX ADMIN — OXYCODONE HYDROCHLORIDE 10 MG: 5 TABLET ORAL at 09:58

## 2023-06-05 RX ADMIN — GABAPENTIN 300 MG: 300 CAPSULE ORAL at 09:59

## 2023-06-05 RX ADMIN — ATORVASTATIN CALCIUM 40 MG: 40 TABLET, FILM COATED ORAL at 21:25

## 2023-06-05 RX ADMIN — ACETAMINOPHEN 325MG 650 MG: 325 TABLET ORAL at 21:25

## 2023-06-05 RX ADMIN — CYCLOBENZAPRINE 5 MG: 10 TABLET, FILM COATED ORAL at 21:25

## 2023-06-05 RX ADMIN — DIATRIZOATE MEGLUMINE AND DIATRIZOATE SODIUM 12 ML: 660; 100 LIQUID ORAL; RECTAL at 13:13

## 2023-06-05 RX ADMIN — ENOXAPARIN SODIUM 40 MG: 100 INJECTION SUBCUTANEOUS at 09:58

## 2023-06-05 RX ADMIN — ACETAMINOPHEN 325MG 650 MG: 325 TABLET ORAL at 15:17

## 2023-06-05 RX ADMIN — GABAPENTIN 300 MG: 300 CAPSULE ORAL at 15:17

## 2023-06-05 RX ADMIN — HYDROMORPHONE HYDROCHLORIDE 0.5 MG: 1 INJECTION, SOLUTION INTRAMUSCULAR; INTRAVENOUS; SUBCUTANEOUS at 22:08

## 2023-06-05 RX ADMIN — LEVOTHYROXINE SODIUM 175 MCG: 175 CAPSULE ORAL at 10:01

## 2023-06-05 RX ADMIN — METRONIDAZOLE 500 MG: 500 INJECTION, SOLUTION INTRAVENOUS at 10:04

## 2023-06-05 RX ADMIN — ACETAMINOPHEN 325MG 650 MG: 325 TABLET ORAL at 05:09

## 2023-06-05 ASSESSMENT — PAIN DESCRIPTION - LOCATION
LOCATION: ABDOMEN

## 2023-06-05 ASSESSMENT — PAIN DESCRIPTION - PAIN TYPE
TYPE: SURGICAL PAIN

## 2023-06-05 ASSESSMENT — PAIN DESCRIPTION - ORIENTATION
ORIENTATION: LEFT
ORIENTATION: LEFT
ORIENTATION: MID
ORIENTATION: LEFT
ORIENTATION: MID

## 2023-06-05 ASSESSMENT — PAIN SCALES - GENERAL
PAINLEVEL_OUTOF10: 8
PAINLEVEL_OUTOF10: 8
PAINLEVEL_OUTOF10: 5
PAINLEVEL_OUTOF10: 8
PAINLEVEL_OUTOF10: 7
PAINLEVEL_OUTOF10: 8
PAINLEVEL_OUTOF10: 5
PAINLEVEL_OUTOF10: 7
PAINLEVEL_OUTOF10: 8

## 2023-06-05 ASSESSMENT — PAIN DESCRIPTION - DESCRIPTORS
DESCRIPTORS: THROBBING
DESCRIPTORS: THROBBING;SHARP;STABBING
DESCRIPTORS: THROBBING
DESCRIPTORS: THROBBING;SHARP;STABBING

## 2023-06-06 LAB
ALBUMIN SERPL-MCNC: 2.5 G/DL (ref 3.4–5)
ANION GAP SERPL CALCULATED.3IONS-SCNC: 11 MMOL/L (ref 3–16)
BUN SERPL-MCNC: 8 MG/DL (ref 7–20)
CALCIUM SERPL-MCNC: 8.2 MG/DL (ref 8.3–10.6)
CHLORIDE SERPL-SCNC: 102 MMOL/L (ref 99–110)
CO2 SERPL-SCNC: 23 MMOL/L (ref 21–32)
CREAT SERPL-MCNC: 0.8 MG/DL (ref 0.6–1.2)
DEPRECATED RDW RBC AUTO: 16.4 % (ref 12.4–15.4)
GFR SERPLBLD CREATININE-BSD FMLA CKD-EPI: >60 ML/MIN/{1.73_M2}
GLUCOSE SERPL-MCNC: 106 MG/DL (ref 70–99)
HCT VFR BLD AUTO: 30.4 % (ref 36–48)
HGB BLD-MCNC: 10 G/DL (ref 12–16)
MCH RBC QN AUTO: 27.9 PG (ref 26–34)
MCHC RBC AUTO-ENTMCNC: 33.1 G/DL (ref 31–36)
MCV RBC AUTO: 84.4 FL (ref 80–100)
PHOSPHATE SERPL-MCNC: 4.7 MG/DL (ref 2.5–4.9)
PLATELET # BLD AUTO: 316 K/UL (ref 135–450)
PMV BLD AUTO: 7.8 FL (ref 5–10.5)
POTASSIUM SERPL-SCNC: 3.5 MMOL/L (ref 3.5–5.1)
RBC # BLD AUTO: 3.6 M/UL (ref 4–5.2)
SODIUM SERPL-SCNC: 136 MMOL/L (ref 136–145)
WBC # BLD AUTO: 5.5 K/UL (ref 4–11)

## 2023-06-06 PROCEDURE — APPNB45 APP NON BILLABLE 31-45 MINUTES: Performed by: CLINICAL NURSE SPECIALIST

## 2023-06-06 PROCEDURE — 87186 SC STD MICRODIL/AGAR DIL: CPT

## 2023-06-06 PROCEDURE — 1200000000 HC SEMI PRIVATE

## 2023-06-06 PROCEDURE — 36415 COLL VENOUS BLD VENIPUNCTURE: CPT

## 2023-06-06 PROCEDURE — 6370000000 HC RX 637 (ALT 250 FOR IP): Performed by: SURGERY

## 2023-06-06 PROCEDURE — 87075 CULTR BACTERIA EXCEPT BLOOD: CPT

## 2023-06-06 PROCEDURE — 87070 CULTURE OTHR SPECIMN AEROBIC: CPT

## 2023-06-06 PROCEDURE — 87184 SC STD DISK METHOD PER PLATE: CPT

## 2023-06-06 PROCEDURE — 99024 POSTOP FOLLOW-UP VISIT: CPT | Performed by: SURGERY

## 2023-06-06 PROCEDURE — 85027 COMPLETE CBC AUTOMATED: CPT

## 2023-06-06 PROCEDURE — 87205 SMEAR GRAM STAIN: CPT

## 2023-06-06 PROCEDURE — 80069 RENAL FUNCTION PANEL: CPT

## 2023-06-06 PROCEDURE — 6360000002 HC RX W HCPCS: Performed by: CLINICAL NURSE SPECIALIST

## 2023-06-06 PROCEDURE — 6370000000 HC RX 637 (ALT 250 FOR IP): Performed by: CLINICAL NURSE SPECIALIST

## 2023-06-06 PROCEDURE — 2500000003 HC RX 250 WO HCPCS: Performed by: CLINICAL NURSE SPECIALIST

## 2023-06-06 PROCEDURE — 87077 CULTURE AEROBIC IDENTIFY: CPT

## 2023-06-06 PROCEDURE — APPSS30 APP SPLIT SHARED TIME 16-30 MINUTES: Performed by: CLINICAL NURSE SPECIALIST

## 2023-06-06 PROCEDURE — 6360000002 HC RX W HCPCS: Performed by: SURGERY

## 2023-06-06 PROCEDURE — 2580000003 HC RX 258: Performed by: SURGERY

## 2023-06-06 RX ADMIN — CYCLOBENZAPRINE 5 MG: 10 TABLET, FILM COATED ORAL at 21:21

## 2023-06-06 RX ADMIN — NYSTATIN 500000 UNITS: 100000 SUSPENSION ORAL at 21:22

## 2023-06-06 RX ADMIN — CYCLOBENZAPRINE 5 MG: 10 TABLET, FILM COATED ORAL at 10:04

## 2023-06-06 RX ADMIN — NYSTATIN 500000 UNITS: 100000 SUSPENSION ORAL at 13:54

## 2023-06-06 RX ADMIN — ACETAMINOPHEN 325MG 650 MG: 325 TABLET ORAL at 18:06

## 2023-06-06 RX ADMIN — ACETAMINOPHEN 325MG 650 MG: 325 TABLET ORAL at 23:02

## 2023-06-06 RX ADMIN — METRONIDAZOLE 500 MG: 500 INJECTION, SOLUTION INTRAVENOUS at 23:04

## 2023-06-06 RX ADMIN — LIOTHYRONINE SODIUM 5 MCG: 5 TABLET ORAL at 06:12

## 2023-06-06 RX ADMIN — METRONIDAZOLE 500 MG: 500 INJECTION, SOLUTION INTRAVENOUS at 14:08

## 2023-06-06 RX ADMIN — GABAPENTIN 300 MG: 300 CAPSULE ORAL at 21:21

## 2023-06-06 RX ADMIN — OXYCODONE HYDROCHLORIDE 10 MG: 5 TABLET ORAL at 23:02

## 2023-06-06 RX ADMIN — NYSTATIN 500000 UNITS: 100000 SUSPENSION ORAL at 17:59

## 2023-06-06 RX ADMIN — SODIUM CHLORIDE, PRESERVATIVE FREE 10 ML: 5 INJECTION INTRAVENOUS at 10:05

## 2023-06-06 RX ADMIN — FAMOTIDINE 20 MG: 20 TABLET, FILM COATED ORAL at 10:04

## 2023-06-06 RX ADMIN — ENOXAPARIN SODIUM 40 MG: 100 INJECTION SUBCUTANEOUS at 10:05

## 2023-06-06 RX ADMIN — OXYCODONE HYDROCHLORIDE 10 MG: 5 TABLET ORAL at 17:59

## 2023-06-06 RX ADMIN — Medication 400 MG: at 21:21

## 2023-06-06 RX ADMIN — CIPROFLOXACIN 400 MG: 2 INJECTION, SOLUTION INTRAVENOUS at 10:04

## 2023-06-06 RX ADMIN — ACETAMINOPHEN 325MG 650 MG: 325 TABLET ORAL at 03:34

## 2023-06-06 RX ADMIN — CYCLOBENZAPRINE 5 MG: 10 TABLET, FILM COATED ORAL at 13:55

## 2023-06-06 RX ADMIN — ATORVASTATIN CALCIUM 40 MG: 40 TABLET, FILM COATED ORAL at 21:21

## 2023-06-06 RX ADMIN — HYDROMORPHONE HYDROCHLORIDE 0.5 MG: 1 INJECTION, SOLUTION INTRAMUSCULAR; INTRAVENOUS; SUBCUTANEOUS at 10:18

## 2023-06-06 RX ADMIN — LEVOTHYROXINE SODIUM 175 MCG: 175 CAPSULE ORAL at 10:05

## 2023-06-06 RX ADMIN — ACETAMINOPHEN 325MG 650 MG: 325 TABLET ORAL at 10:04

## 2023-06-06 RX ADMIN — GABAPENTIN 300 MG: 300 CAPSULE ORAL at 14:02

## 2023-06-06 RX ADMIN — PROPRANOLOL HYDROCHLORIDE 40 MG: 40 TABLET ORAL at 10:04

## 2023-06-06 RX ADMIN — GABAPENTIN 300 MG: 300 CAPSULE ORAL at 10:04

## 2023-06-06 RX ADMIN — Medication 400 MG: at 10:04

## 2023-06-06 RX ADMIN — PROPRANOLOL HYDROCHLORIDE 40 MG: 40 TABLET ORAL at 21:21

## 2023-06-06 RX ADMIN — METRONIDAZOLE 500 MG: 500 INJECTION, SOLUTION INTRAVENOUS at 03:34

## 2023-06-06 RX ADMIN — FAMOTIDINE 20 MG: 20 TABLET, FILM COATED ORAL at 21:21

## 2023-06-06 ASSESSMENT — PAIN DESCRIPTION - LOCATION
LOCATION: ABDOMEN
LOCATION: ABDOMEN;BACK

## 2023-06-06 ASSESSMENT — PAIN - FUNCTIONAL ASSESSMENT
PAIN_FUNCTIONAL_ASSESSMENT: PREVENTS OR INTERFERES SOME ACTIVE ACTIVITIES AND ADLS
PAIN_FUNCTIONAL_ASSESSMENT: ACTIVITIES ARE NOT PREVENTED

## 2023-06-06 ASSESSMENT — PAIN SCALES - GENERAL
PAINLEVEL_OUTOF10: 8
PAINLEVEL_OUTOF10: 6
PAINLEVEL_OUTOF10: 7
PAINLEVEL_OUTOF10: 7
PAINLEVEL_OUTOF10: 5
PAINLEVEL_OUTOF10: 6
PAINLEVEL_OUTOF10: 5
PAINLEVEL_OUTOF10: 6

## 2023-06-06 ASSESSMENT — PAIN DESCRIPTION - DESCRIPTORS
DESCRIPTORS: THROBBING

## 2023-06-06 ASSESSMENT — PAIN DESCRIPTION - ORIENTATION
ORIENTATION: LEFT
ORIENTATION: LEFT

## 2023-06-06 NOTE — CARE COORDINATION
Spoke with Patient re need for [facility] agreeable to referral to [facility] as used in the past. Referral sent and following faxed updated face sheet and clinicals to office. Ph [phone] fax [phone]. PADDY Kc

## 2023-06-07 LAB
ALBUMIN SERPL-MCNC: 2.7 G/DL (ref 3.4–5)
ANION GAP SERPL CALCULATED.3IONS-SCNC: 12 MMOL/L (ref 3–16)
BUN SERPL-MCNC: 8 MG/DL (ref 7–20)
CALCIUM SERPL-MCNC: 8.2 MG/DL (ref 8.3–10.6)
CHLORIDE SERPL-SCNC: 99 MMOL/L (ref 99–110)
CO2 SERPL-SCNC: 24 MMOL/L (ref 21–32)
CREAT SERPL-MCNC: 0.7 MG/DL (ref 0.6–1.2)
DEPRECATED RDW RBC AUTO: 16.4 % (ref 12.4–15.4)
GFR SERPLBLD CREATININE-BSD FMLA CKD-EPI: >60 ML/MIN/{1.73_M2}
GLUCOSE SERPL-MCNC: 104 MG/DL (ref 70–99)
HCT VFR BLD AUTO: 30.6 % (ref 36–48)
HGB BLD-MCNC: 10.2 G/DL (ref 12–16)
MCH RBC QN AUTO: 27.8 PG (ref 26–34)
MCHC RBC AUTO-ENTMCNC: 33.2 G/DL (ref 31–36)
MCV RBC AUTO: 83.8 FL (ref 80–100)
PHOSPHATE SERPL-MCNC: 4.4 MG/DL (ref 2.5–4.9)
PLATELET # BLD AUTO: 372 K/UL (ref 135–450)
PMV BLD AUTO: 7.5 FL (ref 5–10.5)
POTASSIUM SERPL-SCNC: 3.6 MMOL/L (ref 3.5–5.1)
RBC # BLD AUTO: 3.65 M/UL (ref 4–5.2)
SODIUM SERPL-SCNC: 135 MMOL/L (ref 136–145)
WBC # BLD AUTO: 6.8 K/UL (ref 4–11)

## 2023-06-07 PROCEDURE — 6360000002 HC RX W HCPCS: Performed by: CLINICAL NURSE SPECIALIST

## 2023-06-07 PROCEDURE — 1200000000 HC SEMI PRIVATE

## 2023-06-07 PROCEDURE — 6360000002 HC RX W HCPCS: Performed by: SURGERY

## 2023-06-07 PROCEDURE — 2500000003 HC RX 250 WO HCPCS: Performed by: CLINICAL NURSE SPECIALIST

## 2023-06-07 PROCEDURE — 99024 POSTOP FOLLOW-UP VISIT: CPT | Performed by: SURGERY

## 2023-06-07 PROCEDURE — 85027 COMPLETE CBC AUTOMATED: CPT

## 2023-06-07 PROCEDURE — 80069 RENAL FUNCTION PANEL: CPT

## 2023-06-07 PROCEDURE — 6370000000 HC RX 637 (ALT 250 FOR IP): Performed by: SURGERY

## 2023-06-07 PROCEDURE — 36415 COLL VENOUS BLD VENIPUNCTURE: CPT

## 2023-06-07 RX ADMIN — Medication 400 MG: at 08:52

## 2023-06-07 RX ADMIN — ACETAMINOPHEN 325MG 650 MG: 325 TABLET ORAL at 21:45

## 2023-06-07 RX ADMIN — ATORVASTATIN CALCIUM 40 MG: 40 TABLET, FILM COATED ORAL at 21:38

## 2023-06-07 RX ADMIN — FAMOTIDINE 20 MG: 20 TABLET, FILM COATED ORAL at 21:38

## 2023-06-07 RX ADMIN — CYCLOBENZAPRINE 5 MG: 10 TABLET, FILM COATED ORAL at 15:29

## 2023-06-07 RX ADMIN — METRONIDAZOLE 500 MG: 500 INJECTION, SOLUTION INTRAVENOUS at 15:53

## 2023-06-07 RX ADMIN — PROMETHAZINE HYDROCHLORIDE 12.5 MG: 25 TABLET ORAL at 21:46

## 2023-06-07 RX ADMIN — GABAPENTIN 300 MG: 300 CAPSULE ORAL at 08:53

## 2023-06-07 RX ADMIN — ACETAMINOPHEN 325MG 650 MG: 325 TABLET ORAL at 10:36

## 2023-06-07 RX ADMIN — PROPRANOLOL HYDROCHLORIDE 40 MG: 40 TABLET ORAL at 21:37

## 2023-06-07 RX ADMIN — OXYCODONE HYDROCHLORIDE 10 MG: 5 TABLET ORAL at 23:07

## 2023-06-07 RX ADMIN — PROPRANOLOL HYDROCHLORIDE 40 MG: 40 TABLET ORAL at 08:52

## 2023-06-07 RX ADMIN — CIPROFLOXACIN 400 MG: 2 INJECTION, SOLUTION INTRAVENOUS at 23:10

## 2023-06-07 RX ADMIN — Medication 400 MG: at 21:38

## 2023-06-07 RX ADMIN — CYCLOBENZAPRINE 5 MG: 10 TABLET, FILM COATED ORAL at 08:52

## 2023-06-07 RX ADMIN — CYCLOBENZAPRINE 5 MG: 10 TABLET, FILM COATED ORAL at 21:38

## 2023-06-07 RX ADMIN — OXYCODONE HYDROCHLORIDE 5 MG: 5 TABLET ORAL at 18:45

## 2023-06-07 RX ADMIN — LEVOTHYROXINE SODIUM 175 MCG: 175 CAPSULE ORAL at 08:56

## 2023-06-07 RX ADMIN — ACETAMINOPHEN 325MG 650 MG: 325 TABLET ORAL at 05:01

## 2023-06-07 RX ADMIN — LIOTHYRONINE SODIUM 5 MCG: 5 TABLET ORAL at 06:47

## 2023-06-07 RX ADMIN — METRONIDAZOLE 500 MG: 500 INJECTION, SOLUTION INTRAVENOUS at 21:41

## 2023-06-07 RX ADMIN — OXYCODONE HYDROCHLORIDE 10 MG: 5 TABLET ORAL at 06:49

## 2023-06-07 RX ADMIN — GABAPENTIN 300 MG: 300 CAPSULE ORAL at 15:29

## 2023-06-07 RX ADMIN — ACETAMINOPHEN 325MG 650 MG: 325 TABLET ORAL at 18:42

## 2023-06-07 RX ADMIN — CIPROFLOXACIN 400 MG: 2 INJECTION, SOLUTION INTRAVENOUS at 00:33

## 2023-06-07 RX ADMIN — FAMOTIDINE 20 MG: 20 TABLET, FILM COATED ORAL at 08:52

## 2023-06-07 RX ADMIN — GABAPENTIN 300 MG: 300 CAPSULE ORAL at 21:38

## 2023-06-07 RX ADMIN — ENOXAPARIN SODIUM 40 MG: 100 INJECTION SUBCUTANEOUS at 08:53

## 2023-06-07 RX ADMIN — METRONIDAZOLE 500 MG: 500 INJECTION, SOLUTION INTRAVENOUS at 06:47

## 2023-06-07 ASSESSMENT — PAIN SCALES - GENERAL
PAINLEVEL_OUTOF10: 7
PAINLEVEL_OUTOF10: 5
PAINLEVEL_OUTOF10: 7
PAINLEVEL_OUTOF10: 5

## 2023-06-07 NOTE — CARE COORDINATION
Hospital day 8: Patient on C3  care managed by General surgery and IM. Patient from home with family supports is agreeable to [place] [id number]. Referral sent to [facility] accepted, will need [place] [address] orders and signed MARIALUISA when dc, they are aware of nursing need ie packing cite. SW following. PADDY Fine

## 2023-06-08 LAB
ALBUMIN SERPL-MCNC: 2.6 G/DL (ref 3.4–5)
ANION GAP SERPL CALCULATED.3IONS-SCNC: 14 MMOL/L (ref 3–16)
BUN SERPL-MCNC: 7 MG/DL (ref 7–20)
CALCIUM SERPL-MCNC: 8.1 MG/DL (ref 8.3–10.6)
CHLORIDE SERPL-SCNC: 101 MMOL/L (ref 99–110)
CO2 SERPL-SCNC: 22 MMOL/L (ref 21–32)
CREAT SERPL-MCNC: 0.7 MG/DL (ref 0.6–1.2)
DEPRECATED RDW RBC AUTO: 16.3 % (ref 12.4–15.4)
GFR SERPLBLD CREATININE-BSD FMLA CKD-EPI: >60 ML/MIN/{1.73_M2}
GLUCOSE SERPL-MCNC: 105 MG/DL (ref 70–99)
HCT VFR BLD AUTO: 31.2 % (ref 36–48)
HGB BLD-MCNC: 10.4 G/DL (ref 12–16)
MCH RBC QN AUTO: 27.7 PG (ref 26–34)
MCHC RBC AUTO-ENTMCNC: 33.3 G/DL (ref 31–36)
MCV RBC AUTO: 83.2 FL (ref 80–100)
PHOSPHATE SERPL-MCNC: 4.3 MG/DL (ref 2.5–4.9)
PLATELET # BLD AUTO: 416 K/UL (ref 135–450)
PMV BLD AUTO: 7.5 FL (ref 5–10.5)
POTASSIUM SERPL-SCNC: 3.7 MMOL/L (ref 3.5–5.1)
RBC # BLD AUTO: 3.75 M/UL (ref 4–5.2)
SODIUM SERPL-SCNC: 137 MMOL/L (ref 136–145)
WBC # BLD AUTO: 6.6 K/UL (ref 4–11)

## 2023-06-08 PROCEDURE — 2500000003 HC RX 250 WO HCPCS: Performed by: SURGERY

## 2023-06-08 PROCEDURE — 80069 RENAL FUNCTION PANEL: CPT

## 2023-06-08 PROCEDURE — 1200000000 HC SEMI PRIVATE

## 2023-06-08 PROCEDURE — 85027 COMPLETE CBC AUTOMATED: CPT

## 2023-06-08 PROCEDURE — 99024 POSTOP FOLLOW-UP VISIT: CPT | Performed by: SURGERY

## 2023-06-08 PROCEDURE — 6360000002 HC RX W HCPCS: Performed by: SURGERY

## 2023-06-08 PROCEDURE — 6370000000 HC RX 637 (ALT 250 FOR IP): Performed by: SURGERY

## 2023-06-08 PROCEDURE — 2500000003 HC RX 250 WO HCPCS: Performed by: CLINICAL NURSE SPECIALIST

## 2023-06-08 PROCEDURE — APPSS30 APP SPLIT SHARED TIME 16-30 MINUTES: Performed by: CLINICAL NURSE SPECIALIST

## 2023-06-08 PROCEDURE — 2580000003 HC RX 258: Performed by: SURGERY

## 2023-06-08 PROCEDURE — 6360000002 HC RX W HCPCS: Performed by: CLINICAL NURSE SPECIALIST

## 2023-06-08 RX ADMIN — ENOXAPARIN SODIUM 40 MG: 100 INJECTION SUBCUTANEOUS at 09:51

## 2023-06-08 RX ADMIN — CIPROFLOXACIN 400 MG: 2 INJECTION, SOLUTION INTRAVENOUS at 09:51

## 2023-06-08 RX ADMIN — ACETAMINOPHEN 325MG 650 MG: 325 TABLET ORAL at 09:54

## 2023-06-08 RX ADMIN — PROPRANOLOL HYDROCHLORIDE 40 MG: 40 TABLET ORAL at 21:37

## 2023-06-08 RX ADMIN — SODIUM CHLORIDE, PRESERVATIVE FREE 10 ML: 5 INJECTION INTRAVENOUS at 21:37

## 2023-06-08 RX ADMIN — GABAPENTIN 300 MG: 300 CAPSULE ORAL at 14:43

## 2023-06-08 RX ADMIN — METRONIDAZOLE 500 MG: 500 INJECTION, SOLUTION INTRAVENOUS at 22:55

## 2023-06-08 RX ADMIN — CYCLOBENZAPRINE 5 MG: 10 TABLET, FILM COATED ORAL at 21:37

## 2023-06-08 RX ADMIN — METRONIDAZOLE 500 MG: 500 INJECTION, SOLUTION INTRAVENOUS at 14:45

## 2023-06-08 RX ADMIN — FAMOTIDINE 20 MG: 20 TABLET, FILM COATED ORAL at 21:37

## 2023-06-08 RX ADMIN — ACETAMINOPHEN 325MG 650 MG: 325 TABLET ORAL at 05:13

## 2023-06-08 RX ADMIN — OXYCODONE HYDROCHLORIDE 10 MG: 5 TABLET ORAL at 22:57

## 2023-06-08 RX ADMIN — ACETAMINOPHEN 325MG 650 MG: 325 TABLET ORAL at 22:57

## 2023-06-08 RX ADMIN — OXYCODONE HYDROCHLORIDE 10 MG: 5 TABLET ORAL at 16:59

## 2023-06-08 RX ADMIN — FAMOTIDINE 20 MG: 10 INJECTION, SOLUTION INTRAVENOUS at 09:49

## 2023-06-08 RX ADMIN — Medication 400 MG: at 09:54

## 2023-06-08 RX ADMIN — Medication 400 MG: at 21:37

## 2023-06-08 RX ADMIN — SODIUM CHLORIDE: 9 INJECTION, SOLUTION INTRAVENOUS at 22:54

## 2023-06-08 RX ADMIN — GABAPENTIN 300 MG: 300 CAPSULE ORAL at 21:37

## 2023-06-08 RX ADMIN — ACETAMINOPHEN 325MG 650 MG: 325 TABLET ORAL at 17:02

## 2023-06-08 RX ADMIN — ATORVASTATIN CALCIUM 40 MG: 40 TABLET, FILM COATED ORAL at 21:37

## 2023-06-08 RX ADMIN — PROPRANOLOL HYDROCHLORIDE 40 MG: 40 TABLET ORAL at 09:54

## 2023-06-08 RX ADMIN — LIOTHYRONINE SODIUM 5 MCG: 5 TABLET ORAL at 05:13

## 2023-06-08 RX ADMIN — LEVOTHYROXINE SODIUM 175 MCG: 175 CAPSULE ORAL at 09:56

## 2023-06-08 RX ADMIN — METRONIDAZOLE 500 MG: 500 INJECTION, SOLUTION INTRAVENOUS at 05:16

## 2023-06-08 RX ADMIN — CYCLOBENZAPRINE 5 MG: 10 TABLET, FILM COATED ORAL at 09:54

## 2023-06-08 RX ADMIN — CYCLOBENZAPRINE 5 MG: 10 TABLET, FILM COATED ORAL at 14:42

## 2023-06-08 RX ADMIN — GABAPENTIN 300 MG: 300 CAPSULE ORAL at 09:55

## 2023-06-08 ASSESSMENT — PAIN SCALES - GENERAL
PAINLEVEL_OUTOF10: 7
PAINLEVEL_OUTOF10: 5
PAINLEVEL_OUTOF10: 7

## 2023-06-09 VITALS
SYSTOLIC BLOOD PRESSURE: 119 MMHG | BODY MASS INDEX: 36.89 KG/M2 | TEMPERATURE: 99 F | RESPIRATION RATE: 16 BRPM | HEART RATE: 76 BPM | HEIGHT: 65 IN | OXYGEN SATURATION: 95 % | DIASTOLIC BLOOD PRESSURE: 78 MMHG | WEIGHT: 221.4 LBS

## 2023-06-09 PROBLEM — A49.9 ESBL (EXTENDED SPECTRUM BETA-LACTAMASE) PRODUCING BACTERIA INFECTION: Status: ACTIVE | Noted: 2023-06-09

## 2023-06-09 PROBLEM — Z16.12 ESBL (EXTENDED SPECTRUM BETA-LACTAMASE) PRODUCING BACTERIA INFECTION: Status: ACTIVE | Noted: 2023-06-09

## 2023-06-09 PROBLEM — Z71.89 COMPLEX CARE COORDINATION: Status: ACTIVE | Noted: 2023-06-09

## 2023-06-09 PROBLEM — T81.42XA DEEP INCISIONAL SURGICAL SITE INFECTION: Status: ACTIVE | Noted: 2023-06-09

## 2023-06-09 LAB
ALBUMIN SERPL-MCNC: 2.8 G/DL (ref 3.4–5)
ANION GAP SERPL CALCULATED.3IONS-SCNC: 13 MMOL/L (ref 3–16)
BUN SERPL-MCNC: 9 MG/DL (ref 7–20)
CALCIUM SERPL-MCNC: 8.3 MG/DL (ref 8.3–10.6)
CHLORIDE SERPL-SCNC: 103 MMOL/L (ref 99–110)
CO2 SERPL-SCNC: 22 MMOL/L (ref 21–32)
CREAT SERPL-MCNC: 0.7 MG/DL (ref 0.6–1.2)
DEPRECATED RDW RBC AUTO: 16.7 % (ref 12.4–15.4)
GFR SERPLBLD CREATININE-BSD FMLA CKD-EPI: >60 ML/MIN/{1.73_M2}
GLUCOSE SERPL-MCNC: 110 MG/DL (ref 70–99)
HCT VFR BLD AUTO: 41 % (ref 36–48)
HGB BLD-MCNC: 13.2 G/DL (ref 12–16)
INR PPP: 1.2 (ref 0.84–1.16)
MCH RBC QN AUTO: 27.1 PG (ref 26–34)
MCHC RBC AUTO-ENTMCNC: 32 G/DL (ref 31–36)
MCV RBC AUTO: 84.6 FL (ref 80–100)
PHOSPHATE SERPL-MCNC: 4.6 MG/DL (ref 2.5–4.9)
PLATELET # BLD AUTO: 323 K/UL (ref 135–450)
PMV BLD AUTO: 7.4 FL (ref 5–10.5)
POTASSIUM SERPL-SCNC: 3.5 MMOL/L (ref 3.5–5.1)
PROTHROMBIN TIME: 15.2 SEC (ref 11.5–14.8)
RBC # BLD AUTO: 4.85 M/UL (ref 4–5.2)
SODIUM SERPL-SCNC: 138 MMOL/L (ref 136–145)
WBC # BLD AUTO: 5.8 K/UL (ref 4–11)

## 2023-06-09 PROCEDURE — 99024 POSTOP FOLLOW-UP VISIT: CPT | Performed by: SURGERY

## 2023-06-09 PROCEDURE — 6360000002 HC RX W HCPCS: Performed by: INTERNAL MEDICINE

## 2023-06-09 PROCEDURE — 6360000002 HC RX W HCPCS: Performed by: SURGERY

## 2023-06-09 PROCEDURE — 6360000002 HC RX W HCPCS: Performed by: CLINICAL NURSE SPECIALIST

## 2023-06-09 PROCEDURE — 36569 INSJ PICC 5 YR+ W/O IMAGING: CPT

## 2023-06-09 PROCEDURE — 85610 PROTHROMBIN TIME: CPT

## 2023-06-09 PROCEDURE — 99255 IP/OBS CONSLTJ NEW/EST HI 80: CPT | Performed by: INTERNAL MEDICINE

## 2023-06-09 PROCEDURE — 6370000000 HC RX 637 (ALT 250 FOR IP): Performed by: INTERNAL MEDICINE

## 2023-06-09 PROCEDURE — 6370000000 HC RX 637 (ALT 250 FOR IP): Performed by: SURGERY

## 2023-06-09 PROCEDURE — 2580000003 HC RX 258: Performed by: INTERNAL MEDICINE

## 2023-06-09 PROCEDURE — 2500000003 HC RX 250 WO HCPCS: Performed by: CLINICAL NURSE SPECIALIST

## 2023-06-09 PROCEDURE — 80069 RENAL FUNCTION PANEL: CPT

## 2023-06-09 PROCEDURE — C1751 CATH, INF, PER/CENT/MIDLINE: HCPCS

## 2023-06-09 PROCEDURE — 76937 US GUIDE VASCULAR ACCESS: CPT

## 2023-06-09 PROCEDURE — 36415 COLL VENOUS BLD VENIPUNCTURE: CPT

## 2023-06-09 PROCEDURE — 85027 COMPLETE CBC AUTOMATED: CPT

## 2023-06-09 RX ORDER — SODIUM CHLORIDE 0.9 % (FLUSH) 0.9 %
5-40 SYRINGE (ML) INJECTION PRN
Status: DISCONTINUED | OUTPATIENT
Start: 2023-06-09 | End: 2023-06-09 | Stop reason: HOSPADM

## 2023-06-09 RX ORDER — AMOXICILLIN 500 MG/1
500 CAPSULE ORAL EVERY 8 HOURS SCHEDULED
Qty: 42 CAPSULE | Refills: 0 | Status: SHIPPED | OUTPATIENT
Start: 2023-06-09 | End: 2023-06-23

## 2023-06-09 RX ORDER — LIDOCAINE HYDROCHLORIDE 10 MG/ML
5 INJECTION, SOLUTION INFILTRATION; PERINEURAL ONCE
Status: DISCONTINUED | OUTPATIENT
Start: 2023-06-09 | End: 2023-06-09 | Stop reason: HOSPADM

## 2023-06-09 RX ORDER — OXYCODONE HYDROCHLORIDE AND ACETAMINOPHEN 5; 325 MG/1; MG/1
1 TABLET ORAL EVERY 6 HOURS PRN
Qty: 20 TABLET | Refills: 0 | Status: SHIPPED | OUTPATIENT
Start: 2023-06-09 | End: 2023-06-14

## 2023-06-09 RX ORDER — SODIUM CHLORIDE 9 MG/ML
25 INJECTION, SOLUTION INTRAVENOUS PRN
Status: DISCONTINUED | OUTPATIENT
Start: 2023-06-09 | End: 2023-06-09 | Stop reason: HOSPADM

## 2023-06-09 RX ORDER — SODIUM CHLORIDE 0.9 % (FLUSH) 0.9 %
5-40 SYRINGE (ML) INJECTION EVERY 12 HOURS SCHEDULED
Status: DISCONTINUED | OUTPATIENT
Start: 2023-06-09 | End: 2023-06-09 | Stop reason: HOSPADM

## 2023-06-09 RX ORDER — AMOXICILLIN 250 MG/1
500 CAPSULE ORAL EVERY 8 HOURS SCHEDULED
Status: DISCONTINUED | OUTPATIENT
Start: 2023-06-09 | End: 2023-06-09 | Stop reason: HOSPADM

## 2023-06-09 RX ADMIN — GABAPENTIN 300 MG: 300 CAPSULE ORAL at 14:37

## 2023-06-09 RX ADMIN — LEVOTHYROXINE SODIUM 175 MCG: 175 CAPSULE ORAL at 10:04

## 2023-06-09 RX ADMIN — OXYCODONE HYDROCHLORIDE 10 MG: 5 TABLET ORAL at 10:04

## 2023-06-09 RX ADMIN — ACETAMINOPHEN 325MG 650 MG: 325 TABLET ORAL at 05:39

## 2023-06-09 RX ADMIN — ACETAMINOPHEN 325MG 650 MG: 325 TABLET ORAL at 11:30

## 2023-06-09 RX ADMIN — PROPRANOLOL HYDROCHLORIDE 40 MG: 40 TABLET ORAL at 10:09

## 2023-06-09 RX ADMIN — ERTAPENEM SODIUM 1000 MG: 1 INJECTION INTRAMUSCULAR; INTRAVENOUS at 14:41

## 2023-06-09 RX ADMIN — CIPROFLOXACIN 400 MG: 2 INJECTION, SOLUTION INTRAVENOUS at 10:15

## 2023-06-09 RX ADMIN — METRONIDAZOLE 500 MG: 500 INJECTION, SOLUTION INTRAVENOUS at 05:39

## 2023-06-09 RX ADMIN — Medication 400 MG: at 10:08

## 2023-06-09 RX ADMIN — ENOXAPARIN SODIUM 40 MG: 100 INJECTION SUBCUTANEOUS at 10:08

## 2023-06-09 RX ADMIN — GABAPENTIN 300 MG: 300 CAPSULE ORAL at 10:04

## 2023-06-09 RX ADMIN — AMOXICILLIN 500 MG: 250 CAPSULE ORAL at 14:37

## 2023-06-09 RX ADMIN — LIOTHYRONINE SODIUM 5 MCG: 5 TABLET ORAL at 05:39

## 2023-06-09 RX ADMIN — CYCLOBENZAPRINE 5 MG: 10 TABLET, FILM COATED ORAL at 10:09

## 2023-06-09 RX ADMIN — FAMOTIDINE 20 MG: 20 TABLET, FILM COATED ORAL at 10:08

## 2023-06-09 RX ADMIN — CIPROFLOXACIN 400 MG: 2 INJECTION, SOLUTION INTRAVENOUS at 00:19

## 2023-06-09 RX ADMIN — CYCLOBENZAPRINE 5 MG: 10 TABLET, FILM COATED ORAL at 14:37

## 2023-06-09 ASSESSMENT — PAIN DESCRIPTION - DESCRIPTORS
DESCRIPTORS: BURNING;THROBBING
DESCRIPTORS: BURNING;THROBBING

## 2023-06-09 ASSESSMENT — PAIN DESCRIPTION - ORIENTATION
ORIENTATION: LOWER
ORIENTATION: MID;LOWER
ORIENTATION: LOWER

## 2023-06-09 ASSESSMENT — PAIN DESCRIPTION - LOCATION
LOCATION: ABDOMEN

## 2023-06-09 ASSESSMENT — PAIN SCALES - GENERAL
PAINLEVEL_OUTOF10: 6
PAINLEVEL_OUTOF10: 7
PAINLEVEL_OUTOF10: 6

## 2023-06-09 NOTE — CARE COORDINATION
Spoke with Patient this am update anticipated dc okay for referral to [facility]. Spoke with [address] updated on IV ATB need. [address]. inquiring on wound care needs. Writer spoke with RN will get clarified. SW following. PADDY Mitchell   CASE MANAGEMENT DISCHARGE SUMMARY    Discharge to: Home with family supports once PICC placed and received 1st ATB , [address] aware of IV ATB and wound care needs     IMM given: (date) NA    New Durable Medical Equipment ordered/agency: IV ATB thru [facility] office     Transportation: via private car    Confirmed discharge plan with: Patient bedside, [facility] also met with Patient bedside     Facility/Agency, name: [facility] faxed     RN, name: Nilsa Jenkins RN    Note: Discharging nurse to complete MARIALUISA, reconcile AVS, and place final copy with patient's discharge packet.    PADDY Mitchell

## 2023-06-09 NOTE — DISCHARGE INSTR - COC
05/30/2023    Readmission Risk Assessment Score:  Readmission Risk              Risk of Unplanned Readmission:  18         Discharging to Facility/ Agency   Name: [address]   Phone:[phone]  Fax:[phone]    [facility]   Ph [phone]    / signature: Electronically signed by PADDY Fine on 6/9/23 at 12:21 PM EDT    PHYSICIAN SECTION    Prognosis: Good    Condition at Discharge: Stable    Rehab Potential (if transferring to Rehab): Good    Recommended Labs or Other Treatments After Discharge: wound care see home health care orders      Recommended Follow-up, Labs or Other Treatments After Discharge:       ID INFUSION ORDERS   Ertapenem 1g IV q24 continued through 6/23/23  Diagnosis SSI with MDRO   qMonday CBC diff, BUN, creatinine, LFTs faxed to [phone]   Call wit hID related concerns [phone]   Routine CVC care  Meenu Greer MD       Physician Certification: I certify the above information and transfer of Scooby Roca  is necessary for the continuing treatment of the diagnosis listed and that she requires Home Care for greater 30 days.      Update Admission H&P: No change in H&P    PHYSICIAN SIGNATURE:  Electronically signed by Pacheco Briggs MD on 6/9/23 at 2:21 PM EDT

## 2023-06-09 NOTE — CONSULTS
Consult Call Back    Who:Dr. Angeline Stewart  Date:6/9/2023,  Time:11:31 AM    Electronically signed by Soumya Goff on 6/9/23 at 11:31 AM EDT
Consult Placed   Consult sent through [address] [place] [address] Avenue: Adela Quan  Date:6/9/2023  Time:8:34     Electronically signed by Antonino Clarke on 6/9/2023 at 8:34 AM
exam  ACCESS:  PIV in place       DATA:    Old records have been reviewed    CBC:  Recent Labs     06/07/23  0454 06/08/23  0634 06/09/23  0534   WBC 6.8 6.6 5.8   RBC 3.65* 3.75* 4.85   HGB 10.2* 10.4* 13.2   HCT 30.6* 31.2* 41.0    416 323   MCV 83.8 83.2 84.6   MCH 27.8 27.7 27.1   MCHC 33.2 33.3 32.0   RDW 16.4* 16.3* 16.7*      BMP:  Recent Labs     06/07/23 0454 06/08/23  0634 06/09/23  0534   * 137 138   K 3.6 3.7 3.5   CL 99 101 103   CO2 24 22 22   BUN 8 7 9   CREATININE 0.7 0.7 0.7   CALCIUM 8.2* 8.1* 8.3   GLUCOSE 104* 105* 110*        Cultures:   5/31 BC x2 neg  6/6 Wound culture amp-S E faecalis and ESBL E coli ; GS with 1+ GNR   Enterococcus faecalis  Antibiotic Interpretation Microscan  Method Status    ampicillin Sensitive <=2 mcg/mL BACTERIAL SUSCEPTIBILITY PANEL BY ALEKS     ciprofloxacin Sensitive <=0.5 mcg/mL BACTERIAL SUSCEPTIBILITY PANEL BY ALEKS     levofloxacin Sensitive 0.5 mcg/mL BACTERIAL SUSCEPTIBILITY PANEL BY ALEKS     vancomycin Sensitive 1 mcg/mL BACTERIAL SUSCEPTIBILITY PANEL BY ALEKS       Escherichia coli ESBL  Antibiotic Interpretation Microscan  Method Status    amoxicillin-clavulanate Intermediate 16/8 mcg/mL BACTERIAL SUSCEPTIBILITY PANEL BY ALEKS     ampicillin Resistant >16 mcg/mL BACTERIAL SUSCEPTIBILITY PANEL BY ALEKS     ampicillin-sulbactam Resistant >16/8 mcg/mL BACTERIAL SUSCEPTIBILITY PANEL BY ALKES     ceFAZolin Resistant >16 mcg/mL BACTERIAL SUSCEPTIBILITY PANEL BY ALEKS     cefepime Resistant >16 mcg/mL BACTERIAL SUSCEPTIBILITY PANEL BY ALEKS     cefTRIAXone Resistant >32 mcg/mL BACTERIAL SUSCEPTIBILITY PANEL BY ALEKS     cefuroxime Resistant >16 mcg/mL BACTERIAL SUSCEPTIBILITY PANEL BY ALEKS     ciprofloxacin Resistant >2 mcg/mL BACTERIAL SUSCEPTIBILITY PANEL BY ALEKS     ertapenem Sensitive <=0.5 mcg/mL BACTERIAL SUSCEPTIBILITY PANEL BY ALEKS     gentamicin Resistant >8 mcg/mL BACTERIAL SUSCEPTIBILITY PANEL BY ALEKS     meropenem Sensitive <=1 mcg/mL BACTERIAL SUSCEPTIBILITY

## 2023-06-09 NOTE — PROGRESS NOTES
[facility] GENERAL SURGERY    Surgery Progress Note           POD # 8    PATIENT NAME: Black Cha     TODAY'S DATE: 6/7/2023    INTERVAL HISTORY:    Pt  feeling well, less pain since the wounds were drained yesterday. Eating well, bowels functioning. OBJECTIVE:   VITALS:  /83   Pulse 79   Temp 98.2 °F (36.8 °C) (Oral)   Resp 18   Ht 5' 5\" (1.651 m)   Wt 223 lb 5 oz (101.3 kg)   SpO2 96%   BMI 37.16 kg/m²     INTAKE/OUTPUT:    I/O last 3 completed shifts: In: 1388.9 [P.O.:720; IV Piggyback:668.9]  Out: 5 [Drains:5]  I/O this shift: In: 18 [P.O.:480]  Out: -               CONSTITUTIONAL:  awake and alert  LUNGS:     ABDOMEN:    , soft, non-distended, non-tender   INCISION: clean, healing, sero-sanguinous drainage from midline and L-sided incisions, packing removed    Data:  CBC:   Recent Labs     06/05/23  0630 06/06/23  0649 06/07/23  0454   WBC 7.1 5.5 6.8   HGB 10.3* 10.0* 10.2*   HCT 31.4* 30.4* 30.6*    316 372     BMP:    Recent Labs     06/05/23  0630 06/06/23  0649 06/07/23  0454   * 136 135*   K 3.5 3.5 3.6    102 99   CO2 21 23 24   BUN 6* 8 8   CREATININE 0.7 0.8 0.7   GLUCOSE 113* 106* 104*     Hepatic: No results for input(s): AST, ALT, ALB, BILITOT, ALKPHOS in the last 72 hours. Mag:    No results for input(s): MG in the last 72 hours. Phos:     Recent Labs     06/05/23  0630 06/06/23  0649 06/07/23  0454   PHOS 4.7 4.7 4.4      INR: No results for input(s): INR in the last 72 hours.       Radiology Review:       ASSESSMENT AND PLAN:  66 y.o. female status post colostomy reversal, with drainage of wound seromas   - continue local wound care   - cont IV abx   - diet as tolerated   - should be ready for d/c home on PO antibiotics in a 1-2 days         Electronically signed by Gerda Courtney MD
[facility] GENERAL SURGERY    Surgery Progress Note           POD # 7    PATIENT NAME: Fuad Hawthorne     TODAY'S DATE: 6/6/2023    INTERVAL HISTORY:    Continues with pain left flank and midline incision. Dom diet, bowels are working  Tmax 101.1     OBJECTIVE:   VITALS:  /72   Pulse 83   Temp 98.1 °F (36.7 °C) (Oral)   Resp 20   Ht 5' 5\" (1.651 m)   Wt 223 lb 5 oz (101.3 kg)   SpO2 99%   BMI 37.16 kg/m²     INTAKE/OUTPUT:    I/O last 3 completed shifts: In: 1830 [P.O.:480; IV Piggyback:1350]  Out: 20 [Drains:20]  I/O this shift:  In: 240 [P.O.:240]  Out: -               CONSTITUTIONAL:  awake and alert  CVD: RRR  ABDOMEN:   + bowel sounds, soft,nondistended,j p serosanguinous   INCISION: ostomy takedown site with packing changed, min drainage - midline incision with 3 staples removed - drainage serous    Data:  CBC:   Recent Labs     06/04/23  0531 06/05/23  0630 06/06/23  0649   WBC 7.4 7.1 5.5   HGB 10.5* 10.3* 10.0*   HCT 32.6* 31.4* 30.4*    323 316       BMP:    Recent Labs     06/04/23  0532 06/05/23  0630 06/06/23  0649    135* 136   K 4.0 3.5 3.5    101 102   CO2 19* 21 23   BUN 5* 6* 8   CREATININE 0.6 0.7 0.8   GLUCOSE 105* 113* 106*       EXAMINATION:   CT OF THE ABDOMEN AND PELVIS WITH CONTRAST 6/5/2023 2:27 pm     TECHNIQUE:   CT of the abdomen and pelvis was performed with the administration of   intravenous contrast. Multiplanar reformatted images are provided for review. Automated exposure control, iterative reconstruction, and/or weight based   adjustment of the mA/kV was utilized to reduce the radiation dose to as low   as reasonably achievable.      COMPARISON:   02/28/2023     HISTORY:   ORDERING SYSTEM PROVIDED HISTORY: PO colostomy takedown, increased abd pain   TECHNOLOGIST PROVIDED HISTORY:   Additional Contrast?->Oral   Reason for exam:->PO colostomy takedown, increased abd pain   Reason for Exam: pt had colostomy 2/23 due to diverticulitis, had it
Comprehensive Nutrition Assessment    Type and Reason for Visit:  Reassess    Nutrition Recommendations/Plan:   Continue regular; low fiber diet  Encourage po intakes  Monitor nutrition adequacy, pertinent labs, bowel habits, wt changes, and clinical progress     Malnutrition Assessment:  Malnutrition Status: At risk for malnutrition (Comment) (06/02/23 1405)    Context:  Acute Illness       Nutrition Assessment:    Follow up: Diet advanced to regular; low fiber 6/4. Majority PO intakes % per EMR. Pt reports having an appetite and being able to eat all of her meals. Denies N/V. Pt denied any nutrition questions, requested materials on low fiber foods; RD to provide. Wt appears stable per EMR. Will continue to monitor. Nutrition Related Findings:    BM 6/6, active BS, +diarrhea. Trace BLE edema. Na 135. Wound Type: Surgical Incision       Current Nutrition Intake & Therapies:    Average Meal Intake: %, 51-75%, 1-25%  Average Supplements Intake: None Ordered  ADULT DIET; Regular; Low Fiber    Anthropometric Measures:  Height: 5' 5\" (165.1 cm)  Ideal Body Weight (IBW): 125 lbs (57 kg)       Current Body Weight: 223 lb 5.2 oz (101.3 kg),   IBW.  Weight Source: Standing Scale  Current BMI (kg/m2): 37.2        Weight Adjustment For: No Adjustment                 BMI Categories: Obese Class 2 (BMI 35.0 -39.9)    Estimated Daily Nutrient Needs:  Energy Requirements Based On: Kcal/kg  Weight Used for Energy Requirements: Ideal  Energy (kcal/day): 5150-8206  Weight Used for Protein Requirements: Ideal  Protein (g/day): 68-80  Method Used for Fluid Requirements: 1 ml/kcal  Fluid (ml/day): 0525-9886    Nutrition Diagnosis:   Increased nutrient needs related to increase demand for energy/nutrients as evidenced by wounds (s/p colostomy reversal)    Nutrition Interventions:   Food and/or Nutrient Delivery: Continue Current Diet  Nutrition Education/Counseling: No recommendation at this time  Coordination of
Critical lab - cx back - E-Coli ESBL. Enterococcus faecalis. Dr. Eunice Harada @ bedside and aware.
DIT CALLED TO PLACE PICC
Hospital Medicine Progress Note      Date of Admission: 5/30/2023  Hospital Day: 11    Chief Admission Complaint:  Colostomy reversal     Subjective:  no new c/o. Presenting Admission History:       66 y.o. female s/p prior Colostomy who presented to [facility] for [facility] reversal.  She underwent surgery 30 May w/out major complications and we are consulted for medical mgt. Assessment/Plan:      Current Principal Problem:  History of colostomy reversal       S/P Colostomy reversal -  Continue post-op care/mgt and diet advancement per General Surgery. Repeat CT scan 5 June independently reviewed w/out acute intra-abdominal findings but w/ probable wound infection. Started on Cipro/Flagyl and now afebrile >48 hrs - repeat wound Cx w/ E Faecalis and E Coli. PICC ordered and Infectious Disease consulted and appreciated for ABX recs at discharge. Anemia - anticipated post-op acute blood loss anemia w/out evidence of active bleeding/hemolysis. Stable and asymptomatic w/out indication for transfusion. Follow serial labs. Reviewed and documented in this note. Will continue to hold Eliquis for now pending Surgery recs. HTN - w/out known CAD and no evidence of active signs/sxs of ischemia/failure. Currently controlled on home meds w/ vitals documented and reviewed. HyperLipidemia - controlled on home Statin. Continue, w/ f/u and med adjustment w/ PCP     HypoKalemia - etiology clinically unable to determine. Follow serial labs and replace PRN. Reviewed and documented in this note. Obesity -  With Body mass index is 36.14 kg/m². Complicating assessment and treatment. Placing patient at risk for multiple co-morbidities as well as early death and contributing to the patient's presentation. Counseled on weight loss. Physical Exam Performed:      General appearance:  No apparent distress  Respiratory:  Normal respiratory effort. Cardiovascular:  Regular rate and rhythm.   Abdomen:
Hospital Medicine Progress Note      Date of Admission: 5/30/2023  Hospital Day: 9    Chief Admission Complaint:  Colostomy reversal     Subjective:  no new c/o. Presenting Admission History:       66 y.o. female s/p prior Colostomy who presented to [place] for elective reversal.  She underwent surgery 30 May w/out major complications and we are consulted for medical mgt. Assessment/Plan:      Current Principal Problem:  History of colostomy reversal       S/P Colostomy reversal -  Continue post-op care/mgt and diet advancement per General Surgery. Repeat CT scan 5 June independently reviewed w/out acute intra-abdominal findings but w/ probable wound infection. Started on Cipro/Flagyl and Febrile to 102 last 24 hrs - repeat wound Cx pending. Anemia - anticipated post-op acute blood loss anemia w/out evidence of active bleeding/hemolysis. Stable and asymptomatic w/out indication for transfusion. Follow serial labs. Reviewed and documented in this note. Will continue to hold Eliquis for now pending Surgery recs. HTN - w/out known CAD and no evidence of active signs/sxs of ischemia/failure. Currently controlled on home meds w/ vitals documented and reviewed. HyperLipidemia - controlled on home Statin. Continue, w/ f/u and med adjustment w/ PCP     HypoKalemia - etiology clinically unable to determine. Follow serial labs and replace PRN. Reviewed and documented in this note. Obesity -  With Body mass index is 36.14 kg/m². Complicating assessment and treatment. Placing patient at risk for multiple co-morbidities as well as early death and contributing to the patient's presentation. Counseled on weight loss. Physical Exam Performed:      General appearance:  No apparent distress  Respiratory:  Normal respiratory effort. Cardiovascular:  Regular rate and rhythm. Abdomen:  Soft, non-tender, non-distended.   Musculoskelatal:  No edema  Neurologic:  Non-focal  Psychiatric:
Page sent to Drake Love, \"pt's daughter, who is a nurse, would like to have you or Dr. Zak Malik to come in and evaluate her mom. Daughter states Loraine Roman and Dr. Zak Malik keep packing the wound and  keeps taking it out. They have had this problem before and it should be packed\". She also informed this RN that \"I just wanted to let you know that when I get there today, I am going to blow the fucking roof off that place. \"
Patient and daughter requesting for her left transverse incision to be packed with iodoform, per surgeon okay to do. Completed. Also outlined the area of redness surrounding both incisions. Moderate amount of drainage from both midline and transverse incisions, the midline drainage was serous, while the transverse incision was purulent. New dressing applied with 4x4, ABD pads, and medipore tape.
Pt a/o. VSS. Shift assessment updated and documented.
Pt alert and oriented, VSS. Shift assessment completed and documented. Dressing to abd currently C/D/I. Denies any needs at this time. Bed locked and in lowest position, call light within reach.
Pt assessment completed and charted. VSS. Pt a/o. PRN medication administered per MAR. TARUN drain patent. Bed in lowest position and wheels locked. Call light within reach. Bedside table within reach. Non-skid footwear in place. Pt denies any other needs at this time. Pt calls out appropriately.
Pt assessment completed and charted. VSS. Pt a/o. PRN medication administered per [facility] for pain. TARUN drain patent with minimal output. Site dressing C\D\I. Bed in lowest position and wheels locked. Call light within reach. Bedside table within reach. Non-skid footwear in place. Pt denies any other needs at this time. Pt calls out appropriately.
Pt assessment completed and charted. VSS. Pt a/o. Pt denies pain. Pt ambulating independently. Bed in lowest position and wheels locked. Call light within reach. Bedside table within reach. Non-skid footwear in place. Pt denies any other needs at this time. Pt calls out appropriately.
Pt. Resting in bed. Alert/oriented. Vitals and assessment stable as charted. Afebrile this AM but had temp of 100.2 overnight. She has mild erythema around midline incision. No drainage noted at this time. She states she has been passing flatus and also having loose BMs. She is tolerating low fiber diet. C\O pain 7-8/10; administered PO prn reji as per prn order. Ambulates self in room. Call light in reach. Will continue to monitor.
Pt. Resting in bed. Alert/oriented. Vitals and assessment stable as charted. Afebrile this AM, has had intermittent fevers. She still has erythema around midline incision. No drainage noted at this time to dressing LLQ. She states she has been passing flatus; loose BMs have slowed down. She is tolerating low fiber diet. She states pain is 5/10 now and tolerable; plan to give IV dilaudid before planned bedside drainage later. Ambulates self in room. Call light in reach. Will continue to monitor.
[facility] GENERAL SURGERY    Surgery Progress Note           POD # 9    PATIENT NAME: Fuad Hawthorne     TODAY'S DATE: 6/8/2023    INTERVAL HISTORY:    Pt doing better - tenderness left abd, min drainage from wound. OBJECTIVE:   VITALS:  /85   Pulse 85   Temp 98.6 °F (37 °C) (Oral)   Resp 16   Ht 5' 5\" (1.651 m)   Wt 223 lb 5 oz (101.3 kg)   SpO2 95%   BMI 37.16 kg/m²     INTAKE/OUTPUT:    I/O last 3 completed shifts: In: 12 [P.O.:960]  Out: 10 [Drains:10]  I/O this shift: In: 400 [P.O.:400]  Out: -               CONSTITUTIONAL:  awake and alert  LUNGS: no crackles or wheezes    ABDOMEN:    , soft, non-distended, tender left abd - oracio incisional  INCISION: min drainage fro left incision, mild erythema - tender    Data:  CBC:   Recent Labs     06/06/23  0649 06/07/23  0454 06/08/23  0634   WBC 5.5 6.8 6.6   HGB 10.0* 10.2* 10.4*   HCT 30.4* 30.6* 31.2*    372 416       BMP:    Recent Labs     06/06/23  0649 06/07/23  0454 06/08/23  0634    135* 137   K 3.5 3.6 3.7    99 101   CO2 23 24 22   BUN 8 8 7   CREATININE 0.8 0.7 0.7   GLUCOSE 106* 104* 105*       Hepatic: No results for input(s): AST, ALT, ALB, BILITOT, ALKPHOS in the last 72 hours. Mag:    No results for input(s): MG in the last 72 hours.    Phos:     Recent Labs     06/06/23  0649 06/07/23  0454 06/08/23  0634   PHOS 4.7 4.4 4.3        Collected: 06/06/23 1030    Updated: 06/08/23 0655    Specimen Source: Abdomen     Gram Stain Result 3+ WBC's (Polymorphonuclear)   1+ Gram negative rods     Anaerobic Culture No anaerobes isolated so far, Further report to follow    Organism Enterococcus faecalis Abnormal     WOUND/ABSCESS --    Light growth   Sensitivity to follow     Organism Escherichia coli Abnormal     WOUND/ABSCESS --    Light growth   Sensitivity to follow   Repeating sensitivity        ASSESSMENT AND PLAN:  66 y.o. female status post colostomy reversal, with drainage of wound infection prior ostomy site
Secure message sent to Dr. Jarrell Hoffman regarding new sharp/stabbing pain, redness & warmth to midline abdominal incision, & temp of 101. 1. No new orders received.
Spoke with Dr. Martha Aguillon, no new orders at this time. Pain has improved with dilaudid administration. MD planning bedside drainage in am. Pt aware.
Upon arrival to place PICC line assessed chart for issues related to picc placement, check for consent, and did time out with RN Asia Sr. Pt. Tolerated PICC placement well, no difficulty accessing basilic vein and 3CG technology used to verify PICC tip placement. Positive P wave with no negative deflection. Printed wave form and placed In chart.  Reported off to [employer]
[facility] GENERAL SURGERY    Surgery Progress Note           POD # 6    PATIENT NAME: Roland Moya     TODAY'S DATE: 6/5/2023    INTERVAL HISTORY:    Eating and bowels are working, however with increased abd wall tenderness and erythema today. Tmax to 100.5 this AM     OBJECTIVE:   VITALS:  /80   Pulse 89   Temp 98.9 °F (37.2 °C) (Oral)   Resp 16   Ht 5' 5\" (1.651 m)   Wt 224 lb 1.6 oz (101.7 kg)   SpO2 96%   BMI 37.29 kg/m²     INTAKE/OUTPUT:    I/O last 3 completed shifts: In: 5 [P.O.:720]  Out: 25 [Drains:25]  I/O this shift:  In: 80 [P.O.:120]  Out: -               CONSTITUTIONAL:  awake and alert  CVD: RRR  ABDOMEN:   + bowel sounds, soft,nondistended,renny serosanguinous   INCISION: ostomy takedown site with some purulent drainage - there is increased surrounding erythema today that extends to the left flank . Midline incision with some erythema this AM, but without drainage    Data:  CBC:   Recent Labs     06/03/23  0623 06/04/23  0531 06/05/23  0630   WBC 7.1 7.4 7.1   HGB 9.9* 10.5* 10.3*   HCT 30.2* 32.6* 31.4*    300 323       BMP:    Recent Labs     06/03/23  0623 06/04/23  0532 06/05/23  0630    139 135*   K 3.3* 4.0 3.5    107 101   CO2 22 19* 21   BUN 6* 5* 6*   CREATININE 0.6 0.6 0.7   GLUCOSE 111* 105* 113*           ASSESSMENT AND PLAN:  66 y.o. female status post colostomy reversal  GI: reg diet  Wound infection: continue with local wound care, packing daily, antibiotics - cipro/flagyl   Given increased erythema and tenderness, will obtain CT scan today  Activity: OOB to chair - PT/OT  Hypokalemia: resolved    Dispo: pending improvement    Electronically signed by JARETT Mandujano - CNP     Patient seen and agree with above and more than half of the total time was spent by me on the encounter. Continue wound care and abx. CT today to assess further.     Luca Delgado MD
Data Review (any 3)  [] Collateral history obtained from:    [x] All available Consultant notes from yesterday/today were reviewed  [x] All current labs were reviewed and interpreted for clinical significance   [x] Appropriate follow-up labs were ordered    Medications:  Personally reviewed in detail in conjunction w/ labs as documented for evidence of drug toxicity. Infusion Medications    dextrose      sodium chloride       Scheduled Medications    nystatin  500,000 Units Oral 4x Daily    ciprofloxacin  400 mg IntraVENous Q12H    metroNIDAZOLE  500 mg IntraVENous Q8H    Levothyroxine Sodium  175 mcg Oral Daily    atorvastatin  40 mg Oral Nightly    cyclobenzaprine  5 mg Oral TID    gabapentin  300 mg Oral TID    liothyronine  5 mcg Oral Daily    magnesium oxide  400 mg Oral BID    propranolol  40 mg Oral BID    sodium chloride flush  5-40 mL IntraVENous 2 times per day    acetaminophen  650 mg Oral Q6H    famotidine  20 mg Oral BID    Or    famotidine (PEPCID) injection  20 mg IntraVENous BID    enoxaparin  40 mg SubCUTAneous Daily     PRN Meds: magic (miracle) mouthwash with nystatin, diatrizoate meglumine-sodium, ibuprofen, oxyCODONE **OR** oxyCODONE, HYDROmorphone, glucose, dextrose bolus **OR** dextrose bolus, glucagon (rDNA), dextrose, sodium chloride flush, sodium chloride, promethazine **OR** ondansetron, phenol, hydrOXYzine HCl     Labs:  Personally reviewed and interpreted for clinical significance. Recent Labs     06/06/23  0649 06/07/23  0454 06/08/23  0634   WBC 5.5 6.8 6.6   HGB 10.0* 10.2* 10.4*   HCT 30.4* 30.6* 31.2*    372 416       Recent Labs     06/06/23  0649 06/07/23  0454 06/08/23  0634    135* 137   K 3.5 3.6 3.7    99 101   CO2 23 24 22   BUN 8 8 7   CREATININE 0.8 0.7 0.7   CALCIUM 8.2* 8.2* 8.1*   PHOS 4.7 4.4 4.3       No results for input(s): PROBNP, TROPHS in the last 72 hours. No results for input(s): LABA1C in the last 72 hours.   No results for input(s):
antibiotic therapy   - will need [facility] for antibiotic administration     Hopefully will be able to make all these arrangements either today vs tomorrow for eventual discharge home        Electronically signed by Celestina Dakins, MD
from:    [x] All available Consultant notes from yesterday/today were reviewed  [x] All current labs were reviewed and interpreted for clinical significance   [x] Appropriate follow-up labs were ordered    Medications:  Personally reviewed in detail in conjunction w/ labs as documented for evidence of drug toxicity. Infusion Medications    dextrose      sodium chloride       Scheduled Medications    ciprofloxacin  400 mg IntraVENous Q12H    metroNIDAZOLE  500 mg IntraVENous Q8H    Levothyroxine Sodium  175 mcg Oral Daily    atorvastatin  40 mg Oral Nightly    cyclobenzaprine  5 mg Oral TID    gabapentin  300 mg Oral TID    liothyronine  5 mcg Oral Daily    magnesium oxide  400 mg Oral BID    propranolol  40 mg Oral BID    sodium chloride flush  5-40 mL IntraVENous 2 times per day    acetaminophen  650 mg Oral Q6H    famotidine  20 mg Oral BID    Or    famotidine (PEPCID) injection  20 mg IntraVENous BID    enoxaparin  40 mg SubCUTAneous Daily     PRN Meds: diatrizoate meglumine-sodium, ibuprofen, oxyCODONE **OR** oxyCODONE, HYDROmorphone, glucose, dextrose bolus **OR** dextrose bolus, glucagon (rDNA), dextrose, sodium chloride flush, sodium chloride, promethazine **OR** ondansetron, naloxone, phenol, hydrOXYzine HCl     Labs:  Personally reviewed and interpreted for clinical significance. Recent Labs     06/04/23  0531 06/05/23  0630 06/06/23  0649   WBC 7.4 7.1 5.5   HGB 10.5* 10.3* 10.0*   HCT 32.6* 31.4* 30.4*    323 316       Recent Labs     06/04/23  0532 06/05/23  0630 06/06/23  0649    135* 136   K 4.0 3.5 3.5    101 102   CO2 19* 21 23   BUN 5* 6* 8   CREATININE 0.6 0.7 0.8   CALCIUM 8.3 8.2* 8.2*   PHOS 3.7 4.7 4.7       No results for input(s): PROBNP, TROPHS in the last 72 hours. No results for input(s): LABA1C in the last 72 hours. No results for input(s): AST, ALT, BILIDIR, BILITOT, ALKPHOS in the last 72 hours.   No results for input(s): INR, LACTA, TSH in the last 72
significance   [x] Appropriate follow-up labs were ordered    Medications:  Personally reviewed in detail in conjunction w/ labs as documented for evidence of drug toxicity. Infusion Medications    dextrose      sodium chloride       Scheduled Medications    ciprofloxacin  400 mg IntraVENous Q12H    metroNIDAZOLE  500 mg IntraVENous Q8H    Levothyroxine Sodium  175 mcg Oral Daily    atorvastatin  40 mg Oral Nightly    cyclobenzaprine  5 mg Oral TID    gabapentin  300 mg Oral TID    liothyronine  5 mcg Oral Daily    magnesium oxide  400 mg Oral BID    propranolol  40 mg Oral BID    sodium chloride flush  5-40 mL IntraVENous 2 times per day    acetaminophen  650 mg Oral Q6H    famotidine  20 mg Oral BID    Or    famotidine (PEPCID) injection  20 mg IntraVENous BID    enoxaparin  40 mg SubCUTAneous Daily    insulin lispro  0-8 Units SubCUTAneous Q4H     PRN Meds: ibuprofen, oxyCODONE **OR** oxyCODONE, HYDROmorphone, glucose, dextrose bolus **OR** dextrose bolus, glucagon (rDNA), dextrose, sodium chloride flush, sodium chloride, promethazine **OR** ondansetron, naloxone, phenol, hydrOXYzine HCl     Labs:  Personally reviewed and interpreted for clinical significance. Recent Labs     06/03/23  0623 06/04/23  0531 06/05/23  0630   WBC 7.1 7.4 7.1   HGB 9.9* 10.5* 10.3*   HCT 30.2* 32.6* 31.4*    300 323       Recent Labs     06/03/23  0623 06/04/23  0532 06/05/23  0630    139 135*   K 3.3* 4.0 3.5    107 101   CO2 22 19* 21   BUN 6* 5* 6*   CREATININE 0.6 0.6 0.7   CALCIUM 7.9* 8.3 8.2*   MG 1.80  --   --    PHOS 2.6 3.7 4.7       No results for input(s): PROBNP, TROPHS in the last 72 hours. No results for input(s): LABA1C in the last 72 hours. No results for input(s): AST, ALT, BILIDIR, BILITOT, ALKPHOS in the last 72 hours. No results for input(s): INR, LACTA, TSH in the last 72 hours.     Urine Cultures: No results found for: LABURIN  Blood Cultures:   Lab Results   Component Value

## 2023-06-09 NOTE — PLAN OF CARE
Problem: Discharge Planning  Goal: Discharge to home or other facility with appropriate resources  Outcome: Progressing     Problem: Pain  Goal: Verbalizes/displays adequate comfort level or baseline comfort level  Flowsheets (Taken 6/7/2023 1631)  Verbalizes/displays adequate comfort level or baseline comfort level:   Encourage patient to monitor pain and request assistance   Assess pain using appropriate pain scale   Administer analgesics based on type and severity of pain and evaluate response   Implement non-pharmacological measures as appropriate and evaluate response  Note: Verbalizes how to use 1-10 pain scale. PRN reji available. Reassessment of pain after administration of pain medication. Use of ice packs and repositioning for comfort. Pain improved from yesterday.       Problem: Safety - Adult  Goal: Free from fall injury  Outcome: Progressing  Flowsheets (Taken 6/7/2023 1631)  Free From Fall Injury:   Instruct family/caregiver on patient safety   Based on caregiver fall risk screen, instruct family/caregiver to ask for assistance with transferring infant if caregiver noted to have fall risk factors     Problem: ABCDS Injury Assessment  Goal: Absence of physical injury  Outcome: Progressing  Flowsheets (Taken 6/7/2023 1631)  Absence of Physical Injury: Implement safety measures based on patient assessment     Problem: Nutrition Deficit:  Goal: Optimize nutritional status  Outcome: Progressing  Flowsheets (Taken 6/7/2023 1631)  Nutrient intake appropriate for improving, restoring, or maintaining nutritional needs:   Assess nutritional status and recommend course of action   Monitor oral intake, labs, and treatment plans   Recommend appropriate diets, oral nutritional supplements, and vitamin/mineral supplements
Problem: Discharge Planning  Goal: Discharge to home or other facility with appropriate resources  Outcome: Progressing  Flowsheets (Taken 6/4/2023 2045)  Discharge to home or other facility with appropriate resources:   Identify barriers to discharge with patient and caregiver   Arrange for needed discharge resources and transportation as appropriate   Arrange for interpreters to assist at discharge as needed   Identify discharge learning needs (meds, wound care, etc)   Refer to discharge planning if patient needs post-hospital services based on physician order or complex needs related to functional status, cognitive ability or social support system     Problem: Pain  Goal: Verbalizes/displays adequate comfort level or baseline comfort level  Outcome: Progressing  Flowsheets (Taken 6/4/2023 2045)  Verbalizes/displays adequate comfort level or baseline comfort level:   Encourage patient to monitor pain and request assistance   Assess pain using appropriate pain scale   Administer analgesics based on type and severity of pain and evaluate response   Implement non-pharmacological measures as appropriate and evaluate response   Consider cultural and social influences on pain and pain management   Notify Licensed Independent Practitioner if interventions unsuccessful or patient reports new pain     Problem: Safety - Adult  Goal: Free from fall injury  Outcome: Progressing  Flowsheets (Taken 6/5/2023 0217)  Free From Fall Injury:   Based on caregiver fall risk screen, instruct family/caregiver to ask for assistance with transferring infant if caregiver noted to have fall risk factors   Instruct family/caregiver on patient safety     Problem: ABCDS Injury Assessment  Goal: Absence of physical injury  Outcome: Progressing
Problem: Pain  Goal: Verbalizes/displays adequate comfort level or baseline comfort level  6/8/2023 0656 by Angelia Alexis RN  Outcome: Not Progressing  Flowsheets (Taken 6/8/2023 6495)  Verbalizes/displays adequate comfort level or baseline comfort level:   Encourage patient to monitor pain and request assistance   Assess pain using appropriate pain scale   Administer analgesics based on type and severity of pain and evaluate response
Problem: Pain  Goal: Verbalizes/displays adequate comfort level or baseline comfort level  Outcome: Not Progressing  Flowsheets (Taken 6/8/2023 0656)  Verbalizes/displays adequate comfort level or baseline comfort level:   Encourage patient to monitor pain and request assistance   Assess pain using appropriate pain scale   Administer analgesics based on type and severity of pain and evaluate response     Problem: Safety - Adult  Goal: Free from fall injury  Outcome: Progressing  Flowsheets (Taken 6/8/2023 0656)  Free From Fall Injury: Instruct family/caregiver on patient safety     Problem: ABCDS Injury Assessment  Goal: Absence of physical injury  Outcome: Progressing  Flowsheets (Taken 6/8/2023 0656)  Absence of Physical Injury: Implement safety measures based on patient assessment     Problem: Pain  Goal: Verbalizes/displays adequate comfort level or baseline comfort level  Outcome: Not Progressing  Flowsheets (Taken 6/8/2023 0656)  Verbalizes/displays adequate comfort level or baseline comfort level:   Encourage patient to monitor pain and request assistance   Assess pain using appropriate pain scale   Administer analgesics based on type and severity of pain and evaluate response
Problem: Safety - Adult  Goal: Free from fall injury  Outcome: Progressing  Flowsheets (Taken 6/9/2023 0811)  Free From Fall Injury: Instruct family/caregiver on patient safety     Problem: Pain  Goal: Verbalizes/displays adequate comfort level or baseline comfort level  Outcome: Not Progressing  Flowsheets (Taken 6/9/2023 0811)  Verbalizes/displays adequate comfort level or baseline comfort level:   Encourage patient to monitor pain and request assistance   Assess pain using appropriate pain scale   Administer analgesics based on type and severity of pain and evaluate response
1625)  Verbalizes/displays adequate comfort level or baseline comfort level:   Encourage patient to monitor pain and request assistance   Assess pain using appropriate pain scale   Administer analgesics based on type and severity of pain and evaluate response   Implement non-pharmacological measures as appropriate and evaluate response  6/9/2023 0811 by Karina Yun RN  Outcome: Not Progressing  Flowsheets (Taken 6/9/2023 6448)  Verbalizes/displays adequate comfort level or baseline comfort level:   Encourage patient to monitor pain and request assistance   Assess pain using appropriate pain scale   Administer analgesics based on type and severity of pain and evaluate response
Problem: Nutrition Deficit:  Goal: Optimize nutritional status  Outcome: Progressing  Flowsheets (Taken 6/5/2023 1232)  Nutrient intake appropriate for improving, restoring, or maintaining nutritional needs:   Assess nutritional status and recommend course of action   Recommend appropriate diets, oral nutritional supplements, and vitamin/mineral supplements   Order, calculate, and assess calorie counts as needed   Recommend, monitor, and adjust tube feedings and TPN/PPN based on assessed needs   Provide specific nutrition education to patient or family as appropriate

## 2023-06-11 LAB
BACTERIA SPEC AEROBE CULT: ABNORMAL
BACTERIA SPEC AEROBE CULT: ABNORMAL
BACTERIA SPEC ANAEROBE CULT: ABNORMAL
GRAM STN SPEC: ABNORMAL
ORGANISM: ABNORMAL
ORGANISM: ABNORMAL

## 2023-06-12 LAB
A/G RATIO: ABNORMAL
ALBUMIN SERPL-MCNC: 2.9 G/DL
ALP BLD-CCNC: 167 U/L
ALT SERPL-CCNC: 22 U/L
AST SERPL-CCNC: 21 U/L
BASOPHILS ABSOLUTE: 0 /ΜL
BASOPHILS RELATIVE PERCENT: 0.4 %
BILIRUB SERPL-MCNC: 0.3 MG/DL (ref 0.1–1.4)
BILIRUBIN DIRECT: 0.1 MG/DL
BILIRUBIN, INDIRECT: 0.2
BUN / CREAT RATIO: ABNORMAL
BUN BLDV-MCNC: 6 MG/DL
CREAT SERPL-MCNC: 0.9 MG/DL
EOSINOPHILS ABSOLUTE: 0.3 /ΜL
EOSINOPHILS RELATIVE PERCENT: 3.3 %
GLOBULIN: ABNORMAL
HCT VFR BLD CALC: 35.3 % (ref 36–46)
HEMOGLOBIN: 11.2 G/DL (ref 12–16)
LYMPHOCYTES ABSOLUTE: 1.5 /ΜL
LYMPHOCYTES RELATIVE PERCENT: 19 %
MCH RBC QN AUTO: 27 PG
MCHC RBC AUTO-ENTMCNC: 31.7 G/DL
MCV RBC AUTO: 85.1 FL
MONOCYTES ABSOLUTE: 0.8 /ΜL
MONOCYTES RELATIVE PERCENT: 9.6 %
NEUTROPHILS ABSOLUTE: 5.3 /ΜL
NEUTROPHILS RELATIVE PERCENT: 67.7 %
PDW BLD-RTO: 15.4 %
PLATELET # BLD: 581 K/ΜL
PMV BLD AUTO: 8 FL
PROTEIN TOTAL: 6.6 G/DL
RBC # BLD: 4.15 10^6/ΜL
WBC # BLD: 7.8 10^3/ML

## 2023-06-12 NOTE — DISCHARGE SUMMARY
Surgery Discharge Summary    Patient Identification  Lui Aponte is a 66 y.o. female. :  1960  Admit Date:  2023    Discharge date:   2023  6:25 PM                                   Disposition: home    Discharge Diagnoses:   Principal Problem:    History of colostomy reversal  Active Problems:    Colostomy in place [facility])    Hypertension    Hyperlipidemia    Obesity    Hypokalemia    Anemia    Deep incisional surgical site infection    ESBL (extended spectrum beta-lactamase) producing bacteria infection    Complex care coordination  Resolved Problems:    * No resolved hospital problems. *      Discharge condition: good    Discharge Medications:     Discharge Medication List as of 2023  4:52 PM        CONTINUE these medications which have NOT CHANGED    Details   Levothyroxine Sodium (TIROSINT) 175 MCG CAPS Take 175 mcg/day by mouth dailyHistorical Med      cyclobenzaprine (FLEXERIL) 10 MG tablet Take 0.5 tablets by mouth 3 times dailyHistorical Med      PREMARIN 0.625 MG/GM CREA vaginal cream Place 0.625 mg vaginally Twice a Week, Vaginal, TWICE WEEKLY Starting 2023, [place], Historical Med      Magnesium 500 MG CAPS Take 500 mg by mouth 4 times dailyHistorical Med      gabapentin (NEURONTIN) 300 MG capsule Take 1 capsule by mouth 3 times daily. Historical Med      propranolol (INDERAL) 40 MG tablet Take 1 tablet by mouth 2 times dailyHistorical Med      apixaban (ELIQUIS) 2.5 MG TABS tablet Take 1 tablet by mouth 2 times daily Patient unsure of dose.  \"Thinks\" it is 5mg, Disp-60 tablet, R-1Normal      atorvastatin (LIPITOR) 40 MG tablet Take 1 tablet by mouth at bedtime, Disp-30 tablet, R-3Normal      liothyronine (CYTOMEL) 5 MCG tablet Take 1 tablet by mouth dailyHistorical Med                Discharge Medication List as of 2023  4:52 PM        START taking these medications    Details   amoxicillin (AMOXIL) 500 MG capsule Take 1 capsule by mouth every 8 hours for 42 doses,

## 2023-06-13 DIAGNOSIS — T81.42XA DEEP INCISIONAL SURGICAL SITE INFECTION: ICD-10-CM

## 2023-06-19 ENCOUNTER — OFFICE VISIT (OUTPATIENT)
Dept: SURGERY | Age: 63
End: 2023-06-19

## 2023-06-19 VITALS
DIASTOLIC BLOOD PRESSURE: 88 MMHG | HEIGHT: 65 IN | BODY MASS INDEX: 34.66 KG/M2 | WEIGHT: 208 LBS | HEART RATE: 78 BPM | SYSTOLIC BLOOD PRESSURE: 147 MMHG

## 2023-06-19 DIAGNOSIS — K56.699 SIGMOID STRICTURE (HCC): Primary | ICD-10-CM

## 2023-06-19 LAB
A/G RATIO: ABNORMAL
ALBUMIN SERPL-MCNC: 3 G/DL
ALP BLD-CCNC: 190 U/L
ALT SERPL-CCNC: 21 U/L
AST SERPL-CCNC: 25 U/L
BASOPHILS ABSOLUTE: 0.1 /ΜL
BASOPHILS RELATIVE PERCENT: 1.5 %
BILIRUB SERPL-MCNC: ABNORMAL MG/DL
BILIRUBIN DIRECT: 0.1 MG/DL
BILIRUBIN, INDIRECT: 0.1
BUN / CREAT RATIO: NORMAL
BUN BLDV-MCNC: 12 MG/DL
CREAT SERPL-MCNC: 0.9 MG/DL
EOSINOPHILS ABSOLUTE: 0.3 /ΜL
EOSINOPHILS RELATIVE PERCENT: 5.4 %
GLOBULIN: ABNORMAL
HCT VFR BLD CALC: 36.8 % (ref 36–46)
HEMOGLOBIN: 11.7 G/DL (ref 12–16)
LYMPHOCYTES ABSOLUTE: 1.9 /ΜL
LYMPHOCYTES RELATIVE PERCENT: 31.6 %
MCH RBC QN AUTO: 26.7 PG
MCHC RBC AUTO-ENTMCNC: 31.8 G/DL
MCV RBC AUTO: 84 FL
MONOCYTES ABSOLUTE: 0.7 /ΜL
MONOCYTES RELATIVE PERCENT: 10.8 %
NEUTROPHILS ABSOLUTE: 3.1 /ΜL
NEUTROPHILS RELATIVE PERCENT: 50.7 %
PDW BLD-RTO: 15.4 %
PLATELET # BLD: 563 K/ΜL
PMV BLD AUTO: 9.2 FL
PROTEIN TOTAL: 7 G/DL
RBC # BLD: 4.38 10^6/ΜL
WBC # BLD: 6.1 10^3/ML

## 2023-06-19 PROCEDURE — 99024 POSTOP FOLLOW-UP VISIT: CPT | Performed by: SURGERY

## 2023-06-19 RX ORDER — MENTHOL AND ZINC OXIDE .44; 20.625 G/100G; G/100G
OINTMENT TOPICAL DAILY
Qty: 1 EACH | Refills: 4 | Status: SHIPPED | OUTPATIENT
Start: 2023-06-19 | End: 2023-06-26

## 2023-06-19 NOTE — PROGRESS NOTES
HPI: Nursing notes reviewed. Patient with mild drianage from incision. No fevers. Energy low. Some diarrhea. ROS:  10 point review of systems performed with pertinent positives in HPI    Phys:    Abd - soft, appropriately tender at incisions, no erythema, small amount purulent drainage       Assesment: 67 yo s/p colostomy reversal    Plan: 1. Continue abx another week IV   2. Local wound care to incisions - packing to transverse opening   3. High fiber diet or supplement at this time   4.   F/u next week

## 2023-06-20 ENCOUNTER — TELEPHONE (OUTPATIENT)
Dept: SURGERY | Age: 63
End: 2023-06-20

## 2023-06-20 NOTE — TELEPHONE ENCOUNTER
Kartik Hernández with [facility] calling to clarify Rx. [facility] currently has an order for Ertapenem through 6/23, is this order for 7 days past 6/23? Ok to remove PICC line after last dose given to patient?

## 2023-06-20 NOTE — TELEPHONE ENCOUNTER
Per Dr. Tevin Gomez, Rx is for 7 additional days past 6/23 and ok for PICC line to be removed after last dose. Prashanth Faustin with [facility] notified.

## 2023-06-21 ENCOUNTER — TELEPHONE (OUTPATIENT)
Dept: INFECTIOUS DISEASES | Age: 63
End: 2023-06-21

## 2023-06-21 DIAGNOSIS — T81.42XA DEEP INCISIONAL SURGICAL SITE INFECTION: ICD-10-CM

## 2023-06-21 NOTE — TELEPHONE ENCOUNTER
Spoke with Ellen Du RN at [facility], states she will fax weekly lab results. Fax number provided.

## 2023-06-22 ENCOUNTER — TELEPHONE (OUTPATIENT)
Dept: INFECTIOUS DISEASES | Age: 63
End: 2023-06-22

## 2023-06-22 NOTE — TELEPHONE ENCOUNTER
Spoke with Marla Bowden pharmacist at [facility] and advised that Dr Romo Drivers will be signing off and Dr Kathie Remy office will assume care and responsibility regarding IV abx.

## 2023-06-22 NOTE — RESULT ENCOUNTER NOTE
Labs WNL. See surgery progress notes. Okay to term as planned.      Martir Cooper, PharmD, [facility]  Phone: [phone] (also available on [employer])  Fax: [phone]    For Pharmacy [id number] Only    Program: Medical Group  CPA in place: Yes  Time Spent (min): 10

## 2023-06-22 NOTE — TELEPHONE ENCOUNTER
----- Message from Dori Guzman [facility] sent at 6/21/2023  8:00 PM EDT -----  Labs WNL. See surgery progress notes. Okay to term as planned.      Conor Zhang, PharmD, [facility] [place], [place] Infectious [facility]  Phone: [phone] (also available on [employer])  Fax: [phone]    For Pharmacy [address] [place] [address] [address]  Only    Program: Medical Group  CPA in place: Yes  Time Spent (min): 10

## 2023-06-28 ENCOUNTER — OFFICE VISIT (OUTPATIENT)
Dept: SURGERY | Age: 63
End: 2023-06-28

## 2023-06-28 VITALS
WEIGHT: 212 LBS | BODY MASS INDEX: 35.32 KG/M2 | SYSTOLIC BLOOD PRESSURE: 161 MMHG | HEIGHT: 65 IN | DIASTOLIC BLOOD PRESSURE: 97 MMHG | HEART RATE: 90 BPM

## 2023-06-28 DIAGNOSIS — K56.699 SIGMOID STRICTURE (HCC): Primary | ICD-10-CM

## 2023-06-28 PROCEDURE — 99024 POSTOP FOLLOW-UP VISIT: CPT | Performed by: SURGERY

## 2024-09-25 NOTE — TELEPHONE ENCOUNTER
Per Dr. Courtney Medicine, ok to do the packing twice a day. No additional antibiotics at this time, if it worsens or there is any odor or fever, call office. If wound stays the same, will see patient on 3/20 for post op. [facility] notified and verbalized understanding. Risk Alerts:

## (undated) DEVICE — ST ACC MICROPUNCTURE .018 TRANSLSS/PLAT/TP 5F/10CM 21G/10CM

## (undated) DEVICE — PK ANGIO OR 10

## (undated) DEVICE — 3M™ IOBAN™ 2 ANTIMICROBIAL INCISE DRAPE 6650EZ: Brand: IOBAN™ 2

## (undated) DEVICE — SHEET,DRAPE,53X77,STERILE: Brand: MEDLINE

## (undated) DEVICE — BLADE ES L6IN ELASTOMERIC COAT EXT DURABLE BEND UPTO 90DEG

## (undated) DEVICE — SNAP KOVER: Brand: UNBRANDED

## (undated) DEVICE — SYR LL BD 10CC

## (undated) DEVICE — SUTURE PROL SZ 3-0 L48IN NONABSORBABLE BLU SH L26MM 1/2 CIR 8534H

## (undated) DEVICE — NDL HYPO ECLPS SFTY 22G 1 1/2IN

## (undated) DEVICE — KT INTRO MINISTICK MAX W/GW NITNL/TUNG ECHO 4F 21G 7CM

## (undated) DEVICE — RADIFOCUS GLIDEWIRE ADVANTAGE GUIDEWIRE: Brand: GLIDEWIRE ADVANTAGE

## (undated) DEVICE — CONMED GOLDLINE ELECTROSURGICAL HANDPIECE, HAND CONTROLLED WITH ULTRACLEAN BLADE ELECTRODE, BUTTON SWITCH, SAFETY HOLSTER AND 15' (4.6 M) CABLE: Brand: CONMED GOLDLINE

## (undated) DEVICE — TBG INJ CONTRL EXCITE RA 1200PSI 48IN

## (undated) DEVICE — LIQUIBAND RAPID ADHESIVE 36/CS 0.8ML: Brand: MEDLINE

## (undated) DEVICE — COLUMN DRAPE

## (undated) DEVICE — CVR HNDL LIGHT RIGID

## (undated) DEVICE — PENCIL ES CRD L10FT HND SWCHING ROCK SWCH W/ EDGE COAT BLDE

## (undated) DEVICE — SHEET, DRAPE, SPLIT, STERILE: Brand: MEDLINE

## (undated) DEVICE — BINDER ABD UNIV H9IN WAIST 45-62IN E SFT COT PREM 3 PNL

## (undated) DEVICE — SUT PROLN 6/0 BV1 D/A 30IN 8709H

## (undated) DEVICE — SYR CONTRL LUERLOK 10CC

## (undated) DEVICE — SOLUTION IV IRRIG POUR BRL 0.9% SODIUM CHL 2F7124

## (undated) DEVICE — GLV SURG TRIUMPH MICRO PF LTX 7 STRL

## (undated) DEVICE — RESERVOIR,SUCTION,100CC,SILICONE: Brand: MEDLINE

## (undated) DEVICE — SUTURE PDS II SZ 0 L60IN ABSRB VLT L48MM CTX 1/2 CIR Z990G

## (undated) DEVICE — YANKAUER,OPEN TIP,W/O VENT,STERILE: Brand: MEDLINE INDUSTRIES, INC.

## (undated) DEVICE — GLV SURG PREMIERPRO MIC LTX PF SZ7 BRN

## (undated) DEVICE — SUTURE MCRYL + SZ 4-0 L18IN ABSRB UD L19MM PS-2 3/8 CIR MCP496G

## (undated) DEVICE — ULTRAVERSE 035 PTA DILATATION CATHETER 7.0MM X 80MM BALLOON, 130CM SHAFT: Brand: ULTRAVERSE® 035 PTA DILATATION CATHETER

## (undated) DEVICE — DESTINATION PERIPHERAL GUIDING SHEATH: Brand: DESTINATION

## (undated) DEVICE — FEMORAL ENTRY ANGIOGRAPHY SHIELD-ORANGE: Brand: RADPAD

## (undated) DEVICE — ANGIO-SEAL VIP VASCULAR CLOSURE DEVICE: Brand: ANGIO-SEAL

## (undated) DEVICE — STAPLER EXT SKIN 35 WIDE S STL STPL SQUEEZE HNDL VISISTAT

## (undated) DEVICE — SUTURE VCRL + SZ 3-0 L18IN ABSRB UD SH 1/2 CIR TAPERCUT NDL VCP864D

## (undated) DEVICE — FOGARTY ARTERIAL EMBOLECTOMY CATHETER 4F 80CM: Brand: FOGARTY

## (undated) DEVICE — ULTRAVERSE® 014 PTA BALLOON DILATATION CATHETER 2.5 MM X 100 MM, 150 CM CATHETER: Brand: ULTRAVERSE® 014

## (undated) DEVICE — CATH SUP PROC TRAILBLAZER .014IN 15MM 150CM

## (undated) DEVICE — INFLATION DEVICE: Brand: ENCORE™ 26

## (undated) DEVICE — DRAPE,TOP,102X53,STERILE: Brand: MEDLINE

## (undated) DEVICE — TOTAL TRAY, DB, 100% SILI FOLEY, 16FR 10: Brand: MEDLINE

## (undated) DEVICE — DRAPE,UNDERBUTTOCKS,PCH,STERILE: Brand: MEDLINE

## (undated) DEVICE — CVR PROB ULTRASND/TRANSD W/GEL 7X11IN STRL

## (undated) DEVICE — DRSNG SURG AQUACEL AG/ADVNTGE 9X25CM 3.5X10IN

## (undated) DEVICE — PUMP SUC IRR TBNG L10FT W/ HNDPC ASSEMB STRYKEFLOW 2

## (undated) DEVICE — ARM DRAPE

## (undated) DEVICE — SUTURE ABSORBABLE MONOFILAMENT 0 CTX 60 IN VIO PDS + PDP990G

## (undated) DEVICE — SOLUTION IRRIG 2000ML STRL H2O UROMATIC PLAS CONT USP

## (undated) DEVICE — SEALER ENDOSCP NANO COAT OPN DIV CRV L JAW LIGASURE IMPACT

## (undated) DEVICE — CATH GUIDE SOFTVU SELECT/V HT OMNI .035 4F 65CM

## (undated) DEVICE — REDUCER: Brand: ENDOWRIST

## (undated) DEVICE — RADIFOCUS GLIDECATH: Brand: GLIDECATH

## (undated) DEVICE — INTENDED FOR TISSUE SEPARATION, AND OTHER PROCEDURES THAT REQUIRE A SHARP SURGICAL BLADE TO PUNCTURE OR CUT.: Brand: BARD-PARKER ® STAINLESS STEEL BLADES

## (undated) DEVICE — TIBURON LAPAROSCOPIC CHOLECYSTECTOMY DRAPE: Brand: CONVERTORS

## (undated) DEVICE — STAPLER INT L28CM DIA29MM CLS STPL H10-2.5MM OPN LEG L5.5MM

## (undated) DEVICE — DRESSING FOAM W4XL12IN AG SIL ADH ANTIMIC POSTOP OPTIFOAM

## (undated) DEVICE — SUTURE PERMAHAND SZ 3-0 L18IN NONABSORBABLE BLK L26MM SH C013D

## (undated) DEVICE — COVER,TABLE,44X90,STERILE: Brand: MEDLINE

## (undated) DEVICE — Device

## (undated) DEVICE — RADIFOCUS GLIDEWIRE ADVANTAGE TRACK GUIDE WIRE: Brand: GLIDEWIRE ADVANTAGE TRACK

## (undated) DEVICE — DRSNG SURG AQUACEL AG/ADVNTGE 9X15CM 3.5X6IN

## (undated) DEVICE — PK ATS CUST W CARDIOTOMY RESEVOIR

## (undated) DEVICE — GOWN SIRUS NONREIN XL W/TWL: Brand: MEDLINE INDUSTRIES, INC.

## (undated) DEVICE — KT INTRO MINISTICK MAX W/GW NITNL/TUNG ECHO STFF 4F 21G 7CM

## (undated) DEVICE — PERCLOSE™ PROSTYLE™ SUTURE-MEDIATED CLOSURE AND REPAIR SYSTEM: Brand: PERCLOSE™ PROSTYLE™

## (undated) DEVICE — FOGARTY ARTERIAL EMBOLECTOMY CATHETER 2F 60CM: Brand: FOGARTY

## (undated) DEVICE — CVR PROB ULTRASND/TRANSD W/GEL 18X120CM STRL

## (undated) DEVICE — GW MICRO APPROACH CTO25 .014 300CM

## (undated) DEVICE — STAPLER INT CUT LN 51MM STPL 51MM BLU CRV HD B FRM

## (undated) DEVICE — SPONGE LAP W18XL18IN WHT COT 4 PLY FLD STRUNG RADPQ DISP ST 2 PER PACK

## (undated) DEVICE — TIP COVER ACCESSORY

## (undated) DEVICE — ADHS SKIN PREMIERPRO EXOFIN TOPICAL HI/VISC .5ML

## (undated) DEVICE — SI AVANTI+ 6F STD W/GW  NO OBT: Brand: AVANTI

## (undated) DEVICE — LEGGINGS, PAIR, 31X48, STERILE: Brand: MEDLINE

## (undated) DEVICE — COVER LT HNDL PLAS RIG 2 PER PK

## (undated) DEVICE — SPONGE LAP W18XL18IN WHT COT 4 PLY FLD STRUNG RADPQ DISP ST

## (undated) DEVICE — DRAPE,LAP,CHOLE,W/TROUGHS,STERILE: Brand: MEDLINE

## (undated) DEVICE — SUT SILK 2/0 TIES 18IN A185H

## (undated) DEVICE — 3M™ TEGADERM™ TRANSPARENT FILM DRESSING FRAME STYLE, 1627, 4 IN X 10 IN (10 CM X 25 CM), 20/CT 4CT/CASE: Brand: 3M™ TEGADERM™

## (undated) DEVICE — TRAY PREP DRY W/ PREM GLV 2 APPL 6 SPNG 2 UNDPD 1 OVERWRAP

## (undated) DEVICE — MEDI-VAC NON-CONDUCTIVE SUCTION TUBING: Brand: CARDINAL HEALTH

## (undated) DEVICE — SYRINGE, LOCKING, 10CC, STERILE: Brand: BABY GORILLA/GORILLA PLATING SYSTEM

## (undated) DEVICE — SUTURE PERMAHAND SZ 3-0 L30IN NONABSORBABLE BLK L26MM SH C017D

## (undated) DEVICE — SUT SILK 3/0 TIES 18IN A184H

## (undated) DEVICE — DRESSING FOAM W4XL4IN SIL FACE BORD ADH PD SUP ABSRB COR

## (undated) DEVICE — DESTINATION RENAL GUIDING SHEATH: Brand: DESTINATION

## (undated) DEVICE — SPONGE LAP W18XL18IN WHT STRUNG W/ RNG W/OUT LOOP RADPQ ST

## (undated) DEVICE — STAPLER INT L75MM CUT LN L73MM STPL LN L77MM BLU B FRM 8

## (undated) DEVICE — SEALER TISS L20CM DIA13MM ADV BPLR L CRV JAW OPN APPRCH

## (undated) DEVICE — RELOAD STPL 40MM H1.5-3.5MM WIRE 0.2MM REG TISS BLU CRV

## (undated) DEVICE — PINNACLE INTRODUCER SHEATH: Brand: PINNACLE

## (undated) DEVICE — GLOVE,SURG,SENSICARE SLT,LF,PF,7.5: Brand: MEDLINE

## (undated) DEVICE — TROCAR: Brand: KII FIOS FIRST ENTRY

## (undated) DEVICE — DRAIN SURG 15FR SIL RND CHN W/ TRCR FULL FLUT DBL WRP TRAD

## (undated) DEVICE — 1-PIECE DRAINABLE OSTOMY POUCH, SOFTFLEX: Brand: PREMIER

## (undated) DEVICE — DRESSING FOAM W4XL10IN AG SIL ADH ANTIMIC POSTOP OPTIFOAM

## (undated) DEVICE — SEAL

## (undated) DEVICE — SUTURE PERMAHAND SZ 2-0 L18IN NONABSORBABLE BLK L26MM SH C012D

## (undated) DEVICE — CANNULA SEAL

## (undated) DEVICE — POUCH STRL 8X3IN HTSL DL INDCTR VWPK LF

## (undated) DEVICE — DECANTER BAG 9": Brand: MEDLINE INDUSTRIES, INC.

## (undated) DEVICE — 1960 FOAM BLOCK NEEDLE COUNTER: Brand: DEVON

## (undated) DEVICE — SUTURE PERMAHAND SZ 2-0 L30IN NONABSORBABLE BLK SH L26MM C016D

## (undated) DEVICE — GOWN,REINF,POLY,AURORA,XLNG/XXL,STRL: Brand: MEDLINE

## (undated) DEVICE — [HIGH FLOW INSUFFLATOR,  DO NOT USE IF PACKAGE IS DAMAGED,  KEEP DRY,  KEEP AWAY FROM SUNLIGHT,  PROTECT FROM HEAT AND RADIOACTIVE SOURCES.]: Brand: PNEUMOSURE

## (undated) DEVICE — CATH IMG IVUS VISIONS PV .018 3.4F

## (undated) DEVICE — SOLUTION IRRIG 1000ML 0.9% SOD CHL USP POUR PLAS BTL

## (undated) DEVICE — CATH ASPIR EXPORT AP .014IN 6F140CM

## (undated) DEVICE — BLADELESS OBTURATOR: Brand: WECK VISTA

## (undated) DEVICE — RADIFOCUS GLIDEWIRE ADVANTAGE TRACK GUIDE: Brand: GLIDEWIRE ADVANTAGE TRACK

## (undated) DEVICE — INVIEW CLEAR LEGGINGS: Brand: CONVERTORS